# Patient Record
Sex: FEMALE | Race: WHITE | NOT HISPANIC OR LATINO | Employment: OTHER | URBAN - METROPOLITAN AREA
[De-identification: names, ages, dates, MRNs, and addresses within clinical notes are randomized per-mention and may not be internally consistent; named-entity substitution may affect disease eponyms.]

---

## 2017-02-06 ENCOUNTER — TRANSCRIBE ORDERS (OUTPATIENT)
Dept: ADMINISTRATIVE | Facility: HOSPITAL | Age: 72
End: 2017-02-06

## 2017-02-06 ENCOUNTER — LAB (OUTPATIENT)
Dept: LAB | Facility: HOSPITAL | Age: 72
End: 2017-02-06
Attending: INTERNAL MEDICINE

## 2017-02-06 DIAGNOSIS — K75.81 STEATOHEPATITIS, NON-ALCOHOLIC: ICD-10-CM

## 2017-02-06 DIAGNOSIS — K75.81 STEATOHEPATITIS, NON-ALCOHOLIC: Primary | ICD-10-CM

## 2017-02-06 LAB
ALBUMIN SERPL-MCNC: 4.3 G/DL (ref 3.5–5.2)
ALBUMIN/GLOB SERPL: 1.6 G/DL
ALP SERPL-CCNC: 105 U/L (ref 40–129)
ALT SERPL W P-5'-P-CCNC: 112 U/L (ref 5–33)
ANION GAP SERPL CALCULATED.3IONS-SCNC: 16.1 MMOL/L
AST SERPL-CCNC: 87 U/L (ref 5–32)
BILIRUB SERPL-MCNC: 1.1 MG/DL (ref 0.2–1.2)
BUN BLD-MCNC: 14 MG/DL (ref 8–23)
BUN/CREAT SERPL: 14.9 (ref 7–25)
CALCIUM SPEC-SCNC: 9.5 MG/DL (ref 8.8–10.5)
CHLORIDE SERPL-SCNC: 99 MMOL/L (ref 98–107)
CO2 SERPL-SCNC: 20.9 MMOL/L (ref 22–29)
CREAT BLD-MCNC: 0.94 MG/DL (ref 0.57–1)
GFR SERPL CREATININE-BSD FRML MDRD: 59 ML/MIN/1.73
GGT SERPL-CCNC: 254 U/L (ref 5–36)
GLOBULIN UR ELPH-MCNC: 2.7 GM/DL
GLUCOSE BLD-MCNC: 116 MG/DL (ref 65–99)
POTASSIUM BLD-SCNC: 4.2 MMOL/L (ref 3.5–5.2)
PROT SERPL-MCNC: 7 G/DL (ref 6–8.5)
SODIUM BLD-SCNC: 136 MMOL/L (ref 136–145)

## 2017-02-06 PROCEDURE — 36415 COLL VENOUS BLD VENIPUNCTURE: CPT

## 2017-02-06 PROCEDURE — 80053 COMPREHEN METABOLIC PANEL: CPT

## 2017-02-06 PROCEDURE — 82977 ASSAY OF GGT: CPT

## 2017-02-14 ENCOUNTER — OFFICE (OUTPATIENT)
Dept: URBAN - METROPOLITAN AREA OTHER 6 | Facility: OTHER | Age: 72
End: 2017-02-14
Payer: MEDICARE

## 2017-02-14 VITALS
HEIGHT: 66 IN | DIASTOLIC BLOOD PRESSURE: 80 MMHG | WEIGHT: 171 LBS | SYSTOLIC BLOOD PRESSURE: 139 MMHG | HEART RATE: 88 BPM

## 2017-02-14 DIAGNOSIS — Z01.89 ENCOUNTER FOR OTHER SPECIFIED SPECIAL EXAMINATIONS: ICD-10-CM

## 2017-02-14 DIAGNOSIS — K75.81 NONALCOHOLIC STEATOHEPATITIS (NASH): ICD-10-CM

## 2017-02-14 PROCEDURE — 99212 OFFICE O/P EST SF 10 MIN: CPT

## 2017-02-16 ENCOUNTER — TRANSCRIBE ORDERS (OUTPATIENT)
Dept: ADMINISTRATIVE | Facility: HOSPITAL | Age: 72
End: 2017-02-16

## 2017-02-16 DIAGNOSIS — Z12.39 SCREENING BREAST EXAMINATION: Primary | ICD-10-CM

## 2017-02-17 ENCOUNTER — HOSPITAL ENCOUNTER (OUTPATIENT)
Dept: MAMMOGRAPHY | Facility: HOSPITAL | Age: 72
Discharge: HOME | End: 2017-02-17
Attending: INTERNAL MEDICINE | Admitting: INTERNAL MEDICINE

## 2017-02-17 DIAGNOSIS — Z12.31 ENCOUNTER FOR MAMMOGRAM TO ESTABLISH BASELINE MAMMOGRAM: ICD-10-CM

## 2017-02-17 DIAGNOSIS — Z12.39 SCREENING BREAST EXAMINATION: ICD-10-CM

## 2017-02-17 PROCEDURE — 77063 BREAST TOMOSYNTHESIS BI: CPT

## 2017-02-17 PROCEDURE — G0202 SCR MAMMO BI INCL CAD: HCPCS

## 2017-04-14 ENCOUNTER — TRANSCRIBE ORDERS (OUTPATIENT)
Dept: ADMINISTRATIVE | Facility: HOSPITAL | Age: 72
End: 2017-04-14

## 2017-04-14 ENCOUNTER — LAB (OUTPATIENT)
Dept: LAB | Facility: HOSPITAL | Age: 72
End: 2017-04-14
Attending: INTERNAL MEDICINE

## 2017-04-14 DIAGNOSIS — K75.81 NONALCOHOLIC STEATOHEPATITIS: ICD-10-CM

## 2017-04-14 DIAGNOSIS — Z01.89 RADIOLOGICAL EXAMINATION, NOT ELSEWHERE CLASSIFIED: Primary | ICD-10-CM

## 2017-04-14 DIAGNOSIS — Z01.89 RADIOLOGICAL EXAMINATION, NOT ELSEWHERE CLASSIFIED: ICD-10-CM

## 2017-04-14 LAB
ALBUMIN SERPL-MCNC: 4.4 G/DL (ref 3.5–5.2)
ALBUMIN/GLOB SERPL: 1.6 G/DL
ALP SERPL-CCNC: 87 U/L (ref 40–129)
ALT SERPL W P-5'-P-CCNC: 74 U/L (ref 5–33)
ANION GAP SERPL CALCULATED.3IONS-SCNC: 13.9 MMOL/L
AST SERPL-CCNC: 67 U/L (ref 5–32)
BILIRUB SERPL-MCNC: 1 MG/DL (ref 0.2–1.2)
BUN BLD-MCNC: 12 MG/DL (ref 8–23)
BUN/CREAT SERPL: 14.1 (ref 7–25)
CALCIUM SPEC-SCNC: 9.8 MG/DL (ref 8.8–10.5)
CHLORIDE SERPL-SCNC: 103 MMOL/L (ref 98–107)
CO2 SERPL-SCNC: 23.1 MMOL/L (ref 22–29)
CREAT BLD-MCNC: 0.85 MG/DL (ref 0.57–1)
GFR SERPL CREATININE-BSD FRML MDRD: 66 ML/MIN/1.73
GLOBULIN UR ELPH-MCNC: 2.7 GM/DL
GLUCOSE BLD-MCNC: 107 MG/DL (ref 65–99)
POTASSIUM BLD-SCNC: 4.3 MMOL/L (ref 3.5–5.2)
PROT SERPL-MCNC: 7.1 G/DL (ref 6–8.5)
SODIUM BLD-SCNC: 140 MMOL/L (ref 136–145)

## 2017-04-14 PROCEDURE — 36415 COLL VENOUS BLD VENIPUNCTURE: CPT

## 2017-04-14 PROCEDURE — 80053 COMPREHEN METABOLIC PANEL: CPT

## 2017-04-19 ENCOUNTER — TRANSCRIBE ORDERS (OUTPATIENT)
Dept: ADMINISTRATIVE | Facility: HOSPITAL | Age: 72
End: 2017-04-19

## 2017-04-19 DIAGNOSIS — M89.9 DISORDER OF BONE, UNSPECIFIED: Primary | ICD-10-CM

## 2017-04-28 ENCOUNTER — APPOINTMENT (OUTPATIENT)
Dept: BONE DENSITY | Facility: HOSPITAL | Age: 72
End: 2017-04-28
Attending: INTERNAL MEDICINE

## 2017-04-28 DIAGNOSIS — M89.9 DISORDER OF BONE, UNSPECIFIED: ICD-10-CM

## 2017-04-28 PROCEDURE — 77080 DXA BONE DENSITY AXIAL: CPT

## 2017-06-06 ENCOUNTER — OFFICE (OUTPATIENT)
Dept: URBAN - METROPOLITAN AREA OTHER 6 | Facility: OTHER | Age: 72
End: 2017-06-06

## 2017-06-06 VITALS
HEART RATE: 75 BPM | WEIGHT: 155 LBS | DIASTOLIC BLOOD PRESSURE: 84 MMHG | SYSTOLIC BLOOD PRESSURE: 142 MMHG | HEIGHT: 66 IN

## 2017-06-06 DIAGNOSIS — K22.70 BARRETT'S ESOPHAGUS WITHOUT DYSPLASIA: ICD-10-CM

## 2017-06-06 DIAGNOSIS — K75.81 NONALCOHOLIC STEATOHEPATITIS (NASH): ICD-10-CM

## 2017-06-06 PROCEDURE — 99212 OFFICE O/P EST SF 10 MIN: CPT

## 2017-06-08 ENCOUNTER — TRANSCRIBE ORDERS (OUTPATIENT)
Dept: ADMINISTRATIVE | Facility: HOSPITAL | Age: 72
End: 2017-06-08

## 2017-06-08 ENCOUNTER — LAB (OUTPATIENT)
Dept: LAB | Facility: HOSPITAL | Age: 72
End: 2017-06-08
Attending: INTERNAL MEDICINE

## 2017-06-08 DIAGNOSIS — K75.81 NONALCOHOLIC STEATOHEPATITIS: ICD-10-CM

## 2017-06-08 DIAGNOSIS — K75.81 NONALCOHOLIC STEATOHEPATITIS: Primary | ICD-10-CM

## 2017-06-08 DIAGNOSIS — K22.70 SHORT-SEGMENT BARRETT'S ESOPHAGUS: ICD-10-CM

## 2017-06-08 LAB
ALBUMIN SERPL-MCNC: 4.2 G/DL (ref 3.5–5.2)
ALBUMIN/GLOB SERPL: 1.8 G/DL
ALP SERPL-CCNC: 79 U/L (ref 40–129)
ALT SERPL W P-5'-P-CCNC: 35 U/L (ref 5–33)
ANION GAP SERPL CALCULATED.3IONS-SCNC: 13.3 MMOL/L
AST SERPL-CCNC: 33 U/L (ref 5–32)
BILIRUB SERPL-MCNC: 0.9 MG/DL (ref 0.2–1.2)
BUN BLD-MCNC: 16 MG/DL (ref 8–23)
BUN/CREAT SERPL: 21.9 (ref 7–25)
CALCIUM SPEC-SCNC: 8.8 MG/DL (ref 8.8–10.5)
CHLORIDE SERPL-SCNC: 101 MMOL/L (ref 98–107)
CO2 SERPL-SCNC: 21.7 MMOL/L (ref 22–29)
CREAT BLD-MCNC: 0.73 MG/DL (ref 0.57–1)
GFR SERPL CREATININE-BSD FRML MDRD: 79 ML/MIN/1.73
GLOBULIN UR ELPH-MCNC: 2.3 GM/DL
GLUCOSE BLD-MCNC: 100 MG/DL (ref 65–99)
POTASSIUM BLD-SCNC: 4.5 MMOL/L (ref 3.5–5.2)
PROT SERPL-MCNC: 6.5 G/DL (ref 6–8.5)
SODIUM BLD-SCNC: 136 MMOL/L (ref 136–145)

## 2017-06-08 PROCEDURE — 80053 COMPREHEN METABOLIC PANEL: CPT

## 2017-06-08 PROCEDURE — 36415 COLL VENOUS BLD VENIPUNCTURE: CPT

## 2017-07-10 ENCOUNTER — TRANSCRIBE ORDERS (OUTPATIENT)
Dept: ADMINISTRATIVE | Facility: HOSPITAL | Age: 72
End: 2017-07-10

## 2017-07-14 ENCOUNTER — TRANSCRIBE ORDERS (OUTPATIENT)
Dept: ADMINISTRATIVE | Facility: HOSPITAL | Age: 72
End: 2017-07-14

## 2017-07-17 ENCOUNTER — TRANSCRIBE ORDERS (OUTPATIENT)
Dept: ADMINISTRATIVE | Facility: HOSPITAL | Age: 72
End: 2017-07-17

## 2017-07-17 DIAGNOSIS — I70.8 ATHEROSCLEROSIS OF OTHER SPECIFIED ARTERIES: Primary | ICD-10-CM

## 2017-07-20 ENCOUNTER — HOSPITAL ENCOUNTER (OUTPATIENT)
Dept: ULTRASOUND IMAGING | Facility: HOSPITAL | Age: 72
Discharge: HOME | End: 2017-07-20
Attending: INTERNAL MEDICINE | Admitting: INTERNAL MEDICINE

## 2017-07-20 DIAGNOSIS — I70.8 ATHEROSCLEROSIS OF OTHER SPECIFIED ARTERIES: ICD-10-CM

## 2017-07-20 PROCEDURE — 93880 EXTRACRANIAL BILAT STUDY: CPT

## 2017-12-05 ENCOUNTER — TRANSCRIBE ORDERS (OUTPATIENT)
Dept: ADMINISTRATIVE | Facility: HOSPITAL | Age: 72
End: 2017-12-05

## 2017-12-05 ENCOUNTER — LAB (OUTPATIENT)
Dept: LAB | Facility: HOSPITAL | Age: 72
End: 2017-12-05
Attending: INTERNAL MEDICINE

## 2017-12-05 DIAGNOSIS — K75.81 STEATOHEPATITIS, NON-ALCOHOLIC: ICD-10-CM

## 2017-12-05 DIAGNOSIS — K75.81 STEATOHEPATITIS, NON-ALCOHOLIC: Primary | ICD-10-CM

## 2017-12-05 LAB
ALBUMIN SERPL-MCNC: 4 G/DL (ref 3.5–5.2)
ALBUMIN/GLOB SERPL: 1.5 G/DL
ALP SERPL-CCNC: 89 U/L (ref 40–129)
ALT SERPL W P-5'-P-CCNC: 19 U/L (ref 5–33)
ANION GAP SERPL CALCULATED.3IONS-SCNC: 11.3 MMOL/L
AST SERPL-CCNC: 18 U/L (ref 5–32)
BILIRUB SERPL-MCNC: 0.5 MG/DL (ref 0.2–1.2)
BUN BLD-MCNC: 21 MG/DL (ref 8–23)
BUN/CREAT SERPL: 25.6 (ref 7–25)
CALCIUM SPEC-SCNC: 8.8 MG/DL (ref 8.8–10.5)
CHLORIDE SERPL-SCNC: 103 MMOL/L (ref 98–107)
CO2 SERPL-SCNC: 24.7 MMOL/L (ref 22–29)
CREAT BLD-MCNC: 0.82 MG/DL (ref 0.57–1)
GFR SERPL CREATININE-BSD FRML MDRD: 69 ML/MIN/1.73
GLOBULIN UR ELPH-MCNC: 2.6 GM/DL
GLUCOSE BLD-MCNC: 100 MG/DL (ref 65–99)
POTASSIUM BLD-SCNC: 4.4 MMOL/L (ref 3.5–5.2)
PROT SERPL-MCNC: 6.6 G/DL (ref 6–8.5)
SODIUM BLD-SCNC: 139 MMOL/L (ref 136–145)

## 2017-12-05 PROCEDURE — 36415 COLL VENOUS BLD VENIPUNCTURE: CPT

## 2017-12-05 PROCEDURE — 80053 COMPREHEN METABOLIC PANEL: CPT

## 2017-12-07 ENCOUNTER — HOSPITAL ENCOUNTER (OUTPATIENT)
Dept: NUCLEAR MEDICINE | Facility: HOSPITAL | Age: 72
Discharge: HOME | End: 2017-12-07
Attending: UROLOGY

## 2017-12-07 ENCOUNTER — OFFICE (OUTPATIENT)
Dept: URBAN - METROPOLITAN AREA OTHER 6 | Facility: OTHER | Age: 72
End: 2017-12-07

## 2017-12-07 VITALS
HEART RATE: 84 BPM | WEIGHT: 153 LBS | HEIGHT: 66 IN | DIASTOLIC BLOOD PRESSURE: 84 MMHG | SYSTOLIC BLOOD PRESSURE: 144 MMHG

## 2017-12-07 DIAGNOSIS — K75.81 NONALCOHOLIC STEATOHEPATITIS (NASH): ICD-10-CM

## 2017-12-07 DIAGNOSIS — K22.70 BARRETT'S ESOPHAGUS WITHOUT DYSPLASIA: ICD-10-CM

## 2017-12-07 DIAGNOSIS — N13.30 HYDRONEPHROSIS, UNSPECIFIED HYDRONEPHROSIS TYPE: ICD-10-CM

## 2017-12-07 PROCEDURE — A9562 TC99M MERTIATIDE: HCPCS | Performed by: UROLOGY

## 2017-12-07 PROCEDURE — 25010000002 FUROSEMIDE PER 20 MG: Performed by: UROLOGY

## 2017-12-07 PROCEDURE — 0 TECHNETIUM MERTIATIDE: Performed by: UROLOGY

## 2017-12-07 PROCEDURE — 78708 K FLOW/FUNCT IMAGE W/DRUG: CPT

## 2017-12-07 PROCEDURE — 99212 OFFICE O/P EST SF 10 MIN: CPT

## 2017-12-07 RX ORDER — FUROSEMIDE 10 MG/ML
34.5 INJECTION INTRAMUSCULAR; INTRAVENOUS
Status: COMPLETED | OUTPATIENT
Start: 2017-12-07 | End: 2017-12-07

## 2017-12-07 RX ADMIN — FUROSEMIDE 34.5 MG: 10 INJECTION, SOLUTION INTRAMUSCULAR; INTRAVENOUS at 11:30

## 2017-12-07 RX ADMIN — TECHNESCAN TC 99M MERTIATIDE 1 DOSE: 1 INJECTION, POWDER, LYOPHILIZED, FOR SOLUTION INTRAVENOUS at 11:30

## 2017-12-11 ENCOUNTER — TRANSCRIBE ORDERS (OUTPATIENT)
Dept: ADMINISTRATIVE | Facility: HOSPITAL | Age: 72
End: 2017-12-11

## 2017-12-11 ENCOUNTER — HOSPITAL ENCOUNTER (OUTPATIENT)
Dept: GENERAL RADIOLOGY | Facility: HOSPITAL | Age: 72
Discharge: HOME | End: 2017-12-11
Attending: UROLOGY | Admitting: UROLOGY

## 2017-12-11 DIAGNOSIS — N20.0 KIDNEY STONE: Primary | ICD-10-CM

## 2017-12-11 DIAGNOSIS — N20.0 KIDNEY STONE: ICD-10-CM

## 2017-12-11 PROCEDURE — 74000 HC ABDOMEN KUB: CPT

## 2018-01-17 ENCOUNTER — TRANSCRIBE ORDERS (OUTPATIENT)
Dept: ADMINISTRATIVE | Facility: HOSPITAL | Age: 73
End: 2018-01-17

## 2018-01-17 DIAGNOSIS — Z12.31 VISIT FOR SCREENING MAMMOGRAM: Primary | ICD-10-CM

## 2018-02-19 ENCOUNTER — HOSPITAL ENCOUNTER (OUTPATIENT)
Dept: MAMMOGRAPHY | Facility: HOSPITAL | Age: 73
Discharge: HOME | End: 2018-02-19
Attending: INTERNAL MEDICINE | Admitting: INTERNAL MEDICINE

## 2018-02-19 DIAGNOSIS — Z12.31 VISIT FOR SCREENING MAMMOGRAM: ICD-10-CM

## 2018-02-19 PROCEDURE — 77063 BREAST TOMOSYNTHESIS BI: CPT

## 2018-02-19 PROCEDURE — 77067 SCR MAMMO BI INCL CAD: CPT

## 2018-06-04 ENCOUNTER — OFFICE VISIT (OUTPATIENT)
Dept: GASTROENTEROLOGY | Facility: CLINIC | Age: 73
End: 2018-06-04

## 2018-06-04 VITALS
DIASTOLIC BLOOD PRESSURE: 86 MMHG | HEIGHT: 66 IN | WEIGHT: 147 LBS | BODY MASS INDEX: 23.63 KG/M2 | SYSTOLIC BLOOD PRESSURE: 138 MMHG

## 2018-06-04 DIAGNOSIS — K75.81 NASH (NONALCOHOLIC STEATOHEPATITIS): ICD-10-CM

## 2018-06-04 DIAGNOSIS — K22.70 BARRETT'S ESOPHAGUS WITHOUT DYSPLASIA: Primary | ICD-10-CM

## 2018-06-04 PROCEDURE — 99213 OFFICE O/P EST LOW 20 MIN: CPT | Performed by: INTERNAL MEDICINE

## 2018-06-04 RX ORDER — OMEPRAZOLE 40 MG/1
40 CAPSULE, DELAYED RELEASE ORAL DAILY
Qty: 90 CAPSULE | Refills: 3 | Status: SHIPPED | OUTPATIENT
Start: 2018-06-04 | End: 2019-08-12 | Stop reason: SDUPTHER

## 2018-06-04 NOTE — PROGRESS NOTES
PATIENT INFORMATION  Germaine Gonzalez       - 1945    CHIEF COMPLAINT  Chief Complaint   Patient presents with   • Elevated Hepatic Enzymes     6 mo follow up       HISTORY OF PRESENT ILLNESS  Follow up from [facility] but her last enzymes were in December and has seen [facility] and had labs but her LFTs weren't mentioned.  Has had nocturnal awakening with vomiting all brief and recurrent and c/w rank Gerd. 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 The current episode started more than 1 year ago. The onset quality is undetermined. The problem occurs intermittently. Duration: Minutes. The problem has been resolved. Pain location: Base of neck. The pain is moderate. The quality of the pain is burning. Associated symptoms include vomiting and weight loss. She has tried H2 blockers for the symptoms. Prior diagnostic workup includes upper endoscopy.           REVIEW OF SYSTEMS  Review of Systems   Constitutional: Positive for weight loss.   Gastrointestinal: Positive for abdominal pain and vomiting.        Reflux   All other systems reviewed and are negative.        ACTIVE PROBLEMS  Patient Active Problem List    Diagnosis   • Gastroesophageal reflux disease [K21.9]   • Hyperlipidemia [E78.5]         PAST MEDICAL HISTORY  Past Medical History:   Diagnosis Date   • Arthritis     knees   • Cancer     cervical   • Cervical cancer    • Eczema    • Elevated liver enzymes     PCP has stopped statin   • GERD (gastroesophageal reflux disease)    • H/O stress fracture    • Health care maintenance    • Hypercholesteremia    • Kidney stones     sched ureteroscopy, lithotripsy   • Ocular migraine    • Postmenopausal    • Seasonal allergies    • Sjogren's syndrome          SURGICAL HISTORY  Past Surgical History:   Procedure Laterality Date   •  SECTION      x2   • COLONOSCOPY     • CYSTOSCOPY URETEROSCOPY STONE MANIPULATION/EXTRACTION Left 10/14/2016     Procedure: LEFT URETEROSCOPY bilateral retrograde pyleogram LEFT STENT PLACEMENT.;  Surgeon: Julius Darling MD;  Location:  [facility] OR;  Service:    • HYSTERECTOMY     • UPPER GASTROINTESTINAL ENDOSCOPY     • URETEROSCOPY LASER LITHOTRIPSY WITH STENT INSERTION Left 10/26/2016    Procedure: LT URETEROSCOPY LASER LITHOTRIPSY ;  Surgeon: Julius Darling MD;  [facility] MAIN OR;  Service:          FAMILY HISTORY  Family History   Problem Relation Age of Onset   • Anxiety disorder Mother    • Bipolar disorder Mother    • Stroke Mother    • Thyroid disease Mother    • Thyroid disease Brother    • Breast cancer Neg Hx          SOCIAL HISTORY  Social History     Occupational History   • Not on file.     Social History Main Topics   • Smoking status: Former Smoker     Packs/day: 0.50     Years: 16.00     Types: Cigarettes     Quit date: 04/1990   • Smokeless tobacco: Never Used   • Alcohol use Yes      Comment: socially   • Drug use: No   • Sexual activity: Defer         CURRENT MEDICATIONS    Current Outpatient Prescriptions:   •  acetaminophen (TYLENOL) 325 MG tablet, Take 650 mg by mouth Every 6 (Six) Hours As Needed for mild pain (1-3)., Disp: , Rfl:   •  calcium carbonate (OS-ULI) 600 MG tablet, Take 600 mg by mouth 2 (Two) Times a Day With Meals., Disp: , Rfl:   •  cycloSPORINE (RESTASIS) 0.05 % ophthalmic emulsion, Administer 1 drop to both eyes 2 (Two) Times a Day., Disp: , Rfl:   •  desoximetasone (TOPICORT) 0.25 % cream, Apply 1 application topically As Needed., Disp: , Rfl:   •  escitalopram (LEXAPRO) 10 MG tablet, Take 5 mg by mouth Every Morning., Disp: , Rfl:   •  fluticasone (FLONASE) 50 MCG/ACT nasal spray, 2 sprays into each nostril Daily., Disp: , Rfl:   •  glucosamine-chondroitin 500-400 MG capsule capsule, Take 1 capsule by mouth Daily., Disp: , Rfl:   •  nitrofurantoin (MACRODANTIN) 100 MG capsule, Take 100 mg by mouth Every Night., Disp: , Rfl:   •  omeprazole (PriLOSEC) 40 MG capsule, Take 40  "mg by mouth Daily., Disp: , Rfl:   •  omeprazole (PRILOSEC) 40 MG capsule, Take 1 capsule by mouth Daily., Disp: 90 capsule, Rfl: 3  •  Probiotic Product (PROBIOTIC DAILY PO), Take 1 tablet by mouth Daily., Disp: , Rfl:   •  vitamin B-12 (CYANOCOBALAMIN) 1000 MCG tablet, Take 5,000 mcg by mouth Daily., Disp: , Rfl:     ALLERGIES  Levaquin [levofloxacin] and Tetracycline    VITALS  Vitals:    06/04/18 1315   BP: 138/86   Weight: 66.7 kg (147 lb)   Height: 167.6 cm (65.98\")       LAST RESULTS   Lab on 12/05/2017   Component Date Value Ref Range Status   • Glucose 12/05/2017 100* 65 - 99 mg/dL Final   • BUN 12/05/2017 21  8 - 23 mg/dL Final   • Creatinine 12/05/2017 0.82  0.57 - 1.00 mg/dL Final   • Sodium 12/05/2017 139  136 - 145 mmol/L Final   • Potassium 12/05/2017 4.4  3.5 - 5.2 mmol/L Final   • Chloride 12/05/2017 103  98 - 107 mmol/L Final   • CO2 12/05/2017 24.7  22.0 - 29.0 mmol/L Final   • Calcium 12/05/2017 8.8  8.8 - 10.5 mg/dL Final   • Total Protein 12/05/2017 6.6  6.0 - 8.5 g/dL Final   • Albumin 12/05/2017 4.00  3.50 - 5.20 g/dL Final   • ALT (SGPT) 12/05/2017 19  5 - 33 U/L Final   • AST (SGOT) 12/05/2017 18  5 - 32 U/L Final   • Alkaline Phosphatase 12/05/2017 89  40 - 129 U/L Final   • Total Bilirubin 12/05/2017 0.5  0.2 - 1.2 mg/dL Final   • eGFR Non African Amer 12/05/2017 69  >60 mL/min/1.73 Final   • Globulin 12/05/2017 2.6  gm/dL Final   • A/G Ratio 12/05/2017 1.5  g/dL Final   • BUN/Creatinine Ratio 12/05/2017 25.6* 7.0 - 25.0 Final   • Anion Gap 12/05/2017 11.3  mmol/L Final     No results found.    PHYSICAL EXAM  Physical Exam   Constitutional: She is oriented to person, place, and time. She appears well-developed and well-nourished.   HENT:   Head: Normocephalic.   Mouth/Throat: Oropharynx is clear and moist.   Eyes: Conjunctivae and EOM are normal. Pupils are equal, round, and reactive to light. No scleral icterus.   Neck: Normal range of motion. Neck supple. No thyromegaly present. "   Cardiovascular: Normal rate, regular rhythm, normal heart sounds and intact distal pulses.  Exam reveals no gallop.    No murmur heard.  Pulmonary/Chest: Effort normal and breath sounds normal. She has no wheezes. She has no rales.   Abdominal: Soft. Bowel sounds are normal. She exhibits no shifting dullness, no distension, no fluid wave, no abdominal bruit, no ascites and no mass. There is no hepatosplenomegaly. There is tenderness in the epigastric area. There is no guarding and negative García's sign. Hernia confirmed negative in the ventral area.   Musculoskeletal: Normal range of motion. She exhibits no edema.   Lymphadenopathy:     She has no cervical adenopathy.   Neurological: She is alert and oriented to person, place, and time.   Skin: Skin is warm and dry. No rash noted. She is not diaphoretic. No erythema.   Psychiatric: She has a normal mood and affect. Her behavior is normal.       ASSESSMENT  Diagnoses and all orders for this visit:    Mcgill's esophagus without dysplasia    -  Needs recall for EGD in 3/2019    [facility] (nonalcoholic steatohepatitis)    -  Will get labs from [facility]    Other orders  -     omeprazole (PRILOSEC) 40 MG capsule; Take 1 capsule by mouth Daily.          PLAN  Return in about 2 months (around 8/4/2018).

## 2018-07-31 ENCOUNTER — APPOINTMENT (OUTPATIENT)
Dept: CT IMAGING | Facility: HOSPITAL | Age: 73
End: 2018-07-31

## 2018-07-31 ENCOUNTER — HOSPITAL ENCOUNTER (EMERGENCY)
Facility: HOSPITAL | Age: 73
Discharge: HOME | End: 2018-08-01
Attending: EMERGENCY MEDICINE | Admitting: EMERGENCY MEDICINE

## 2018-07-31 DIAGNOSIS — N10 PYELONEPHRITIS, ACUTE: Primary | ICD-10-CM

## 2018-07-31 DIAGNOSIS — N13.4 HYDROURETER ON RIGHT: ICD-10-CM

## 2018-07-31 DIAGNOSIS — N13.30 HYDRONEPHROSIS OF RIGHT KIDNEY: ICD-10-CM

## 2018-07-31 DIAGNOSIS — N13.5 URETERAL STRICTURE, RIGHT: ICD-10-CM

## 2018-07-31 LAB
ANION GAP SERPL CALCULATED.3IONS-SCNC: 14.5 MMOL/L
BACTERIA UR QL AUTO: ABNORMAL /HPF
BASOPHILS # BLD AUTO: 0.06 10*3/MM3 (ref 0–0.2)
BASOPHILS NFR BLD AUTO: 0.5 % (ref 0–2)
BILIRUB UR QL STRIP: NEGATIVE
BUN BLD-MCNC: 18 MG/DL (ref 8–23)
BUN/CREAT SERPL: 16.2 (ref 7–25)
CALCIUM SPEC-SCNC: 9.7 MG/DL (ref 8.8–10.5)
CHLORIDE SERPL-SCNC: 102 MMOL/L (ref 98–107)
CLARITY UR: ABNORMAL
CO2 SERPL-SCNC: 24.5 MMOL/L (ref 22–29)
COLOR UR: ABNORMAL
CREAT BLD-MCNC: 1.11 MG/DL (ref 0.57–1)
DEPRECATED RDW RBC AUTO: 43.6 FL (ref 37–54)
EOSINOPHIL # BLD AUTO: 0.13 10*3/MM3 (ref 0.1–0.3)
EOSINOPHIL NFR BLD AUTO: 1.1 % (ref 0–4)
ERYTHROCYTE [DISTWIDTH] IN BLOOD BY AUTOMATED COUNT: 13 % (ref 11.5–14.5)
GFR SERPL CREATININE-BSD FRML MDRD: 48 ML/MIN/1.73
GLUCOSE BLD-MCNC: 130 MG/DL (ref 65–99)
GLUCOSE UR STRIP-MCNC: NEGATIVE MG/DL
HCT VFR BLD AUTO: 42 % (ref 37–47)
HGB BLD-MCNC: 14.4 G/DL (ref 12–16)
HGB UR QL STRIP.AUTO: ABNORMAL
HYALINE CASTS UR QL AUTO: ABNORMAL /LPF
IMM GRANULOCYTES # BLD: 0.04 10*3/MM3 (ref 0–0.03)
IMM GRANULOCYTES NFR BLD: 0.3 % (ref 0–0.5)
KETONES UR QL STRIP: ABNORMAL
LEUKOCYTE ESTERASE UR QL STRIP.AUTO: ABNORMAL
LYMPHOCYTES # BLD AUTO: 1.5 10*3/MM3 (ref 0.6–4.8)
LYMPHOCYTES NFR BLD AUTO: 12.6 % (ref 20–45)
MCH RBC QN AUTO: 31.6 PG (ref 27–31)
MCHC RBC AUTO-ENTMCNC: 34.3 G/DL (ref 31–37)
MCV RBC AUTO: 92.3 FL (ref 81–99)
MONOCYTES # BLD AUTO: 0.3 10*3/MM3 (ref 0–1)
MONOCYTES NFR BLD AUTO: 2.5 % (ref 3–8)
NEUTROPHILS # BLD AUTO: 9.91 10*3/MM3 (ref 1.5–8.3)
NEUTROPHILS NFR BLD AUTO: 83 % (ref 45–70)
NITRITE UR QL STRIP: POSITIVE
NRBC BLD MANUAL-RTO: 0 /100 WBC (ref 0–0)
PH UR STRIP.AUTO: 5.5 [PH] (ref 4.5–8)
PLATELET # BLD AUTO: 258 10*3/MM3 (ref 140–500)
PMV BLD AUTO: 9.3 FL (ref 7.4–10.4)
POTASSIUM BLD-SCNC: 3.7 MMOL/L (ref 3.5–5.2)
PROT UR QL STRIP: ABNORMAL
RBC # BLD AUTO: 4.55 10*6/MM3 (ref 4.2–5.4)
RBC # UR: ABNORMAL /HPF
REF LAB TEST METHOD: ABNORMAL
SODIUM BLD-SCNC: 141 MMOL/L (ref 136–145)
SP GR UR STRIP: 1.02 (ref 1–1.03)
SQUAMOUS #/AREA URNS HPF: ABNORMAL /HPF
UROBILINOGEN UR QL STRIP: ABNORMAL
WBC NRBC COR # BLD: 11.94 10*3/MM3 (ref 4.8–10.8)
WBC UR QL AUTO: ABNORMAL /HPF

## 2018-07-31 PROCEDURE — 87086 URINE CULTURE/COLONY COUNT: CPT | Performed by: EMERGENCY MEDICINE

## 2018-07-31 PROCEDURE — 87077 CULTURE AEROBIC IDENTIFY: CPT | Performed by: EMERGENCY MEDICINE

## 2018-07-31 PROCEDURE — 99284 EMERGENCY DEPT VISIT MOD MDM: CPT | Performed by: EMERGENCY MEDICINE

## 2018-07-31 PROCEDURE — 25010000002 MORPHINE PER 10 MG: Performed by: EMERGENCY MEDICINE

## 2018-07-31 PROCEDURE — 25010000002 ONDANSETRON PER 1 MG: Performed by: EMERGENCY MEDICINE

## 2018-07-31 PROCEDURE — 25010000002 FENTANYL CITRATE (PF) 100 MCG/2ML SOLUTION: Performed by: EMERGENCY MEDICINE

## 2018-07-31 PROCEDURE — 25010000002 PROMETHAZINE PER 50 MG: Performed by: EMERGENCY MEDICINE

## 2018-07-31 PROCEDURE — 96375 TX/PRO/DX INJ NEW DRUG ADDON: CPT

## 2018-07-31 PROCEDURE — 74176 CT ABD & PELVIS W/O CONTRAST: CPT

## 2018-07-31 PROCEDURE — 81001 URINALYSIS AUTO W/SCOPE: CPT | Performed by: EMERGENCY MEDICINE

## 2018-07-31 PROCEDURE — 87186 SC STD MICRODIL/AGAR DIL: CPT | Performed by: EMERGENCY MEDICINE

## 2018-07-31 PROCEDURE — 80048 BASIC METABOLIC PNL TOTAL CA: CPT | Performed by: EMERGENCY MEDICINE

## 2018-07-31 PROCEDURE — 99284 EMERGENCY DEPT VISIT MOD MDM: CPT

## 2018-07-31 PROCEDURE — 85025 COMPLETE CBC W/AUTO DIFF WBC: CPT | Performed by: EMERGENCY MEDICINE

## 2018-07-31 RX ORDER — EZETIMIBE 10 MG/1
10 TABLET ORAL DAILY
COMMUNITY
End: 2018-09-05

## 2018-07-31 RX ORDER — FENTANYL CITRATE 50 UG/ML
50 INJECTION, SOLUTION INTRAMUSCULAR; INTRAVENOUS ONCE
Status: COMPLETED | OUTPATIENT
Start: 2018-07-31 | End: 2018-07-31

## 2018-07-31 RX ORDER — SODIUM CHLORIDE 0.9 % (FLUSH) 0.9 %
10 SYRINGE (ML) INJECTION AS NEEDED
Status: DISCONTINUED | OUTPATIENT
Start: 2018-07-31 | End: 2018-08-01 | Stop reason: HOSPADM

## 2018-07-31 RX ORDER — MORPHINE SULFATE 10 MG/ML
4 INJECTION INTRAMUSCULAR; INTRAVENOUS; SUBCUTANEOUS ONCE
Status: COMPLETED | OUTPATIENT
Start: 2018-07-31 | End: 2018-07-31

## 2018-07-31 RX ORDER — ONDANSETRON 2 MG/ML
4 INJECTION INTRAMUSCULAR; INTRAVENOUS ONCE
Status: COMPLETED | OUTPATIENT
Start: 2018-07-31 | End: 2018-07-31

## 2018-07-31 RX ORDER — PROMETHAZINE HYDROCHLORIDE 25 MG/ML
12.5 INJECTION, SOLUTION INTRAMUSCULAR; INTRAVENOUS ONCE
Status: COMPLETED | OUTPATIENT
Start: 2018-07-31 | End: 2018-07-31

## 2018-07-31 RX ORDER — ONDANSETRON 8 MG/1
8 TABLET, ORALLY DISINTEGRATING ORAL AS NEEDED
COMMUNITY
End: 2018-08-05 | Stop reason: HOSPADM

## 2018-07-31 RX ADMIN — PROMETHAZINE HYDROCHLORIDE 12.5 MG: 25 INJECTION INTRAMUSCULAR; INTRAVENOUS at 23:56

## 2018-07-31 RX ADMIN — FENTANYL CITRATE 50 MCG: 50 INJECTION INTRAMUSCULAR; INTRAVENOUS at 23:55

## 2018-07-31 RX ADMIN — MORPHINE SULFATE 4 MG: 10 INJECTION INTRAVENOUS at 23:23

## 2018-07-31 RX ADMIN — ONDANSETRON 4 MG: 2 INJECTION INTRAMUSCULAR; INTRAVENOUS at 23:23

## 2018-08-01 VITALS
HEART RATE: 81 BPM | BODY MASS INDEX: 24.16 KG/M2 | DIASTOLIC BLOOD PRESSURE: 71 MMHG | OXYGEN SATURATION: 98 % | SYSTOLIC BLOOD PRESSURE: 140 MMHG | HEIGHT: 65 IN | TEMPERATURE: 98.8 F | RESPIRATION RATE: 14 BRPM | WEIGHT: 145 LBS

## 2018-08-01 PROCEDURE — 96365 THER/PROPH/DIAG IV INF INIT: CPT

## 2018-08-01 PROCEDURE — 25010000002 CEFTRIAXONE PER 250 MG: Performed by: EMERGENCY MEDICINE

## 2018-08-01 RX ORDER — CEFUROXIME AXETIL 500 MG/1
500 TABLET ORAL 2 TIMES DAILY
Qty: 14 TABLET | Refills: 0 | Status: SHIPPED | OUTPATIENT
Start: 2018-08-01 | End: 2018-08-05 | Stop reason: HOSPADM

## 2018-08-01 RX ORDER — PROMETHAZINE HYDROCHLORIDE 25 MG/1
12.5 TABLET ORAL EVERY 6 HOURS PRN
Qty: 20 TABLET | Refills: 0 | Status: SHIPPED | OUTPATIENT
Start: 2018-08-01 | End: 2018-08-05 | Stop reason: HOSPADM

## 2018-08-01 RX ORDER — OXYCODONE HYDROCHLORIDE AND ACETAMINOPHEN 5; 325 MG/1; MG/1
1 TABLET ORAL EVERY 6 HOURS PRN
Qty: 20 TABLET | Refills: 0 | Status: SHIPPED | OUTPATIENT
Start: 2018-08-01 | End: 2018-08-05 | Stop reason: HOSPADM

## 2018-08-01 RX ADMIN — CEFTRIAXONE 1 G: 1 INJECTION, SOLUTION INTRAVENOUS at 00:04

## 2018-08-02 ENCOUNTER — HOSPITAL ENCOUNTER (INPATIENT)
Facility: HOSPITAL | Age: 73
LOS: 2 days | Discharge: HOME HEALTH | End: 2018-08-05
Attending: EMERGENCY MEDICINE | Admitting: HOSPITALIST

## 2018-08-02 DIAGNOSIS — N12 PYELONEPHRITIS: Primary | ICD-10-CM

## 2018-08-02 LAB
ALBUMIN SERPL-MCNC: 3.6 G/DL (ref 3.5–5.2)
ALBUMIN/GLOB SERPL: 1.1 G/DL
ALP SERPL-CCNC: 79 U/L (ref 40–129)
ALT SERPL W P-5'-P-CCNC: 11 U/L (ref 5–33)
AMORPH URATE CRY URNS QL MICRO: ABNORMAL /HPF
ANION GAP SERPL CALCULATED.3IONS-SCNC: 15.7 MMOL/L
AST SERPL-CCNC: 11 U/L (ref 5–32)
BACTERIA UR QL AUTO: ABNORMAL /HPF
BASOPHILS # BLD AUTO: 0.02 10*3/MM3 (ref 0–0.2)
BASOPHILS NFR BLD AUTO: 0.1 % (ref 0–2)
BILIRUB SERPL-MCNC: 1.7 MG/DL (ref 0.2–1.2)
BILIRUB UR QL STRIP: NEGATIVE
BUN BLD-MCNC: 14 MG/DL (ref 8–23)
BUN/CREAT SERPL: 14.4 (ref 7–25)
CALCIUM SPEC-SCNC: 9.4 MG/DL (ref 8.8–10.5)
CHLORIDE SERPL-SCNC: 95 MMOL/L (ref 98–107)
CLARITY UR: CLEAR
CO2 SERPL-SCNC: 23.3 MMOL/L (ref 22–29)
COLOR UR: YELLOW
CREAT BLD-MCNC: 0.97 MG/DL (ref 0.57–1)
DEPRECATED RDW RBC AUTO: 43.4 FL (ref 37–54)
EOSINOPHIL # BLD AUTO: 0 10*3/MM3 (ref 0.1–0.3)
EOSINOPHIL NFR BLD AUTO: 0 % (ref 0–4)
ERYTHROCYTE [DISTWIDTH] IN BLOOD BY AUTOMATED COUNT: 12.7 % (ref 11.5–14.5)
GFR SERPL CREATININE-BSD FRML MDRD: 57 ML/MIN/1.73
GLOBULIN UR ELPH-MCNC: 3.4 GM/DL
GLUCOSE BLD-MCNC: 138 MG/DL (ref 65–99)
GLUCOSE UR STRIP-MCNC: NEGATIVE MG/DL
HCT VFR BLD AUTO: 39.7 % (ref 37–47)
HGB BLD-MCNC: 13.5 G/DL (ref 12–16)
HGB UR QL STRIP.AUTO: ABNORMAL
HYALINE CASTS UR QL AUTO: ABNORMAL /LPF
IMM GRANULOCYTES # BLD: 0.06 10*3/MM3 (ref 0–0.03)
IMM GRANULOCYTES NFR BLD: 0.4 % (ref 0–0.5)
KETONES UR QL STRIP: ABNORMAL
LEUKOCYTE ESTERASE UR QL STRIP.AUTO: ABNORMAL
LYMPHOCYTES # BLD AUTO: 1.37 10*3/MM3 (ref 0.6–4.8)
LYMPHOCYTES NFR BLD AUTO: 9.8 % (ref 20–45)
MCH RBC QN AUTO: 31.3 PG (ref 27–31)
MCHC RBC AUTO-ENTMCNC: 34 G/DL (ref 31–37)
MCV RBC AUTO: 92.1 FL (ref 81–99)
MONOCYTES # BLD AUTO: 0.67 10*3/MM3 (ref 0–1)
MONOCYTES NFR BLD AUTO: 4.8 % (ref 3–8)
MUCOUS THREADS URNS QL MICRO: ABNORMAL /HPF
NEUTROPHILS # BLD AUTO: 11.91 10*3/MM3 (ref 1.5–8.3)
NEUTROPHILS NFR BLD AUTO: 84.9 % (ref 45–70)
NITRITE UR QL STRIP: NEGATIVE
NRBC BLD MANUAL-RTO: 0 /100 WBC (ref 0–0)
PH UR STRIP.AUTO: 6 [PH] (ref 4.5–8)
PLATELET # BLD AUTO: 169 10*3/MM3 (ref 140–500)
PMV BLD AUTO: 9.8 FL (ref 7.4–10.4)
POTASSIUM BLD-SCNC: 3.7 MMOL/L (ref 3.5–5.2)
PROT SERPL-MCNC: 7 G/DL (ref 6–8.5)
PROT UR QL STRIP: ABNORMAL
RBC # BLD AUTO: 4.31 10*6/MM3 (ref 4.2–5.4)
RBC # UR: ABNORMAL /HPF
REF LAB TEST METHOD: ABNORMAL
SODIUM BLD-SCNC: 134 MMOL/L (ref 136–145)
SP GR UR STRIP: 1.02 (ref 1–1.03)
SQUAMOUS #/AREA URNS HPF: ABNORMAL /HPF
UROBILINOGEN UR QL STRIP: ABNORMAL
WBC NRBC COR # BLD: 14.03 10*3/MM3 (ref 4.8–10.8)
WBC UR QL AUTO: ABNORMAL /HPF

## 2018-08-02 PROCEDURE — 25010000002 ONDANSETRON PER 1 MG: Performed by: EMERGENCY MEDICINE

## 2018-08-02 PROCEDURE — 99284 EMERGENCY DEPT VISIT MOD MDM: CPT

## 2018-08-02 PROCEDURE — 85025 COMPLETE CBC W/AUTO DIFF WBC: CPT | Performed by: EMERGENCY MEDICINE

## 2018-08-02 PROCEDURE — 99284 EMERGENCY DEPT VISIT MOD MDM: CPT | Performed by: EMERGENCY MEDICINE

## 2018-08-02 PROCEDURE — 80053 COMPREHEN METABOLIC PANEL: CPT | Performed by: EMERGENCY MEDICINE

## 2018-08-02 PROCEDURE — P9612 CATHETERIZE FOR URINE SPEC: HCPCS

## 2018-08-02 PROCEDURE — 81001 URINALYSIS AUTO W/SCOPE: CPT | Performed by: EMERGENCY MEDICINE

## 2018-08-02 RX ORDER — ONDANSETRON 2 MG/ML
4 INJECTION INTRAMUSCULAR; INTRAVENOUS ONCE
Status: COMPLETED | OUTPATIENT
Start: 2018-08-02 | End: 2018-08-02

## 2018-08-02 RX ADMIN — SODIUM CHLORIDE 1000 ML: 9 INJECTION, SOLUTION INTRAVENOUS at 19:49

## 2018-08-02 RX ADMIN — ONDANSETRON 4 MG: 2 INJECTION, SOLUTION INTRAMUSCULAR; INTRAVENOUS at 19:49

## 2018-08-03 ENCOUNTER — ANESTHESIA EVENT (OUTPATIENT)
Dept: PERIOP | Facility: HOSPITAL | Age: 73
End: 2018-08-03

## 2018-08-03 ENCOUNTER — ANESTHESIA (OUTPATIENT)
Dept: PERIOP | Facility: HOSPITAL | Age: 73
End: 2018-08-03

## 2018-08-03 ENCOUNTER — APPOINTMENT (OUTPATIENT)
Dept: CT IMAGING | Facility: HOSPITAL | Age: 73
End: 2018-08-03

## 2018-08-03 ENCOUNTER — APPOINTMENT (OUTPATIENT)
Dept: GENERAL RADIOLOGY | Facility: HOSPITAL | Age: 73
End: 2018-08-03

## 2018-08-03 PROBLEM — N12 PYELONEPHRITIS: Status: ACTIVE | Noted: 2018-08-03

## 2018-08-03 LAB — BACTERIA SPEC AEROBE CULT: ABNORMAL

## 2018-08-03 PROCEDURE — 0T768DZ DILATION OF RIGHT URETER WITH INTRALUMINAL DEVICE, VIA NATURAL OR ARTIFICIAL OPENING ENDOSCOPIC: ICD-10-PCS | Performed by: UROLOGY

## 2018-08-03 PROCEDURE — 25010000002 CEFTRIAXONE: Performed by: INTERNAL MEDICINE

## 2018-08-03 PROCEDURE — 99222 1ST HOSP IP/OBS MODERATE 55: CPT | Performed by: HOSPITALIST

## 2018-08-03 PROCEDURE — 25010000002 ONDANSETRON PER 1 MG: Performed by: HOSPITALIST

## 2018-08-03 PROCEDURE — 25010000002 IOPAMIDOL 61 % SOLUTION: Performed by: UROLOGY

## 2018-08-03 PROCEDURE — 25010000002 FENTANYL CITRATE (PF) 100 MCG/2ML SOLUTION: Performed by: NURSE ANESTHETIST, CERTIFIED REGISTERED

## 2018-08-03 PROCEDURE — 25010000002 ONDANSETRON PER 1 MG

## 2018-08-03 PROCEDURE — 25010000002 ONDANSETRON PER 1 MG: Performed by: NURSE ANESTHETIST, CERTIFIED REGISTERED

## 2018-08-03 PROCEDURE — 25010000002 MIDAZOLAM PER 1 MG: Performed by: NURSE ANESTHETIST, CERTIFIED REGISTERED

## 2018-08-03 PROCEDURE — 87086 URINE CULTURE/COLONY COUNT: CPT | Performed by: UROLOGY

## 2018-08-03 PROCEDURE — BT141ZZ FLUOROSCOPY OF KIDNEYS, URETERS AND BLADDER USING LOW OSMOLAR CONTRAST: ICD-10-PCS | Performed by: UROLOGY

## 2018-08-03 PROCEDURE — 74420 UROGRAPHY RTRGR +-KUB: CPT

## 2018-08-03 PROCEDURE — C2617 STENT, NON-COR, TEM W/O DEL: HCPCS | Performed by: UROLOGY

## 2018-08-03 PROCEDURE — 25010000002 DEXAMETHASONE PER 1 MG: Performed by: NURSE ANESTHETIST, CERTIFIED REGISTERED

## 2018-08-03 PROCEDURE — C1758 CATHETER, URETERAL: HCPCS | Performed by: UROLOGY

## 2018-08-03 PROCEDURE — C1769 GUIDE WIRE: HCPCS | Performed by: UROLOGY

## 2018-08-03 PROCEDURE — 25010000002 PROPOFOL 10 MG/ML EMULSION: Performed by: NURSE ANESTHETIST, CERTIFIED REGISTERED

## 2018-08-03 PROCEDURE — 74176 CT ABD & PELVIS W/O CONTRAST: CPT

## 2018-08-03 PROCEDURE — 25010000002 ERTAPENEM PER 500 MG: Performed by: HOSPITALIST

## 2018-08-03 DEVICE — URETERAL STENT
Type: IMPLANTABLE DEVICE | Site: URETER | Status: NON-FUNCTIONAL
Brand: CONTOUR™

## 2018-08-03 RX ORDER — OXYCODONE HYDROCHLORIDE AND ACETAMINOPHEN 5; 325 MG/1; MG/1
1 TABLET ORAL EVERY 6 HOURS PRN
Status: DISCONTINUED | OUTPATIENT
Start: 2018-08-03 | End: 2018-08-05 | Stop reason: HOSPADM

## 2018-08-03 RX ORDER — MIDAZOLAM HYDROCHLORIDE 1 MG/ML
1 INJECTION INTRAMUSCULAR; INTRAVENOUS
Status: DISCONTINUED | OUTPATIENT
Start: 2018-08-03 | End: 2018-08-03 | Stop reason: HOSPADM

## 2018-08-03 RX ORDER — MAGNESIUM HYDROXIDE 1200 MG/15ML
LIQUID ORAL AS NEEDED
Status: DISCONTINUED | OUTPATIENT
Start: 2018-08-03 | End: 2018-08-03 | Stop reason: HOSPADM

## 2018-08-03 RX ORDER — GLYCOPYRROLATE 0.2 MG/ML
INJECTION INTRAMUSCULAR; INTRAVENOUS AS NEEDED
Status: DISCONTINUED | OUTPATIENT
Start: 2018-08-03 | End: 2018-08-03 | Stop reason: SURG

## 2018-08-03 RX ORDER — ONDANSETRON 2 MG/ML
4 INJECTION INTRAMUSCULAR; INTRAVENOUS ONCE AS NEEDED
Status: COMPLETED | OUTPATIENT
Start: 2018-08-03 | End: 2018-08-03

## 2018-08-03 RX ORDER — SODIUM CHLORIDE 9 MG/ML
40 INJECTION, SOLUTION INTRAVENOUS AS NEEDED
Status: DISCONTINUED | OUTPATIENT
Start: 2018-08-03 | End: 2018-08-05 | Stop reason: HOSPADM

## 2018-08-03 RX ORDER — DESOXIMETASONE 2.5 MG/G
1 CREAM TOPICAL AS NEEDED
COMMUNITY
End: 2018-08-27

## 2018-08-03 RX ORDER — OXYCODONE HYDROCHLORIDE AND ACETAMINOPHEN 5; 325 MG/1; MG/1
1 TABLET ORAL ONCE AS NEEDED
Status: DISCONTINUED | OUTPATIENT
Start: 2018-08-03 | End: 2018-08-03 | Stop reason: HOSPADM

## 2018-08-03 RX ORDER — PHENOL 1.4 %
600 AEROSOL, SPRAY (ML) MUCOUS MEMBRANE 2 TIMES DAILY WITH MEALS
Status: DISCONTINUED | OUTPATIENT
Start: 2018-08-03 | End: 2018-08-03

## 2018-08-03 RX ORDER — SODIUM CHLORIDE 0.9 % (FLUSH) 0.9 %
1-10 SYRINGE (ML) INJECTION AS NEEDED
Status: DISCONTINUED | OUTPATIENT
Start: 2018-08-03 | End: 2018-08-03 | Stop reason: HOSPADM

## 2018-08-03 RX ORDER — ONDANSETRON 2 MG/ML
4 INJECTION INTRAMUSCULAR; INTRAVENOUS ONCE AS NEEDED
Status: DISCONTINUED | OUTPATIENT
Start: 2018-08-03 | End: 2018-08-03 | Stop reason: HOSPADM

## 2018-08-03 RX ORDER — MAGNESIUM HYDROXIDE 1200 MG/15ML
LIQUID ORAL AS NEEDED
Status: DISCONTINUED | OUTPATIENT
Start: 2018-08-03 | End: 2018-08-05 | Stop reason: HOSPADM

## 2018-08-03 RX ORDER — SODIUM CHLORIDE 9 MG/ML
75 INJECTION, SOLUTION INTRAVENOUS CONTINUOUS
Status: DISCONTINUED | OUTPATIENT
Start: 2018-08-03 | End: 2018-08-05 | Stop reason: HOSPADM

## 2018-08-03 RX ORDER — LIDOCAINE HYDROCHLORIDE 20 MG/ML
INJECTION, SOLUTION INFILTRATION; PERINEURAL AS NEEDED
Status: DISCONTINUED | OUTPATIENT
Start: 2018-08-03 | End: 2018-08-03 | Stop reason: SURG

## 2018-08-03 RX ORDER — PROPOFOL 10 MG/ML
VIAL (ML) INTRAVENOUS AS NEEDED
Status: DISCONTINUED | OUTPATIENT
Start: 2018-08-03 | End: 2018-08-03 | Stop reason: SURG

## 2018-08-03 RX ORDER — SODIUM CHLORIDE 9 MG/ML
40 INJECTION, SOLUTION INTRAVENOUS AS NEEDED
Status: DISCONTINUED | OUTPATIENT
Start: 2018-08-03 | End: 2018-08-03 | Stop reason: HOSPADM

## 2018-08-03 RX ORDER — MIDAZOLAM HYDROCHLORIDE 1 MG/ML
2 INJECTION INTRAMUSCULAR; INTRAVENOUS
Status: DISCONTINUED | OUTPATIENT
Start: 2018-08-03 | End: 2018-08-03 | Stop reason: HOSPADM

## 2018-08-03 RX ORDER — SODIUM CHLORIDE, SODIUM LACTATE, POTASSIUM CHLORIDE, CALCIUM CHLORIDE 600; 310; 30; 20 MG/100ML; MG/100ML; MG/100ML; MG/100ML
9 INJECTION, SOLUTION INTRAVENOUS CONTINUOUS PRN
Status: DISCONTINUED | OUTPATIENT
Start: 2018-08-03 | End: 2018-08-03 | Stop reason: HOSPADM

## 2018-08-03 RX ORDER — ACETAMINOPHEN 325 MG/1
650 TABLET ORAL EVERY 4 HOURS PRN
Status: DISCONTINUED | OUTPATIENT
Start: 2018-08-03 | End: 2018-08-05 | Stop reason: HOSPADM

## 2018-08-03 RX ORDER — ONDANSETRON 2 MG/ML
4 INJECTION INTRAMUSCULAR; INTRAVENOUS ONCE
Status: COMPLETED | OUTPATIENT
Start: 2018-08-03 | End: 2018-08-03

## 2018-08-03 RX ORDER — FLUTICASONE PROPIONATE 50 MCG
2 SPRAY, SUSPENSION (ML) NASAL DAILY
Status: DISCONTINUED | OUTPATIENT
Start: 2018-08-03 | End: 2018-08-05 | Stop reason: HOSPADM

## 2018-08-03 RX ORDER — CEFTRIAXONE 1 G/1
INJECTION, POWDER, FOR SOLUTION INTRAMUSCULAR; INTRAVENOUS
Status: DISPENSED
Start: 2018-08-03 | End: 2018-08-03

## 2018-08-03 RX ORDER — HYDROMORPHONE HYDROCHLORIDE 1 MG/ML
0.5 INJECTION, SOLUTION INTRAMUSCULAR; INTRAVENOUS; SUBCUTANEOUS
Status: DISCONTINUED | OUTPATIENT
Start: 2018-08-03 | End: 2018-08-03 | Stop reason: HOSPADM

## 2018-08-03 RX ORDER — PANTOPRAZOLE SODIUM 40 MG/1
40 TABLET, DELAYED RELEASE ORAL EVERY MORNING
Status: DISCONTINUED | OUTPATIENT
Start: 2018-08-03 | End: 2018-08-03

## 2018-08-03 RX ORDER — ESCITALOPRAM OXALATE 10 MG/1
5 TABLET ORAL EVERY MORNING
Status: DISCONTINUED | OUTPATIENT
Start: 2018-08-03 | End: 2018-08-05 | Stop reason: HOSPADM

## 2018-08-03 RX ORDER — ONDANSETRON 2 MG/ML
INJECTION INTRAMUSCULAR; INTRAVENOUS
Status: COMPLETED
Start: 2018-08-03 | End: 2018-08-03

## 2018-08-03 RX ORDER — SODIUM CHLORIDE 9 MG/ML
INJECTION, SOLUTION INTRAVENOUS
Status: DISPENSED
Start: 2018-08-03 | End: 2018-08-03

## 2018-08-03 RX ORDER — DIPHENHYDRAMINE HYDROCHLORIDE 50 MG/ML
12.5 INJECTION INTRAMUSCULAR; INTRAVENOUS
Status: DISCONTINUED | OUTPATIENT
Start: 2018-08-03 | End: 2018-08-03 | Stop reason: HOSPADM

## 2018-08-03 RX ORDER — SODIUM CHLORIDE 0.9 % (FLUSH) 0.9 %
1-10 SYRINGE (ML) INJECTION AS NEEDED
Status: DISCONTINUED | OUTPATIENT
Start: 2018-08-03 | End: 2018-08-05 | Stop reason: HOSPADM

## 2018-08-03 RX ORDER — ACETAMINOPHEN 650 MG/1
650 SUPPOSITORY RECTAL ONCE AS NEEDED
Status: DISCONTINUED | OUTPATIENT
Start: 2018-08-03 | End: 2018-08-03 | Stop reason: HOSPADM

## 2018-08-03 RX ORDER — CYCLOSPORINE 0.5 MG/ML
1 EMULSION OPHTHALMIC 2 TIMES DAILY
Status: DISCONTINUED | OUTPATIENT
Start: 2018-08-03 | End: 2018-08-05 | Stop reason: HOSPADM

## 2018-08-03 RX ORDER — FLUTICASONE PROPIONATE 50 MCG
2 SPRAY, SUSPENSION (ML) NASAL DAILY
COMMUNITY
End: 2020-01-29 | Stop reason: SDUPTHER

## 2018-08-03 RX ORDER — ACETAMINOPHEN 325 MG/1
650 TABLET ORAL ONCE AS NEEDED
Status: DISCONTINUED | OUTPATIENT
Start: 2018-08-03 | End: 2018-08-03 | Stop reason: HOSPADM

## 2018-08-03 RX ORDER — DEXAMETHASONE SODIUM PHOSPHATE 4 MG/ML
4 INJECTION, SOLUTION INTRA-ARTICULAR; INTRALESIONAL; INTRAMUSCULAR; INTRAVENOUS; SOFT TISSUE ONCE AS NEEDED
Status: COMPLETED | OUTPATIENT
Start: 2018-08-03 | End: 2018-08-03

## 2018-08-03 RX ORDER — MEPERIDINE HYDROCHLORIDE 25 MG/ML
12.5 INJECTION INTRAMUSCULAR; INTRAVENOUS; SUBCUTANEOUS
Status: DISCONTINUED | OUTPATIENT
Start: 2018-08-03 | End: 2018-08-03 | Stop reason: HOSPADM

## 2018-08-03 RX ORDER — FENTANYL CITRATE 50 UG/ML
INJECTION, SOLUTION INTRAMUSCULAR; INTRAVENOUS AS NEEDED
Status: DISCONTINUED | OUTPATIENT
Start: 2018-08-03 | End: 2018-08-03 | Stop reason: SURG

## 2018-08-03 RX ORDER — PANTOPRAZOLE SODIUM 40 MG/10ML
40 INJECTION, POWDER, LYOPHILIZED, FOR SOLUTION INTRAVENOUS
Status: DISCONTINUED | OUTPATIENT
Start: 2018-08-03 | End: 2018-08-05 | Stop reason: HOSPADM

## 2018-08-03 RX ORDER — L.ACID,PARA/B.BIFIDUM/S.THERM 8B CELL
1 CAPSULE ORAL DAILY
Status: DISCONTINUED | OUTPATIENT
Start: 2018-08-03 | End: 2018-08-05 | Stop reason: HOSPADM

## 2018-08-03 RX ORDER — LANOLIN ALCOHOL/MO/W.PET/CERES
5000 CREAM (GRAM) TOPICAL DAILY
Status: DISCONTINUED | OUTPATIENT
Start: 2018-08-03 | End: 2018-08-05 | Stop reason: HOSPADM

## 2018-08-03 RX ORDER — ONDANSETRON 2 MG/ML
4 INJECTION INTRAMUSCULAR; INTRAVENOUS EVERY 6 HOURS PRN
Status: DISCONTINUED | OUTPATIENT
Start: 2018-08-03 | End: 2018-08-05 | Stop reason: HOSPADM

## 2018-08-03 RX ADMIN — ONDANSETRON 4 MG: 2 INJECTION, SOLUTION INTRAMUSCULAR; INTRAVENOUS at 15:17

## 2018-08-03 RX ADMIN — SODIUM CHLORIDE 1 G: 900 INJECTION INTRAVENOUS at 11:55

## 2018-08-03 RX ADMIN — MIDAZOLAM HYDROCHLORIDE 1 MG: 1 INJECTION, SOLUTION INTRAMUSCULAR; INTRAVENOUS at 16:01

## 2018-08-03 RX ADMIN — SODIUM CHLORIDE, POTASSIUM CHLORIDE, SODIUM LACTATE AND CALCIUM CHLORIDE: 600; 310; 30; 20 INJECTION, SOLUTION INTRAVENOUS at 16:10

## 2018-08-03 RX ADMIN — FAMOTIDINE 20 MG: 10 INJECTION INTRAVENOUS at 15:17

## 2018-08-03 RX ADMIN — LIDOCAINE HYDROCHLORIDE 60 MG: 20 INJECTION, SOLUTION INFILTRATION; PERINEURAL at 16:12

## 2018-08-03 RX ADMIN — ONDANSETRON 4 MG: 2 INJECTION INTRAMUSCULAR; INTRAVENOUS at 13:18

## 2018-08-03 RX ADMIN — PROPOFOL 150 MG: 10 INJECTION, EMULSION INTRAVENOUS at 16:13

## 2018-08-03 RX ADMIN — PANTOPRAZOLE SODIUM 40 MG: 40 INJECTION, POWDER, FOR SOLUTION INTRAVENOUS at 20:37

## 2018-08-03 RX ADMIN — DEXAMETHASONE SODIUM PHOSPHATE 4 MG: 4 INJECTION, SOLUTION INTRAMUSCULAR; INTRAVENOUS at 15:17

## 2018-08-03 RX ADMIN — ONDANSETRON 4 MG: 2 INJECTION, SOLUTION INTRAMUSCULAR; INTRAVENOUS at 02:07

## 2018-08-03 RX ADMIN — CEFTRIAXONE 2 G: 2 INJECTION, POWDER, FOR SOLUTION INTRAMUSCULAR; INTRAVENOUS at 06:30

## 2018-08-03 RX ADMIN — SODIUM CHLORIDE 50 ML/HR: 9 INJECTION, SOLUTION INTRAVENOUS at 06:29

## 2018-08-03 RX ADMIN — OXYCODONE HYDROCHLORIDE AND ACETAMINOPHEN 1 TABLET: 5; 325 TABLET ORAL at 21:41

## 2018-08-03 RX ADMIN — GLYCOPYRROLATE 0.1 MG: 0.2 INJECTION INTRAMUSCULAR; INTRAVENOUS at 16:12

## 2018-08-03 RX ADMIN — FENTANYL CITRATE 25 MCG: 50 INJECTION, SOLUTION INTRAMUSCULAR; INTRAVENOUS at 16:20

## 2018-08-03 RX ADMIN — ONDANSETRON 4 MG: 2 INJECTION INTRAMUSCULAR; INTRAVENOUS at 02:07

## 2018-08-03 RX ADMIN — FLUTICASONE PROPIONATE 2 SPRAY: 50 SPRAY, METERED NASAL at 11:29

## 2018-08-03 NOTE — ANESTHESIA PREPROCEDURE EVALUATION
Anesthesia Evaluation     Patient summary reviewed and Nursing notes reviewed   history of anesthetic complications: PONV  NPO Solid Status: > 8 hours  NPO Liquid Status: > 8 hours           Airway   Mallampati: II  TM distance: >3 FB  Neck ROM: full  No difficulty expected  Dental - normal exam     Pulmonary - normal exam    breath sounds clear to auscultation  (+) a smoker (quit 1995) Former,   Cardiovascular - normal exam    ECG reviewed  Rhythm: regular  Rate: normal      ROS comment: 2016 ECHO: · Left ventricular function is normal. Calculated EF = 58.6%.  · Normal stress echo with no significant echocardiographic evidence for myocardial ischemia.    Neuro/Psych  GI/Hepatic/Renal/Endo    (+)  GERD well controlled,  renal disease stones,     Musculoskeletal     Abdominal  - normal exam   Substance History   (+) alcohol use,      OB/GYN          Other   (+) arthritis   history of cancer remission                  Anesthesia Plan    ASA 2     general     intravenous induction   Anesthetic plan and risks discussed with patient.  Use of blood products discussed with patient  Consented to blood products.

## 2018-08-03 NOTE — ANESTHESIA POSTPROCEDURE EVALUATION
Patient: Germaine Gonzalez    Procedure Summary     Date:  08/03/18 Room / Location:  [facility] OR 3 /  [facility] OR    Anesthesia Start:  1610 Anesthesia Stop:  1644    Procedure:  CYSTOSCOPY URETERAL CATHETER/STENT INSERTION (Right Ureter) Diagnosis:       Obstructive pyelonephritis      Ureteral stricture, right      (Right UPJ Obstruction with Pyelonephritis)    Surgeon:  Farzad Cordova MD Provider:  Ammon Sims CRNA    Anesthesia Type:  general ASA Status:  2          Anesthesia Type: general  Last vitals  BP   138/64 (08/03/18 1705)   Temp   98.3 °F (36.8 °C) (08/03/18 1644)   Pulse   72 (08/03/18 1705)   Resp   14 (08/03/18 1705)     SpO2   95 % (08/03/18 1705)     Post Anesthesia Care and Evaluation    Patient location during evaluation: PACU  Patient participation: complete - patient participated  Level of consciousness: awake and alert  Pain score: 0  Pain management: adequate  Airway patency: patent  Anesthetic complications: No anesthetic complications  PONV Status: none  Cardiovascular status: acceptable  Respiratory status: acceptable  Hydration status: acceptable

## 2018-08-03 NOTE — ANESTHESIA PROCEDURE NOTES
Airway  Urgency: elective    Airway not difficult    General Information and Staff    Patient location during procedure: OR  CRNA: INGE ZHANG    Indications and Patient Condition  Indications for airway management: airway protection    Preoxygenated: yes  MILS maintained throughout  Mask difficulty assessment: 1 - vent by mask    Final Airway Details  Final airway type: endotracheal airway      Successful airway: ETT  Cuffed: yes   Successful intubation technique: direct laryngoscopy  Endotracheal tube insertion site: oral  Blade: Thierry  Blade size: #3 (3.5)  ETT size: 7.0 mm  Placement verified by: chest auscultation and capnometry   Measured from: lips  ETT to lips (cm): 21  Number of attempts at approach: 1    Additional Comments  To OR, monitors and O2 on. Smooth IV ind. - intubated x 1 attempt through clear cords + BBS. Tape OU. Pressure points checked and padded.

## 2018-08-04 LAB
ALBUMIN SERPL-MCNC: 3.2 G/DL (ref 3.5–5.2)
ALBUMIN/GLOB SERPL: 1.2 G/DL
ALP SERPL-CCNC: 65 U/L (ref 40–129)
ALT SERPL W P-5'-P-CCNC: 14 U/L (ref 5–33)
ANION GAP SERPL CALCULATED.3IONS-SCNC: 12.2 MMOL/L
AST SERPL-CCNC: 14 U/L (ref 5–32)
BASOPHILS # BLD AUTO: 0.01 10*3/MM3 (ref 0–0.2)
BASOPHILS NFR BLD AUTO: 0.2 % (ref 0–2)
BILIRUB SERPL-MCNC: 0.6 MG/DL (ref 0.2–1.2)
BUN BLD-MCNC: 12 MG/DL (ref 8–23)
BUN/CREAT SERPL: 16.2 (ref 7–25)
CALCIUM SPEC-SCNC: 8.7 MG/DL (ref 8.8–10.5)
CHLORIDE SERPL-SCNC: 103 MMOL/L (ref 98–107)
CO2 SERPL-SCNC: 21.8 MMOL/L (ref 22–29)
CREAT BLD-MCNC: 0.74 MG/DL (ref 0.57–1)
DEPRECATED RDW RBC AUTO: 42.6 FL (ref 37–54)
EOSINOPHIL # BLD AUTO: 0 10*3/MM3 (ref 0.1–0.3)
EOSINOPHIL NFR BLD AUTO: 0 % (ref 0–4)
ERYTHROCYTE [DISTWIDTH] IN BLOOD BY AUTOMATED COUNT: 12.6 % (ref 11.5–14.5)
GFR SERPL CREATININE-BSD FRML MDRD: 77 ML/MIN/1.73
GLOBULIN UR ELPH-MCNC: 2.7 GM/DL
GLUCOSE BLD-MCNC: 125 MG/DL (ref 65–99)
HCT VFR BLD AUTO: 37.5 % (ref 37–47)
HGB BLD-MCNC: 12.7 G/DL (ref 12–16)
IMM GRANULOCYTES # BLD: 0.04 10*3/MM3 (ref 0–0.03)
IMM GRANULOCYTES NFR BLD: 0.7 % (ref 0–0.5)
LYMPHOCYTES # BLD AUTO: 0.98 10*3/MM3 (ref 0.6–4.8)
LYMPHOCYTES NFR BLD AUTO: 17.6 % (ref 20–45)
MCH RBC QN AUTO: 31.3 PG (ref 27–31)
MCHC RBC AUTO-ENTMCNC: 33.9 G/DL (ref 31–37)
MCV RBC AUTO: 92.4 FL (ref 81–99)
MONOCYTES # BLD AUTO: 0.51 10*3/MM3 (ref 0–1)
MONOCYTES NFR BLD AUTO: 9.2 % (ref 3–8)
NEUTROPHILS # BLD AUTO: 4.02 10*3/MM3 (ref 1.5–8.3)
NEUTROPHILS NFR BLD AUTO: 72.3 % (ref 45–70)
NRBC BLD MANUAL-RTO: 0 /100 WBC (ref 0–0)
PLATELET # BLD AUTO: 162 10*3/MM3 (ref 140–500)
PMV BLD AUTO: 10.5 FL (ref 7.4–10.4)
POTASSIUM BLD-SCNC: 3.7 MMOL/L (ref 3.5–5.2)
PROT SERPL-MCNC: 5.9 G/DL (ref 6–8.5)
RBC # BLD AUTO: 4.06 10*6/MM3 (ref 4.2–5.4)
SODIUM BLD-SCNC: 137 MMOL/L (ref 136–145)
WBC NRBC COR # BLD: 5.56 10*3/MM3 (ref 4.8–10.8)

## 2018-08-04 PROCEDURE — 99231 SBSQ HOSP IP/OBS SF/LOW 25: CPT | Performed by: HOSPITALIST

## 2018-08-04 PROCEDURE — 25010000002 ERTAPENEM PER 500 MG: Performed by: HOSPITALIST

## 2018-08-04 PROCEDURE — 85025 COMPLETE CBC W/AUTO DIFF WBC: CPT | Performed by: INTERNAL MEDICINE

## 2018-08-04 PROCEDURE — 80053 COMPREHEN METABOLIC PANEL: CPT | Performed by: HOSPITALIST

## 2018-08-04 PROCEDURE — 25010000002 ONDANSETRON PER 1 MG: Performed by: HOSPITALIST

## 2018-08-04 PROCEDURE — 94799 UNLISTED PULMONARY SVC/PX: CPT

## 2018-08-04 RX ADMIN — Medication 1 CAPSULE: at 09:01

## 2018-08-04 RX ADMIN — CYANOCOBALAMIN TAB 1000 MCG 5000 MCG: 1000 TAB at 09:01

## 2018-08-04 RX ADMIN — SODIUM CHLORIDE 75 ML/HR: 9 INJECTION, SOLUTION INTRAVENOUS at 00:28

## 2018-08-04 RX ADMIN — FLUTICASONE PROPIONATE 2 SPRAY: 50 SPRAY, METERED NASAL at 09:01

## 2018-08-04 RX ADMIN — ONDANSETRON 4 MG: 2 INJECTION INTRAMUSCULAR; INTRAVENOUS at 07:15

## 2018-08-04 RX ADMIN — ONDANSETRON 4 MG: 2 INJECTION INTRAMUSCULAR; INTRAVENOUS at 00:28

## 2018-08-04 RX ADMIN — PANTOPRAZOLE SODIUM 40 MG: 40 INJECTION, POWDER, FOR SOLUTION INTRAVENOUS at 06:27

## 2018-08-04 RX ADMIN — SODIUM CHLORIDE 75 ML/HR: 9 INJECTION, SOLUTION INTRAVENOUS at 17:20

## 2018-08-04 RX ADMIN — SODIUM CHLORIDE 1 G: 900 INJECTION INTRAVENOUS at 11:16

## 2018-08-04 RX ADMIN — ESCITALOPRAM OXALATE 5 MG: 10 TABLET, FILM COATED ORAL at 06:27

## 2018-08-04 RX ADMIN — ONDANSETRON 4 MG: 2 INJECTION INTRAMUSCULAR; INTRAVENOUS at 21:56

## 2018-08-05 VITALS
HEIGHT: 65 IN | WEIGHT: 139 LBS | TEMPERATURE: 98.8 F | SYSTOLIC BLOOD PRESSURE: 104 MMHG | RESPIRATION RATE: 16 BRPM | OXYGEN SATURATION: 98 % | BODY MASS INDEX: 23.16 KG/M2 | DIASTOLIC BLOOD PRESSURE: 51 MMHG | HEART RATE: 66 BPM

## 2018-08-05 LAB — BACTERIA SPEC AEROBE CULT: ABNORMAL

## 2018-08-05 PROCEDURE — C1751 CATH, INF, PER/CENT/MIDLINE: HCPCS

## 2018-08-05 PROCEDURE — 02HV33Z INSERTION OF INFUSION DEVICE INTO SUPERIOR VENA CAVA, PERCUTANEOUS APPROACH: ICD-10-PCS | Performed by: HOSPITALIST

## 2018-08-05 PROCEDURE — 99239 HOSP IP/OBS DSCHRG MGMT >30: CPT | Performed by: HOSPITALIST

## 2018-08-05 PROCEDURE — 25010000002 ERTAPENEM PER 500 MG: Performed by: HOSPITALIST

## 2018-08-05 RX ORDER — ACETAMINOPHEN 325 MG/1
650 TABLET ORAL EVERY 4 HOURS PRN
Start: 2018-08-05 | End: 2018-08-27

## 2018-08-05 RX ADMIN — PANTOPRAZOLE SODIUM 40 MG: 40 INJECTION, POWDER, FOR SOLUTION INTRAVENOUS at 06:18

## 2018-08-05 RX ADMIN — SODIUM CHLORIDE 1 G: 900 INJECTION INTRAVENOUS at 11:37

## 2018-08-05 RX ADMIN — Medication 1 CAPSULE: at 09:09

## 2018-08-05 RX ADMIN — ESCITALOPRAM OXALATE 5 MG: 10 TABLET, FILM COATED ORAL at 06:18

## 2018-08-05 RX ADMIN — FLUTICASONE PROPIONATE 2 SPRAY: 50 SPRAY, METERED NASAL at 09:08

## 2018-08-05 RX ADMIN — CYANOCOBALAMIN TAB 1000 MCG 5000 MCG: 1000 TAB at 09:09

## 2018-08-06 ENCOUNTER — READMISSION MANAGEMENT (OUTPATIENT)
Dept: CALL CENTER | Facility: HOSPITAL | Age: 73
End: 2018-08-06

## 2018-08-06 ENCOUNTER — OFFICE VISIT (OUTPATIENT)
Dept: GASTROENTEROLOGY | Facility: CLINIC | Age: 73
End: 2018-08-06

## 2018-08-06 ENCOUNTER — TELEPHONE (OUTPATIENT)
Dept: INTERNAL MEDICINE | Facility: CLINIC | Age: 73
End: 2018-08-06

## 2018-08-06 VITALS
OXYGEN SATURATION: 99 % | HEIGHT: 65 IN | BODY MASS INDEX: 24.96 KG/M2 | SYSTOLIC BLOOD PRESSURE: 100 MMHG | WEIGHT: 149.8 LBS | DIASTOLIC BLOOD PRESSURE: 52 MMHG | HEART RATE: 58 BPM

## 2018-08-06 DIAGNOSIS — N12 PYELONEPHRITIS: ICD-10-CM

## 2018-08-06 DIAGNOSIS — K22.70 BARRETT'S ESOPHAGUS WITHOUT DYSPLASIA: Primary | ICD-10-CM

## 2018-08-06 DIAGNOSIS — K75.81 NASH (NONALCOHOLIC STEATOHEPATITIS): ICD-10-CM

## 2018-08-06 PROCEDURE — 99213 OFFICE O/P EST LOW 20 MIN: CPT | Performed by: INTERNAL MEDICINE

## 2018-08-06 NOTE — OUTREACH NOTE
Prep Survey      Responses   Facility patient discharged from?  [facility]   Is LACE score < 7 ?  No   Is patient eligible?  Yes   Discharge diagnosis  acute pyelonephritis, , ureteral stent placed,  PICC line   Does the patient have one of the following disease processes/diagnoses(primary or secondary)?  Other   Does the patient have Home health ordered?  Yes   What is the Home health agency?   [facility]   Is there a DME ordered?  No   Medication alerts for this patient  IV abx x 1 week   Prep survey completed?  Yes          Sujey Cordova RN

## 2018-08-06 NOTE — PROGRESS NOTES
PATIENT INFORMATION  Germaine Gonzalez       - 1945    CHIEF COMPLAINT  Chief Complaint   Patient presents with   • Follow-up     Mcgill's Esophagus       HISTORY OF PRESENT ILLNESS  Nearly a year of recurrent UTI and now has stent and IV NVANZ (Mid lne) for MRSA UTI. Was to see Urology and got so sick went to ER for Vomiting and pain.Was admitted and underwent Cysto stent   Was just [id number] yesterday. And is going home to get her IV abx.            REVIEW OF SYSTEMS  Review of Systems   All other systems reviewed and are negative.        ACTIVE PROBLEMS  Patient Active Problem List    Diagnosis   • Pyelonephritis [N12]   • Gastroesophageal reflux disease [K21.9]   • Hyperlipidemia [E78.5]         PAST MEDICAL HISTORY  Past Medical History:   Diagnosis Date   • Arthritis     knees   • Cancer (CMS/HCC)     cervical   • Cervical cancer (CMS/HCC)    • Eczema    • Elevated liver enzymes     PCP has stopped statin   • GERD (gastroesophageal reflux disease)    • H/O stress fracture    • Health care maintenance    • Hypercholesteremia    • Kidney stones     sched ureteroscopy, lithotripsy   • Ocular migraine    • Postmenopausal    • Seasonal allergies    • Sjogren's syndrome (CMS/HCC)          SURGICAL HISTORY  Past Surgical History:   Procedure Laterality Date   •  SECTION      x2   • COLONOSCOPY     • CYSTOSCOPY URETEROSCOPY STONE MANIPULATION/EXTRACTION Left 10/14/2016    Procedure: LEFT URETEROSCOPY bilateral retrograde pyleogram LEFT STENT PLACEMENT.;  Surgeon: Julius Darling MD;  Location: [facility] OR;  Service:    • CYSTOSCOPY W/ URETERAL STENT PLACEMENT Right 8/3/2018    Procedure: CYSTOSCOPY URETERAL CATHETER/STENT INSERTION;  Surgeon: Farzad Cordova MD;  Location: [facility] OR;  Service: Urology   • HYSTERECTOMY     • UPPER GASTROINTESTINAL ENDOSCOPY     • URETEROSCOPY LASER LITHOTRIPSY WITH STENT INSERTION Left 10/26/2016    Procedure: LT URETEROSCOPY LASER LITHOTRIPSY ;  Surgeon: Julius WERNER  MD Phong;  Location: [facility] OR;  Service:          FAMILY HISTORY  Family History   Problem Relation Age of Onset   • Anxiety disorder Mother    • Bipolar disorder Mother    • Stroke Mother    • Thyroid disease Mother    • Thyroid disease Brother    • Breast cancer Neg Hx          SOCIAL HISTORY  Social History     Occupational History   • Not on file.     Social History Main Topics   • Smoking status: Former Smoker     Packs/day: 0.50     Years: 16.00     Types: Cigarettes     Quit date: 04/1990   • Smokeless tobacco: Never Used   • Alcohol use Yes      Comment: socially   • Drug use: No   • Sexual activity: Defer         CURRENT MEDICATIONS    Current Outpatient Prescriptions:   •  acetaminophen (TYLENOL) 325 MG tablet, Take 2 tablets by mouth Every 4 (Four) Hours As Needed for Mild Pain ., Disp: , Rfl:   •  calcium carbonate (OS-ULI) 600 MG tablet, Take 600 mg by mouth 2 (Two) Times a Day With Meals., Disp: , Rfl:   •  cycloSPORINE (RESTASIS) 0.05 % ophthalmic emulsion, Administer 1 drop to both eyes 2 (Two) Times a Day., Disp: , Rfl:   •  desoximetasone (TOPICORT) 0.25 % cream, Apply 1 application topically to the appropriate area as directed As Needed for Irritation., Disp: , Rfl:   •  ertapenem 1 g in sodium chloride 0.9 % 100 mL IVPB, Infuse 1 g into a venous catheter Daily for 7 doses., Disp: 7 dose, Rfl: 0  •  escitalopram (LEXAPRO) 10 MG tablet, Take 5 mg by mouth Every Morning., Disp: , Rfl:   •  ezetimibe (ZETIA) 10 MG tablet, Take 10 mg by mouth Daily., Disp: , Rfl:   •  fluticasone (FLONASE) 50 MCG/ACT nasal spray, 2 sprays into the nostril(s) as directed by provider Daily., Disp: , Rfl:   •  glucosamine-chondroitin 500-400 MG capsule capsule, Take 1 capsule by mouth 2 (Two) Times a Day With Meals., Disp: , Rfl:   •  omeprazole (PRILOSEC) 40 MG capsule, Take 1 capsule by mouth Daily., Disp: 90 capsule, Rfl: 3  •  Probiotic Product (PROBIOTIC DAILY PO), Take 1 tablet by mouth Daily., Disp: ,  "Rfl:   •  vitamin B-12 (CYANOCOBALAMIN) 1000 MCG tablet, Take 5,000 mcg by mouth Daily., Disp: , Rfl:   No current facility-administered medications for this visit.     ALLERGIES  Levaquin [levofloxacin] and Tetracycline    VITALS  Vitals:    08/06/18 1416   BP: 100/52   Pulse: 58   SpO2: 99%   Weight: 67.9 kg (149 lb 12.8 oz)   Height: 165.1 cm (65\")       LAST RESULTS   Admission on 08/02/2018, Discharged on 08/05/2018   Component Date Value Ref Range Status   • Glucose 08/02/2018 138* 65 - 99 mg/dL Final   • BUN 08/02/2018 14  8 - 23 mg/dL Final   • Creatinine 08/02/2018 0.97  0.57 - 1.00 mg/dL Final   • Sodium 08/02/2018 134* 136 - 145 mmol/L Final   • Potassium 08/02/2018 3.7  3.5 - 5.2 mmol/L Final   • Chloride 08/02/2018 95* 98 - 107 mmol/L Final   • CO2 08/02/2018 23.3  22.0 - 29.0 mmol/L Final   • Calcium 08/02/2018 9.4  8.8 - 10.5 mg/dL Final   • Total Protein 08/02/2018 7.0  6.0 - 8.5 g/dL Final   • Albumin 08/02/2018 3.60  3.50 - 5.20 g/dL Final   • ALT (SGPT) 08/02/2018 11  5 - 33 U/L Final   • AST (SGOT) 08/02/2018 11  5 - 32 U/L Final   • Alkaline Phosphatase 08/02/2018 79  40 - 129 U/L Final   • Total Bilirubin 08/02/2018 1.7* 0.2 - 1.2 mg/dL Final   • eGFR Non African Amer 08/02/2018 57* >60 mL/min/1.73 Final   • Globulin 08/02/2018 3.4  gm/dL Final   • A/G Ratio 08/02/2018 1.1  g/dL Final   • BUN/Creatinine Ratio 08/02/2018 14.4  7.0 - 25.0 Final   • Anion Gap 08/02/2018 15.7  mmol/L Final   • Color, UA 08/02/2018 Yellow  Yellow, Straw Final   • Appearance, UA 08/02/2018 Clear  Clear Final   • pH, UA 08/02/2018 6.0  4.5 - 8.0 Final   • Specific Gravity, UA 08/02/2018 1.020  1.003 - 1.030 Final   • Glucose, UA 08/02/2018 Negative  Negative Final   • Ketones, UA 08/02/2018 40 mg/dL (2+)* Negative, 80 mg/dL (3+), >=160 mg/dL (4+) Final   • Bilirubin, UA 08/02/2018 Negative  Negative Final   • Blood, UA 08/02/2018 Moderate (2+)* Negative Final   • Protein, UA 08/02/2018 100 mg/dL (2+)* Negative Final "   • Leuk Esterase, UA 08/02/2018 Large (3+)* Negative Final   • Nitrite, UA 08/02/2018 Negative  Negative Final   • Urobilinogen, UA 08/02/2018 0.2 E.U./dL  0.2 - 1.0 E.U./dL Final   • WBC 08/02/2018 14.03* 4.80 - 10.80 10*3/mm3 Final   • RBC 08/02/2018 4.31  4.20 - 5.40 10*6/mm3 Final   • Hemoglobin 08/02/2018 13.5  12.0 - 16.0 g/dL Final   • Hematocrit 08/02/2018 39.7  37.0 - 47.0 % Final   • MCV 08/02/2018 92.1  81.0 - 99.0 fL Final   • MCH 08/02/2018 31.3* 27.0 - 31.0 pg Final   • MCHC 08/02/2018 34.0  31.0 - 37.0 g/dL Final   • RDW 08/02/2018 12.7  11.5 - 14.5 % Final   • RDW-SD 08/02/2018 43.4  37.0 - 54.0 fl Final   • MPV 08/02/2018 9.8  7.4 - 10.4 fL Final   • Platelets 08/02/2018 169  140 - 500 10*3/mm3 Final   • Neutrophil % 08/02/2018 84.9* 45.0 - 70.0 % Final   • Lymphocyte % 08/02/2018 9.8* 20.0 - 45.0 % Final   • Monocyte % 08/02/2018 4.8  3.0 - 8.0 % Final   • Eosinophil % 08/02/2018 0.0  0.0 - 4.0 % Final   • Basophil % 08/02/2018 0.1  0.0 - 2.0 % Final   • Immature Grans % 08/02/2018 0.4  0.0 - 0.5 % Final   • Neutrophils, Absolute 08/02/2018 11.91* 1.50 - 8.30 10*3/mm3 Final   • Lymphocytes, Absolute 08/02/2018 1.37  0.60 - 4.80 10*3/mm3 Final   • Monocytes, Absolute 08/02/2018 0.67  0.00 - 1.00 10*3/mm3 Final   • Eosinophils, Absolute 08/02/2018 0.00* 0.10 - 0.30 10*3/mm3 Final   • Basophils, Absolute 08/02/2018 0.02  0.00 - 0.20 10*3/mm3 Final   • Immature Grans, Absolute 08/02/2018 0.06* 0.00 - 0.03 10*3/mm3 Final   • nRBC 08/02/2018 0.0  0.0 - 0.0 /100 WBC Final   • RBC, UA 08/02/2018 6-12* None Seen /HPF Final   • WBC, UA 08/02/2018 13-20* None Seen /HPF Final   • Bacteria,  08/02/2018 Trace* None Seen /HPF Final   • Squamous Epithelial Cells,  08/02/2018 7-12* None Seen, 0-2 /HPF Final   • Hyaline Casts,  08/02/2018 None Seen  None Seen /LPF Final   • Amorphous Crystals,  08/02/2018 Large/3+  None Seen /HPF Final   • Mucus,  08/02/2018 Small/1+* None Seen, Trace /HPF Final   •  Methodology 08/02/2018 Manual Light Microscopy   Final   • Urine Culture 08/03/2018 <25,000 CFU/mL Gram Negative Bacilli*  Final    Call if further workup needed.     • Glucose 08/04/2018 125* 65 - 99 mg/dL Final   • BUN 08/04/2018 12  8 - 23 mg/dL Final   • Creatinine 08/04/2018 0.74  0.57 - 1.00 mg/dL Final   • Sodium 08/04/2018 137  136 - 145 mmol/L Final   • Potassium 08/04/2018 3.7  3.5 - 5.2 mmol/L Final   • Chloride 08/04/2018 103  98 - 107 mmol/L Final   • CO2 08/04/2018 21.8* 22.0 - 29.0 mmol/L Final   • Calcium 08/04/2018 8.7* 8.8 - 10.5 mg/dL Final   • Total Protein 08/04/2018 5.9* 6.0 - 8.5 g/dL Final   • Albumin 08/04/2018 3.20* 3.50 - 5.20 g/dL Final   • ALT (SGPT) 08/04/2018 14  5 - 33 U/L Final   • AST (SGOT) 08/04/2018 14  5 - 32 U/L Final   • Alkaline Phosphatase 08/04/2018 65  40 - 129 U/L Final   • Total Bilirubin 08/04/2018 0.6  0.2 - 1.2 mg/dL Final   • eGFR Non African Amer 08/04/2018 77  >60 mL/min/1.73 Final   • Globulin 08/04/2018 2.7  gm/dL Final   • A/G Ratio 08/04/2018 1.2  g/dL Final   • BUN/Creatinine Ratio 08/04/2018 16.2  7.0 - 25.0 Final   • Anion Gap 08/04/2018 12.2  mmol/L Final   • WBC 08/04/2018 5.56  4.80 - 10.80 10*3/mm3 Final   • RBC 08/04/2018 4.06* 4.20 - 5.40 10*6/mm3 Final   • Hemoglobin 08/04/2018 12.7  12.0 - 16.0 g/dL Final   • Hematocrit 08/04/2018 37.5  37.0 - 47.0 % Final   • MCV 08/04/2018 92.4  81.0 - 99.0 fL Final   • MCH 08/04/2018 31.3* 27.0 - 31.0 pg Final   • MCHC 08/04/2018 33.9  31.0 - 37.0 g/dL Final   • RDW 08/04/2018 12.6  11.5 - 14.5 % Final   • RDW-SD 08/04/2018 42.6  37.0 - 54.0 fl Final   • MPV 08/04/2018 10.5* 7.4 - 10.4 fL Final   • Platelets 08/04/2018 162  140 - 500 10*3/mm3 Final   • Neutrophil % 08/04/2018 72.3* 45.0 - 70.0 % Final   • Lymphocyte % 08/04/2018 17.6* 20.0 - 45.0 % Final   • Monocyte % 08/04/2018 9.2* 3.0 - 8.0 % Final   • Eosinophil % 08/04/2018 0.0  0.0 - 4.0 % Final   • Basophil % 08/04/2018 0.2  0.0 - 2.0 % Final   • Immature  Grans % 08/04/2018 0.7* 0.0 - 0.5 % Final   • Neutrophils, Absolute 08/04/2018 4.02  1.50 - 8.30 10*3/mm3 Final   • Lymphocytes, Absolute 08/04/2018 0.98  0.60 - 4.80 10*3/mm3 Final   • Monocytes, Absolute 08/04/2018 0.51  0.00 - 1.00 10*3/mm3 Final   • Eosinophils, Absolute 08/04/2018 0.00* 0.10 - 0.30 10*3/mm3 Final   • Basophils, Absolute 08/04/2018 0.01  0.00 - 0.20 10*3/mm3 Final   • Immature Grans, Absolute 08/04/2018 0.04* 0.00 - 0.03 10*3/mm3 Final   • nRBC 08/04/2018 0.0  0.0 - 0.0 /100 WBC Final     Ct Abdomen Pelvis Without Contrast    Result Date: 8/3/2018  Narrative: CT ABDOMEN AND PELVIS WITHOUT CONTRAST, 8/3/2018  HISTORY: 73-year-old female with recent exam here 4 days ago showing severe right-sided hydronephrosis. Nephrolithiasis but no visible obstructing ureter stone. Previous left side lithotripsies in 2016. She returns to the ED complaining of persistent right side abdomen pain, nausea and vomiting.  TECHNIQUE: CT examination of the abdomen and pelvis without oral or IV contrast using kidney stone protocol. Radiation dose reduction techniques included automated exposure control or exposure modulation based on body size. Radiation audit for CT and nuclear cardiology exams in the last 12 months: 1.  COMPARISON: *  CT stone study, 7/31/2018.  ABDOMEN FINDINGS: Persistent severe right-sided hydronephrosis with perinephric urinary extravasation. Persistent upper urinary tract urothelial thickening implying inflammation or infection. Right ureteral dilatation to the mid pelvis level. No visible obstructing ureter stone. Ureteral stricture or other lesion should be excluded. Bilateral nonobstructing renal calculi unchanged.  Increasing atelectasis in the right posterior lung base. Liver, pancreas and spleen are within normal limits without contrast. No gallbladder distention or bile duct dilatation.  Small bowel and colon remain normal in caliber and appearance, as imaged.  PELVIS FINDINGS:  Hysterectomy. Urinary bladder and rectum are normal. No mass or adenopathy is seen within the pelvis or along the course of either ureter (reported past history of cervical cancer).      Impression: 1. Severe right-sided hydronephrosis with perinephric urinary extravasation and upper urinary tract urothelial thickening, similar when compared with the recent study of 7/31/2018. 2. No visible right ureter stone. Consider ureteral stricture or other lesion. Recommend retrograde ureteral evaluation. 3. Bilateral nonobstructing renal calculi. 4. Hysterectomy. 5. Initial stat report to the ED from Dr. Jhonatan Moreno at 0202 hours on 8/3/2018.  This report was finalized on 8/3/2018 6:29 AM by Dr. Alejandro Longo MD.      Ct Abdomen Pelvis Without Contrast    Result Date: 8/1/2018  Narrative: CT ABDOMEN AND PELVIS, NONCONTRAST, 7/31/2018  HISTORY: 73-year-old female in the ED with new onset right side abdomen pain, nausea and vomiting this evening. Previous studies showing bilateral renal calculi and a dilated right renal pelvis and collecting system at the [facility]. Cystoscopic procedures including retrograde ureterograms and left side laser lithotripsy October 2016. She has a past history of cervical cancer.  TECHNIQUE: CT examination of the abdomen and pelvis was performed without oral or IV contrast using kidney stone protocol. Radiation dose reduction techniques included automated exposure control or exposure modulation based on body size. Radiation audit for CT and nuclear cardiology exams in the last 12 months: 0.  COMPARISON: *  CT abdomen/pelvis, multiphase, 9/14/2016.  RENAL/URINARY FINDINGS: Severe right-sided hydronephrosis with markedly dilated right renal collecting system, right renal pelvis and right mid and upper ureter to the level of the pelvic brim. There is associated obstructive urinary extravasation surrounding the right kidney and right upper ureter. Stone material is present within the right lower pole  renal collecting system, but no radiopaque stone is seen within the right ureter or urinary bladder at this time. The findings may represent obstructive changes due to recently passed stone, nonvisualized stone, or ureteral stricture or other lesion.  Marked urothelial thickening in the right upper urinary tract may be infectious or inflammatory. Enlarged perinephric and periaortic lymph nodes in the upper retroperitoneum are new since the prior study. This is nonspecific but is most likely reactive. Neoplastic adenopathy is possible but less likely.  There are several tiny nonobstructing left upper pole renal calculi measuring 1 to 2 mm, but this is significantly decreased since the previous CT study following interval lithotripsy. There is no evidence of obstruction on the left at this time.  ABDOMEN FINDINGS: Liver, pancreas and spleen are normal in size and appearance. Small bowel and colon are normal in caliber and appearance, as imaged. Normal caliber abdominal aorta.  PELVIS FINDINGS: Hysterectomy. No pelvic mass or adenopathy is seen in this patient with reported past history of cervical cancer. Urinary bladder and rectum are unremarkable. No inguinal hernia.      Impression: 1. Severe right-sided hydronephrosis with ureteral dilatation to about the level of the pelvic brim. Right perinephric obstructive urinary extravasation and extensive right upper urinary tract urothelial thickening. No visible obstructing right ureter stone. 2. Large nonobstructing calculi within the right lower pole renal collecting system. Tiny nonobstructing left upper renal calculi. 3. Hysterectomy. 4. Initial stat report to the ED from Dr. Jhonatan Moreno at 2359 hours on 7/31/2018.  This report was finalized on 8/1/2018 6:45 AM by Dr. Alejandro Longo MD.      Fl Retrograde Pyelogram In Or    Result Date: 8/6/2018  Narrative: FL RETROGRADE PYELOGRAM IN OR-: 8/3/2018 4:17 PM  INDICATION: Right hydronephrosis. Ureteral stent  placement..  COMPARISON: CT abdomen 8/3/2018.  FINDINGS: 4 fluoroscopic images of the right ureter were submitted for review. The right renal collecting system and right ureter are severely dilated. There is a focal transition site at the pelvic inlet. The stricture is smooth bordered with no irregularity, however, this is concerning for possible malignant stricture. Fluoroscopic time 0.50 minutes  This report was finalized on 8/6/2018 7:15 AM by Dr. Chad Thompson MD.        PHYSICAL EXAM  Physical Exam   Constitutional: She is oriented to person, place, and time. She appears well-developed and well-nourished.   HENT:   Head: Normocephalic and atraumatic.   Eyes: Pupils are equal, round, and reactive to light. Conjunctivae and EOM are normal. No scleral icterus.   Neck: Normal range of motion. Neck supple. No thyromegaly present.   Cardiovascular: Normal rate, regular rhythm, normal heart sounds and intact distal pulses.  Exam reveals no gallop.    No murmur heard.  Pulmonary/Chest: Effort normal and breath sounds normal. She has no wheezes. She has no rales.   Abdominal: Soft. Bowel sounds are normal. She exhibits no shifting dullness, no distension, no fluid wave, no abdominal bruit, no ascites and no mass. There is no hepatosplenomegaly. There is no tenderness. There is no guarding and negative García's sign. Hernia confirmed negative in the ventral area.   Musculoskeletal: Normal range of motion. She exhibits no edema.   Lymphadenopathy:     She has no cervical adenopathy.   Neurological: She is alert and oriented to person, place, and time.   Skin: Skin is warm and dry. No rash noted. She is not diaphoretic. No erythema.   Psychiatric: She has a normal mood and affect. Her behavior is normal.       ASSESSMENT  Diagnoses and all orders for this visit:    Mcgill's esophagus without dysplasia          PLAN  No Follow-up on file.

## 2018-08-06 NOTE — TELEPHONE ENCOUNTER
----- Message from Muriel Darling MA sent at 8/6/2018 11:51 AM EDT -----  Regarding: RE: POSSIBLE NEW [facility] PT ?  Contact: [phone]  ka  ----- Message -----  From: Roberta Boswell MD  Sent: 8/6/2018  10:30 AM  To: Muriel Darling MA  Subject: RE: POSSIBLE NEW [facility] PT ?                       Ok to take pt    ----- Message -----  From: Muriel Darling MA  Sent: 8/6/2018  10:02 AM  To: Roberta Bosewll MD  Subject: FW: POSSIBLE NEW [facility] PT ?                       Please advise.  ----- Message -----  From: Tatiana David  Sent: 8/6/2018   9:57 AM  To: [facility]  Subject: POSSIBLE NEW [facility] PT ?                             I received a call from spouse, Kaden Gonzalez, (he is a [facility] pt), and he is asking that we take his wife, Germaine, as a new patient. He said that she just got out of the hospital with a UTI and had to have a stent put in.  The hospital wants her to see her primary care physician, and he is hoping this will be dr boswell.    Please call them back    Thanks!  tatiana

## 2018-08-07 ENCOUNTER — READMISSION MANAGEMENT (OUTPATIENT)
Dept: CALL CENTER | Facility: HOSPITAL | Age: 73
End: 2018-08-07

## 2018-08-07 NOTE — OUTREACH NOTE
Medical Week 1 Survey      Responses   Facility patient discharged from?  [facility]   Does the patient have one of the following disease processes/diagnoses(primary or secondary)?  Other   Is there a successful TCM telephone encounter documented?  No   Week 1 attempt successful?  No   Unsuccessful attempts  Attempt 1          Ben Hector RN

## 2018-08-08 ENCOUNTER — READMISSION MANAGEMENT (OUTPATIENT)
Dept: CALL CENTER | Facility: HOSPITAL | Age: 73
End: 2018-08-08

## 2018-08-08 NOTE — OUTREACH NOTE
Medical Week 2 Survey      Responses   Facility patient discharged from?  [facility]   Does the patient have one of the following disease processes/diagnoses(primary or secondary)?  Other   Call start time  0912   Discharge diagnosis  acute pyelonephritis, , ureteral stent placed,  PICC line   Call end time  0923   Medication alerts for this patient  IV abx x 1 week   Meds reviewed with patient/caregiver?  Yes   Comments regarding appointments  appt with Dr Cordova ([facility]) on 8/15/18   Does the patient have a primary care provider?   Yes   Does the patient have an appointment with their PCP within 7 days of discharge?  Greater than 7 days   Nursing Interventions  Verified appointment date/time/provider   Has the patient kept scheduled appointments due by today?  N/A   What is the Home health agency?   [facility]   Has home health visited the patient within 72 hours of discharge?  Yes   Comments  pt and  managing IV Invanz infusions without difficulty   Did the patient receive a copy of their discharge instructions?  Yes   Nursing interventions  Reviewed instructions with patient   What is the patient's perception of their health status since discharge?  Improving   Is the patient/caregiver able to teach back signs and symptoms related to disease process for when to call PCP?  Yes   Is the patient/caregiver able to teach back signs and symptoms related to disease process for when to call 911?  Yes   Is the patient/caregiver able to teach back the hierarchy of who to call/visit for symptoms/problems? PCP, Specialist, Home health nurse, Urgent Care, ED, 911  Yes          Shelby Go RN

## 2018-08-16 ENCOUNTER — READMISSION MANAGEMENT (OUTPATIENT)
Dept: CALL CENTER | Facility: HOSPITAL | Age: 73
End: 2018-08-16

## 2018-08-16 NOTE — OUTREACH NOTE
Medical Week 2 Survey      Responses   Facility patient discharged from?  [facility]   Does the patient have one of the following disease processes/diagnoses(primary or secondary)?  General Surgery   Week 2 attempt successful?  No   Unsuccessful attempts  Attempt 1          Ifeoma Cortez RN

## 2018-08-17 ENCOUNTER — READMISSION MANAGEMENT (OUTPATIENT)
Dept: CALL CENTER | Facility: HOSPITAL | Age: 73
End: 2018-08-17

## 2018-08-17 NOTE — OUTREACH NOTE
Medical Week 1 Survey      Responses   Facility patient discharged from?  [facility]   Does the patient have one of the following disease processes/diagnoses(primary or secondary)?  Other   Is there a successful TCM telephone encounter documented?  No   Week 1 attempt successful?  Yes   Call start time  0912   Call end time  0923   Discharge diagnosis  acute pyelonephritis, , ureteral stent placed,  PICC line   Medication alerts for this patient  IV abx x 1 week   Meds reviewed with patient/caregiver?  Yes   Comments regarding appointments  appt with Dr Cordova ([facility]) on 8/15/18   Does the patient have a primary care provider?   Yes   Does the patient have an appointment with their PCP within 7 days of discharge?  Greater than 7 days   Nursing Interventions  Verified appointment date/time/provider   Has the patient kept scheduled appointments due by today?  N/A   What is the Home health agency?   [facility]   Has home health visited the patient within 72 hours of discharge?  Yes   Comments  pt and  managing IV Invanz infusions without difficulty   Did the patient receive a copy of their discharge instructions?  Yes   Nursing interventions  Reviewed instructions with patient   What is the patient's perception of their health status since discharge?  Improving   Is the patient/caregiver able to teach back signs and symptoms related to disease process for when to call PCP?  Yes   Is the patient/caregiver able to teach back signs and symptoms related to disease process for when to call 911?  Yes   Is the patient/caregiver able to teach back the hierarchy of who to call/visit for symptoms/problems? PCP, Specialist, Home health nurse, Urgent Care, ED, 911  Yes   Week 1 call completed?  Yes          Shelby Go RN

## 2018-08-17 NOTE — OUTREACH NOTE
Medical Week 2 Survey      Responses   Facility patient discharged from?  [facility]   Does the patient have one of the following disease processes/diagnoses(primary or secondary)?  General Surgery   Week 2 attempt successful?  Yes   Call start time  1028   Discharge diagnosis  acute pyelonephritis, , ureteral stent placed,  PICC line   Call end time  1032   List who call center can speak with  Spouse   Medication alerts for this patient  IV abx x 1 week-Picc Line out on 08/15/18   Meds reviewed with patient/caregiver?  Yes   Is the patient having any side effects they believe may be caused by any medication additions or changes?  No   Does the patient have all medications ordered at discharge?  Yes   Is the patient taking all medications as directed (includes completed medication regime)?  Yes   Comments regarding appointments  Has been back to Urologist and will go again in September to have the stent removed.   Does the patient have a primary care provider?   Yes   Does the patient have an appointment with their PCP within 7 days of discharge?  Greater than 7 days   Nursing Interventions  Verified appointment date/time/provider   Has the patient kept scheduled appointments due by today?  Yes   What is the Home health agency?   [facility] infusion   Has [facility] visited the patient within 72 hours of discharge?  Yes   Psychosocial issues?  No   Comments  Infusions are complete and No Oral ABX needed at this time.   Did the patient receive a copy of their discharge instructions?  Yes   Nursing interventions  Reviewed instructions with patient   What is the patient's perception of their health status since discharge?  Improving   Is the patient/caregiver able to teach back signs and symptoms related to disease process for when to call PCP?  Yes   Is the patient/caregiver able to teach back signs and symptoms related to disease process for when to call 911?  Yes   Is the patient/caregiver able to  teach back the hierarchy of who to call/visit for symptoms/problems? PCP, Specialist, Home health nurse, Urgent Care, ED, 911  Yes   Week 2 Call Completed?  Yes          Ciara Adams RN

## 2018-08-27 ENCOUNTER — OFFICE VISIT (OUTPATIENT)
Dept: INTERNAL MEDICINE | Facility: CLINIC | Age: 73
End: 2018-08-27

## 2018-08-27 ENCOUNTER — READMISSION MANAGEMENT (OUTPATIENT)
Dept: CALL CENTER | Facility: HOSPITAL | Age: 73
End: 2018-08-27

## 2018-08-27 VITALS
HEART RATE: 87 BPM | HEIGHT: 65 IN | SYSTOLIC BLOOD PRESSURE: 150 MMHG | BODY MASS INDEX: 24.49 KG/M2 | OXYGEN SATURATION: 99 % | DIASTOLIC BLOOD PRESSURE: 92 MMHG | WEIGHT: 147 LBS

## 2018-08-27 DIAGNOSIS — E78.2 MIXED HYPERLIPIDEMIA: ICD-10-CM

## 2018-08-27 DIAGNOSIS — Z23 NEEDS FLU SHOT: Primary | ICD-10-CM

## 2018-08-27 DIAGNOSIS — M35.01 SJOGREN'S SYNDROME WITH KERATOCONJUNCTIVITIS SICCA (HCC): ICD-10-CM

## 2018-08-27 DIAGNOSIS — R31.9 HEMATURIA, UNSPECIFIED TYPE: ICD-10-CM

## 2018-08-27 DIAGNOSIS — R73.9 HYPERGLYCEMIA: ICD-10-CM

## 2018-08-27 DIAGNOSIS — N12 PYELONEPHRITIS: ICD-10-CM

## 2018-08-27 DIAGNOSIS — K21.9 GASTROESOPHAGEAL REFLUX DISEASE, ESOPHAGITIS PRESENCE NOT SPECIFIED: ICD-10-CM

## 2018-08-27 DIAGNOSIS — K22.70 BARRETT'S ESOPHAGUS WITHOUT DYSPLASIA: ICD-10-CM

## 2018-08-27 LAB
BILIRUB BLD-MCNC: NEGATIVE MG/DL
CLARITY, POC: CLEAR
COLOR UR: YELLOW
GLUCOSE UR STRIP-MCNC: NEGATIVE MG/DL
KETONES UR QL: NEGATIVE
LEUKOCYTE EST, POC: ABNORMAL
NITRITE UR-MCNC: NEGATIVE MG/ML
PH UR: 5.5 [PH] (ref 5–8)
PROT UR STRIP-MCNC: NEGATIVE MG/DL
RBC # UR STRIP: ABNORMAL /UL
SP GR UR: 1.02 (ref 1–1.03)
UROBILINOGEN UR QL: NORMAL

## 2018-08-27 PROCEDURE — 90471 IMMUNIZATION ADMIN: CPT | Performed by: INTERNAL MEDICINE

## 2018-08-27 PROCEDURE — 90662 IIV NO PRSV INCREASED AG IM: CPT | Performed by: INTERNAL MEDICINE

## 2018-08-27 PROCEDURE — 99203 OFFICE O/P NEW LOW 30 MIN: CPT | Performed by: INTERNAL MEDICINE

## 2018-08-27 PROCEDURE — 81003 URINALYSIS AUTO W/O SCOPE: CPT | Performed by: INTERNAL MEDICINE

## 2018-08-27 RX ORDER — IBUPROFEN 200 MG
200 TABLET ORAL EVERY 6 HOURS PRN
COMMUNITY
End: 2019-02-11

## 2018-08-27 NOTE — PROGRESS NOTES
Germaine Gonzalez is a 73 y.o. female, who presents with a chief complaint of   Chief Complaint   Patient presents with   • Establish Care   • Follow-up     Went to ED on 7/31/2018& 8/2/2018       HPI   The pt is here to [facility].    She was recently in the hosptial x 2 in the past month.  She had a Acute Pyelonephritis/ ESBL E. Coli went home with PICC line and 7 days ertapenem for 7 more days.  Obstructive pyelonephritis with placement of ureteral stent.  she follows with dr. Darling and plan for stent out on 9/5/18.    HLD - pt on zetia.  Statins elevated LFT's.  Labs in April viewed in care everywhere showed very high cholesterol (279) with low hdl (35) and high LDL.     Gerd with Mcgill's esophagus - pt on omeprazole and ranitidine.     Anxiety - on low dose lexapro and doing well.     Pt tries to eat a healthy diet but isnt exercising regularly.  She used to be a runner and run marathon.  She has lots of knee issues and has had to have intermittent injections in her knees.  I encouraged her to swim or bike.     The following portions of the patient's history were reviewed and updated as appropriate: allergies, current medications, past family history, past medical history, past social history, past surgical history and problem list.    Allergies: Levaquin [levofloxacin] and Tetracycline    Review of Systems   Constitutional: Negative.    HENT: Negative.    Eyes: Negative.    Respiratory: Negative.    Cardiovascular: Negative.    Gastrointestinal: Negative.    Endocrine: Negative.    Genitourinary: Negative.    Musculoskeletal: Negative.    Skin: Negative.    Allergic/Immunologic: Negative.    Neurological: Negative.    Hematological: Negative.    Psychiatric/Behavioral: Negative.    All other systems reviewed and are negative.            Wt Readings from Last 3 Encounters:   08/27/18 66.7 kg (147 lb)   08/06/18 67.9 kg (149 lb 12.8 oz)   08/03/18 63 kg (139 lb)     Temp Readings from Last 3 Encounters:    08/05/18 98.8 °F (37.1 °C) (Oral)   07/31/18 98.8 °F (37.1 °C) (Oral)   10/26/16 97.6 °F (36.4 °C) (Oral)     BP Readings from Last 3 Encounters:   08/27/18 150/92   08/06/18 100/52   08/05/18 104/51     Pulse Readings from Last 3 Encounters:   08/27/18 87   08/06/18 58   08/05/18 66     Body mass index is 24.46 kg/m².  @LASTSAO2(3)@    Physical Exam   Constitutional: She is oriented to person, place, and time. She appears well-developed and well-nourished. No distress.   HENT:   Head: Normocephalic and atraumatic.   Right Ear: External ear normal.   Left Ear: External ear normal.   Nose: Nose normal.   Mouth/Throat: Oropharynx is clear and moist.   Eyes: Pupils are equal, round, and reactive to light. Conjunctivae and EOM are normal.   Neck: Normal range of motion. Neck supple.   Cardiovascular: Normal rate, regular rhythm, normal heart sounds and intact distal pulses.    Pulmonary/Chest: Effort normal and breath sounds normal. No respiratory distress. She has no wheezes.   Musculoskeletal: Normal range of motion.   Normal gait   Neurological: She is alert and oriented to person, place, and time.   Skin: Skin is warm and dry.   Psychiatric: She has a normal mood and affect. Her behavior is normal. Judgment and thought content normal.   Nursing note and vitals reviewed.      Results for orders placed or performed during the hospital encounter of 08/02/18   Urine Culture - Urine, Urine, Catheter   Result Value Ref Range    Urine Culture <25,000 CFU/mL Gram Negative Bacilli (A)    Comprehensive Metabolic Panel   Result Value Ref Range    Glucose 138 (H) 65 - 99 mg/dL    BUN 14 8 - 23 mg/dL    Creatinine 0.97 0.57 - 1.00 mg/dL    Sodium 134 (L) 136 - 145 mmol/L    Potassium 3.7 3.5 - 5.2 mmol/L    Chloride 95 (L) 98 - 107 mmol/L    CO2 23.3 22.0 - 29.0 mmol/L    Calcium 9.4 8.8 - 10.5 mg/dL    Total Protein 7.0 6.0 - 8.5 g/dL    Albumin 3.60 3.50 - 5.20 g/dL    ALT (SGPT) 11 5 - 33 U/L    AST (SGOT) 11 5 - 32 U/L     Alkaline Phosphatase 79 40 - 129 U/L    Total Bilirubin 1.7 (H) 0.2 - 1.2 mg/dL    eGFR Non African Amer 57 (L) >60 mL/min/1.73    Globulin 3.4 gm/dL    A/G Ratio 1.1 g/dL    BUN/Creatinine Ratio 14.4 7.0 - 25.0    Anion Gap 15.7 mmol/L   Urinalysis With Microscopic If Indicated (No Culture) - Urine, Catheter   Result Value Ref Range    Color, UA Yellow Yellow, Straw    Appearance, UA Clear Clear    pH, UA 6.0 4.5 - 8.0    Specific Gravity, UA 1.020 1.003 - 1.030    Glucose, UA Negative Negative    Ketones, UA 40 mg/dL (2+) (A) Negative, 80 mg/dL (3+), >=160 mg/dL (4+)    Bilirubin, UA Negative Negative    Blood, UA Moderate (2+) (A) Negative    Protein,  mg/dL (2+) (A) Negative    Leuk Esterase, UA Large (3+) (A) Negative    Nitrite, UA Negative Negative    Urobilinogen, UA 0.2 E.U./dL 0.2 - 1.0 E.U./dL   CBC Auto Differential   Result Value Ref Range    WBC 14.03 (H) 4.80 - 10.80 10*3/mm3    RBC 4.31 4.20 - 5.40 10*6/mm3    Hemoglobin 13.5 12.0 - 16.0 g/dL    Hematocrit 39.7 37.0 - 47.0 %    MCV 92.1 81.0 - 99.0 fL    MCH 31.3 (H) 27.0 - 31.0 pg    MCHC 34.0 31.0 - 37.0 g/dL    RDW 12.7 11.5 - 14.5 %    RDW-SD 43.4 37.0 - 54.0 fl    MPV 9.8 7.4 - 10.4 fL    Platelets 169 140 - 500 10*3/mm3    Neutrophil % 84.9 (H) 45.0 - 70.0 %    Lymphocyte % 9.8 (L) 20.0 - 45.0 %    Monocyte % 4.8 3.0 - 8.0 %    Eosinophil % 0.0 0.0 - 4.0 %    Basophil % 0.1 0.0 - 2.0 %    Immature Grans % 0.4 0.0 - 0.5 %    Neutrophils, Absolute 11.91 (H) 1.50 - 8.30 10*3/mm3    Lymphocytes, Absolute 1.37 0.60 - 4.80 10*3/mm3    Monocytes, Absolute 0.67 0.00 - 1.00 10*3/mm3    Eosinophils, Absolute 0.00 (L) 0.10 - 0.30 10*3/mm3    Basophils, Absolute 0.02 0.00 - 0.20 10*3/mm3    Immature Grans, Absolute 0.06 (H) 0.00 - 0.03 10*3/mm3    nRBC 0.0 0.0 - 0.0 /100 WBC   Urinalysis, Microscopic Only - Urine, Clean Catch   Result Value Ref Range    RBC, UA 6-12 (A) None Seen /HPF    WBC, UA 13-20 (A) None Seen /HPF    Bacteria, UA Trace (A) None  Seen /HPF    Squamous Epithelial Cells, UA 7-12 (A) None Seen, 0-2 /HPF    Hyaline Casts, UA None Seen None Seen /LPF    Amorphous Crystals, UA Large/3+ None Seen /HPF    Mucus, UA Small/1+ (A) None Seen, Trace /HPF    Methodology Manual Light Microscopy    Comprehensive Metabolic Panel   Result Value Ref Range    Glucose 125 (H) 65 - 99 mg/dL    BUN 12 8 - 23 mg/dL    Creatinine 0.74 0.57 - 1.00 mg/dL    Sodium 137 136 - 145 mmol/L    Potassium 3.7 3.5 - 5.2 mmol/L    Chloride 103 98 - 107 mmol/L    CO2 21.8 (L) 22.0 - 29.0 mmol/L    Calcium 8.7 (L) 8.8 - 10.5 mg/dL    Total Protein 5.9 (L) 6.0 - 8.5 g/dL    Albumin 3.20 (L) 3.50 - 5.20 g/dL    ALT (SGPT) 14 5 - 33 U/L    AST (SGOT) 14 5 - 32 U/L    Alkaline Phosphatase 65 40 - 129 U/L    Total Bilirubin 0.6 0.2 - 1.2 mg/dL    eGFR Non African Amer 77 >60 mL/min/1.73    Globulin 2.7 gm/dL    A/G Ratio 1.2 g/dL    BUN/Creatinine Ratio 16.2 7.0 - 25.0    Anion Gap 12.2 mmol/L   CBC Auto Differential   Result Value Ref Range    WBC 5.56 4.80 - 10.80 10*3/mm3    RBC 4.06 (L) 4.20 - 5.40 10*6/mm3    Hemoglobin 12.7 12.0 - 16.0 g/dL    Hematocrit 37.5 37.0 - 47.0 %    MCV 92.4 81.0 - 99.0 fL    MCH 31.3 (H) 27.0 - 31.0 pg    MCHC 33.9 31.0 - 37.0 g/dL    RDW 12.6 11.5 - 14.5 %    RDW-SD 42.6 37.0 - 54.0 fl    MPV 10.5 (H) 7.4 - 10.4 fL    Platelets 162 140 - 500 10*3/mm3    Neutrophil % 72.3 (H) 45.0 - 70.0 %    Lymphocyte % 17.6 (L) 20.0 - 45.0 %    Monocyte % 9.2 (H) 3.0 - 8.0 %    Eosinophil % 0.0 0.0 - 4.0 %    Basophil % 0.2 0.0 - 2.0 %    Immature Grans % 0.7 (H) 0.0 - 0.5 %    Neutrophils, Absolute 4.02 1.50 - 8.30 10*3/mm3    Lymphocytes, Absolute 0.98 0.60 - 4.80 10*3/mm3    Monocytes, Absolute 0.51 0.00 - 1.00 10*3/mm3    Eosinophils, Absolute 0.00 (L) 0.10 - 0.30 10*3/mm3    Basophils, Absolute 0.01 0.00 - 0.20 10*3/mm3    Immature Grans, Absolute 0.04 (H) 0.00 - 0.03 10*3/mm3    nRBC 0.0 0.0 - 0.0 /100 WBC           Germaine was seen today for establish care and  follow-up.    Diagnoses and all orders for this visit:    Hematuria, unspecified type  -     POCT urinalysis dipstick, automated  -     Urine Culture - Urine, Urine, Clean Catch    Mixed hyperlipidemia  -     Comprehensive Metabolic Panel; Future  -     Lipid Panel With LDL / HDL Ratio; Future    Gastroesophageal reflux disease, esophagitis presence not specified  -     Comprehensive Metabolic Panel; Future  -     CBC & Differential; Future  -     T4, Free; Future  -     TSH; Future  -     Lipid Panel With LDL / HDL Ratio; Future  -     Hemoglobin A1c; Future  -     Microalbumin / Creatinine Urine Ratio - Urine, Clean Catch    Mcgill's esophagus without dysplasia  -     Comprehensive Metabolic Panel; Future  -     CBC & Differential; Future  -     T4, Free; Future  -     TSH; Future  -     Lipid Panel With LDL / HDL Ratio; Future  -     Hemoglobin A1c; Future  -     Microalbumin / Creatinine Urine Ratio - Urine, Clean Catch    Sjogren's syndrome with keratoconjunctivitis sicca (CMS/HCC)  -     Comprehensive Metabolic Panel; Future  -     CBC & Differential; Future  -     T4, Free; Future  -     TSH; Future  -     Lipid Panel With LDL / HDL Ratio; Future  -     Hemoglobin A1c; Future  -     Microalbumin / Creatinine Urine Ratio - Urine, Clean Catch    Pyelonephritis  -     Comprehensive Metabolic Panel; Future  -     CBC & Differential; Future  -     T4, Free; Future  -     TSH; Future  -     Lipid Panel With LDL / HDL Ratio; Future  -     Hemoglobin A1c; Future  -     Microalbumin / Creatinine Urine Ratio - Urine, Clean Catch    Hyperglycemia  -     Comprehensive Metabolic Panel; Future  -     CBC & Differential; Future  -     T4, Free; Future  -     TSH; Future  -     Lipid Panel With LDL / HDL Ratio; Future  -     Hemoglobin A1c; Future  -     Microalbumin / Creatinine Urine Ratio - Urine, Clean Catch      Cont current meds.  Ov/labs in 6 mo.    Outpatient Medications Prior to Visit   Medication Sig Dispense  Refill   • calcium carbonate (OS-ULI) 600 MG tablet Take 600 mg by mouth 2 (Two) Times a Day With Meals.     • cycloSPORINE (RESTASIS) 0.05 % ophthalmic emulsion Administer 1 drop to both eyes 2 (Two) Times a Day.     • escitalopram (LEXAPRO) 10 MG tablet Take 5 mg by mouth Every Morning.     • ezetimibe (ZETIA) 10 MG tablet Take 10 mg by mouth Daily.     • fluticasone (FLONASE) 50 MCG/ACT nasal spray 2 sprays into the nostril(s) as directed by provider Daily.     • glucosamine-chondroitin 500-400 MG capsule capsule Take 1 capsule by mouth 2 (Two) Times a Day With Meals.     • omeprazole (PRILOSEC) 40 MG capsule Take 1 capsule by mouth Daily. 90 capsule 3   • Probiotic Product (PROBIOTIC DAILY PO) Take 1 tablet by mouth Daily.     • vitamin B-12 (CYANOCOBALAMIN) 1000 MCG tablet Take 5,000 mcg by mouth Daily.     • acetaminophen (TYLENOL) 325 MG tablet Take 2 tablets by mouth Every 4 (Four) Hours As Needed for Mild Pain .     • desoximetasone (TOPICORT) 0.25 % cream Apply 1 application topically to the appropriate area as directed As Needed for Irritation.       No facility-administered medications prior to visit.      No orders of the defined types were placed in this encounter.    [unfilled]  Medications Discontinued During This Encounter   Medication Reason   • acetaminophen (TYLENOL) 325 MG tablet *Therapy completed   • desoximetasone (TOPICORT) 0.25 % cream *Therapy completed         Return in about 6 months (around 2/27/2019) for Recheck, labs.

## 2018-08-27 NOTE — OUTREACH NOTE
Medical Week 3 Survey      Responses   Facility patient discharged from?  [facility]   Does the patient have one of the following disease processes/diagnoses(primary or secondary)?  General Surgery   Week 3 attempt successful?  No   Unsuccessful attempts  Attempt 1          Palak Mai RN

## 2018-08-29 ENCOUNTER — READMISSION MANAGEMENT (OUTPATIENT)
Dept: CALL CENTER | Facility: HOSPITAL | Age: 73
End: 2018-08-29

## 2018-08-29 LAB
BACTERIA UR CULT: NORMAL
BACTERIA UR CULT: NORMAL

## 2018-08-29 NOTE — OUTREACH NOTE
Medical Week 3 Survey      Responses   Facility patient discharged from?  [facility]   Does the patient have one of the following disease processes/diagnoses(primary or secondary)?  General Surgery   Week 3 attempt successful?  No   Unsuccessful attempts  Attempt 2          Eliana Pandya RN

## 2018-08-30 LAB
ALBUMIN SERPL-MCNC: 4.5 G/DL (ref 3.5–5.2)
ALBUMIN/GLOB SERPL: 2 G/DL
ALP SERPL-CCNC: 99 U/L (ref 40–129)
ALT SERPL-CCNC: 12 U/L (ref 5–33)
AST SERPL-CCNC: 15 U/L (ref 5–32)
BASOPHILS # BLD AUTO: 0.04 10*3/MM3 (ref 0–0.2)
BASOPHILS NFR BLD AUTO: 1 % (ref 0–2)
BILIRUB SERPL-MCNC: 0.9 MG/DL (ref 0.2–1.2)
BUN SERPL-MCNC: 15 MG/DL (ref 8–23)
BUN/CREAT SERPL: 17.9 (ref 7–25)
CALCIUM SERPL-MCNC: 9.5 MG/DL (ref 8.8–10.5)
CHLORIDE SERPL-SCNC: 110 MMOL/L (ref 98–107)
CHOLEST SERPL-MCNC: 285 MG/DL (ref 0–200)
CO2 SERPL-SCNC: 24.8 MMOL/L (ref 22–29)
CREAT SERPL-MCNC: 0.84 MG/DL (ref 0.57–1)
EOSINOPHIL # BLD AUTO: 0.13 10*3/MM3 (ref 0.1–0.3)
EOSINOPHIL NFR BLD AUTO: 3.2 % (ref 0–4)
ERYTHROCYTE [DISTWIDTH] IN BLOOD BY AUTOMATED COUNT: 13.3 % (ref 11.5–14.5)
GLOBULIN SER CALC-MCNC: 2.3 GM/DL
GLUCOSE SERPL-MCNC: 104 MG/DL (ref 65–99)
HBA1C MFR BLD: 5.4 % (ref 4.8–5.6)
HCT VFR BLD AUTO: 40.4 % (ref 37–47)
HDLC SERPL-MCNC: 65 MG/DL (ref 40–60)
HGB BLD-MCNC: 13.8 G/DL (ref 12–16)
IMM GRANULOCYTES # BLD: 0.02 10*3/MM3 (ref 0–0.03)
IMM GRANULOCYTES NFR BLD: 0.5 % (ref 0–0.5)
LDLC SERPL CALC-MCNC: 199 MG/DL (ref 0–100)
LDLC/HDLC SERPL: 3.07 {RATIO}
LYMPHOCYTES # BLD AUTO: 1.05 10*3/MM3 (ref 0.6–4.8)
LYMPHOCYTES NFR BLD AUTO: 25.5 % (ref 20–45)
MCH RBC QN AUTO: 31.7 PG (ref 27–31)
MCHC RBC AUTO-ENTMCNC: 34.2 G/DL (ref 31–37)
MCV RBC AUTO: 92.7 FL (ref 81–99)
MONOCYTES # BLD AUTO: 0.58 10*3/MM3 (ref 0–1)
MONOCYTES NFR BLD AUTO: 14.1 % (ref 3–8)
NEUTROPHILS # BLD AUTO: 2.3 10*3/MM3 (ref 1.5–8.3)
NEUTROPHILS NFR BLD AUTO: 55.7 % (ref 45–70)
NRBC BLD AUTO-RTO: 0 /100 WBC (ref 0–0)
PLATELET # BLD AUTO: 198 10*3/MM3 (ref 140–500)
POTASSIUM SERPL-SCNC: 4.3 MMOL/L (ref 3.5–5.2)
PROT SERPL-MCNC: 6.8 G/DL (ref 6–8.5)
RBC # BLD AUTO: 4.36 10*6/MM3 (ref 4.2–5.4)
SODIUM SERPL-SCNC: 146 MMOL/L (ref 136–145)
T4 FREE SERPL-MCNC: 0.92 NG/DL (ref 0.93–1.7)
TRIGL SERPL-MCNC: 103 MG/DL (ref 0–150)
TSH SERPL DL<=0.005 MIU/L-ACNC: 1.89 MIU/ML (ref 0.27–4.2)
VLDLC SERPL CALC-MCNC: 20.6 MG/DL (ref 7–27)
WBC # BLD AUTO: 4.12 10*3/MM3 (ref 4.8–10.8)

## 2018-09-01 ENCOUNTER — RESULTS ENCOUNTER (OUTPATIENT)
Dept: INTERNAL MEDICINE | Facility: CLINIC | Age: 73
End: 2018-09-01

## 2018-09-01 DIAGNOSIS — K22.70 BARRETT'S ESOPHAGUS WITHOUT DYSPLASIA: ICD-10-CM

## 2018-09-01 DIAGNOSIS — M35.01 SJOGREN'S SYNDROME WITH KERATOCONJUNCTIVITIS SICCA (HCC): ICD-10-CM

## 2018-09-01 DIAGNOSIS — N12 PYELONEPHRITIS: ICD-10-CM

## 2018-09-01 DIAGNOSIS — E78.2 MIXED HYPERLIPIDEMIA: ICD-10-CM

## 2018-09-01 DIAGNOSIS — R73.9 HYPERGLYCEMIA: ICD-10-CM

## 2018-09-01 DIAGNOSIS — K21.9 GASTROESOPHAGEAL REFLUX DISEASE, ESOPHAGITIS PRESENCE NOT SPECIFIED: ICD-10-CM

## 2018-09-05 ENCOUNTER — TELEPHONE (OUTPATIENT)
Dept: INTERNAL MEDICINE | Facility: CLINIC | Age: 73
End: 2018-09-05

## 2018-09-05 RX ORDER — ROSUVASTATIN CALCIUM 5 MG/1
5 TABLET, COATED ORAL DAILY
Qty: 30 TABLET | Refills: 3 | Status: SHIPPED | OUTPATIENT
Start: 2018-09-05 | End: 2018-11-15 | Stop reason: SDUPTHER

## 2018-09-05 NOTE — TELEPHONE ENCOUNTER
Patient notified, she would like the medication sent to the [facility] pharmacy in [place]. She will stop taking Zetia. She already has an appt in 3 months for her labs.   ----- Message from Roberta Boswell MD sent at 9/4/2018  4:56 PM EDT -----  Call pt about labs. Try crestor 5mg and stop zetia

## 2018-09-20 ENCOUNTER — HOSPITAL ENCOUNTER (EMERGENCY)
Facility: HOSPITAL | Age: 73
Discharge: LEFT BEFORE DISCHARGE | End: 2018-09-20

## 2018-09-20 VITALS
HEART RATE: 86 BPM | WEIGHT: 149 LBS | RESPIRATION RATE: 18 BRPM | TEMPERATURE: 98.3 F | HEIGHT: 65 IN | BODY MASS INDEX: 24.83 KG/M2 | SYSTOLIC BLOOD PRESSURE: 132 MMHG | OXYGEN SATURATION: 94 % | DIASTOLIC BLOOD PRESSURE: 79 MMHG

## 2018-10-11 ENCOUNTER — TRANSCRIBE ORDERS (OUTPATIENT)
Dept: ADMINISTRATIVE | Facility: HOSPITAL | Age: 73
End: 2018-10-11

## 2018-10-11 DIAGNOSIS — N13.30 HYDRONEPHROSIS, UNSPECIFIED HYDRONEPHROSIS TYPE: Primary | ICD-10-CM

## 2018-11-15 RX ORDER — ROSUVASTATIN CALCIUM 5 MG/1
5 TABLET, COATED ORAL DAILY
Qty: 90 TABLET | Refills: 1 | Status: SHIPPED | OUTPATIENT
Start: 2018-11-15 | End: 2019-07-09 | Stop reason: SDUPTHER

## 2018-12-04 DIAGNOSIS — M35.01 SJOGREN'S SYNDROME WITH KERATOCONJUNCTIVITIS SICCA (HCC): ICD-10-CM

## 2018-12-04 DIAGNOSIS — R79.89 ABNORMAL THYROID BLOOD TEST: Primary | ICD-10-CM

## 2018-12-04 DIAGNOSIS — R73.9 HYPERGLYCEMIA: ICD-10-CM

## 2018-12-04 LAB
T4 FREE SERPL-MCNC: 0.94 NG/DL (ref 0.93–1.7)
TSH SERPL DL<=0.005 MIU/L-ACNC: 3 MIU/ML (ref 0.27–4.2)

## 2018-12-07 ENCOUNTER — TELEPHONE (OUTPATIENT)
Dept: INTERNAL MEDICINE | Facility: CLINIC | Age: 73
End: 2018-12-07

## 2018-12-07 NOTE — TELEPHONE ENCOUNTER
----- Message from Roberta Boswell MD sent at 12/4/2018  5:07 PM EST -----  Repeat thyroid labs back in normal range    Pt given results.dg

## 2019-01-14 ENCOUNTER — HOSPITAL ENCOUNTER (OUTPATIENT)
Dept: NUCLEAR MEDICINE | Facility: HOSPITAL | Age: 74
Discharge: HOME | End: 2019-01-14
Attending: UROLOGY

## 2019-01-14 DIAGNOSIS — N13.30 HYDRONEPHROSIS, UNSPECIFIED HYDRONEPHROSIS TYPE: ICD-10-CM

## 2019-01-14 PROCEDURE — 0 TECHNETIUM MERTIATIDE: Performed by: UROLOGY

## 2019-01-14 PROCEDURE — 25010000002 FUROSEMIDE PER 20 MG: Performed by: UROLOGY

## 2019-01-14 PROCEDURE — 78708 K FLOW/FUNCT IMAGE W/DRUG: CPT

## 2019-01-14 PROCEDURE — A9562 TC99M MERTIATIDE: HCPCS | Performed by: UROLOGY

## 2019-01-14 RX ORDER — FUROSEMIDE 10 MG/ML
36.1 INJECTION INTRAMUSCULAR; INTRAVENOUS
Status: COMPLETED | OUTPATIENT
Start: 2019-01-14 | End: 2019-01-14

## 2019-01-14 RX ADMIN — FUROSEMIDE 36.1 MG: 10 INJECTION, SOLUTION INTRAMUSCULAR; INTRAVENOUS at 12:31

## 2019-01-14 RX ADMIN — TECHNESCAN TC 99M MERTIATIDE 1 DOSE: 1 INJECTION, POWDER, LYOPHILIZED, FOR SOLUTION INTRAVENOUS at 12:13

## 2019-01-30 ENCOUNTER — TRANSCRIBE ORDERS (OUTPATIENT)
Dept: INTERNAL MEDICINE | Facility: CLINIC | Age: 74
End: 2019-01-30

## 2019-01-30 DIAGNOSIS — Z12.39 SCREENING BREAST EXAMINATION: Primary | ICD-10-CM

## 2019-02-11 ENCOUNTER — OFFICE VISIT (OUTPATIENT)
Dept: GASTROENTEROLOGY | Facility: CLINIC | Age: 74
End: 2019-02-11

## 2019-02-11 VITALS — BODY MASS INDEX: 28.39 KG/M2 | HEIGHT: 65 IN | WEIGHT: 170.4 LBS

## 2019-02-11 DIAGNOSIS — K75.81 NASH (NONALCOHOLIC STEATOHEPATITIS): ICD-10-CM

## 2019-02-11 DIAGNOSIS — K22.70 BARRETT'S ESOPHAGUS WITHOUT DYSPLASIA: Primary | ICD-10-CM

## 2019-02-11 PROCEDURE — 99213 OFFICE O/P EST LOW 20 MIN: CPT | Performed by: INTERNAL MEDICINE

## 2019-02-11 NOTE — PROGRESS NOTES
PATIENT INFORMATION  Germaine Gonzalez       - 1945    CHIEF COMPLAINT  Chief Complaint   Patient presents with   • Follow-up     6 mo follow up on Mcgill's       HISTORY OF PRESENT ILLNESS  Has labs to be drawn in March and overall is doing well and is here for her Mcgill's and is up a few pounds but her control of her HB but is back on her PPi daily.            REVIEW OF SYSTEMS  Review of Systems   All other systems reviewed and are negative.        ACTIVE PROBLEMS  Patient Active Problem List    Diagnosis   • WALSH (nonalcoholic steatohepatitis) [K75.81]   • Sjogren's syndrome with keratoconjunctivitis sicca (CMS/HCC) [M35.01]   • Mcgill's esophagus without dysplasia [K22.70]   • Hyperglycemia [R73.9]   • Pyelonephritis [N12]   • Gastroesophageal reflux disease [K21.9]   • Hyperlipidemia [E78.5]         PAST MEDICAL HISTORY  Past Medical History:   Diagnosis Date   • Arthritis     knees   • Mcgill esophagus    • Cancer (CMS/HCC)     cervical   • Cervical cancer (CMS/HCC)    • Colon polyp    • Eczema    • Elevated liver enzymes     PCP has stopped statin   • GERD (gastroesophageal reflux disease)    • H/O stress fracture    • Health care maintenance    • Hypercholesteremia    • Kidney stones     sched ureteroscopy, lithotripsy   • Ocular migraine    • Postmenopausal    • Seasonal allergies    • Sjogren's syndrome (CMS/HCC)          SURGICAL HISTORY  Past Surgical History:   Procedure Laterality Date   •  SECTION      x2   • COLONOSCOPY     • CYSTOSCOPY URETEROSCOPY STONE MANIPULATION/EXTRACTION Left 10/14/2016    Procedure: LEFT URETEROSCOPY bilateral retrograde pyleogram LEFT STENT PLACEMENT.;  Surgeon: Julius Darling MD;  Location: [facility] OR;  Service:    • CYSTOSCOPY W/ URETERAL STENT PLACEMENT Right 8/3/2018    Procedure: CYSTOSCOPY URETERAL CATHETER/STENT INSERTION;  Surgeon: Farzad Cordova MD;  Location: [facility] OR;  Service: Urology   • HYSTERECTOMY     • UPPER GASTROINTESTINAL  ENDOSCOPY     • URETEROSCOPY LASER LITHOTRIPSY WITH STENT INSERTION Left 10/26/2016    Procedure: LT URETEROSCOPY LASER LITHOTRIPSY ;  Surgeon: Julius Darling MD;  Location: [facility] OR;  Service:          FAMILY HISTORY  Family History   Problem Relation Age of Onset   • Anxiety disorder Mother    • Bipolar disorder Mother    • Stroke Mother 83   • Thyroid disease Mother    • Depression Mother    • Heart attack Father 47   • Thyroid disease Brother    • Hypertension Brother    • Diabetes Brother 68   • Glaucoma Brother    • Depression Brother    • No Known Problems Daughter    • No Known Problems Son    • Leukemia Maternal Grandmother    • Diabetes Maternal Grandfather    • Heart attack Paternal Grandmother    • Heart attack Paternal Grandfather    • Breast cancer Neg Hx    • Colon cancer Neg Hx    • Colon polyps Neg Hx          SOCIAL HISTORY  Social History     Occupational History   • Not on file   Tobacco Use   • Smoking status: Former Smoker     Packs/day: 0.50     Years: 16.00     Pack years: 8.00     Types: Cigarettes     Last attempt to quit: 1990     Years since quittin.8   • Smokeless tobacco: Never Used   Substance and Sexual Activity   • Alcohol use: Yes     Comment: socially   • Drug use: No   • Sexual activity: Defer       Debilities/Disabilities Identified: None    Emotional Behavior: Appropriate    CURRENT MEDICATIONS    Current Outpatient Medications:   •  calcium carbonate (OS-ULI) 600 MG tablet, Take 600 mg by mouth 2 (Two) Times a Day With Meals., Disp: , Rfl:   •  cycloSPORINE (RESTASIS) 0.05 % ophthalmic emulsion, Administer 1 drop to both eyes 2 (Two) Times a Day., Disp: , Rfl:   •  escitalopram (LEXAPRO) 10 MG tablet, Take 5 mg by mouth Every Morning., Disp: , Rfl:   •  fluticasone (FLONASE) 50 MCG/ACT nasal spray, 2 sprays into the nostril(s) as directed by provider Daily., Disp: , Rfl:   •  glucosamine-chondroitin 500-400 MG capsule capsule, Take 1 capsule by mouth 2 (Two)  "Times a Day With Meals., Disp: , Rfl:   •  omeprazole (PRILOSEC) 40 MG capsule, Take 1 capsule by mouth Daily., Disp: 90 capsule, Rfl: 3  •  Probiotic Product (PROBIOTIC DAILY PO), Take 1 tablet by mouth Daily., Disp: , Rfl:   •  rosuvastatin (CRESTOR) 5 MG tablet, Take 1 tablet by mouth Daily., Disp: 90 tablet, Rfl: 1  •  vitamin B-12 (CYANOCOBALAMIN) 1000 MCG tablet, Take 5,000 mcg by mouth Daily., Disp: , Rfl:     ALLERGIES  Levaquin [levofloxacin] and Tetracycline    VITALS  Vitals:    02/11/19 1310   Weight: 77.3 kg (170 lb 6.4 oz)   Height: 165.1 cm (65\")       LAST RESULTS   Orders Only on 12/04/2018   Component Date Value Ref Range Status   • TSH 12/04/2018 3.000  0.270 - 4.200 mIU/mL Final   • Free T4 12/04/2018 0.94  0.93 - 1.70 ng/dL Final     Nm Renal With Flow & Function With Pharmacological Intervention    Result Date: 1/14/2019  Narrative: RADIONUCLIDE RENAL SCAN WITH LASIX, 1/14/2019  HISTORY: 73-year-old female with history of right UPJ obstruction. Episode of obstructive pyelonephritis August 2018 with subsequent stent placement and improvement. Retrograde ureterogram at that time showed a high-grade stricture of the right distal ureter at the level of the pelvic brim as well as UPJ stricture.  DOSE: *  4.1 mCi MAG3 intravenously. *  36.1 mg Lasix IV at 25 minutes.  COMPARISON: *  Right retrograde ureterogram from cystoscopic procedure, 8/3/2018. *  CT abdomen/pelvis, 8/3/2018. *  Lasix renal scan, 12/7/2017.  FINDINGS: Early dynamic images and perfusion curves show prompt, bilaterally similar perfusion of both kidneys.  Activity at peak measures 62% on the left and 38% on the right. Prompt visualization of urinary activity within renal collecting systems, both ureters and the urinary bladder.  Excretion images show moderately severe dilatation of the right renal pelvis and collecting system. Excretion curve is normal on the left but shows delayed excretion on the right due to accumulation of " urinary activity within the dilated renal pelvis and collecting system.  Following Lasix administration, there is a brief period of washout and reaccumulation followed by continuous and complete washout of urinary activity from the dilated system. The findings are compatible with a dilated, but unobstructed right collecting system. Additionally, there is no abnormally retained activity within the mid and proximal right ureter to suggest obstruction by the previously demonstrated distal ureteral stricture.      Impression: 1. Normal bilateral renal perfusion. 2. Decreased peak renal parenchymal activity on the right and compared with the left, a new finding since 2017. 3. Lasix excretion curve showing evidence of a dilated right renal pelvis and collecting system, but no evidence of high-grade UPJ obstruction.  This report was finalized on 1/14/2019 1:30 PM by Dr. Alejandro Longo MD.        PHYSICAL EXAM  Physical Exam   Constitutional: She is oriented to person, place, and time. She appears well-developed and well-nourished.   HENT:   Head: Normocephalic and atraumatic.   Eyes: Conjunctivae and EOM are normal. Pupils are equal, round, and reactive to light. No scleral icterus.   Neck: Normal range of motion. Neck supple. No thyromegaly present.   Cardiovascular: Normal rate, regular rhythm, normal heart sounds and intact distal pulses. Exam reveals no gallop.   No murmur heard.  Pulmonary/Chest: Effort normal and breath sounds normal. She has no wheezes. She has no rales.   Abdominal: Soft. Bowel sounds are normal. She exhibits no shifting dullness, no distension, no fluid wave, no abdominal bruit, no ascites and no mass. There is no hepatosplenomegaly. There is tenderness in the epigastric area. There is no guarding and negative García's sign. Hernia confirmed negative in the ventral area.   Musculoskeletal: Normal range of motion. She exhibits no edema.   Lymphadenopathy:     She has no cervical adenopathy.    Neurological: She is alert and oriented to person, place, and time.   Skin: Skin is warm and dry. No rash noted. She is not diaphoretic. No erythema.   Psychiatric: She has a normal mood and affect. Her behavior is normal.       ASSESSMENT  Diagnoses and all orders for this visit:    Mcgill's esophagus without dysplasia  -     External Facility Surgical / Procedural Request; Future    WALSH (nonalcoholic steatohepatitis)    Other orders  -     Obtain informed consent; Future          PLAN  Return in about 6 months (around 8/11/2019).

## 2019-02-12 ENCOUNTER — PREP FOR SURGERY (OUTPATIENT)
Dept: OTHER | Facility: HOSPITAL | Age: 74
End: 2019-02-12

## 2019-02-12 DIAGNOSIS — K22.70 BARRETT'S ESOPHAGUS WITHOUT DYSPLASIA: Primary | ICD-10-CM

## 2019-02-18 DIAGNOSIS — R73.9 HYPERGLYCEMIA: ICD-10-CM

## 2019-02-18 DIAGNOSIS — E78.2 MIXED HYPERLIPIDEMIA: Primary | ICD-10-CM

## 2019-02-20 ENCOUNTER — HOSPITAL ENCOUNTER (OUTPATIENT)
Dept: MAMMOGRAPHY | Facility: HOSPITAL | Age: 74
Discharge: HOME | End: 2019-02-20
Attending: INTERNAL MEDICINE | Admitting: INTERNAL MEDICINE

## 2019-02-20 DIAGNOSIS — Z12.39 SCREENING BREAST EXAMINATION: ICD-10-CM

## 2019-02-20 LAB
ALBUMIN SERPL-MCNC: 4.6 G/DL (ref 3.5–5.2)
ALBUMIN/GLOB SERPL: 1.8 G/DL
ALP SERPL-CCNC: 100 U/L (ref 40–129)
ALT SERPL-CCNC: 32 U/L (ref 5–33)
AST SERPL-CCNC: 22 U/L (ref 5–32)
BASOPHILS # BLD AUTO: 0.02 10*3/MM3 (ref 0–0.2)
BASOPHILS NFR BLD AUTO: 0.3 % (ref 0–1.5)
BILIRUB SERPL-MCNC: 0.8 MG/DL (ref 0.2–1.2)
BUN SERPL-MCNC: 20 MG/DL (ref 8–23)
BUN/CREAT SERPL: 21.1 (ref 7–25)
CALCIUM SERPL-MCNC: 9.5 MG/DL (ref 8.8–10.5)
CHLORIDE SERPL-SCNC: 104 MMOL/L (ref 98–107)
CHOLEST SERPL-MCNC: 237 MG/DL (ref 0–200)
CO2 SERPL-SCNC: 23.9 MMOL/L (ref 22–29)
CREAT SERPL-MCNC: 0.95 MG/DL (ref 0.57–1)
EOSINOPHIL # BLD AUTO: 0.16 10*3/MM3 (ref 0–0.4)
EOSINOPHIL NFR BLD AUTO: 2.7 % (ref 0.3–6.2)
ERYTHROCYTE [DISTWIDTH] IN BLOOD BY AUTOMATED COUNT: 12.3 % (ref 12.3–15.4)
GLOBULIN SER CALC-MCNC: 2.5 GM/DL
GLUCOSE SERPL-MCNC: 100 MG/DL (ref 65–99)
HCT VFR BLD AUTO: 41.5 % (ref 34–46.6)
HDLC SERPL-MCNC: 75 MG/DL (ref 40–60)
HGB BLD-MCNC: 14 G/DL (ref 12–15.9)
IMM GRANULOCYTES # BLD AUTO: 0.02 10*3/MM3 (ref 0–0.05)
IMM GRANULOCYTES NFR BLD AUTO: 0.3 % (ref 0–0.5)
LDLC SERPL CALC-MCNC: 134 MG/DL (ref 0–100)
LDLC/HDLC SERPL: 1.79 {RATIO}
LYMPHOCYTES # BLD AUTO: 1.14 10*3/MM3 (ref 0.7–3.1)
LYMPHOCYTES NFR BLD AUTO: 19 % (ref 19.6–45.3)
MCH RBC QN AUTO: 32.3 PG (ref 26.6–33)
MCHC RBC AUTO-ENTMCNC: 33.7 G/DL (ref 31.5–35.7)
MCV RBC AUTO: 95.8 FL (ref 79–97)
MONOCYTES # BLD AUTO: 0.61 10*3/MM3 (ref 0.1–0.9)
MONOCYTES NFR BLD AUTO: 10.1 % (ref 5–12)
NEUTROPHILS # BLD AUTO: 4.06 10*3/MM3 (ref 1.4–7)
NEUTROPHILS NFR BLD AUTO: 67.6 % (ref 42.7–76)
PLATELET # BLD AUTO: 208 10*3/MM3 (ref 140–450)
POTASSIUM SERPL-SCNC: 4.7 MMOL/L (ref 3.5–5.2)
PROT SERPL-MCNC: 7.1 G/DL (ref 6–8.5)
RBC # BLD AUTO: 4.33 10*6/MM3 (ref 3.77–5.28)
SODIUM SERPL-SCNC: 141 MMOL/L (ref 136–145)
TRIGL SERPL-MCNC: 139 MG/DL (ref 0–150)
TSH SERPL DL<=0.005 MIU/L-ACNC: 2.85 MIU/ML (ref 0.27–4.2)
VLDLC SERPL CALC-MCNC: 27.8 MG/DL (ref 7–27)
WBC # BLD AUTO: 6.01 10*3/MM3 (ref 3.4–10.8)

## 2019-02-20 PROCEDURE — 77067 SCR MAMMO BI INCL CAD: CPT

## 2019-02-20 PROCEDURE — 77063 BREAST TOMOSYNTHESIS BI: CPT

## 2019-02-21 ENCOUNTER — TELEPHONE (OUTPATIENT)
Dept: INTERNAL MEDICINE | Facility: CLINIC | Age: 74
End: 2019-02-21

## 2019-02-21 NOTE — TELEPHONE ENCOUNTER
Mammogram result letter mailed yesterday per chart.     ----- Message from Roberta Boswell MD sent at 2/20/2019  5:12 PM EST -----  Mammogram normal.  Repeat 1 year

## 2019-02-23 ENCOUNTER — RESULTS ENCOUNTER (OUTPATIENT)
Dept: INTERNAL MEDICINE | Facility: CLINIC | Age: 74
End: 2019-02-23

## 2019-02-23 DIAGNOSIS — E78.2 MIXED HYPERLIPIDEMIA: ICD-10-CM

## 2019-02-23 DIAGNOSIS — R73.9 HYPERGLYCEMIA: ICD-10-CM

## 2019-02-25 ENCOUNTER — TELEPHONE (OUTPATIENT)
Dept: INTERNAL MEDICINE | Facility: CLINIC | Age: 74
End: 2019-02-25

## 2019-02-25 NOTE — TELEPHONE ENCOUNTER
----- Message from Roberta Boswell MD sent at 2/25/2019 10:28 AM EST -----  Labs ok. Will discuss at upcoming appt.

## 2019-02-26 ENCOUNTER — TELEPHONE (OUTPATIENT)
Dept: GASTROENTEROLOGY | Facility: CLINIC | Age: 74
End: 2019-02-26

## 2019-02-26 NOTE — TELEPHONE ENCOUNTER
WAS SCHEDULED 02/26/2019 AT [facility] AT 2:15PM.  DID NOT SHOW FOR PROCEDURE.      CALLED AND LEFT MESSAGE ON VOICE MAIL.

## 2019-02-27 ENCOUNTER — OFFICE VISIT (OUTPATIENT)
Dept: INTERNAL MEDICINE | Facility: CLINIC | Age: 74
End: 2019-02-27

## 2019-02-27 VITALS
RESPIRATION RATE: 16 BRPM | WEIGHT: 170 LBS | HEART RATE: 75 BPM | SYSTOLIC BLOOD PRESSURE: 148 MMHG | DIASTOLIC BLOOD PRESSURE: 90 MMHG | BODY MASS INDEX: 28.32 KG/M2 | OXYGEN SATURATION: 100 % | TEMPERATURE: 98.2 F | HEIGHT: 65 IN

## 2019-02-27 DIAGNOSIS — I10 ESSENTIAL HYPERTENSION: Primary | ICD-10-CM

## 2019-02-27 DIAGNOSIS — M35.01 SJOGREN'S SYNDROME WITH KERATOCONJUNCTIVITIS SICCA (HCC): ICD-10-CM

## 2019-02-27 DIAGNOSIS — K22.70 BARRETT'S ESOPHAGUS WITHOUT DYSPLASIA: ICD-10-CM

## 2019-02-27 DIAGNOSIS — K21.9 GASTROESOPHAGEAL REFLUX DISEASE, ESOPHAGITIS PRESENCE NOT SPECIFIED: ICD-10-CM

## 2019-02-27 DIAGNOSIS — R73.9 HYPERGLYCEMIA: ICD-10-CM

## 2019-02-27 DIAGNOSIS — E78.2 MIXED HYPERLIPIDEMIA: ICD-10-CM

## 2019-02-27 PROCEDURE — 99214 OFFICE O/P EST MOD 30 MIN: CPT | Performed by: INTERNAL MEDICINE

## 2019-02-27 RX ORDER — CONJUGATED ESTROGENS 0.62 MG/G
CREAM VAGINAL
COMMUNITY
Start: 2019-02-25 | End: 2019-11-25 | Stop reason: CLARIF

## 2019-02-27 RX ORDER — ANTIARTHRITIC COMBINATION NO.2 900 MG
TABLET ORAL DAILY
COMMUNITY

## 2019-02-27 RX ORDER — LISINOPRIL 10 MG/1
10 TABLET ORAL DAILY
Qty: 30 TABLET | Refills: 0 | Status: SHIPPED | OUTPATIENT
Start: 2019-02-27 | End: 2019-03-28 | Stop reason: SDUPTHER

## 2019-02-27 NOTE — PROGRESS NOTES
Germaine Gonzalez is a 73 y.o. female, who presents with a chief complaint of   Chief Complaint   Patient presents with   • Follow-up     6 month f/u, lab results   • Hyperlipidemia       HPI     Pt has a blocked tear duct.  Her optometrist disnt have the tool to fix it but will refer her to ophthalmology.     She says her left thumb is hurting and catching.  She has had a trigger finger in the past.  She doesn't want to see hadn surgery yet bc of cost.     She is UTD with dr. Darling and dr. agarwal    HTN - bp up some today. She is not on meds for htn.  No ha/dizziness    HLD - on crestor.  Grandparents and parents all with CAD/strokes.  Normal stress echo 10/2016    The following portions of the patient's history were reviewed and updated as appropriate: allergies, current medications, past family history, past medical history, past social history, past surgical history and problem list.    Allergies: Levaquin [levofloxacin] and Tetracycline    Review of Systems   Constitutional: Negative.    HENT: Negative.         Eye drainage       Eyes: Negative.    Respiratory: Negative.    Cardiovascular: Negative.    Gastrointestinal: Negative.    Endocrine: Negative.    Genitourinary: Negative.    Musculoskeletal: Negative.    Skin: Negative.    Allergic/Immunologic: Negative.    Neurological: Negative.    Hematological: Negative.    Psychiatric/Behavioral: Negative.    All other systems reviewed and are negative.            Wt Readings from Last 3 Encounters:   02/27/19 77.1 kg (170 lb)   02/11/19 77.3 kg (170 lb 6.4 oz)   09/20/18 67.6 kg (149 lb)     Temp Readings from Last 3 Encounters:   02/27/19 98.2 °F (36.8 °C) (Oral)   09/20/18 98.3 °F (36.8 °C) (Oral)   08/05/18 98.8 °F (37.1 °C) (Oral)     BP Readings from Last 3 Encounters:   02/27/19 148/90   09/20/18 132/79   08/27/18 150/92     Pulse Readings from Last 3 Encounters:   02/27/19 75   09/20/18 86   08/27/18 87     Body mass index is 28.29  kg/m².  @LASTSAO2(3)@    Physical Exam   Constitutional: She is oriented to person, place, and time. She appears well-developed and well-nourished. No distress.   HENT:   Head: Normocephalic and atraumatic.   Right Ear: External ear normal.   Left Ear: External ear normal.   Nose: Nose normal.   Mouth/Throat: Oropharynx is clear and moist.   Eyes: Conjunctivae and EOM are normal. Pupils are equal, round, and reactive to light.   Neck: Normal range of motion. Neck supple.   Cardiovascular: Normal rate, regular rhythm, normal heart sounds and intact distal pulses.   Pulmonary/Chest: Effort normal and breath sounds normal. No respiratory distress. She has no wheezes.   Musculoskeletal:   Normal gait  Left thumb with rigid movements.     Neurological: She is alert and oriented to person, place, and time.   Skin: Skin is warm and dry.   Psychiatric: She has a normal mood and affect. Her behavior is normal. Judgment and thought content normal.   Nursing note and vitals reviewed.      Results for orders placed or performed in visit on 02/23/19   Lipid Panel With LDL / HDL Ratio   Result Value Ref Range    Total Cholesterol 237 (H) 0 - 200 mg/dL    Triglycerides 139 0 - 150 mg/dL    HDL Cholesterol 75 (H) 40 - 60 mg/dL    VLDL Cholesterol 27.8 (H) 7 - 27 mg/dL    LDL Cholesterol  134 (H) 0 - 100 mg/dL    LDL/HDL Ratio 1.79    Comprehensive Metabolic Panel   Result Value Ref Range    Glucose 100 (H) 65 - 99 mg/dL    BUN 20 8 - 23 mg/dL    Creatinine 0.95 0.57 - 1.00 mg/dL    eGFR Non African Am 58 (L) >60 mL/min/1.73    eGFR African Am 70 >60 mL/min/1.73    BUN/Creatinine Ratio 21.1 7.0 - 25.0    Sodium 141 136 - 145 mmol/L    Potassium 4.7 3.5 - 5.2 mmol/L    Chloride 104 98 - 107 mmol/L    Total CO2 23.9 22.0 - 29.0 mmol/L    Calcium 9.5 8.8 - 10.5 mg/dL    Total Protein 7.1 6.0 - 8.5 g/dL    Albumin 4.60 3.50 - 5.20 g/dL    Globulin 2.5 gm/dL    A/G Ratio 1.8 g/dL    Total Bilirubin 0.8 0.2 - 1.2 mg/dL    Alkaline  Phosphatase 100 40 - 129 U/L    AST (SGOT) 22 5 - 32 U/L    ALT (SGPT) 32 5 - 33 U/L   TSH   Result Value Ref Range    TSH 2.850 0.270 - 4.200 mIU/mL   CBC & Differential   Result Value Ref Range    WBC 6.01 3.40 - 10.80 10*3/mm3    RBC 4.33 3.77 - 5.28 10*6/mm3    Hemoglobin 14.0 12.0 - 15.9 g/dL    Hematocrit 41.5 34.0 - 46.6 %    MCV 95.8 79.0 - 97.0 fL    MCH 32.3 26.6 - 33.0 pg    MCHC 33.7 31.5 - 35.7 g/dL    RDW 12.3 12.3 - 15.4 %    Platelets 208 140 - 450 10*3/mm3    Neutrophil Rel % 67.6 42.7 - 76.0 %    Lymphocyte Rel % 19.0 (L) 19.6 - 45.3 %    Monocyte Rel % 10.1 5.0 - 12.0 %    Eosinophil Rel % 2.7 0.3 - 6.2 %    Basophil Rel % 0.3 0.0 - 1.5 %    Neutrophils Absolute 4.06 1.40 - 7.00 10*3/mm3    Lymphocytes Absolute 1.14 0.70 - 3.10 10*3/mm3    Monocytes Absolute 0.61 0.10 - 0.90 10*3/mm3    Eosinophils Absolute 0.16 0.00 - 0.40 10*3/mm3    Basophils Absolute 0.02 0.00 - 0.20 10*3/mm3    Immature Granulocyte Rel % 0.3 0.0 - 0.5 %    Immature Grans Absolute 0.02 0.00 - 0.05 10*3/mm3           Germaine was seen today for follow-up and hyperlipidemia.    Diagnoses and all orders for this visit:    Essential hypertension  -     lisinopril (PRINIVIL,ZESTRIL) 10 MG tablet; Take 1 tablet by mouth Daily.    Mixed hyperlipidemia    Hyperglycemia    Sjogren's syndrome with keratoconjunctivitis sicca (CMS/HCC)    Mcgill's esophagus without dysplasia    Gastroesophageal reflux disease, esophagitis presence not specified          Outpatient Medications Prior to Visit   Medication Sig Dispense Refill   • Biotin 5000 MCG tablet Take  by mouth Daily.     • calcium carbonate (OS-ULI) 600 MG tablet Take 600 mg by mouth 2 (Two) Times a Day With Meals.     • cycloSPORINE (RESTASIS) 0.05 % ophthalmic emulsion Administer 1 drop to both eyes 2 (Two) Times a Day.     • escitalopram (LEXAPRO) 10 MG tablet Take 5 mg by mouth Every Morning.     • fluticasone (FLONASE) 50 MCG/ACT nasal spray 2 sprays into the nostril(s) as directed  by provider Daily.     • glucosamine-chondroitin 500-400 MG capsule capsule Take 1 capsule by mouth 2 (Two) Times a Day With Meals.     • omeprazole (PRILOSEC) 40 MG capsule Take 1 capsule by mouth Daily. 90 capsule 3   • PREMARIN 0.625 MG/GM vaginal cream      • Probiotic Product (PROBIOTIC DAILY PO) Take 1 tablet by mouth Daily.     • rosuvastatin (CRESTOR) 5 MG tablet Take 1 tablet by mouth Daily. 90 tablet 1   • vitamin B-12 (CYANOCOBALAMIN) 1000 MCG tablet Take 5,000 mcg by mouth Daily.       No facility-administered medications prior to visit.      New Medications Ordered This Visit   Medications   • lisinopril (PRINIVIL,ZESTRIL) 10 MG tablet     Sig: Take 1 tablet by mouth Daily.     Dispense:  30 tablet     Refill:  0     [unfilled]  There are no discontinued medications.      Return in about 4 weeks (around 3/27/2019) for Recheck.

## 2019-03-04 ENCOUNTER — OFFICE VISIT (OUTPATIENT)
Dept: INTERNAL MEDICINE | Facility: CLINIC | Age: 74
End: 2019-03-04

## 2019-03-04 VITALS
OXYGEN SATURATION: 99 % | HEIGHT: 65 IN | TEMPERATURE: 98 F | SYSTOLIC BLOOD PRESSURE: 122 MMHG | BODY MASS INDEX: 28.16 KG/M2 | WEIGHT: 169 LBS | DIASTOLIC BLOOD PRESSURE: 76 MMHG | HEART RATE: 91 BPM | RESPIRATION RATE: 16 BRPM

## 2019-03-04 DIAGNOSIS — R06.2 WHEEZE: ICD-10-CM

## 2019-03-04 DIAGNOSIS — R05.9 COUGH: Primary | ICD-10-CM

## 2019-03-04 DIAGNOSIS — J02.9 SORE THROAT: ICD-10-CM

## 2019-03-04 LAB
EXPIRATION DATE: NORMAL
INTERNAL CONTROL: NORMAL
Lab: NORMAL
S PYO AG THROAT QL: NEGATIVE

## 2019-03-04 PROCEDURE — 87880 STREP A ASSAY W/OPTIC: CPT | Performed by: INTERNAL MEDICINE

## 2019-03-04 PROCEDURE — 99213 OFFICE O/P EST LOW 20 MIN: CPT | Performed by: INTERNAL MEDICINE

## 2019-03-04 NOTE — PROGRESS NOTES
Germaine Gonzalez is a 73 y.o. female, who presents with a chief complaint of   Chief Complaint   Patient presents with   • Cough       HPI   Pt here bc of cough and wheeze for over a week. She is taking flonase.  Her  has had similar sx.  She has felt hot/cold and had chills.  + some nausea.  Sore throat when coughing.      The following portions of the patient's history were reviewed and updated as appropriate: allergies, current medications, past family history, past medical history, past social history, past surgical history and problem list.    Allergies: Levaquin [levofloxacin] and Tetracycline    Review of Systems   Constitutional: Positive for chills.   HENT: Negative.    Eyes: Negative.    Respiratory: Positive for cough and wheezing.    Cardiovascular: Negative.    Gastrointestinal: Negative.    Endocrine: Negative.    Genitourinary: Negative.    Musculoskeletal: Negative.    Skin: Negative.    Allergic/Immunologic: Negative.    Neurological: Negative.    Hematological: Negative.    Psychiatric/Behavioral: Negative.    All other systems reviewed and are negative.            Wt Readings from Last 3 Encounters:   03/04/19 76.7 kg (169 lb)   02/27/19 77.1 kg (170 lb)   02/11/19 77.3 kg (170 lb 6.4 oz)     Temp Readings from Last 3 Encounters:   03/04/19 98 °F (36.7 °C)   02/27/19 98.2 °F (36.8 °C) (Oral)   09/20/18 98.3 °F (36.8 °C) (Oral)     BP Readings from Last 3 Encounters:   03/04/19 122/76   02/27/19 148/90   09/20/18 132/79     Pulse Readings from Last 3 Encounters:   03/04/19 91   02/27/19 75   09/20/18 86     Body mass index is 28.12 kg/m².  @LASTSAO2(3)@    Physical Exam   Constitutional: She is oriented to person, place, and time. She appears well-developed and well-nourished. No distress.   HENT:   Head: Normocephalic and atraumatic.   Right Ear: External ear normal.   Left Ear: External ear normal.   Nose: Nose normal.   Mouth/Throat: Oropharynx is clear and moist.   Bilateral serous  effusion without erythema   Eyes: Conjunctivae, EOM and lids are normal. Pupils are equal, round, and reactive to light.   Neck: Normal range of motion. Neck supple.   Cardiovascular: Normal rate, regular rhythm, normal heart sounds and intact distal pulses.   Pulmonary/Chest: Effort normal and breath sounds normal. No respiratory distress. She has no wheezes.   Musculoskeletal: Normal range of motion. She exhibits no edema.   Normal gait   Lymphadenopathy:     She has cervical adenopathy.   Neurological: She is alert and oriented to person, place, and time. No cranial nerve deficit.   Skin: Skin is warm and dry. No rash noted.   Psychiatric: She has a normal mood and affect. Her behavior is normal. Judgment and thought content normal.   Nursing note and vitals reviewed.      Results for orders placed or performed in visit on 03/04/19   POC Rapid Strep A   Result Value Ref Range    Rapid Strep A Screen Negative Negative, VALID, INVALID, Not Performed    Internal Control Passed Passed    Lot Number [id number]     Expiration Date ,706,021            Germaine was seen today for cough.    Diagnoses and all orders for this visit:    Cough    Wheeze    Sore throat  -     POC Rapid Strep A      Trial of symbicort to help with cough/wheeze likely exacerbated by viral illness.    Outpatient Medications Prior to Visit   Medication Sig Dispense Refill   • Biotin 5000 MCG tablet Take  by mouth Daily.     • calcium carbonate (OS-ULI) 600 MG tablet Take 600 mg by mouth 2 (Two) Times a Day With Meals.     • cycloSPORINE (RESTASIS) 0.05 % ophthalmic emulsion Administer 1 drop to both eyes 2 (Two) Times a Day.     • escitalopram (LEXAPRO) 10 MG tablet Take 5 mg by mouth Every Morning.     • fluticasone (FLONASE) 50 MCG/ACT nasal spray 2 sprays into the nostril(s) as directed by provider Daily.     • glucosamine-chondroitin 500-400 MG capsule capsule Take 1 capsule by mouth 2 (Two) Times a Day With Meals.     • lisinopril  (PRINIVIL,ZESTRIL) 10 MG tablet Take 1 tablet by mouth Daily. 30 tablet 0   • omeprazole (PRILOSEC) 40 MG capsule Take 1 capsule by mouth Daily. 90 capsule 3   • PREMARIN 0.625 MG/GM vaginal cream      • Probiotic Product (PROBIOTIC DAILY PO) Take 1 tablet by mouth Daily.     • rosuvastatin (CRESTOR) 5 MG tablet Take 1 tablet by mouth Daily. 90 tablet 1   • vitamin B-12 (CYANOCOBALAMIN) 1000 MCG tablet Take 5,000 mcg by mouth Daily.       No facility-administered medications prior to visit.      No orders of the defined types were placed in this encounter.    [unfilled]  There are no discontinued medications.      Return if symptoms worsen or fail to improve.

## 2019-03-06 ENCOUNTER — OFFICE VISIT (OUTPATIENT)
Dept: INTERNAL MEDICINE | Facility: CLINIC | Age: 74
End: 2019-03-06

## 2019-03-06 VITALS
WEIGHT: 169 LBS | HEIGHT: 65 IN | OXYGEN SATURATION: 97 % | BODY MASS INDEX: 28.16 KG/M2 | DIASTOLIC BLOOD PRESSURE: 80 MMHG | RESPIRATION RATE: 16 BRPM | HEART RATE: 88 BPM | TEMPERATURE: 99.5 F | SYSTOLIC BLOOD PRESSURE: 128 MMHG

## 2019-03-06 DIAGNOSIS — J40 BRONCHITIS: ICD-10-CM

## 2019-03-06 DIAGNOSIS — R50.9 FEVER, UNSPECIFIED FEVER CAUSE: Primary | ICD-10-CM

## 2019-03-06 DIAGNOSIS — R05.9 COUGH: ICD-10-CM

## 2019-03-06 LAB
EXPIRATION DATE: NORMAL
FLUAV AG NPH QL: NEGATIVE
FLUBV AG NPH QL: NEGATIVE
INTERNAL CONTROL: NORMAL
Lab: NORMAL

## 2019-03-06 PROCEDURE — 87804 INFLUENZA ASSAY W/OPTIC: CPT | Performed by: INTERNAL MEDICINE

## 2019-03-06 PROCEDURE — 99213 OFFICE O/P EST LOW 20 MIN: CPT | Performed by: INTERNAL MEDICINE

## 2019-03-06 RX ORDER — BENZONATATE 200 MG/1
200 CAPSULE ORAL 3 TIMES DAILY PRN
Qty: 20 CAPSULE | Refills: 0 | Status: SHIPPED | OUTPATIENT
Start: 2019-03-06 | End: 2019-04-09

## 2019-03-06 RX ORDER — CEFDINIR 300 MG/1
300 CAPSULE ORAL 2 TIMES DAILY
Qty: 20 CAPSULE | Refills: 0 | Status: SHIPPED | OUTPATIENT
Start: 2019-03-06 | End: 2019-03-16

## 2019-03-06 NOTE — PROGRESS NOTES
Germaine Gonzalez is a 73 y.o. female, who presents with a chief complaint of   Chief Complaint   Patient presents with   • Fever   • Chills   • Cough     non prod   • Shortness of Breath       HPI   Pt here for follow up.  Sx worse.  She had chills and subjective fever at home.  She has been on symbicort to help with breathing.  She had trouble with oral steroids so we tried the combo inhaler instead.  She still feels short of breath and is wheezing.       The following portions of the patient's history were reviewed and updated as appropriate: allergies, current medications, past family history, past medical history, past social history, past surgical history and problem list.    Allergies: Levaquin [levofloxacin] and Tetracycline    Review of Systems   Constitutional: Positive for fatigue and fever.   HENT: Negative.    Eyes: Negative.    Respiratory: Positive for cough, shortness of breath and wheezing.    Cardiovascular: Negative.    Gastrointestinal: Negative.    Endocrine: Negative.    Genitourinary: Negative.    Musculoskeletal: Negative.    Skin: Negative.    Allergic/Immunologic: Negative.    Neurological: Negative.    Hematological: Negative.    Psychiatric/Behavioral: Negative.    All other systems reviewed and are negative.            Wt Readings from Last 3 Encounters:   03/06/19 76.7 kg (169 lb)   03/04/19 76.7 kg (169 lb)   02/27/19 77.1 kg (170 lb)     Temp Readings from Last 3 Encounters:   03/06/19 99.5 °F (37.5 °C) (Oral)   03/04/19 98 °F (36.7 °C)   02/27/19 98.2 °F (36.8 °C) (Oral)     BP Readings from Last 3 Encounters:   03/06/19 128/80   03/04/19 122/76   02/27/19 148/90     Pulse Readings from Last 3 Encounters:   03/06/19 88   03/04/19 91   02/27/19 75     Body mass index is 28.12 kg/m².  @LASTSAO2(3)@    Physical Exam   Constitutional: She is oriented to person, place, and time. She appears well-developed and well-nourished. No distress.   HENT:   Head: Normocephalic and atraumatic.    Right Ear: External ear normal.   Left Ear: External ear normal.   Nose: Nose normal.   Mouth/Throat: Oropharynx is clear and moist.   Eyes: Conjunctivae and EOM are normal. Pupils are equal, round, and reactive to light.   Neck: Normal range of motion. Neck supple.   Cardiovascular: Normal rate, regular rhythm, normal heart sounds and intact distal pulses.   Pulmonary/Chest: Effort normal and breath sounds normal. No respiratory distress. She has no wheezes.   Musculoskeletal: Normal range of motion.   Normal gait   Neurological: She is alert and oriented to person, place, and time.   Skin: Skin is warm and dry.   Psychiatric: She has a normal mood and affect. Her behavior is normal. Judgment and thought content normal.   Nursing note and vitals reviewed.      Results for orders placed or performed in visit on 03/04/19   POC Rapid Strep A   Result Value Ref Range    Rapid Strep A Screen Negative Negative, VALID, INVALID, Not Performed    Internal Control Passed Passed    Lot Number [id number]     Expiration Date 8,749,021            Germaine was seen today for fever, chills, cough and shortness of breath.    Diagnoses and all orders for this visit:    Fever, unspecified fever cause  -     POCT Influenza A/B    Cough  -     POCT Influenza A/B    Bronchitis  -     cefdinir (OMNICEF) 300 MG capsule; Take 1 capsule by mouth 2 (Two) Times a Day for 10 days.  -     benzonatate (TESSALON) 200 MG capsule; Take 1 capsule by mouth 3 (Three) Times a Day As Needed for Cough.          Outpatient Medications Prior to Visit   Medication Sig Dispense Refill   • Biotin 5000 MCG tablet Take  by mouth Daily.     • calcium carbonate (OS-ULI) 600 MG tablet Take 600 mg by mouth 2 (Two) Times a Day With Meals.     • cycloSPORINE (RESTASIS) 0.05 % ophthalmic emulsion Administer 1 drop to both eyes 2 (Two) Times a Day.     • escitalopram (LEXAPRO) 10 MG tablet Take 5 mg by mouth Every Morning.     • fluticasone (FLONASE) 50 MCG/ACT nasal  spray 2 sprays into the nostril(s) as directed by provider Daily.     • glucosamine-chondroitin 500-400 MG capsule capsule Take 1 capsule by mouth 2 (Two) Times a Day With Meals.     • lisinopril (PRINIVIL,ZESTRIL) 10 MG tablet Take 1 tablet by mouth Daily. 30 tablet 0   • omeprazole (PRILOSEC) 40 MG capsule Take 1 capsule by mouth Daily. 90 capsule 3   • PREMARIN 0.625 MG/GM vaginal cream      • Probiotic Product (PROBIOTIC DAILY PO) Take 1 tablet by mouth Daily.     • rosuvastatin (CRESTOR) 5 MG tablet Take 1 tablet by mouth Daily. 90 tablet 1   • vitamin B-12 (CYANOCOBALAMIN) 1000 MCG tablet Take 5,000 mcg by mouth Daily.       No facility-administered medications prior to visit.      New Medications Ordered This Visit   Medications   • cefdinir (OMNICEF) 300 MG capsule     Sig: Take 1 capsule by mouth 2 (Two) Times a Day for 10 days.     Dispense:  20 capsule     Refill:  0   • benzonatate (TESSALON) 200 MG capsule     Sig: Take 1 capsule by mouth 3 (Three) Times a Day As Needed for Cough.     Dispense:  20 capsule     Refill:  0     [unfilled]  There are no discontinued medications.      Return if symptoms worsen or fail to improve.

## 2019-03-28 DIAGNOSIS — I10 ESSENTIAL HYPERTENSION: ICD-10-CM

## 2019-03-28 RX ORDER — LISINOPRIL 10 MG/1
TABLET ORAL
Qty: 90 TABLET | Refills: 1 | Status: SHIPPED | OUTPATIENT
Start: 2019-03-28 | End: 2019-08-12 | Stop reason: SDUPTHER

## 2019-04-01 ENCOUNTER — TELEPHONE (OUTPATIENT)
Dept: GASTROENTEROLOGY | Facility: CLINIC | Age: 74
End: 2019-04-01

## 2019-04-01 NOTE — TELEPHONE ENCOUNTER
Looks like patient is re-scheduled for an EGD on 4/10/19 @ 12:30pm. But I don't see a new order for Adilene Azul.

## 2019-04-05 ENCOUNTER — PREP FOR SURGERY (OUTPATIENT)
Dept: OTHER | Facility: HOSPITAL | Age: 74
End: 2019-04-05

## 2019-04-05 DIAGNOSIS — K22.70 BARRETT'S ESOPHAGUS WITHOUT DYSPLASIA: Primary | ICD-10-CM

## 2019-04-09 ENCOUNTER — ANESTHESIA EVENT (OUTPATIENT)
Dept: PERIOP | Facility: HOSPITAL | Age: 74
End: 2019-04-09

## 2019-04-10 ENCOUNTER — HOSPITAL ENCOUNTER (OUTPATIENT)
Facility: HOSPITAL | Age: 74
Setting detail: HOSPITAL OUTPATIENT SURGERY
Discharge: HOME | End: 2019-04-10
Attending: INTERNAL MEDICINE | Admitting: INTERNAL MEDICINE

## 2019-04-10 ENCOUNTER — ANESTHESIA (OUTPATIENT)
Dept: PERIOP | Facility: HOSPITAL | Age: 74
End: 2019-04-10

## 2019-04-10 VITALS
BODY MASS INDEX: 27 KG/M2 | SYSTOLIC BLOOD PRESSURE: 129 MMHG | OXYGEN SATURATION: 98 % | TEMPERATURE: 97.9 F | HEART RATE: 62 BPM | HEIGHT: 66 IN | RESPIRATION RATE: 18 BRPM | WEIGHT: 168 LBS | DIASTOLIC BLOOD PRESSURE: 72 MMHG

## 2019-04-10 DIAGNOSIS — K22.70 BARRETT'S ESOPHAGUS WITHOUT DYSPLASIA: ICD-10-CM

## 2019-04-10 PROCEDURE — 25010000002 PROPOFOL 10 MG/ML EMULSION: Performed by: NURSE ANESTHETIST, CERTIFIED REGISTERED

## 2019-04-10 PROCEDURE — 88305 TISSUE EXAM BY PATHOLOGIST: CPT | Performed by: INTERNAL MEDICINE

## 2019-04-10 PROCEDURE — 43239 EGD BIOPSY SINGLE/MULTIPLE: CPT | Performed by: INTERNAL MEDICINE

## 2019-04-10 RX ORDER — LIDOCAINE HYDROCHLORIDE 20 MG/ML
INJECTION, SOLUTION INFILTRATION; PERINEURAL AS NEEDED
Status: DISCONTINUED | OUTPATIENT
Start: 2019-04-10 | End: 2019-04-10 | Stop reason: SURG

## 2019-04-10 RX ORDER — PROPOFOL 10 MG/ML
VIAL (ML) INTRAVENOUS CONTINUOUS PRN
Status: DISCONTINUED | OUTPATIENT
Start: 2019-04-10 | End: 2019-04-10 | Stop reason: SURG

## 2019-04-10 RX ORDER — PROPOFOL 10 MG/ML
VIAL (ML) INTRAVENOUS AS NEEDED
Status: DISCONTINUED | OUTPATIENT
Start: 2019-04-10 | End: 2019-04-10 | Stop reason: SURG

## 2019-04-10 RX ORDER — SODIUM CHLORIDE 9 MG/ML
40 INJECTION, SOLUTION INTRAVENOUS AS NEEDED
Status: DISCONTINUED | OUTPATIENT
Start: 2019-04-10 | End: 2019-04-10 | Stop reason: HOSPADM

## 2019-04-10 RX ORDER — SODIUM CHLORIDE 0.9 % (FLUSH) 0.9 %
1-10 SYRINGE (ML) INJECTION AS NEEDED
Status: DISCONTINUED | OUTPATIENT
Start: 2019-04-10 | End: 2019-04-10 | Stop reason: HOSPADM

## 2019-04-10 RX ORDER — GLYCOPYRROLATE 0.2 MG/ML
INJECTION INTRAMUSCULAR; INTRAVENOUS AS NEEDED
Status: DISCONTINUED | OUTPATIENT
Start: 2019-04-10 | End: 2019-04-10 | Stop reason: SURG

## 2019-04-10 RX ORDER — LIDOCAINE HYDROCHLORIDE 10 MG/ML
0.5 INJECTION, SOLUTION EPIDURAL; INFILTRATION; INTRACAUDAL; PERINEURAL ONCE AS NEEDED
Status: COMPLETED | OUTPATIENT
Start: 2019-04-10 | End: 2019-04-10

## 2019-04-10 RX ORDER — SODIUM CHLORIDE, SODIUM LACTATE, POTASSIUM CHLORIDE, CALCIUM CHLORIDE 600; 310; 30; 20 MG/100ML; MG/100ML; MG/100ML; MG/100ML
9 INJECTION, SOLUTION INTRAVENOUS CONTINUOUS
Status: DISCONTINUED | OUTPATIENT
Start: 2019-04-10 | End: 2019-04-10 | Stop reason: HOSPADM

## 2019-04-10 RX ORDER — SODIUM CHLORIDE 0.9 % (FLUSH) 0.9 %
3 SYRINGE (ML) INJECTION EVERY 12 HOURS SCHEDULED
Status: DISCONTINUED | OUTPATIENT
Start: 2019-04-10 | End: 2019-04-10 | Stop reason: HOSPADM

## 2019-04-10 RX ADMIN — PROPOFOL 50 MG: 10 INJECTION, EMULSION INTRAVENOUS at 13:25

## 2019-04-10 RX ADMIN — LIDOCAINE HYDROCHLORIDE 50 MG: 20 INJECTION, SOLUTION INFILTRATION; PERINEURAL at 13:15

## 2019-04-10 RX ADMIN — LIDOCAINE HYDROCHLORIDE 0.2 ML: 10 INJECTION, SOLUTION EPIDURAL; INFILTRATION; INTRACAUDAL; PERINEURAL at 12:10

## 2019-04-10 RX ADMIN — PROPOFOL 50 MG: 10 INJECTION, EMULSION INTRAVENOUS at 13:19

## 2019-04-10 RX ADMIN — SODIUM CHLORIDE, POTASSIUM CHLORIDE, SODIUM LACTATE AND CALCIUM CHLORIDE 9 ML/HR: 600; 310; 30; 20 INJECTION, SOLUTION INTRAVENOUS at 12:10

## 2019-04-10 RX ADMIN — SODIUM CHLORIDE, POTASSIUM CHLORIDE, SODIUM LACTATE AND CALCIUM CHLORIDE: 600; 310; 30; 20 INJECTION, SOLUTION INTRAVENOUS at 13:13

## 2019-04-10 RX ADMIN — PROPOFOL 100 MCG/KG/MIN: 10 INJECTION, EMULSION INTRAVENOUS at 13:19

## 2019-04-10 RX ADMIN — GLYCOPYRROLATE 0.1 MG: 0.2 INJECTION INTRAMUSCULAR; INTRAVENOUS at 13:15

## 2019-04-10 RX ADMIN — PROPOFOL 50 MG: 10 INJECTION, EMULSION INTRAVENOUS at 13:21

## 2019-04-10 NOTE — OP NOTE
ESOPHAGOGASTRODUODENOSCOPY  Procedure Report    Patient Name:  Gremaine Gonzalez  YOB: 1945    Date of Surgery:  4/10/2019     Indications:  Mcgill's    Pre-op Diagnosis:   Mcgill's esophagus without dysplasia [K22.70]    Post-Op Diagnosis Codes:     * Mcgill's esophagus without dysplasia [K22.70]     * Gastritis [K29.70]         Procedure/CPT® Codes:      Procedure(s):  ESOPHAGOGASTRODUODENOSCOPY    Staff:  Surgeon(s):  Bc Sanchez MD         Anesthesia: Monitor Anesthesia Care    Estimated Blood Loss: none    Specimens:   ID Type Source Tests Collected by Time   A : GASTRIC BIOPSY Tissue Gastric, Body TISSUE PATHOLOGY EXAM Bc Sanchez MD 4/10/2019 1323   B : DISTAL ESOPHAGUS Tissue Esophagus, Distal TISSUE PATHOLOGY EXAM Bc Sanchez MD 4/10/2019 1325       Implants:    Nothing was implanted during the procedure      Description of Procedure: After having signed informed consent, she was brought to the endoscopy suite and placed in left lateral decubitus position and given her IV sedation.  Bite block was placed between her incisors.  Scope was introduced into the oropharynx and advanced under direct visualization with ease in the esophagus, through distal esophagus.  Z line was mildly irregular with one significant gastric tone.  Scope was advanced through the lower esophageal sphincter into the stomach, retroflexed revealing normal cardia and fundus.  Scope was deretroflexed and advanced to antrum.  The antrum had evidence of scarring and petechial erythema.  No alphonso erosion or ulcer suggestive of previous ulcer disease.  Scope was advanced through the pylorus to the duodenal bulb which was examined and normal around the angle of the second and third portion of the duodenum which was normal as well.  Scope was withdrawn back into the antrum.  Biopsies were taken to rule out H. pylori.  Scope was withdrawn in the distal esophagus and biopsies were taken  in a targeted fashion to rule out dysplasia within the Mcgill's esophagus.  As the scope was withdrawn, the remainder of the esophagus was normal appearing.  The scope was taken from the patient.  The patient tolerated the procedure very well.          Findings: Normal Duodenum  Moderate Antral Gastritis-Biopsy  Short Segment Mcgill's Esophagus-Biopsy        Complications: None    Recommendations: Continue Same Meds , results to be called      Bc Sanchez MD     Date: 4/10/2019  Time: 1:32 PM

## 2019-04-10 NOTE — H&P
Patient Care Team:  Roberta Boswell MD as PCP - General (Internal Medicine & Pediatrics)  Bc Sanchez MD as Consulting Physician (Gastroenterology)    CHIEF COMPLAINT: Mcgill's Esophagus    HISTORY OF PRESENT ILLNESS:    Last exam 3 years back and her symptoms are well controlled on her daily PPI      Past Medical History:   Diagnosis Date   • Arthritis     knees   • Mcgill esophagus    • Cancer (CMS/HCC)     cervical   • Cervical cancer (CMS/HCC)    • Colon polyp    • Eczema    • Elevated liver enzymes    • GERD (gastroesophageal reflux disease)    • H/O stress fracture    • Health care maintenance    • Hypercholesteremia    • Kidney stones     sched ureteroscopy, lithotripsy   • Ocular migraine    • Postmenopausal    • Seasonal allergies    • Sjogren's syndrome (CMS/HCC)      Past Surgical History:   Procedure Laterality Date   •  SECTION      x2   • COLONOSCOPY     • CYSTOSCOPY URETEROSCOPY STONE MANIPULATION/EXTRACTION Left 10/14/2016    Procedure: LEFT URETEROSCOPY bilateral retrograde pyleogram LEFT STENT PLACEMENT.;  Surgeon: Julius Darling MD;  Location: [facility] OR;  Service:    • CYSTOSCOPY W/ URETERAL STENT PLACEMENT Right 8/3/2018    Procedure: CYSTOSCOPY URETERAL CATHETER/STENT INSERTION;  Surgeon: Farzad Cordova MD;  Location: [facility] OR;  Service: Urology   • HYSTERECTOMY     • UPPER GASTROINTESTINAL ENDOSCOPY     • URETEROSCOPY LASER LITHOTRIPSY WITH STENT INSERTION Left 10/26/2016    Procedure: LT URETEROSCOPY LASER LITHOTRIPSY ;  Surgeon: Julius Darling MD;  Location: [facility] OR;  Service:      Family History   Problem Relation Age of Onset   • Anxiety disorder Mother    • Bipolar disorder Mother    • Stroke Mother 83   • Thyroid disease Mother    • Depression Mother    • Heart attack Father 47   • Thyroid disease Brother    • Hypertension Brother    • Diabetes Brother 68   • Glaucoma Brother    • Depression Brother    • No Known Problems Daughter     • No Known Problems Son    • Leukemia Maternal Grandmother    • Diabetes Maternal Grandfather    • Heart attack Paternal Grandmother    • Heart attack Paternal Grandfather    • Breast cancer Neg Hx    • Colon cancer Neg Hx    • Colon polyps Neg Hx      Social History     Tobacco Use   • Smoking status: Former Smoker     Packs/day: 0.50     Years: 16.00     Pack years: 8.00     Types: Cigarettes     Last attempt to quit: 1990     Years since quittin.0   • Smokeless tobacco: Never Used   Substance Use Topics   • Alcohol use: Yes     Alcohol/week: 1.2 oz     Types: 2 Glasses of wine per week   • Drug use: No     Medications Prior to Admission   Medication Sig Dispense Refill Last Dose   • Biotin 5000 MCG tablet Take  by mouth Daily.   2019 at 0800   • calcium carbonate (OS-ULI) 600 MG tablet Take 600 mg by mouth 2 (Two) Times a Day With Meals.   2019 at 2100   • cycloSPORINE (RESTASIS) 0.05 % ophthalmic emulsion Administer 1 drop to both eyes 2 (Two) Times a Day.   2019 at 0800   • escitalopram (LEXAPRO) 10 MG tablet Take 5 mg by mouth Every Morning.   2019 at 0800   • fluticasone (FLONASE) 50 MCG/ACT nasal spray 2 sprays into the nostril(s) as directed by provider Daily.   4/10/2019 at 0730   • glucosamine-chondroitin 500-400 MG capsule capsule Take 1 capsule by mouth 2 (Two) Times a Day With Meals.   2019 at 2100   • lisinopril (PRINIVIL,ZESTRIL) 10 MG tablet TAKE 1 TABLET BY MOUTH ONCE DAILY 90 tablet 1 4/10/2019 at 0730   • omeprazole (PRILOSEC) 40 MG capsule Take 1 capsule by mouth Daily. 90 capsule 3 2019 at 0800   • PREMARIN 0.625 MG/GM vaginal cream    2019 at 2100   • Probiotic Product (PROBIOTIC DAILY PO) Take 1 tablet by mouth Daily.   2019 at 0800   • rosuvastatin (CRESTOR) 5 MG tablet Take 1 tablet by mouth Daily. 90 tablet 1 2019 at 0800   • vitamin B-12 (CYANOCOBALAMIN) 1000 MCG tablet Take 5,000 mcg by mouth Daily.   2019 at 0800  "    Allergies:  Levaquin [levofloxacin] and Tetracycline    REVIEW OF SYSTEMS:  Please see the above history of present illness for pertinent positives and negatives.  The remainder of the patient's systems have been reviewed and are negative.     Vital Signs  Temp:  [98.1 °F (36.7 °C)] 98.1 °F (36.7 °C)  Heart Rate:  [76] 76  Resp:  [18] 18  BP: (128)/(96) 128/96    Flowsheet Rows      First Filed Value   Admission Height  167.6 cm (66\") Documented at 04/10/2019 1158   Admission Weight  76.2 kg (168 lb) Documented at 04/10/2019 1158           Physical Exam:  Physical Exam   Constitutional: Patient appears well-developed and well-nourished and in no acute distress   HEENT:   Head: Normocephalic and atraumatic.   Eyes:  Pupils are equal, round, and reactive to light. EOM are intact. Sclerae are anicteric and non-injected.  Mouth and Throat: Patient has moist mucous membranes. Oropharynx is clear of any erythema or exudate.     Neck: Neck supple. No JVD present. No thyromegaly present. No lymphadenopathy present.  Cardiovascular: Regular rate, regular rhythm, S1 normal and S2 normal.  Exam reveals no gallop and no friction rub.  No murmur heard.  Pulmonary/Chest: Lungs are clear to auscultation bilaterally. No respiratory distress. No wheezes. No rhonchi. No rales.   Abdominal: Soft. Bowel sounds are normal. No distension and no mass. There is no hepatosplenomegaly. There is no tenderness.   Musculoskeletal: Normal Muscle tone  Extremities: No edema. Pulses are palpable in all 4 extremities.  Neurological: Patient is alert and oriented to person, place, and time. Cranial nerves II-XII are grossly intact with no focal deficits.  Skin: Skin is warm. No rash noted. Nails show no clubbing.  No cyanosis or erythema.    Debilities/Disabilities Identified: None  Emotional Behavior: Appropriate     Results Review:    I reviewed the patient's new clinical results.  Lab Results (most recent)     None          Imaging Results " (most recent)     None        reviewed    ECG/EMG Results (most recent)     None        reviewed    Assessment/Plan     Mcgill's Esophagus/ EGD w Biopsy    I discussed the patients findings and my recommendations with patient.     Bc Sanchez MD  04/10/19  1:16 PM    Time: 10 min prior to procedure.

## 2019-04-10 NOTE — ANESTHESIA POSTPROCEDURE EVALUATION
Patient: Germaine Gonzalez    Procedure Summary     Date:  04/10/19 Room / Location:  [facility] ENDOSCOPY 1 /  [facility]    Anesthesia Start:  1313 Anesthesia Stop:  1329    Procedure:  ESOPHAGOGASTRODUODENOSCOPY (N/A Esophagus) Diagnosis:       Mcgill's esophagus without dysplasia      Gastritis      (Mcgill's esophagus without dysplasia [K22.70])    Surgeon:  Bc Sanchez MD Provider:  Ammon Sims CRNA    Anesthesia Type:  MAC ASA Status:  2          Anesthesia Type: MAC  Last vitals  BP   125/72 (04/10/19 1350)   Temp   97.9 °F (36.6 °C) (04/10/19 1332)   Pulse   62 (04/10/19 1350)   Resp   18 (04/10/19 1350)     SpO2   98 % (04/10/19 1350)     Post Anesthesia Care and Evaluation    Patient location during evaluation: bedside  Patient participation: complete - patient participated  Level of consciousness: awake  Pain score: 0  Pain management: adequate  Airway patency: patent  Anesthetic complications: No anesthetic complications  PONV Status: none  Cardiovascular status: acceptable  Respiratory status: acceptable  Hydration status: acceptable

## 2019-04-10 NOTE — ANESTHESIA PREPROCEDURE EVALUATION
Anesthesia Evaluation     Patient summary reviewed and Nursing notes reviewed   NPO Solid Status: > 8 hours  NPO Liquid Status: > 8 hours           Airway   Mallampati: II  TM distance: >3 FB  Neck ROM: full  No difficulty expected  Dental - normal exam     Pulmonary - normal exam    breath sounds clear to auscultation  (+) a smoker (quit 1990) Former,   Cardiovascular - normal exam  Exercise tolerance: good (4-7 METS)    ECG reviewed  Rhythm: regular  Rate: normal    (+) hypertension ( on Lisinopril x 6 weeks.), hyperlipidemia,     ROS comment: 2016 ECHO: · Left ventricular function is normal. Calculated EF = 58.6%.  · Normal stress echo with no significant echocardiographic evidence for myocardial ischemia.    Neuro/Psych- negative ROS  GI/Hepatic/Renal/Endo    (+)  GERD well controlled,  liver disease (LFT improved with weight loss) history of elevated LFT, renal disease stones,     Musculoskeletal     Abdominal  - normal exam   Substance History   (+) alcohol use,      OB/GYN          Other   (+) arthritis   history of cancer remission                      Anesthesia Plan    ASA 2     MAC     intravenous induction   Anesthetic plan, all risks, benefits, and alternatives have been provided, discussed and informed consent has been obtained with: patient.  Use of blood products discussed with patient  Consented to blood products.   Plan discussed with CRNA.

## 2019-04-10 NOTE — BRIEF OP NOTE
ESOPHAGOGASTRODUODENOSCOPY  Progress Note    Germaine Gonzalez  4/10/2019    Pre-op Diagnosis:   Mcgill's esophagus without dysplasia [K22.70]       Post-Op Diagnosis Codes:     * Mcgill's esophagus without dysplasia [K22.70]     * Gastritis [K29.70]    Procedure/CPT® Codes:      Procedure(s):  ESOPHAGOGASTRODUODENOSCOPY    Surgeon(s):  Bc Sanchez MD    Anesthesia: Monitor Anesthesia Care    Staff:   Circulator: Thania Puri RN  Scrub Person: Lorene Bauer    Estimated Blood Loss: none    Urine Voided: * No values recorded between 4/10/2019  1:11 PM and 4/10/2019  1:31 PM *    Specimens:                Specimens     ID Source Type Tests Collected By Collected At Frozen?      A Gastric, Body Tissue · TISSUE PATHOLOGY EXAM   Bc Sanchez MD 4/10/19 1324      Description: GASTRIC BIOPSY    B Esophagus, Distal Tissue · TISSUE PATHOLOGY EXAM   Bc Sanchez MD 4/10/19 1546      Description: DISTAL ESOPHAGUS                Drains:      Findings: Normal Duodenum  Moderate Antral Gastritis-Biopsy  Short Segment Mcgill's Esophagus-Biopsy    Complications: None      Bc Sanchez MD     Date: 4/10/2019  Time: 1:31 PM

## 2019-04-12 LAB
CYTO UR: NORMAL
LAB AP CASE REPORT: NORMAL
PATH REPORT.FINAL DX SPEC: NORMAL
PATH REPORT.GROSS SPEC: NORMAL

## 2019-04-17 RX ORDER — ESCITALOPRAM OXALATE 10 MG/1
5 TABLET ORAL EVERY MORNING
Qty: 7 TABLET | Refills: 0 | Status: CANCELLED | OUTPATIENT
Start: 2019-04-17

## 2019-04-19 ENCOUNTER — TRANSCRIBE ORDERS (OUTPATIENT)
Dept: ADMINISTRATIVE | Facility: HOSPITAL | Age: 74
End: 2019-04-19

## 2019-04-19 DIAGNOSIS — N13.30 HYDRONEPHROSIS, UNSPECIFIED HYDRONEPHROSIS TYPE: Primary | ICD-10-CM

## 2019-04-19 DIAGNOSIS — N20.0 KIDNEY STONES: ICD-10-CM

## 2019-04-23 ENCOUNTER — TELEPHONE (OUTPATIENT)
Dept: INTERNAL MEDICINE | Facility: CLINIC | Age: 74
End: 2019-04-23

## 2019-04-23 RX ORDER — ESCITALOPRAM OXALATE 10 MG/1
5 TABLET ORAL EVERY MORNING
Qty: 90 TABLET | Refills: 3 | Status: SHIPPED | OUTPATIENT
Start: 2019-04-23 | End: 2020-05-19

## 2019-04-23 NOTE — TELEPHONE ENCOUNTER
I HAVE   CALLED AND LEFT   3 MESSAGES FOR PT.  SHE WILL NOT CALL ME  BACK.          ----- Message from Clementine Tripp MA sent at 4/18/2019 10:23 AM EDT -----  LEFT MESSAGE AGAIN TODAY TO GET  CORRECT INFO TO SEND IN MEDICATION  ----- Message -----  From: Clementine Tripp MA  Sent: 4/17/2019   2:50 PM  To: Clementine Tripp MA    PT  CALLED IS ON VACATION IN [facility]   IS IN [facility], AND SHE  NEEDS 3 TO  7 DAYS WORTH OF LEXAPRO.  I CALLED HRE BACK TO GET CORRECT DOSE, AND WILL ALSO NEED MORE  INFO ON [facility].   [phone]

## 2019-05-28 ENCOUNTER — OFFICE VISIT (OUTPATIENT)
Dept: INTERNAL MEDICINE | Facility: CLINIC | Age: 74
End: 2019-05-28

## 2019-05-28 VITALS
TEMPERATURE: 98.5 F | SYSTOLIC BLOOD PRESSURE: 126 MMHG | HEART RATE: 79 BPM | RESPIRATION RATE: 16 BRPM | OXYGEN SATURATION: 97 % | DIASTOLIC BLOOD PRESSURE: 70 MMHG | WEIGHT: 170.6 LBS | BODY MASS INDEX: 28.42 KG/M2 | HEIGHT: 65 IN

## 2019-05-28 DIAGNOSIS — J40 BRONCHITIS: Primary | ICD-10-CM

## 2019-05-28 DIAGNOSIS — R11.2 NAUSEA AND VOMITING, INTRACTABILITY OF VOMITING NOT SPECIFIED, UNSPECIFIED VOMITING TYPE: ICD-10-CM

## 2019-05-28 LAB
EXPIRATION DATE: NORMAL
INTERNAL CONTROL: NORMAL
Lab: NORMAL
S PYO AG THROAT QL: NEGATIVE

## 2019-05-28 PROCEDURE — 87880 STREP A ASSAY W/OPTIC: CPT | Performed by: INTERNAL MEDICINE

## 2019-05-28 PROCEDURE — 99213 OFFICE O/P EST LOW 20 MIN: CPT | Performed by: INTERNAL MEDICINE

## 2019-05-28 RX ORDER — ALBUTEROL SULFATE 90 UG/1
2 AEROSOL, METERED RESPIRATORY (INHALATION) EVERY 4 HOURS PRN
Qty: 1 INHALER | Refills: 2 | Status: SHIPPED | OUTPATIENT
Start: 2019-05-28 | End: 2020-03-16

## 2019-05-28 RX ORDER — AZITHROMYCIN 250 MG/1
TABLET, FILM COATED ORAL
Qty: 6 TABLET | Refills: 0 | Status: SHIPPED | OUTPATIENT
Start: 2019-05-28 | End: 2019-07-17

## 2019-05-28 NOTE — PROGRESS NOTES
Germaine Gonzalez is a 74 y.o. female, who presents with a chief complaint of   Chief Complaint   Patient presents with   • Vomiting   • Cough       HPI     Pt has been sick for 3-4 days.  She has had a bad cough that is making her chest and sides ache.  + sore throat.  No fever.  She is achy all over.  She has been taking ibuprofen at home which has helped some.  Dayquil helped a little but sx come right back.  Cough is dry.  + ear fullness and sinus pressure.  Pt's  had sore throat recently but was not this sick.  She is a former smoker.  She doesn't have an inhaler at home now.      r  The following portions of the patient's history were reviewed and updated as appropriate: allergies, current medications, past family history, past medical history, past social history, past surgical history and problem list.    Allergies: Levaquin [levofloxacin] and Tetracycline    Review of Systems   Constitutional: Negative.  Negative for fever.   HENT: Positive for congestion, ear pain, sinus pressure, sore throat and voice change.    Eyes: Negative.    Respiratory: Positive for cough.    Cardiovascular: Negative.    Gastrointestinal: Positive for nausea and vomiting.   Endocrine: Negative.    Genitourinary: Negative.    Musculoskeletal: Negative.    Skin: Negative.    Allergic/Immunologic: Negative.    Neurological: Negative.    Hematological: Negative.    Psychiatric/Behavioral: Negative.    All other systems reviewed and are negative.            Wt Readings from Last 3 Encounters:   05/28/19 77.4 kg (170 lb 9.6 oz)   04/10/19 76.2 kg (168 lb)   03/06/19 76.7 kg (169 lb)     Temp Readings from Last 3 Encounters:   05/28/19 98.5 °F (36.9 °C)   04/10/19 97.9 °F (36.6 °C) (Oral)   03/06/19 99.5 °F (37.5 °C) (Oral)     BP Readings from Last 3 Encounters:   05/28/19 126/70   04/10/19 129/72   03/06/19 128/80     Pulse Readings from Last 3 Encounters:   05/28/19 79   04/10/19 62   03/06/19 88     Body mass index is 28.39  kg/m².  @LASTSAO2(3)@    Physical Exam   Constitutional: She is oriented to person, place, and time. She appears well-developed and well-nourished.   HENT:   Head: Normocephalic and atraumatic.   Right Ear: External ear normal.   Left Ear: External ear normal.   Bilateral serous effusion without erythema   Eyes: Conjunctivae and lids are normal.   Neck: Normal range of motion. Neck supple.   Cardiovascular: Normal rate and regular rhythm.   Pulmonary/Chest: Effort normal and breath sounds normal. No respiratory distress. She has no wheezes.   Musculoskeletal: Normal range of motion. She exhibits no edema.   Lymphadenopathy:     She has cervical adenopathy.   Neurological: She is alert and oriented to person, place, and time. No cranial nerve deficit.   Skin: Skin is warm and dry. No rash noted.   Psychiatric: She has a normal mood and affect. Her behavior is normal. Thought content normal.   Nursing note and vitals reviewed.      Results for orders placed or performed in visit on 05/28/19   POCT rapid strep A   Result Value Ref Range    Rapid Strep A Screen Negative Negative, VALID, INVALID, Not Performed    Internal Control Passed Passed    Lot Number [id number]     Expiration Date 10,312,020            Germaine was seen today for vomiting and cough.    Diagnoses and all orders for this visit:    Bronchitis  -     azithromycin (ZITHROMAX) 250 MG tablet; Take 2 tablets the first day, then 1 tablet daily for 4 days.  -     albuterol sulfate  (90 Base) MCG/ACT inhaler; Inhale 2 puffs Every 4 (Four) Hours As Needed for Wheezing.    Nausea and vomiting, intractability of vomiting not specified, unspecified vomiting type  -     POCT rapid strep A  -     azithromycin (ZITHROMAX) 250 MG tablet; Take 2 tablets the first day, then 1 tablet daily for 4 days.  -     albuterol sulfate  (90 Base) MCG/ACT inhaler; Inhale 2 puffs Every 4 (Four) Hours As Needed for Wheezing.          Outpatient Medications Prior to Visit    Medication Sig Dispense Refill   • Biotin 5000 MCG tablet Take  by mouth Daily.     • calcium carbonate (OS-ULI) 600 MG tablet Take 600 mg by mouth 2 (Two) Times a Day With Meals.     • cycloSPORINE (RESTASIS) 0.05 % ophthalmic emulsion Administer 1 drop to both eyes 2 (Two) Times a Day.     • escitalopram (LEXAPRO) 10 MG tablet Take 0.5 tablets by mouth Every Morning. 90 tablet 3   • fluticasone (FLONASE) 50 MCG/ACT nasal spray 2 sprays into the nostril(s) as directed by provider Daily.     • glucosamine-chondroitin 500-400 MG capsule capsule Take 1 capsule by mouth 2 (Two) Times a Day With Meals.     • lisinopril (PRINIVIL,ZESTRIL) 10 MG tablet TAKE 1 TABLET BY MOUTH ONCE DAILY 90 tablet 1   • omeprazole (PRILOSEC) 40 MG capsule Take 1 capsule by mouth Daily. 90 capsule 3   • PREMARIN 0.625 MG/GM vaginal cream      • Probiotic Product (PROBIOTIC DAILY PO) Take 1 tablet by mouth Daily.     • rosuvastatin (CRESTOR) 5 MG tablet Take 1 tablet by mouth Daily. 90 tablet 1   • vitamin B-12 (CYANOCOBALAMIN) 1000 MCG tablet Take 5,000 mcg by mouth Daily.       No facility-administered medications prior to visit.      New Medications Ordered This Visit   Medications   • azithromycin (ZITHROMAX) 250 MG tablet     Sig: Take 2 tablets the first day, then 1 tablet daily for 4 days.     Dispense:  6 tablet     Refill:  0   • albuterol sulfate  (90 Base) MCG/ACT inhaler     Sig: Inhale 2 puffs Every 4 (Four) Hours As Needed for Wheezing.     Dispense:  1 inhaler     Refill:  2     [unfilled]  There are no discontinued medications.      Return if symptoms worsen or fail to improve.

## 2019-07-09 RX ORDER — ROSUVASTATIN CALCIUM 5 MG/1
TABLET, COATED ORAL
Qty: 90 TABLET | Refills: 1 | Status: SHIPPED | OUTPATIENT
Start: 2019-07-09 | End: 2019-09-23 | Stop reason: SDUPTHER

## 2019-07-11 ENCOUNTER — HOSPITAL ENCOUNTER (OUTPATIENT)
Dept: NUCLEAR MEDICINE | Facility: HOSPITAL | Age: 74
Discharge: HOME | End: 2019-07-11

## 2019-07-11 ENCOUNTER — HOSPITAL ENCOUNTER (OUTPATIENT)
Dept: CT IMAGING | Facility: HOSPITAL | Age: 74
Discharge: HOME | End: 2019-07-11
Admitting: UROLOGY

## 2019-07-11 DIAGNOSIS — N20.0 KIDNEY STONES: ICD-10-CM

## 2019-07-11 DIAGNOSIS — N13.30 HYDRONEPHROSIS, UNSPECIFIED HYDRONEPHROSIS TYPE: ICD-10-CM

## 2019-07-11 PROCEDURE — 78708 K FLOW/FUNCT IMAGE W/DRUG: CPT

## 2019-07-11 PROCEDURE — 25010000002 FUROSEMIDE PER 20 MG: Performed by: UROLOGY

## 2019-07-11 PROCEDURE — 0 TECHNETIUM MERTIATIDE: Performed by: UROLOGY

## 2019-07-11 PROCEDURE — A9562 TC99M MERTIATIDE: HCPCS | Performed by: UROLOGY

## 2019-07-11 PROCEDURE — 74176 CT ABD & PELVIS W/O CONTRAST: CPT

## 2019-07-11 RX ORDER — FUROSEMIDE 10 MG/ML
39 INJECTION INTRAMUSCULAR; INTRAVENOUS
Status: COMPLETED | OUTPATIENT
Start: 2019-07-11 | End: 2019-07-11

## 2019-07-11 RX ADMIN — TECHNESCAN TC 99M MERTIATIDE 1 DOSE: 1 INJECTION, POWDER, LYOPHILIZED, FOR SOLUTION INTRAVENOUS at 11:22

## 2019-07-11 RX ADMIN — FUROSEMIDE 39 MG: 10 INJECTION, SOLUTION INTRAMUSCULAR; INTRAVENOUS at 11:53

## 2019-07-17 ENCOUNTER — OFFICE VISIT (OUTPATIENT)
Dept: INTERNAL MEDICINE | Facility: CLINIC | Age: 74
End: 2019-07-17

## 2019-07-17 VITALS
HEART RATE: 74 BPM | WEIGHT: 172.6 LBS | SYSTOLIC BLOOD PRESSURE: 134 MMHG | OXYGEN SATURATION: 98 % | RESPIRATION RATE: 16 BRPM | HEIGHT: 65 IN | DIASTOLIC BLOOD PRESSURE: 68 MMHG | BODY MASS INDEX: 28.76 KG/M2

## 2019-07-17 DIAGNOSIS — G89.29 CHRONIC PAIN OF BOTH KNEES: Primary | ICD-10-CM

## 2019-07-17 DIAGNOSIS — M17.0 PRIMARY OSTEOARTHRITIS OF BOTH KNEES: ICD-10-CM

## 2019-07-17 DIAGNOSIS — M25.562 CHRONIC PAIN OF BOTH KNEES: Primary | ICD-10-CM

## 2019-07-17 DIAGNOSIS — M25.561 CHRONIC PAIN OF BOTH KNEES: Primary | ICD-10-CM

## 2019-07-17 PROCEDURE — 20610 DRAIN/INJ JOINT/BURSA W/O US: CPT | Performed by: INTERNAL MEDICINE

## 2019-07-17 PROCEDURE — 99213 OFFICE O/P EST LOW 20 MIN: CPT | Performed by: INTERNAL MEDICINE

## 2019-07-17 RX ORDER — LIDOCAINE HYDROCHLORIDE 10 MG/ML
1 INJECTION, SOLUTION EPIDURAL; INFILTRATION; INTRACAUDAL; PERINEURAL
Status: DISCONTINUED | OUTPATIENT
Start: 2019-07-17 | End: 2019-07-17 | Stop reason: HOSPADM

## 2019-07-17 RX ORDER — TRIAMCINOLONE ACETONIDE 40 MG/ML
80 INJECTION, SUSPENSION INTRA-ARTICULAR; INTRAMUSCULAR
Status: DISCONTINUED | OUTPATIENT
Start: 2019-07-17 | End: 2019-07-17 | Stop reason: HOSPADM

## 2019-07-17 RX ADMIN — LIDOCAINE HYDROCHLORIDE 1 ML: 10 INJECTION, SOLUTION EPIDURAL; INFILTRATION; INTRACAUDAL; PERINEURAL at 09:58

## 2019-07-17 RX ADMIN — TRIAMCINOLONE ACETONIDE 80 MG: 40 INJECTION, SUSPENSION INTRA-ARTICULAR; INTRAMUSCULAR at 09:58

## 2019-07-17 NOTE — PROGRESS NOTES
Germaine Gonzalez is a 74 y.o. female, who presents with a chief complaint of   Chief Complaint   Patient presents with   • Knee Pain       HPI   Pt here bc of bilat knee pain.  This has been chronic.  She had steroid injections about 5 years ago which helped. She is very active and has been to [facility] this year.  She has been doing lots of gardening.  Right knee worse than left.  She used to be a marathon runner.     The following portions of the patient's history were reviewed and updated as appropriate: allergies, current medications, past family history, past medical history, past social history, past surgical history and problem list.    Allergies: Levaquin [levofloxacin] and Tetracycline    Review of Systems   Constitutional: Negative.    HENT: Negative.    Eyes: Negative.    Respiratory: Negative.    Cardiovascular: Negative.    Gastrointestinal: Negative.    Endocrine: Negative.    Genitourinary: Negative.    Musculoskeletal: Positive for arthralgias.   Skin: Negative.    Allergic/Immunologic: Negative.    Neurological: Negative.    Hematological: Negative.    Psychiatric/Behavioral: Negative.    All other systems reviewed and are negative.            Wt Readings from Last 3 Encounters:   07/17/19 78.3 kg (172 lb 9.6 oz)   05/28/19 77.4 kg (170 lb 9.6 oz)   04/10/19 76.2 kg (168 lb)     Temp Readings from Last 3 Encounters:   05/28/19 98.5 °F (36.9 °C)   04/10/19 97.9 °F (36.6 °C) (Oral)   03/06/19 99.5 °F (37.5 °C) (Oral)     BP Readings from Last 3 Encounters:   07/17/19 134/68   05/28/19 126/70   04/10/19 129/72     Pulse Readings from Last 3 Encounters:   07/17/19 74   05/28/19 79   04/10/19 62     Body mass index is 28.72 kg/m².    @LASTSAO2(3)@    Physical Exam   Constitutional: She is oriented to person, place, and time. She appears well-developed and well-nourished. No distress.   HENT:   Head: Normocephalic and atraumatic.   Right Ear: External ear normal.   Left Ear: External ear normal.  "  Nose: Nose normal.   Mouth/Throat: Oropharynx is clear and moist.   Eyes: Conjunctivae and EOM are normal. Pupils are equal, round, and reactive to light.   Neck: Normal range of motion. Neck supple.   Cardiovascular: Normal rate, regular rhythm, normal heart sounds and intact distal pulses.   Pulmonary/Chest: Effort normal and breath sounds normal. No respiratory distress. She has no wheezes.   Musculoskeletal: Normal range of motion.   Normal gait  bilat knee crepitus  Normal knee stability.  derrick neg.  No joint line ttp   Neurological: She is alert and oriented to person, place, and time.   Skin: Skin is warm and dry.   Psychiatric: She has a normal mood and affect. Her behavior is normal. Judgment and thought content normal.   Nursing note and vitals reviewed.      Results for orders placed or performed in visit on 05/28/19   POCT rapid strep A   Result Value Ref Range    Rapid Strep A Screen Negative Negative, VALID, INVALID, Not Performed    Internal Control Passed Passed    Lot Number [id number]     Expiration Date 10,312,020      Joint injection of bilateral knees  Date/Time: 7/17/2019 9:58 AM  Performed by: Roberta Boswell MD  Authorized by: Roberta Boswell MD   Consent: Verbal consent obtained.  Consent given by: patient  Patient understanding: patient states understanding of the procedure being performed  Patient consent: the patient's understanding of the procedure matches consent given  Patient identity confirmed: verbally with patient  Time out: Immediately prior to procedure a \"time out\" was called to verify the correct patient, procedure, equipment, support staff and site/side marked as required.  Indications: pain   Body area: knee  Needle gauge: 25G.  Approach: lateral  Patient tolerance: Patient tolerated the procedure well with no immediate complications  Comments: 1cc 1% lidocaine and 2cc kenalog injected into each knee            Germaine was seen today for knee " pain.    Diagnoses and all orders for this visit:    Chronic pain of both knees  -     joint injection of bilateral knees    Primary osteoarthritis of both knees  -     joint injection of bilateral knees    nsaids, low impact exercise, ice as needed for knees.  If pain not improved with injection will refer to ortho.      Outpatient Medications Prior to Visit   Medication Sig Dispense Refill   • albuterol sulfate  (90 Base) MCG/ACT inhaler Inhale 2 puffs Every 4 (Four) Hours As Needed for Wheezing. 1 inhaler 2   • Biotin 5000 MCG tablet Take  by mouth Daily.     • calcium carbonate (OS-ULI) 600 MG tablet Take 600 mg by mouth 2 (Two) Times a Day With Meals.     • cycloSPORINE (RESTASIS) 0.05 % ophthalmic emulsion Administer 1 drop to both eyes 2 (Two) Times a Day.     • escitalopram (LEXAPRO) 10 MG tablet Take 0.5 tablets by mouth Every Morning. 90 tablet 3   • fluticasone (FLONASE) 50 MCG/ACT nasal spray 2 sprays into the nostril(s) as directed by provider Daily.     • glucosamine-chondroitin 500-400 MG capsule capsule Take 1 capsule by mouth 2 (Two) Times a Day With Meals.     • lisinopril (PRINIVIL,ZESTRIL) 10 MG tablet TAKE 1 TABLET BY MOUTH ONCE DAILY 90 tablet 1   • omeprazole (PRILOSEC) 40 MG capsule Take 1 capsule by mouth Daily. 90 capsule 3   • PREMARIN 0.625 MG/GM vaginal cream      • Probiotic Product (PROBIOTIC DAILY PO) Take 1 tablet by mouth Daily.     • rosuvastatin (CRESTOR) 5 MG tablet TAKE 1 TABLET BY MOUTH ONCE DAILY 90 tablet 1   • vitamin B-12 (CYANOCOBALAMIN) 1000 MCG tablet Take 5,000 mcg by mouth Daily.     • azithromycin (ZITHROMAX) 250 MG tablet Take 2 tablets the first day, then 1 tablet daily for 4 days. 6 tablet 0     No facility-administered medications prior to visit.      No orders of the defined types were placed in this encounter.    [unfilled]  Medications Discontinued During This Encounter   Medication Reason   • azithromycin (ZITHROMAX) 250 MG tablet *Therapy  completed         Return if symptoms worsen or fail to improve.

## 2019-08-09 NOTE — TELEPHONE ENCOUNTER
SPOKE WITH PATIENT.  NEEDS REFILL ON OMEPRAZOLE 40 MG TAKE 1 DAY.  PLEASE SEND TO [facility].  PHARMACY TOLD HER THEY HAVE FAXED 2 REQUEST, BUT UNABLE TO LOCATE.  THANKS!!

## 2019-08-12 ENCOUNTER — OFFICE VISIT (OUTPATIENT)
Dept: GASTROENTEROLOGY | Facility: CLINIC | Age: 74
End: 2019-08-12

## 2019-08-12 VITALS — WEIGHT: 168.8 LBS | BODY MASS INDEX: 28.12 KG/M2 | HEIGHT: 65 IN

## 2019-08-12 DIAGNOSIS — K75.81 NASH (NONALCOHOLIC STEATOHEPATITIS): ICD-10-CM

## 2019-08-12 DIAGNOSIS — I10 ESSENTIAL HYPERTENSION: ICD-10-CM

## 2019-08-12 DIAGNOSIS — Z86.010 PERSONAL HISTORY OF COLONIC POLYPS: ICD-10-CM

## 2019-08-12 DIAGNOSIS — K21.00 GASTROESOPHAGEAL REFLUX DISEASE WITH ESOPHAGITIS: ICD-10-CM

## 2019-08-12 DIAGNOSIS — K22.70 BARRETT'S ESOPHAGUS WITHOUT DYSPLASIA: Primary | ICD-10-CM

## 2019-08-12 PROCEDURE — 99212 OFFICE O/P EST SF 10 MIN: CPT | Performed by: INTERNAL MEDICINE

## 2019-08-12 RX ORDER — LISINOPRIL 10 MG/1
10 TABLET ORAL DAILY
Qty: 90 TABLET | Refills: 0 | Status: SHIPPED | OUTPATIENT
Start: 2019-08-12 | End: 2019-11-25 | Stop reason: SDUPTHER

## 2019-08-12 RX ORDER — OMEPRAZOLE 40 MG/1
40 CAPSULE, DELAYED RELEASE ORAL DAILY
Qty: 90 CAPSULE | Refills: 3 | Status: SHIPPED | OUTPATIENT
Start: 2019-08-12 | End: 2020-08-11

## 2019-08-12 NOTE — PROGRESS NOTES
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 She is not taking millie antac and is doing well on her QDay PPI    Colon is due next in 3/2021 and is 5 years out for polyps.            REVIEW OF SYSTEMS  Review of Systems   All other systems reviewed and are negative.        ACTIVE PROBLEMS  Patient Active Problem List    Diagnosis   • WALSH (nonalcoholic steatohepatitis) [K75.81]   • Sjogren's syndrome with keratoconjunctivitis sicca (CMS/HCC) [M35.01]   • Mcgill's esophagus without dysplasia [K22.70]   • Hyperglycemia [R73.9]   • Pyelonephritis [N12]   • Gastroesophageal reflux disease [K21.9]   • Hyperlipidemia [E78.5]   • Pure hypercholesterolemia [E78.00]   • Knee pain [M25.569]         PAST MEDICAL HISTORY  Past Medical History:   Diagnosis Date   • Arthritis     knees   • Mcgill esophagus    • Cancer (CMS/HCC)     cervical   • Cervical cancer (CMS/HCC)    • Colon polyp    • Eczema    • Elevated liver enzymes    • GERD (gastroesophageal reflux disease)    • H/O stress fracture    • Health care maintenance    • Hypercholesteremia    • Kidney stones     sched ureteroscopy, lithotripsy   • Ocular migraine    • Postmenopausal    • Seasonal allergies    • Sjogren's syndrome (CMS/HCC)          SURGICAL HISTORY  Past Surgical History:   Procedure Laterality Date   •  SECTION      x2   • COLONOSCOPY     • CYSTOSCOPY URETEROSCOPY STONE MANIPULATION/EXTRACTION Left 10/14/2016    Procedure: LEFT URETEROSCOPY bilateral retrograde pyleogram LEFT STENT PLACEMENT.;  Surgeon: Julius Darling MD;  Location: [facility];  Service:    • CYSTOSCOPY W/ URETERAL STENT PLACEMENT Right 8/3/2018    Procedure: CYSTOSCOPY URETERAL CATHETER/STENT INSERTION;  Surgeon: Art  Farzad JERRY MD;  Location: [facility];  Service: Urology   • ENDOSCOPY N/A 4/10/2019    Procedure: ESOPHAGOGASTRODUODENOSCOPY;  Surgeon: Bc Sanchez MD;  Location: [facility] OR;  Service: Gastroenterology   • HYSTERECTOMY     • UPPER GASTROINTESTINAL ENDOSCOPY     • URETEROSCOPY LASER LITHOTRIPSY WITH STENT INSERTION Left 10/26/2016    Procedure: LT URETEROSCOPY LASER LITHOTRIPSY ;  Surgeon: Julius Darling MD;  Location: [facility] OR;  Service:          FAMILY HISTORY  Family History   Problem Relation Age of Onset   • Anxiety disorder Mother    • Bipolar disorder Mother    • Stroke Mother 83   • Thyroid disease Mother    • Depression Mother    • Heart attack Father 47   • Thyroid disease Brother    • Hypertension Brother    • Diabetes Brother 68   • Glaucoma Brother    • Depression Brother    • No Known Problems Daughter    • No Known Problems Son    • Leukemia Maternal Grandmother    • Diabetes Maternal Grandfather    • Heart attack Paternal Grandmother    • Heart attack Paternal Grandfather    • Breast cancer Neg Hx    • Colon cancer Neg Hx    • Colon polyps Neg Hx          SOCIAL HISTORY  Social History     Occupational History   • Not on file   Tobacco Use   • Smoking status: Former Smoker     Packs/day: 0.50     Years: 16.00     Pack years: 8.00     Types: Cigarettes     Last attempt to quit: 1990     Years since quittin.3   • Smokeless tobacco: Never Used   Substance and Sexual Activity   • Alcohol use: Yes     Alcohol/week: 1.2 oz     Types: 2 Glasses of wine per week   • Drug use: No   • Sexual activity: Defer       Debilities/Disabilities Identified: None    Emotional Behavior: Appropriate    CURRENT MEDICATIONS    Current Outpatient Medications:   •  albuterol sulfate  (90 Base) MCG/ACT inhaler, Inhale 2 puffs Every 4 (Four) Hours As Needed for Wheezing., Disp: 1 inhaler, Rfl: 2  •  Biotin 5000 MCG tablet, Take  by mouth Daily., Disp: , Rfl:   •  calcium carbonate  "(OS-ULI) 600 MG tablet, Take 600 mg by mouth 2 (Two) Times a Day With Meals., Disp: , Rfl:   •  cycloSPORINE (RESTASIS) 0.05 % ophthalmic emulsion, Administer 1 drop to both eyes 2 (Two) Times a Day., Disp: , Rfl:   •  escitalopram (LEXAPRO) 10 MG tablet, Take 0.5 tablets by mouth Every Morning., Disp: 90 tablet, Rfl: 3  •  fluticasone (FLONASE) 50 MCG/ACT nasal spray, 2 sprays into the nostril(s) as directed by provider Daily., Disp: , Rfl:   •  glucosamine-chondroitin 500-400 MG capsule capsule, Take 1 capsule by mouth 2 (Two) Times a Day With Meals., Disp: , Rfl:   •  lisinopril (PRINIVIL,ZESTRIL) 10 MG tablet, TAKE 1 TABLET BY MOUTH ONCE DAILY, Disp: 90 tablet, Rfl: 1  •  omeprazole (PRILOSEC) 40 MG capsule, Take 1 capsule by mouth Daily., Disp: 90 capsule, Rfl: 3  •  PREMARIN 0.625 MG/GM vaginal cream, , Disp: , Rfl:   •  Probiotic Product (PROBIOTIC DAILY PO), Take 1 tablet by mouth Daily., Disp: , Rfl:   •  rosuvastatin (CRESTOR) 5 MG tablet, TAKE 1 TABLET BY MOUTH ONCE DAILY, Disp: 90 tablet, Rfl: 1  •  vitamin B-12 (CYANOCOBALAMIN) 1000 MCG tablet, Take 5,000 mcg by mouth Daily., Disp: , Rfl:     ALLERGIES  Levaquin [levofloxacin] and Tetracycline    VITALS  Vitals:    08/12/19 1318   Weight: 76.6 kg (168 lb 12.8 oz)   Height: 165.1 cm (65\")       LAST RESULTS   Office Visit on 05/28/2019   Component Date Value Ref Range Status   • Rapid Strep A Screen 05/28/2019 Negative  Negative, VALID, INVALID, Not Performed Final   • Internal Control 05/28/2019 Passed  Passed Final   • Lot Number 05/28/2019 [id number]   Final   • Expiration Date 05/28/2019 [id number]   Final     No results found.    PHYSICAL EXAM  Physical Exam   Constitutional: She is oriented to person, place, and time. She appears well-developed and well-nourished.   HENT:   Head: Normocephalic and atraumatic.   Eyes: Conjunctivae and EOM are normal. Pupils are equal, round, and reactive to light. No scleral icterus.   Neck: Normal range of motion. " Neck supple. No thyromegaly present.   Cardiovascular: Normal rate, regular rhythm, normal heart sounds and intact distal pulses. Exam reveals no gallop.   No murmur heard.  Pulmonary/Chest: Effort normal and breath sounds normal. She has no wheezes. She has no rales.   Abdominal: Soft. Bowel sounds are normal. She exhibits no shifting dullness, no distension, no fluid wave, no abdominal bruit, no ascites and no mass. There is no hepatosplenomegaly. There is no tenderness. There is no guarding and negative García's sign. Hernia confirmed negative in the ventral area.   Musculoskeletal: Normal range of motion. She exhibits no edema.   Lymphadenopathy:     She has no cervical adenopathy.   Neurological: She is alert and oriented to person, place, and time.   Skin: Skin is warm and dry. No rash noted. She is not diaphoretic. No erythema.   Psychiatric: She has a normal mood and affect. Her behavior is normal.       ASSESSMENT  Diagnoses and all orders for this visit:    Mcgill's esophagus without dysplasia    WALSH (nonalcoholic steatohepatitis)    Gastroesophageal reflux disease with esophagitis    Personal history of colonic polyps          PLAN  Return in about 6 months (around 2/12/2020).

## 2019-09-12 DIAGNOSIS — R79.89 ABNORMAL THYROID BLOOD TEST: ICD-10-CM

## 2019-09-12 DIAGNOSIS — E78.2 MIXED HYPERLIPIDEMIA: Primary | ICD-10-CM

## 2019-09-12 DIAGNOSIS — R73.9 HYPERGLYCEMIA: ICD-10-CM

## 2019-09-16 LAB
ALBUMIN SERPL-MCNC: 5 G/DL (ref 3.5–5.2)
ALBUMIN/GLOB SERPL: 2 G/DL
ALP SERPL-CCNC: 86 U/L (ref 39–117)
ALT SERPL-CCNC: 37 U/L (ref 1–33)
AST SERPL-CCNC: 31 U/L (ref 1–32)
BASOPHILS # BLD AUTO: 0.02 10*3/MM3 (ref 0–0.2)
BASOPHILS NFR BLD AUTO: 0.4 % (ref 0–1.5)
BILIRUB SERPL-MCNC: 0.9 MG/DL (ref 0.2–1.2)
BUN SERPL-MCNC: 29 MG/DL (ref 8–23)
BUN/CREAT SERPL: 31.9 (ref 7–25)
CALCIUM SERPL-MCNC: 10.2 MG/DL (ref 8.6–10.5)
CHLORIDE SERPL-SCNC: 100 MMOL/L (ref 98–107)
CHOLEST SERPL-MCNC: 270 MG/DL (ref 0–200)
CO2 SERPL-SCNC: 22.4 MMOL/L (ref 22–29)
CREAT SERPL-MCNC: 0.91 MG/DL (ref 0.57–1)
EOSINOPHIL # BLD AUTO: 0.09 10*3/MM3 (ref 0–0.4)
EOSINOPHIL NFR BLD AUTO: 1.9 % (ref 0.3–6.2)
ERYTHROCYTE [DISTWIDTH] IN BLOOD BY AUTOMATED COUNT: 12.5 % (ref 12.3–15.4)
GLOBULIN SER CALC-MCNC: 2.5 GM/DL
GLUCOSE SERPL-MCNC: 92 MG/DL (ref 65–99)
HBA1C MFR BLD: 5.3 % (ref 4.8–5.6)
HCT VFR BLD AUTO: 42.8 % (ref 34–46.6)
HDLC SERPL-MCNC: 89 MG/DL (ref 40–60)
HGB BLD-MCNC: 14 G/DL (ref 12–15.9)
IMM GRANULOCYTES # BLD AUTO: 0.05 10*3/MM3 (ref 0–0.05)
IMM GRANULOCYTES NFR BLD AUTO: 1 % (ref 0–0.5)
LDLC SERPL CALC-MCNC: 157 MG/DL (ref 0–100)
LDLC/HDLC SERPL: 1.77 {RATIO}
LYMPHOCYTES # BLD AUTO: 1.27 10*3/MM3 (ref 0.7–3.1)
LYMPHOCYTES NFR BLD AUTO: 26.3 % (ref 19.6–45.3)
MCH RBC QN AUTO: 33.3 PG (ref 26.6–33)
MCHC RBC AUTO-ENTMCNC: 32.7 G/DL (ref 31.5–35.7)
MCV RBC AUTO: 101.7 FL (ref 79–97)
MONOCYTES # BLD AUTO: 0.6 10*3/MM3 (ref 0.1–0.9)
MONOCYTES NFR BLD AUTO: 12.4 % (ref 5–12)
NEUTROPHILS # BLD AUTO: 2.79 10*3/MM3 (ref 1.7–7)
NEUTROPHILS NFR BLD AUTO: 58 % (ref 42.7–76)
NRBC BLD AUTO-RTO: 0 /100 WBC (ref 0–0.2)
PLATELET # BLD AUTO: 220 10*3/MM3 (ref 140–450)
POTASSIUM SERPL-SCNC: 4.9 MMOL/L (ref 3.5–5.2)
PROT SERPL-MCNC: 7.5 G/DL (ref 6–8.5)
RBC # BLD AUTO: 4.21 10*6/MM3 (ref 3.77–5.28)
SODIUM SERPL-SCNC: 139 MMOL/L (ref 136–145)
TRIGL SERPL-MCNC: 118 MG/DL (ref 0–150)
TSH SERPL DL<=0.005 MIU/L-ACNC: 1.11 UIU/ML (ref 0.27–4.2)
VLDLC SERPL CALC-MCNC: 23.6 MG/DL
WBC # BLD AUTO: 4.82 10*3/MM3 (ref 3.4–10.8)

## 2019-09-17 ENCOUNTER — RESULTS ENCOUNTER (OUTPATIENT)
Dept: INTERNAL MEDICINE | Facility: CLINIC | Age: 74
End: 2019-09-17

## 2019-09-17 DIAGNOSIS — E78.2 MIXED HYPERLIPIDEMIA: ICD-10-CM

## 2019-09-17 DIAGNOSIS — R79.89 ABNORMAL THYROID BLOOD TEST: ICD-10-CM

## 2019-09-17 DIAGNOSIS — R73.9 HYPERGLYCEMIA: ICD-10-CM

## 2019-09-23 ENCOUNTER — OFFICE VISIT (OUTPATIENT)
Dept: INTERNAL MEDICINE | Facility: CLINIC | Age: 74
End: 2019-09-23

## 2019-09-23 VITALS
RESPIRATION RATE: 16 BRPM | BODY MASS INDEX: 28.32 KG/M2 | OXYGEN SATURATION: 97 % | HEART RATE: 71 BPM | DIASTOLIC BLOOD PRESSURE: 70 MMHG | HEIGHT: 65 IN | WEIGHT: 170 LBS | SYSTOLIC BLOOD PRESSURE: 146 MMHG

## 2019-09-23 DIAGNOSIS — M25.561 CHRONIC PAIN OF BOTH KNEES: ICD-10-CM

## 2019-09-23 DIAGNOSIS — I10 ESSENTIAL HYPERTENSION: ICD-10-CM

## 2019-09-23 DIAGNOSIS — M25.562 CHRONIC PAIN OF BOTH KNEES: ICD-10-CM

## 2019-09-23 DIAGNOSIS — E78.2 MIXED HYPERLIPIDEMIA: ICD-10-CM

## 2019-09-23 DIAGNOSIS — Z00.00 MEDICARE ANNUAL WELLNESS VISIT, SUBSEQUENT: Primary | ICD-10-CM

## 2019-09-23 DIAGNOSIS — G89.29 CHRONIC PAIN OF BOTH KNEES: ICD-10-CM

## 2019-09-23 PROCEDURE — G0439 PPPS, SUBSEQ VISIT: HCPCS | Performed by: INTERNAL MEDICINE

## 2019-09-23 PROCEDURE — 99213 OFFICE O/P EST LOW 20 MIN: CPT | Performed by: INTERNAL MEDICINE

## 2019-09-23 PROCEDURE — 96160 PT-FOCUSED HLTH RISK ASSMT: CPT | Performed by: INTERNAL MEDICINE

## 2019-09-23 PROCEDURE — 99397 PER PM REEVAL EST PAT 65+ YR: CPT | Performed by: INTERNAL MEDICINE

## 2019-09-23 RX ORDER — ROSUVASTATIN CALCIUM 10 MG/1
10 TABLET, COATED ORAL DAILY
Qty: 90 TABLET | Refills: 3 | Status: SHIPPED | OUTPATIENT
Start: 2019-09-23 | End: 2020-09-28

## 2019-09-23 NOTE — PROGRESS NOTES
Germaine Gonzalez is a 74 y.o. female, who presents with a chief complaint of   Chief Complaint   Patient presents with   • Medicare Wellness-subsequent   • Hyperlipidemia   • Hypertension       HPI   Pt here for follow up    She is UTD with dr. Darling and dr. agarwal     HTN - bp up some today. She is on lisinopril but misses doses often and she did not take her meds today.  No ha/dizziness     HLD - on crestor.  Grandparents and parents all with CAD/strokes.  Normal stress echo 10/2016    Knees much better after steroid injections     She has started jogging some and feels better .  She is trying to eat a healthy diet.      The following portions of the patient's history were reviewed and updated as appropriate: allergies, current medications, past family history, past medical history, past social history, past surgical history and problem list.    Allergies: Levaquin [levofloxacin] and Tetracycline    Review of Systems   Constitutional: Negative.    HENT: Negative.    Eyes: Negative.    Respiratory: Negative.    Cardiovascular: Negative.    Gastrointestinal: Negative.    Endocrine: Negative.    Genitourinary: Negative.    Musculoskeletal: Negative.    Skin: Negative.    Allergic/Immunologic: Negative.    Neurological: Negative.    Hematological: Negative.    Psychiatric/Behavioral: Negative.    All other systems reviewed and are negative.            Wt Readings from Last 3 Encounters:   09/23/19 77.1 kg (170 lb)   08/12/19 76.6 kg (168 lb 12.8 oz)   07/17/19 78.3 kg (172 lb 9.6 oz)     Temp Readings from Last 3 Encounters:   05/28/19 98.5 °F (36.9 °C)   04/10/19 97.9 °F (36.6 °C) (Oral)   03/06/19 99.5 °F (37.5 °C) (Oral)     BP Readings from Last 3 Encounters:   09/23/19 146/70   07/17/19 134/68   05/28/19 126/70     Pulse Readings from Last 3 Encounters:   09/23/19 71   07/17/19 74   05/28/19 79     Body mass index is 28.29 kg/m².  @LASTSAO2(3)@    Physical Exam   Constitutional: She is oriented to person,  place, and time. She appears well-developed and well-nourished. No distress.   HENT:   Head: Normocephalic and atraumatic.   Right Ear: External ear normal.   Left Ear: External ear normal.   Nose: Nose normal.   Mouth/Throat: Oropharynx is clear and moist.   Eyes: Conjunctivae and EOM are normal. Pupils are equal, round, and reactive to light.   Neck: Normal range of motion. Neck supple.   Cardiovascular: Normal rate, regular rhythm, normal heart sounds and intact distal pulses.   Pulmonary/Chest: Effort normal and breath sounds normal. No respiratory distress. She has no wheezes.   Musculoskeletal: Normal range of motion.   Normal gait   Neurological: She is alert and oriented to person, place, and time.   Skin: Skin is warm and dry.   Psychiatric: She has a normal mood and affect. Her behavior is normal. Judgment and thought content normal.   Nursing note and vitals reviewed.      Results for orders placed or performed in visit on 09/17/19   Lipid Panel With LDL / HDL Ratio   Result Value Ref Range    Total Cholesterol 270 (H) 0 - 200 mg/dL    Triglycerides 118 0 - 150 mg/dL    HDL Cholesterol 89 (H) 40 - 60 mg/dL    VLDL Cholesterol 23.6 mg/dL    LDL Cholesterol  157 (H) 0 - 100 mg/dL    LDL/HDL Ratio 1.77    Hemoglobin A1c   Result Value Ref Range    Hemoglobin A1C 5.30 4.80 - 5.60 %   Comprehensive Metabolic Panel   Result Value Ref Range    Glucose 92 65 - 99 mg/dL    BUN 29 (H) 8 - 23 mg/dL    Creatinine 0.91 0.57 - 1.00 mg/dL    eGFR Non African Am 61 >60 mL/min/1.73    eGFR African Am 74 >60 mL/min/1.73    BUN/Creatinine Ratio 31.9 (H) 7.0 - 25.0    Sodium 139 136 - 145 mmol/L    Potassium 4.9 3.5 - 5.2 mmol/L    Chloride 100 98 - 107 mmol/L    Total CO2 22.4 22.0 - 29.0 mmol/L    Calcium 10.2 8.6 - 10.5 mg/dL    Total Protein 7.5 6.0 - 8.5 g/dL    Albumin 5.00 3.50 - 5.20 g/dL    Globulin 2.5 gm/dL    A/G Ratio 2.0 g/dL    Total Bilirubin 0.9 0.2 - 1.2 mg/dL    Alkaline Phosphatase 86 39 - 117 U/L    AST  (SGOT) 31 1 - 32 U/L    ALT (SGPT) 37 (H) 1 - 33 U/L   TSH Rfx On Abnormal To Free T4   Result Value Ref Range    TSH 1.110 0.270 - 4.200 uIU/mL   CBC & Differential   Result Value Ref Range    WBC 4.82 3.40 - 10.80 10*3/mm3    RBC 4.21 3.77 - 5.28 10*6/mm3    Hemoglobin 14.0 12.0 - 15.9 g/dL    Hematocrit 42.8 34.0 - 46.6 %    .7 (H) 79.0 - 97.0 fL    MCH 33.3 (H) 26.6 - 33.0 pg    MCHC 32.7 31.5 - 35.7 g/dL    RDW 12.5 12.3 - 15.4 %    Platelets 220 140 - 450 10*3/mm3    Neutrophil Rel % 58.0 42.7 - 76.0 %    Lymphocyte Rel % 26.3 19.6 - 45.3 %    Monocyte Rel % 12.4 (H) 5.0 - 12.0 %    Eosinophil Rel % 1.9 0.3 - 6.2 %    Basophil Rel % 0.4 0.0 - 1.5 %    Neutrophils Absolute 2.79 1.70 - 7.00 10*3/mm3    Lymphocytes Absolute 1.27 0.70 - 3.10 10*3/mm3    Monocytes Absolute 0.60 0.10 - 0.90 10*3/mm3    Eosinophils Absolute 0.09 0.00 - 0.40 10*3/mm3    Basophils Absolute 0.02 0.00 - 0.20 10*3/mm3    Immature Granulocyte Rel % 1.0 (H) 0.0 - 0.5 %    Immature Grans Absolute 0.05 0.00 - 0.05 10*3/mm3    nRBC 0.0 0.0 - 0.2 /100 WBC           Germaine was seen today for medicare wellness-subsequent, hyperlipidemia and hypertension.    Diagnoses and all orders for this visit:    Medicare annual wellness visit, subsequent    Mixed hyperlipidemia  -     rosuvastatin (CRESTOR) 10 MG tablet; Take 1 tablet by mouth Daily.    Essential hypertension    Chronic pain of both knees      Encouraged health diet and exercise. Ov/labs in 6 mo.    Outpatient Medications Prior to Visit   Medication Sig Dispense Refill   • albuterol sulfate  (90 Base) MCG/ACT inhaler Inhale 2 puffs Every 4 (Four) Hours As Needed for Wheezing. 1 inhaler 2   • Biotin 5000 MCG tablet Take  by mouth Daily.     • calcium carbonate (OS-ULI) 600 MG tablet Take 600 mg by mouth 2 (Two) Times a Day With Meals.     • cycloSPORINE (RESTASIS) 0.05 % ophthalmic emulsion Administer 1 drop to both eyes 2 (Two) Times a Day.     • escitalopram (LEXAPRO) 10 MG  tablet Take 0.5 tablets by mouth Every Morning. 90 tablet 3   • fluticasone (FLONASE) 50 MCG/ACT nasal spray 2 sprays into the nostril(s) as directed by provider Daily.     • glucosamine-chondroitin 500-400 MG capsule capsule Take 1 capsule by mouth 2 (Two) Times a Day With Meals.     • lisinopril (PRINIVIL,ZESTRIL) 10 MG tablet Take 1 tablet by mouth Daily. 90 tablet 0   • omeprazole (PRILOSEC) 40 MG capsule Take 1 capsule by mouth Daily. 90 capsule 3   • PREMARIN 0.625 MG/GM vaginal cream      • Probiotic Product (PROBIOTIC DAILY PO) Take 1 tablet by mouth Daily.     • vitamin B-12 (CYANOCOBALAMIN) 1000 MCG tablet Take 5,000 mcg by mouth Daily.     • rosuvastatin (CRESTOR) 5 MG tablet TAKE 1 TABLET BY MOUTH ONCE DAILY 90 tablet 1     No facility-administered medications prior to visit.      New Medications Ordered This Visit   Medications   • rosuvastatin (CRESTOR) 10 MG tablet     Sig: Take 1 tablet by mouth Daily.     Dispense:  90 tablet     Refill:  3     [unfilled]  Medications Discontinued During This Encounter   Medication Reason   • rosuvastatin (CRESTOR) 5 MG tablet Reorder         Return in about 6 months (around 3/23/2020) for Recheck, labs.

## 2019-09-23 NOTE — PATIENT INSTRUCTIONS
Medicare Wellness  Personal Prevention Plan of Service     Date of Office Visit:  2019  Encounter Provider:  Roberta Boswell MD  Place of Service:  [facility]  Patient Name: Germaine Gonzalez  :  1945    As part of the Medicare Wellness portion of your visit today, we are providing you with this personalized preventive plan of services (PPPS). This plan is based upon recommendations of the [place] Preventive Services Task Force (USPSTF) and the Advisory Committee on Immunization Practices (ACIP).    This lists the preventive care services that should be considered, and provides dates of when you are due. Items listed as completed are up-to-date and do not require any further intervention.    Health Maintenance   Topic Date Due   • INFLUENZA VACCINE  10/18/2019 (Originally 2019)   • PNEUMOCOCCAL VACCINES (65+ LOW/MEDIUM RISK) (2 of 2 - PCV13) 2020 (Originally 2018)   • ZOSTER VACCINE (3 of 3) 2019   • LIPID PANEL  2020   • MEDICARE ANNUAL WELLNESS  2020   • MAMMOGRAM  2021   • COLONOSCOPY  2021   • TDAP/TD VACCINES (2 - Td) 2025   • HEPATITIS C SCREENING  Completed       No orders of the defined types were placed in this encounter.      Return in about 6 months (around 3/23/2020) for Recheck, labs.

## 2019-09-23 NOTE — PROGRESS NOTES
The ABCs of the Annual Wellness Visit  Subsequent Medicare Wellness Visit    Chief Complaint   Patient presents with   • Medicare Wellness-subsequent   • Hyperlipidemia   • Hypertension       Subjective   History of Present Illness:  Germaine Gonzalez is a 74 y.o. female who presents for a Subsequent Medicare Wellness Visit.    HEALTH RISK ASSESSMENT    Recent Hospitalizations:  No hospitalization(s) within the last year.    Current Medical Providers:  Patient Care Team:  Roberta Boswell MD as PCP - General (Internal Medicine & Pediatrics)  LauraBc MD as Consulting Physician (Gastroenterology)    Smoking Status:  Social History     Tobacco Use   Smoking Status Former Smoker   • Packs/day: 0.50   • Years: 16.00   • Pack years: 8.00   • Types: Cigarettes   • Last attempt to quit: 1990   • Years since quittin.4   Smokeless Tobacco Never Used       Alcohol Consumption:  Social History     Substance and Sexual Activity   Alcohol Use Yes   • Alcohol/week: 1.2 oz   • Types: 2 Glasses of wine per week       Depression Screen:   PHQ-2/PHQ-9 Depression Screening 2019   Little interest or pleasure in doing things 0   Feeling down, depressed, or hopeless 0   Trouble falling or staying asleep, or sleeping too much 0   Feeling tired or having little energy 0   Poor appetite or overeating 0   Feeling bad about yourself - or that you are a failure or have let yourself or your family down 0   Trouble concentrating on things, such as reading the newspaper or watching television 0   Moving or speaking so slowly that other people could have noticed. Or the opposite - being so fidgety or restless that you have been moving around a lot more than usual 0   Thoughts that you would be better off dead, or of hurting yourself in some way 0   Total Score 0   If you checked off any problems, how difficult have these problems made it for you to do your work, take care of things at home, or get along with  other people? Not difficult at all       Fall Risk Screen:  JEREMY Fall Risk Assessment was completed, and patient is at LOW risk for falls.Assessment completed on:9/23/2019    Health Habits and Functional and Cognitive Screening:  Functional & Cognitive Status 9/23/2019   Do you have difficulty preparing food and eating? No   Do you have difficulty bathing yourself, getting dressed or grooming yourself? No   Do you have difficulty using the toilet? No   Do you have difficulty moving around from place to place? No   Do you have trouble with steps or getting out of a bed or a chair? No   Current Diet Well Balanced Diet   Dental Exam Up to date   Eye Exam Up to date   Exercise (times per week) 4 times per week   Current Exercise Activities Include Running/Jogging   Do you need help using the phone?  No   Are you deaf or do you have serious difficulty hearing?  No   Do you need help with transportation? No   Do you need help shopping? No   Do you need help preparing meals?  No   Do you need help with housework?  No   Do you need help with laundry? No   Do you need help taking your medications? No   Do you need help managing money? No   Do you ever drive or ride in a car without wearing a seat belt? No   Have you felt unusual stress, anger or loneliness in the last month? No   Who do you live with? Spouse   If you need help, do you have trouble finding someone available to you? No   Have you been bothered in the last four weeks by sexual problems? No   Do you have difficulty concentrating, remembering or making decisions? No         Does the patient have evidence of cognitive impairment? No    Asprin use counseling:Does not need ASA (and currently is not on it)    Age-appropriate Screening Schedule:  Refer to the list below for future screening recommendations based on patient's age, sex and/or medical conditions. Orders for these recommended tests are listed in the plan section. The patient has been provided with a  written plan.    Health Maintenance   Topic Date Due   • INFLUENZA VACCINE  10/18/2019 (Originally 8/1/2019)   • PNEUMOCOCCAL VACCINES (65+ LOW/MEDIUM RISK) (2 of 2 - PCV13) 09/23/2020 (Originally 7/11/2018)   • ZOSTER VACCINE (3 of 3) 09/26/2019   • LIPID PANEL  09/16/2020   • MAMMOGRAM  02/20/2021   • COLONOSCOPY  03/02/2021   • TDAP/TD VACCINES (2 - Td) 01/01/2025          The following portions of the patient's history were reviewed and updated as appropriate: allergies, current medications, past family history, past medical history, past social history, past surgical history and problem list.    Outpatient Medications Prior to Visit   Medication Sig Dispense Refill   • albuterol sulfate  (90 Base) MCG/ACT inhaler Inhale 2 puffs Every 4 (Four) Hours As Needed for Wheezing. 1 inhaler 2   • Biotin 5000 MCG tablet Take  by mouth Daily.     • calcium carbonate (OS-ULI) 600 MG tablet Take 600 mg by mouth 2 (Two) Times a Day With Meals.     • cycloSPORINE (RESTASIS) 0.05 % ophthalmic emulsion Administer 1 drop to both eyes 2 (Two) Times a Day.     • escitalopram (LEXAPRO) 10 MG tablet Take 0.5 tablets by mouth Every Morning. 90 tablet 3   • fluticasone (FLONASE) 50 MCG/ACT nasal spray 2 sprays into the nostril(s) as directed by provider Daily.     • glucosamine-chondroitin 500-400 MG capsule capsule Take 1 capsule by mouth 2 (Two) Times a Day With Meals.     • lisinopril (PRINIVIL,ZESTRIL) 10 MG tablet Take 1 tablet by mouth Daily. 90 tablet 0   • omeprazole (PRILOSEC) 40 MG capsule Take 1 capsule by mouth Daily. 90 capsule 3   • PREMARIN 0.625 MG/GM vaginal cream      • Probiotic Product (PROBIOTIC DAILY PO) Take 1 tablet by mouth Daily.     • vitamin B-12 (CYANOCOBALAMIN) 1000 MCG tablet Take 5,000 mcg by mouth Daily.     • rosuvastatin (CRESTOR) 5 MG tablet TAKE 1 TABLET BY MOUTH ONCE DAILY 90 tablet 1     No facility-administered medications prior to visit.        Patient Active Problem List   Diagnosis   •  "Gastroesophageal reflux disease   • Hyperlipidemia   • Pyelonephritis   • Sjogren's syndrome with keratoconjunctivitis sicca (CMS/HCC)   • Mcgill's esophagus without dysplasia   • Hyperglycemia   • WALHS (nonalcoholic steatohepatitis)   • Pure hypercholesterolemia   • Knee pain       Advanced Care Planning:  Patient has an advance directive - a copy has not been provided. Have asked the patient to send this to us to add to record    Review of Systems   Constitutional: Negative.    HENT: Negative.    Eyes: Negative.    Respiratory: Negative.    Cardiovascular: Negative.    Gastrointestinal: Negative.    Endocrine: Negative.    Genitourinary: Negative.    Musculoskeletal: Negative.    Skin: Negative.    Allergic/Immunologic: Negative.    Neurological: Negative.    Hematological: Negative.    Psychiatric/Behavioral: Negative.    All other systems reviewed and are negative.      Compared to one year ago, the patient feels her physical health is better.  Compared to one year ago, the patient feels her mental health is better.    Reviewed chart for potential of high risk medication in the elderly: yes  Reviewed chart for potential of harmful drug interactions in the elderly:yes    Objective         Vitals:    09/23/19 0959   BP: 146/70   Pulse: 71   Resp: 16   SpO2: 97%   Weight: 77.1 kg (170 lb)   Height: 165.1 cm (65\")       Body mass index is 28.29 kg/m².  Discussed the patient's BMI with her. The BMI is above average; no BMI management plan is appropriate..    Physical Exam   Constitutional: She is oriented to person, place, and time. She appears well-developed and well-nourished. No distress.   HENT:   Head: Normocephalic and atraumatic.   Right Ear: External ear normal.   Left Ear: External ear normal.   Nose: Nose normal.   Mouth/Throat: Oropharynx is clear and moist.   Eyes: Conjunctivae and EOM are normal. Pupils are equal, round, and reactive to light.   Neck: Normal range of motion. Neck supple. "   Cardiovascular: Normal rate, regular rhythm, normal heart sounds and intact distal pulses.   Pulmonary/Chest: Effort normal and breath sounds normal. No respiratory distress. She has no wheezes.   Musculoskeletal: Normal range of motion.   Normal gait   Neurological: She is alert and oriented to person, place, and time.   Skin: Skin is warm and dry.   Psychiatric: She has a normal mood and affect. Her behavior is normal. Judgment and thought content normal.   Nursing note and vitals reviewed.      Lab Results   Component Value Date    GLU 92 09/16/2019    CHLPL 270 (H) 09/16/2019    TRIG 118 09/16/2019    HDL 89 (H) 09/16/2019     (H) 09/16/2019    VLDL 23.6 09/16/2019    HGBA1C 5.30 09/16/2019        Assessment/Plan   Medicare Risks and Personalized Health Plan  CMS Preventative Services Quick Reference  Advance Directive Discussion  Immunizations Discussed/Encouraged (specific immunizations; Influenza, Pneumococcal 23, Prevnar, Shingrix and pt will get records from [facility].  )  Polypharmacy    The above risks/problems have been discussed with the patient.  Pertinent information has been shared with the patient in the After Visit Summary.  Follow up plans and orders are seen below in the Assessment/Plan Section.    Diagnoses and all orders for this visit:    1. Medicare annual wellness visit, subsequent (Primary)    2. Mixed hyperlipidemia  -     rosuvastatin (CRESTOR) 10 MG tablet; Take 1 tablet by mouth Daily.  Dispense: 90 tablet; Refill: 3    3. Essential hypertension    4. Chronic pain of both knees      Follow Up:  Return in about 6 months (around 3/23/2020) for Recheck, labs.     An After Visit Summary and PPPS were given to the patient.

## 2019-11-25 ENCOUNTER — HOSPITAL ENCOUNTER (OUTPATIENT)
Dept: GENERAL RADIOLOGY | Facility: HOSPITAL | Age: 74
Discharge: HOME | End: 2019-11-25
Admitting: INTERNAL MEDICINE

## 2019-11-25 ENCOUNTER — OFFICE VISIT (OUTPATIENT)
Dept: INTERNAL MEDICINE | Facility: CLINIC | Age: 74
End: 2019-11-25

## 2019-11-25 VITALS
WEIGHT: 175.4 LBS | RESPIRATION RATE: 18 BRPM | DIASTOLIC BLOOD PRESSURE: 92 MMHG | HEART RATE: 85 BPM | OXYGEN SATURATION: 98 % | HEIGHT: 65 IN | SYSTOLIC BLOOD PRESSURE: 132 MMHG | BODY MASS INDEX: 29.22 KG/M2

## 2019-11-25 DIAGNOSIS — M17.0 PRIMARY OSTEOARTHRITIS OF BOTH KNEES: Primary | ICD-10-CM

## 2019-11-25 DIAGNOSIS — I10 ESSENTIAL HYPERTENSION: ICD-10-CM

## 2019-11-25 DIAGNOSIS — M79.641 RIGHT HAND PAIN: ICD-10-CM

## 2019-11-25 PROCEDURE — 99214 OFFICE O/P EST MOD 30 MIN: CPT | Performed by: INTERNAL MEDICINE

## 2019-11-25 PROCEDURE — 73130 X-RAY EXAM OF HAND: CPT

## 2019-11-25 PROCEDURE — 20610 DRAIN/INJ JOINT/BURSA W/O US: CPT | Performed by: INTERNAL MEDICINE

## 2019-11-25 RX ORDER — LIDOCAINE HYDROCHLORIDE 10 MG/ML
1 INJECTION, SOLUTION EPIDURAL; INFILTRATION; INTRACAUDAL; PERINEURAL
Status: COMPLETED | OUTPATIENT
Start: 2019-11-25 | End: 2019-11-25

## 2019-11-25 RX ORDER — TRIAMCINOLONE ACETONIDE 40 MG/ML
80 INJECTION, SUSPENSION INTRA-ARTICULAR; INTRAMUSCULAR
Status: COMPLETED | OUTPATIENT
Start: 2019-11-25 | End: 2019-11-25

## 2019-11-25 RX ORDER — LISINOPRIL 10 MG/1
10 TABLET ORAL DAILY
Qty: 90 TABLET | Refills: 3 | Status: SHIPPED | OUTPATIENT
Start: 2019-11-25 | End: 2020-11-25

## 2019-11-25 RX ORDER — ESTRADIOL 0.1 MG/G
CREAM VAGINAL
COMMUNITY
Start: 2019-11-11

## 2019-11-25 RX ADMIN — LIDOCAINE HYDROCHLORIDE 1 ML: 10 INJECTION, SOLUTION EPIDURAL; INFILTRATION; INTRACAUDAL; PERINEURAL at 11:58

## 2019-11-25 RX ADMIN — TRIAMCINOLONE ACETONIDE 80 MG: 40 INJECTION, SUSPENSION INTRA-ARTICULAR; INTRAMUSCULAR at 11:58

## 2019-11-25 NOTE — PROGRESS NOTES
Germaine Gonzalez is a 74 y.o. female, who presents with a chief complaint of   Chief Complaint   Patient presents with   • Knee Pain       HPI   Pt fell Friday. She was walking to get the mail and her foot slipped off the side of the drive and she landed on her right hand.  She is especially sore along the 5th metatarsal. She cont to have lots of pain.      HTN - needs bp meds refilled.  Well controlled. No ha/dizziness.    Knee pain - bilat knee pain.  She had steroid injections done in April and it helped a lot.  No help with the gel injections.  Sx worse with increased activity.  No real help with otc pain meds.    The following portions of the patient's history were reviewed and updated as appropriate: allergies, current medications, past family history, past medical history, past social history, past surgical history and problem list.    Allergies: Levaquin [levofloxacin] and Tetracycline    Review of Systems   Constitutional: Negative.    HENT: Negative.    Eyes: Negative.    Respiratory: Negative.    Cardiovascular: Negative.    Gastrointestinal: Negative.    Endocrine: Negative.    Genitourinary: Negative.    Musculoskeletal:        Right hand pain  Knee pain   Skin: Negative.    Allergic/Immunologic: Negative.    Neurological: Negative.    Hematological: Negative.    Psychiatric/Behavioral: Negative.    All other systems reviewed and are negative.            Wt Readings from Last 3 Encounters:   11/25/19 79.6 kg (175 lb 6.4 oz)   09/23/19 77.1 kg (170 lb)   08/12/19 76.6 kg (168 lb 12.8 oz)     Temp Readings from Last 3 Encounters:   05/28/19 98.5 °F (36.9 °C)   04/10/19 97.9 °F (36.6 °C) (Oral)   03/06/19 99.5 °F (37.5 °C) (Oral)     BP Readings from Last 3 Encounters:   11/25/19 132/92   09/23/19 146/70   07/17/19 134/68     Pulse Readings from Last 3 Encounters:   11/25/19 85   09/23/19 71   07/17/19 74     Body mass index is 29.19 kg/m².  @LASTSAO2(3)@    Physical Exam   Constitutional: She is oriented  to person, place, and time. She appears well-developed and well-nourished. No distress.   HENT:   Head: Normocephalic and atraumatic.   Right Ear: External ear normal.   Left Ear: External ear normal.   Nose: Nose normal.   Mouth/Throat: Oropharynx is clear and moist.   Eyes: Conjunctivae and EOM are normal. Pupils are equal, round, and reactive to light.   Neck: Normal range of motion. Neck supple.   Cardiovascular: Normal rate, regular rhythm, normal heart sounds and intact distal pulses.   Pulmonary/Chest: Effort normal and breath sounds normal. No respiratory distress. She has no wheezes.   Musculoskeletal: Normal range of motion.   Normal gait   Neurological: She is alert and oriented to person, place, and time.   Skin: Skin is warm and dry.   Psychiatric: She has a normal mood and affect. Her behavior is normal. Judgment and thought content normal.   Nursing note and vitals reviewed.      Results for orders placed or performed in visit on 09/17/19   Lipid Panel With LDL / HDL Ratio   Result Value Ref Range    Total Cholesterol 270 (H) 0 - 200 mg/dL    Triglycerides 118 0 - 150 mg/dL    HDL Cholesterol 89 (H) 40 - 60 mg/dL    VLDL Cholesterol 23.6 mg/dL    LDL Cholesterol  157 (H) 0 - 100 mg/dL    LDL/HDL Ratio 1.77    Hemoglobin A1c   Result Value Ref Range    Hemoglobin A1C 5.30 4.80 - 5.60 %   Comprehensive Metabolic Panel   Result Value Ref Range    Glucose 92 65 - 99 mg/dL    BUN 29 (H) 8 - 23 mg/dL    Creatinine 0.91 0.57 - 1.00 mg/dL    eGFR Non African Am 61 >60 mL/min/1.73    eGFR African Am 74 >60 mL/min/1.73    BUN/Creatinine Ratio 31.9 (H) 7.0 - 25.0    Sodium 139 136 - 145 mmol/L    Potassium 4.9 3.5 - 5.2 mmol/L    Chloride 100 98 - 107 mmol/L    Total CO2 22.4 22.0 - 29.0 mmol/L    Calcium 10.2 8.6 - 10.5 mg/dL    Total Protein 7.5 6.0 - 8.5 g/dL    Albumin 5.00 3.50 - 5.20 g/dL    Globulin 2.5 gm/dL    A/G Ratio 2.0 g/dL    Total Bilirubin 0.9 0.2 - 1.2 mg/dL    Alkaline Phosphatase 86 39 - 117  U/L    AST (SGOT) 31 1 - 32 U/L    ALT (SGPT) 37 (H) 1 - 33 U/L   TSH Rfx On Abnormal To Free T4   Result Value Ref Range    TSH 1.110 0.270 - 4.200 uIU/mL   CBC & Differential   Result Value Ref Range    WBC 4.82 3.40 - 10.80 10*3/mm3    RBC 4.21 3.77 - 5.28 10*6/mm3    Hemoglobin 14.0 12.0 - 15.9 g/dL    Hematocrit 42.8 34.0 - 46.6 %    .7 (H) 79.0 - 97.0 fL    MCH 33.3 (H) 26.6 - 33.0 pg    MCHC 32.7 31.5 - 35.7 g/dL    RDW 12.5 12.3 - 15.4 %    Platelets 220 140 - 450 10*3/mm3    Neutrophil Rel % 58.0 42.7 - 76.0 %    Lymphocyte Rel % 26.3 19.6 - 45.3 %    Monocyte Rel % 12.4 (H) 5.0 - 12.0 %    Eosinophil Rel % 1.9 0.3 - 6.2 %    Basophil Rel % 0.4 0.0 - 1.5 %    Neutrophils Absolute 2.79 1.70 - 7.00 10*3/mm3    Lymphocytes Absolute 1.27 0.70 - 3.10 10*3/mm3    Monocytes Absolute 0.60 0.10 - 0.90 10*3/mm3    Eosinophils Absolute 0.09 0.00 - 0.40 10*3/mm3    Basophils Absolute 0.02 0.00 - 0.20 10*3/mm3    Immature Granulocyte Rel % 1.0 (H) 0.0 - 0.5 %    Immature Grans Absolute 0.05 0.00 - 0.05 10*3/mm3    nRBC 0.0 0.0 - 0.2 /100 WBC     Bilateral knee injection  Date/Time: 11/25/2019 11:58 AM  Performed by: Roberta Boswell MD  Authorized by: Roberta Boswell MD   Risks and benefits: risks, benefits and alternatives were discussed  Consent given by: patient  Patient understanding: patient states understanding of the procedure being performed  Patient identity confirmed: verbally with patient  Indications: pain   Body area: knee (bilateral)  Preparation: Patient was prepped and draped in the usual sterile fashion.  Patient tolerance: Patient tolerated the procedure well with no immediate complications                Germaine was seen today for knee pain.    Diagnoses and all orders for this visit:    Primary osteoarthritis of both knees  -     bilateral knee injection    Right hand pain - x-ray neg for fracture  -     XR Hand 3+ View Right; Future    Essential hypertension  -     lisinopril  (PRINIVIL,ZESTRIL) 10 MG tablet; Take 1 tablet by mouth Daily.          Outpatient Medications Prior to Visit   Medication Sig Dispense Refill   • albuterol sulfate  (90 Base) MCG/ACT inhaler Inhale 2 puffs Every 4 (Four) Hours As Needed for Wheezing. 1 inhaler 2   • Biotin 5000 MCG tablet Take  by mouth Daily.     • calcium carbonate (OS-ULI) 600 MG tablet Take 600 mg by mouth 2 (Two) Times a Day With Meals.     • cycloSPORINE (RESTASIS) 0.05 % ophthalmic emulsion Administer 1 drop to both eyes 2 (Two) Times a Day.     • escitalopram (LEXAPRO) 10 MG tablet Take 0.5 tablets by mouth Every Morning. 90 tablet 3   • estradiol (ESTRACE) 0.1 MG/GM vaginal cream      • fluticasone (FLONASE) 50 MCG/ACT nasal spray 2 sprays into the nostril(s) as directed by provider Daily.     • glucosamine-chondroitin 500-400 MG capsule capsule Take 1 capsule by mouth 2 (Two) Times a Day With Meals.     • omeprazole (PRILOSEC) 40 MG capsule Take 1 capsule by mouth Daily. 90 capsule 3   • Probiotic Product (PROBIOTIC DAILY PO) Take 1 tablet by mouth Daily.     • rosuvastatin (CRESTOR) 10 MG tablet Take 1 tablet by mouth Daily. 90 tablet 3   • vitamin B-12 (CYANOCOBALAMIN) 1000 MCG tablet Take 5,000 mcg by mouth Daily.     • lisinopril (PRINIVIL,ZESTRIL) 10 MG tablet Take 1 tablet by mouth Daily. 90 tablet 0   • PREMARIN 0.625 MG/GM vaginal cream        No facility-administered medications prior to visit.      New Medications Ordered This Visit   Medications   • lisinopril (PRINIVIL,ZESTRIL) 10 MG tablet     Sig: Take 1 tablet by mouth Daily.     Dispense:  90 tablet     Refill:  3     [unfilled]  Medications Discontinued During This Encounter   Medication Reason   • PREMARIN 0.625 MG/GM vaginal cream Formulary change   • lisinopril (PRINIVIL,ZESTRIL) 10 MG tablet Reorder         Return in about 6 months (around 5/25/2020) for Recheck, labs.

## 2020-01-23 ENCOUNTER — HOSPITAL ENCOUNTER (OUTPATIENT)
Dept: GENERAL RADIOLOGY | Facility: HOSPITAL | Age: 75
Discharge: HOME | End: 2020-01-23
Admitting: UROLOGY

## 2020-01-23 ENCOUNTER — LAB (OUTPATIENT)
Dept: LAB | Facility: HOSPITAL | Age: 75
End: 2020-01-23

## 2020-01-23 ENCOUNTER — TRANSCRIBE ORDERS (OUTPATIENT)
Dept: ADMINISTRATIVE | Facility: HOSPITAL | Age: 75
End: 2020-01-23

## 2020-01-23 DIAGNOSIS — N30.20 BLADDER INFECTION, CHRONIC: ICD-10-CM

## 2020-01-23 DIAGNOSIS — N13.30 HYDRONEPHROSIS, UNSPECIFIED HYDRONEPHROSIS TYPE: ICD-10-CM

## 2020-01-23 DIAGNOSIS — N20.0 CALCULUS, RENAL: Primary | ICD-10-CM

## 2020-01-23 DIAGNOSIS — N20.0 CALCULUS, RENAL: ICD-10-CM

## 2020-01-23 DIAGNOSIS — Q62.39 CONGENITAL OBSTRUCTION OF URETEROPELVIC JUNCTION: ICD-10-CM

## 2020-01-23 DIAGNOSIS — N10 ACUTE PYELITIS: ICD-10-CM

## 2020-01-23 LAB
ALBUMIN SERPL-MCNC: 4.6 G/DL (ref 3.5–5.2)
ALBUMIN/GLOB SERPL: 1.8 G/DL
ALP SERPL-CCNC: 79 U/L (ref 39–117)
ALT SERPL W P-5'-P-CCNC: 50 U/L (ref 1–33)
ANION GAP SERPL CALCULATED.3IONS-SCNC: 16.8 MMOL/L (ref 5–15)
AST SERPL-CCNC: 39 U/L (ref 1–32)
BILIRUB SERPL-MCNC: 0.9 MG/DL (ref 0.2–1.2)
BUN BLD-MCNC: 20 MG/DL (ref 8–23)
BUN/CREAT SERPL: 21.5 (ref 7–25)
CALCIUM SPEC-SCNC: 9.7 MG/DL (ref 8.6–10.5)
CHLORIDE SERPL-SCNC: 100 MMOL/L (ref 98–107)
CO2 SERPL-SCNC: 21.2 MMOL/L (ref 22–29)
CREAT BLD-MCNC: 0.93 MG/DL (ref 0.57–1)
GFR SERPL CREATININE-BSD FRML MDRD: 59 ML/MIN/1.73
GLOBULIN UR ELPH-MCNC: 2.5 GM/DL
GLUCOSE BLD-MCNC: 105 MG/DL (ref 65–99)
POTASSIUM BLD-SCNC: 4.4 MMOL/L (ref 3.5–5.2)
PROT SERPL-MCNC: 7.1 G/DL (ref 6–8.5)
SODIUM BLD-SCNC: 138 MMOL/L (ref 136–145)

## 2020-01-23 PROCEDURE — 74018 RADEX ABDOMEN 1 VIEW: CPT

## 2020-01-23 PROCEDURE — 80053 COMPREHEN METABOLIC PANEL: CPT

## 2020-01-23 PROCEDURE — 36415 COLL VENOUS BLD VENIPUNCTURE: CPT

## 2020-01-27 ENCOUNTER — TRANSCRIBE ORDERS (OUTPATIENT)
Dept: ADMINISTRATIVE | Facility: HOSPITAL | Age: 75
End: 2020-01-27

## 2020-01-27 DIAGNOSIS — Z12.31 VISIT FOR SCREENING MAMMOGRAM: Primary | ICD-10-CM

## 2020-01-29 ENCOUNTER — TELEPHONE (OUTPATIENT)
Dept: INTERNAL MEDICINE | Facility: CLINIC | Age: 75
End: 2020-01-29

## 2020-01-29 DIAGNOSIS — J30.9 ALLERGIC RHINITIS, UNSPECIFIED SEASONALITY, UNSPECIFIED TRIGGER: Primary | ICD-10-CM

## 2020-01-29 DIAGNOSIS — J30.9 ALLERGIC RHINITIS, UNSPECIFIED SEASONALITY, UNSPECIFIED TRIGGER: ICD-10-CM

## 2020-01-29 RX ORDER — FLUTICASONE PROPIONATE 50 MCG
2 SPRAY, SUSPENSION (ML) NASAL DAILY
Qty: 3 BOTTLE | Refills: 1 | Status: SHIPPED | OUTPATIENT
Start: 2020-01-29 | End: 2022-02-01

## 2020-01-29 RX ORDER — FLUTICASONE PROPIONATE 50 MCG
2 SPRAY, SUSPENSION (ML) NASAL DAILY
Qty: 3 BOTTLE | Refills: 1 | Status: SHIPPED | OUTPATIENT
Start: 2020-01-29 | End: 2020-01-29 | Stop reason: SDUPTHER

## 2020-01-29 NOTE — TELEPHONE ENCOUNTER
Sent in as Rx, but advised patient to check on OTC price vs insurance copay before picking up. Pt voiced understanding.

## 2020-01-29 NOTE — TELEPHONE ENCOUNTER
Pt having allergy symptoms and asks if pcp can send in script to [facility] for fluticasone (FLONASE) 50 MCG/ACT nasal spray

## 2020-02-24 ENCOUNTER — HOSPITAL ENCOUNTER (OUTPATIENT)
Dept: MAMMOGRAPHY | Facility: HOSPITAL | Age: 75
Discharge: HOME | End: 2020-02-24
Admitting: INTERNAL MEDICINE

## 2020-02-24 DIAGNOSIS — Z12.31 VISIT FOR SCREENING MAMMOGRAM: ICD-10-CM

## 2020-02-24 PROCEDURE — 77067 SCR MAMMO BI INCL CAD: CPT

## 2020-02-24 PROCEDURE — 77063 BREAST TOMOSYNTHESIS BI: CPT

## 2020-02-25 ENCOUNTER — TELEPHONE (OUTPATIENT)
Dept: INTERNAL MEDICINE | Facility: CLINIC | Age: 75
End: 2020-02-25

## 2020-02-25 NOTE — TELEPHONE ENCOUNTER
----- Message from Roberta Boswell MD sent at 2/24/2020  3:26 PM EST -----  Mammogram normal.  Repeat 1 year

## 2020-03-16 ENCOUNTER — OFFICE VISIT (OUTPATIENT)
Dept: INTERNAL MEDICINE | Facility: CLINIC | Age: 75
End: 2020-03-16

## 2020-03-16 VITALS
HEART RATE: 78 BPM | WEIGHT: 180 LBS | OXYGEN SATURATION: 100 % | SYSTOLIC BLOOD PRESSURE: 128 MMHG | TEMPERATURE: 97.9 F | RESPIRATION RATE: 18 BRPM | DIASTOLIC BLOOD PRESSURE: 80 MMHG | BODY MASS INDEX: 29.99 KG/M2 | HEIGHT: 65 IN

## 2020-03-16 DIAGNOSIS — K22.70 BARRETT'S ESOPHAGUS WITHOUT DYSPLASIA: ICD-10-CM

## 2020-03-16 DIAGNOSIS — E78.2 MIXED HYPERLIPIDEMIA: Primary | ICD-10-CM

## 2020-03-16 DIAGNOSIS — M35.01 SJOGREN'S SYNDROME WITH KERATOCONJUNCTIVITIS SICCA (HCC): ICD-10-CM

## 2020-03-16 DIAGNOSIS — J30.9 ALLERGIC RHINITIS, UNSPECIFIED SEASONALITY, UNSPECIFIED TRIGGER: Primary | ICD-10-CM

## 2020-03-16 DIAGNOSIS — R73.9 HYPERGLYCEMIA: ICD-10-CM

## 2020-03-16 DIAGNOSIS — E78.00 PURE HYPERCHOLESTEROLEMIA: ICD-10-CM

## 2020-03-16 PROCEDURE — 99213 OFFICE O/P EST LOW 20 MIN: CPT | Performed by: NURSE PRACTITIONER

## 2020-03-16 RX ORDER — EZETIMIBE 10 MG/1
TABLET ORAL
COMMUNITY
End: 2020-03-16

## 2020-03-16 RX ORDER — EPINEPHRINE 0.3 MG/.3ML
INJECTION SUBCUTANEOUS
COMMUNITY
End: 2022-03-15

## 2020-03-16 RX ORDER — LEVOCETIRIZINE DIHYDROCHLORIDE 5 MG/1
5 TABLET, FILM COATED ORAL EVERY EVENING
Status: ON HOLD
Start: 2020-03-16 | End: 2021-09-08

## 2020-03-16 RX ORDER — METHYLPREDNISOLONE 4 MG/1
TABLET ORAL
Qty: 21 EACH | Refills: 0 | Status: SHIPPED | OUTPATIENT
Start: 2020-03-16 | End: 2020-04-06

## 2020-03-16 NOTE — PROGRESS NOTES
"Subjective   Germaine Gonzalez is a 74 y.o. female presenting today for   Chief Complaint   Patient presents with   • Earache     all sx off/on 2-3 x weeks   • Nasal Congestion   • Sore Throat   • Cough   • Generalized Body Aches       URI    This is a new problem. Episode onset: 3 weeks ago. The problem has been unchanged. Associated symptoms include congestion, coughing (NPC), rhinorrhea, sneezing and a sore throat. Pertinent negatives include no diarrhea, ear pain, nausea, vomiting or wheezing. Associated symptoms comments: Nasal congestion x3 week, ear fullness x2 days. Treatments tried: flonase.        The following portions of the patient's history were reviewed and updated as appropriate: allergies, current medications, past family history, past medical history, past social history, past surgical history and problem list.    Review of Systems   Constitutional: Negative for fever.   HENT: Positive for congestion, rhinorrhea, sneezing and sore throat. Negative for ear discharge and ear pain.    Respiratory: Positive for cough (NPC). Negative for choking, shortness of breath and wheezing.    Gastrointestinal: Negative for diarrhea, nausea and vomiting.   Musculoskeletal: Positive for myalgias.   Neurological: Positive for headache.       Objective   Vitals:    03/16/20 1104   BP: 128/80   BP Location: Left arm   Patient Position: Sitting   Cuff Size: Adult   Pulse: 78   Resp: 18   Temp: 97.9 °F (36.6 °C)   TempSrc: Oral   SpO2: 100%   Weight: 81.6 kg (180 lb)   Height: 165.1 cm (65\")     Body mass index is 29.95 kg/m².  Nursing notes and vitals reviewed.    Physical Exam   Constitutional: She is oriented to person, place, and time. She appears well-developed and well-nourished. No distress.   HENT:   Right Ear: External ear and ear canal normal. Tympanic membrane is not erythematous and not bulging. A middle ear effusion is present.   Left Ear: External ear and ear canal normal. Tympanic membrane is not erythematous " and not bulging. A middle ear effusion is present.   Nose: Mucosal edema and rhinorrhea present. Right sinus exhibits no maxillary sinus tenderness and no frontal sinus tenderness. Left sinus exhibits no maxillary sinus tenderness and no frontal sinus tenderness.   Mouth/Throat: Oropharynx is clear and moist and mucous membranes are normal.   Eyes: Conjunctivae are normal. Right eye exhibits no discharge. Left eye exhibits no discharge.   Neck: Neck supple.   Cardiovascular: Regular rhythm, S1 normal and S2 normal.   Pulmonary/Chest: Effort normal and breath sounds normal.   Lymphadenopathy:     She has no cervical adenopathy.   Neurological: She is alert and oriented to person, place, and time.   Psychiatric: She has a normal mood and affect. Her behavior is normal. Thought content normal. She is attentive.         Assessment/Plan   Germaine was seen today for earache, nasal congestion, sore throat, cough and generalized body aches.    Diagnoses and all orders for this visit:    Allergic rhinitis, unspecified seasonality, unspecified trigger  -     methylPREDNISolone (MEDROL, HARSHAL,) 4 MG tablet; Take as directed on package instructions.  -     levocetirizine (XYZAL) 5 MG tablet; Take 1 tablet by mouth Every Evening.      Continue Flonase.    Return if symptoms worsen or fail to improve.

## 2020-03-17 LAB
ALBUMIN SERPL-MCNC: 4.7 G/DL (ref 3.5–5.2)
ALBUMIN/GLOB SERPL: 2 G/DL
ALP SERPL-CCNC: 81 U/L (ref 39–117)
ALT SERPL-CCNC: 46 U/L (ref 1–33)
AST SERPL-CCNC: 37 U/L (ref 1–32)
BASOPHILS # BLD AUTO: 0.03 10*3/MM3 (ref 0–0.2)
BASOPHILS NFR BLD AUTO: 0.7 % (ref 0–1.5)
BILIRUB SERPL-MCNC: 0.7 MG/DL (ref 0.2–1.2)
BUN SERPL-MCNC: 17 MG/DL (ref 8–23)
BUN/CREAT SERPL: 19.5 (ref 7–25)
CALCIUM SERPL-MCNC: 9.5 MG/DL (ref 8.6–10.5)
CHLORIDE SERPL-SCNC: 104 MMOL/L (ref 98–107)
CHOLEST SERPL-MCNC: 235 MG/DL (ref 0–200)
CO2 SERPL-SCNC: 25.8 MMOL/L (ref 22–29)
CREAT SERPL-MCNC: 0.87 MG/DL (ref 0.57–1)
EOSINOPHIL # BLD AUTO: 0.1 10*3/MM3 (ref 0–0.4)
EOSINOPHIL NFR BLD AUTO: 2.4 % (ref 0.3–6.2)
ERYTHROCYTE [DISTWIDTH] IN BLOOD BY AUTOMATED COUNT: 12.3 % (ref 12.3–15.4)
GLOBULIN SER CALC-MCNC: 2.4 GM/DL
GLUCOSE SERPL-MCNC: 111 MG/DL (ref 65–99)
HBA1C MFR BLD: 5.5 % (ref 4.8–5.6)
HCT VFR BLD AUTO: 40.2 % (ref 34–46.6)
HDLC SERPL-MCNC: 73 MG/DL (ref 40–60)
HGB BLD-MCNC: 13.9 G/DL (ref 12–15.9)
IMM GRANULOCYTES # BLD AUTO: 0.03 10*3/MM3 (ref 0–0.05)
IMM GRANULOCYTES NFR BLD AUTO: 0.7 % (ref 0–0.5)
LDLC SERPL CALC-MCNC: 139 MG/DL (ref 0–100)
LDLC/HDLC SERPL: 1.9 {RATIO}
LYMPHOCYTES # BLD AUTO: 1.02 10*3/MM3 (ref 0.7–3.1)
LYMPHOCYTES NFR BLD AUTO: 24 % (ref 19.6–45.3)
MCH RBC QN AUTO: 34.1 PG (ref 26.6–33)
MCHC RBC AUTO-ENTMCNC: 34.6 G/DL (ref 31.5–35.7)
MCV RBC AUTO: 98.5 FL (ref 79–97)
MONOCYTES # BLD AUTO: 0.43 10*3/MM3 (ref 0.1–0.9)
MONOCYTES NFR BLD AUTO: 10.1 % (ref 5–12)
NEUTROPHILS # BLD AUTO: 2.64 10*3/MM3 (ref 1.7–7)
NEUTROPHILS NFR BLD AUTO: 62.1 % (ref 42.7–76)
NRBC BLD AUTO-RTO: 0 /100 WBC (ref 0–0.2)
PLATELET # BLD AUTO: 190 10*3/MM3 (ref 140–450)
POTASSIUM SERPL-SCNC: 4.6 MMOL/L (ref 3.5–5.2)
PROT SERPL-MCNC: 7.1 G/DL (ref 6–8.5)
RBC # BLD AUTO: 4.08 10*6/MM3 (ref 3.77–5.28)
SODIUM SERPL-SCNC: 144 MMOL/L (ref 136–145)
T4 FREE SERPL-MCNC: 1.05 NG/DL (ref 0.93–1.7)
TRIGL SERPL-MCNC: 116 MG/DL (ref 0–150)
TSH SERPL DL<=0.005 MIU/L-ACNC: 2.85 UIU/ML (ref 0.27–4.2)
VLDLC SERPL CALC-MCNC: 23.2 MG/DL
WBC # BLD AUTO: 4.25 10*3/MM3 (ref 3.4–10.8)

## 2020-03-23 ENCOUNTER — OFFICE VISIT (OUTPATIENT)
Dept: INTERNAL MEDICINE | Facility: CLINIC | Age: 75
End: 2020-03-23

## 2020-03-23 VITALS
OXYGEN SATURATION: 99 % | TEMPERATURE: 98.3 F | HEIGHT: 65 IN | WEIGHT: 179.6 LBS | RESPIRATION RATE: 16 BRPM | SYSTOLIC BLOOD PRESSURE: 126 MMHG | BODY MASS INDEX: 29.92 KG/M2 | HEART RATE: 82 BPM | DIASTOLIC BLOOD PRESSURE: 78 MMHG

## 2020-03-23 DIAGNOSIS — E78.2 MIXED HYPERLIPIDEMIA: Primary | ICD-10-CM

## 2020-03-23 DIAGNOSIS — E88.81 INSULIN RESISTANCE: ICD-10-CM

## 2020-03-23 DIAGNOSIS — I10 ESSENTIAL HYPERTENSION: ICD-10-CM

## 2020-03-23 DIAGNOSIS — J01.90 ACUTE SINUSITIS, RECURRENCE NOT SPECIFIED, UNSPECIFIED LOCATION: ICD-10-CM

## 2020-03-23 PROCEDURE — 99214 OFFICE O/P EST MOD 30 MIN: CPT | Performed by: INTERNAL MEDICINE

## 2020-03-23 RX ORDER — AMOXICILLIN 500 MG/1
1000 CAPSULE ORAL 2 TIMES DAILY
Qty: 40 CAPSULE | Refills: 0 | Status: SHIPPED | OUTPATIENT
Start: 2020-03-23 | End: 2020-04-06

## 2020-03-23 NOTE — PROGRESS NOTES
Germaine Gonzalez is a 74 y.o. female, who presents with a chief complaint of   Chief Complaint   Patient presents with   • Follow-up     lab results   • head cold     Not improving       HPI   Pt has had head congestion for a while.  She was here last week and put on xyzal and steroids.  She got a little better but then worse again.  She has a mild cough but she feels like the mucus is going down in her chest.  She still has sinus pressure, PND, and fluid in her ears.  She is wheezing some    HTN - well controlled.  No ha/dizziness.    HLD - on statin.  No cramps or myalgias.    Insulin resistance - fasting glucoses high but a1c has been normal.  She is trying to eat a healthy diet.     The following portions of the patient's history were reviewed and updated as appropriate: allergies, current medications, past family history, past medical history, past social history, past surgical history and problem list.    Allergies: Levaquin [levofloxacin] and Tetracycline    Review of Systems   Constitutional: Negative.  Negative for fever.   HENT: Positive for congestion, postnasal drip and sinus pressure.    Eyes: Negative.    Respiratory: Positive for cough.    Cardiovascular: Negative.    Gastrointestinal: Negative.    Endocrine: Negative.    Genitourinary: Negative.    Musculoskeletal: Negative.    Skin: Negative.    Allergic/Immunologic: Negative.    Neurological: Negative.    Hematological: Negative.    Psychiatric/Behavioral: Negative.    All other systems reviewed and are negative.            Wt Readings from Last 3 Encounters:   03/23/20 81.5 kg (179 lb 9.6 oz)   03/16/20 81.6 kg (180 lb)   11/25/19 79.6 kg (175 lb 6.4 oz)     Temp Readings from Last 3 Encounters:   03/23/20 98.3 °F (36.8 °C) (Oral)   03/16/20 97.9 °F (36.6 °C) (Oral)   05/28/19 98.5 °F (36.9 °C)     BP Readings from Last 3 Encounters:   03/23/20 126/78   03/16/20 128/80   11/25/19 132/92     Pulse Readings from Last 3 Encounters:   03/23/20 82    03/16/20 78   11/25/19 85     Body mass index is 29.89 kg/m².  @LASTSAO2(3)@    Physical Exam   Constitutional: She is oriented to person, place, and time. She appears well-developed and well-nourished. No distress.   HENT:   Head: Normocephalic and atraumatic.   Right Ear: External ear normal.   Left Ear: External ear normal.   Nose: Nose normal.   Mouth/Throat: Oropharynx is clear and moist.   Eyes: Pupils are equal, round, and reactive to light. Conjunctivae and EOM are normal.   Neck: Normal range of motion. Neck supple.   Cardiovascular: Normal rate, regular rhythm, normal heart sounds and intact distal pulses.   Pulmonary/Chest: Effort normal and breath sounds normal. No respiratory distress. She has no wheezes.   Musculoskeletal: Normal range of motion.   Normal gait   Neurological: She is alert and oriented to person, place, and time.   Skin: Skin is warm and dry.   Psychiatric: She has a normal mood and affect. Her behavior is normal. Judgment and thought content normal.   Nursing note and vitals reviewed.      Results for orders placed or performed in visit on 03/16/20   T4, Free   Result Value Ref Range    Free T4 1.05 0.93 - 1.70 ng/dL   Lipid Panel With LDL / HDL Ratio   Result Value Ref Range    Total Cholesterol 235 (H) 0 - 200 mg/dL    Triglycerides 116 0 - 150 mg/dL    HDL Cholesterol 73 (H) 40 - 60 mg/dL    VLDL Cholesterol 23.2 mg/dL    LDL Cholesterol  139 (H) 0 - 100 mg/dL    LDL/HDL Ratio 1.90    TSH   Result Value Ref Range    TSH 2.850 0.270 - 4.200 uIU/mL   Hemoglobin A1c   Result Value Ref Range    Hemoglobin A1C 5.50 4.80 - 5.60 %   Comprehensive Metabolic Panel   Result Value Ref Range    Glucose 111 (H) 65 - 99 mg/dL    BUN 17 8 - 23 mg/dL    Creatinine 0.87 0.57 - 1.00 mg/dL    eGFR Non African Am 64 >60 mL/min/1.73    eGFR African Am 78 >60 mL/min/1.73    BUN/Creatinine Ratio 19.5 7.0 - 25.0    Sodium 144 136 - 145 mmol/L    Potassium 4.6 3.5 - 5.2 mmol/L    Chloride 104 98 - 107  mmol/L    Total CO2 25.8 22.0 - 29.0 mmol/L    Calcium 9.5 8.6 - 10.5 mg/dL    Total Protein 7.1 6.0 - 8.5 g/dL    Albumin 4.70 3.50 - 5.20 g/dL    Globulin 2.4 gm/dL    A/G Ratio 2.0 g/dL    Total Bilirubin 0.7 0.2 - 1.2 mg/dL    Alkaline Phosphatase 81 39 - 117 U/L    AST (SGOT) 37 (H) 1 - 32 U/L    ALT (SGPT) 46 (H) 1 - 33 U/L   CBC & Differential   Result Value Ref Range    WBC 4.25 3.40 - 10.80 10*3/mm3    RBC 4.08 3.77 - 5.28 10*6/mm3    Hemoglobin 13.9 12.0 - 15.9 g/dL    Hematocrit 40.2 34.0 - 46.6 %    MCV 98.5 (H) 79.0 - 97.0 fL    MCH 34.1 (H) 26.6 - 33.0 pg    MCHC 34.6 31.5 - 35.7 g/dL    RDW 12.3 12.3 - 15.4 %    Platelets 190 140 - 450 10*3/mm3    Neutrophil Rel % 62.1 42.7 - 76.0 %    Lymphocyte Rel % 24.0 19.6 - 45.3 %    Monocyte Rel % 10.1 5.0 - 12.0 %    Eosinophil Rel % 2.4 0.3 - 6.2 %    Basophil Rel % 0.7 0.0 - 1.5 %    Neutrophils Absolute 2.64 1.70 - 7.00 10*3/mm3    Lymphocytes Absolute 1.02 0.70 - 3.10 10*3/mm3    Monocytes Absolute 0.43 0.10 - 0.90 10*3/mm3    Eosinophils Absolute 0.10 0.00 - 0.40 10*3/mm3    Basophils Absolute 0.03 0.00 - 0.20 10*3/mm3    Immature Granulocyte Rel % 0.7 (H) 0.0 - 0.5 %    Immature Grans Absolute 0.03 0.00 - 0.05 10*3/mm3    nRBC 0.0 0.0 - 0.2 /100 WBC           Germaine was seen today for follow-up and head cold.    Diagnoses and all orders for this visit:    Mixed hyperlipidemia    Insulin resistance    Essential hypertension    Acute sinusitis, recurrence not specified, unspecified location  -     amoxicillin (AMOXIL) 500 MG capsule; Take 2 capsules by mouth 2 (Two) Times a Day.      Cont current meds.  Ov/labs in 6 mo.    Outpatient Medications Prior to Visit   Medication Sig Dispense Refill   • Biotin 5000 MCG tablet Take  by mouth Daily.     • calcium carbonate (OS-ULI) 600 MG tablet Take 600 mg by mouth 2 (Two) Times a Day With Meals.     • conjugated estrogens (PREMARIN) 0.625 MG/GM vaginal cream Premarin 0.625 mg/gram vaginal cream     • cycloSPORINE  (RESTASIS) 0.05 % ophthalmic emulsion Administer 1 drop to both eyes 2 (Two) Times a Day.     • EPINEPHrine (EPIPEN) 0.3 MG/0.3ML solution auto-injector injection epinephrine 0.3 mg/0.3 mL injection, auto-injector     • escitalopram (LEXAPRO) 10 MG tablet Take 0.5 tablets by mouth Every Morning. 90 tablet 3   • estradiol (ESTRACE) 0.1 MG/GM vaginal cream      • fluticasone (FLONASE) 50 MCG/ACT nasal spray 2 sprays into the nostril(s) as directed by provider Daily. 3 bottle 1   • glucosamine-chondroitin 500-400 MG capsule capsule Take 1 capsule by mouth 2 (Two) Times a Day With Meals.     • levocetirizine (XYZAL) 5 MG tablet Take 1 tablet by mouth Every Evening.     • lisinopril (PRINIVIL,ZESTRIL) 10 MG tablet Take 1 tablet by mouth Daily. 90 tablet 3   • methylPREDNISolone (MEDROL, HARSHAL,) 4 MG tablet Take as directed on package instructions. 21 each 0   • omeprazole (PRILOSEC) 40 MG capsule Take 1 capsule by mouth Daily. 90 capsule 3   • rosuvastatin (CRESTOR) 10 MG tablet Take 1 tablet by mouth Daily. 90 tablet 3   • vitamin B-12 (CYANOCOBALAMIN) 1000 MCG tablet Take 5,000 mcg by mouth Daily.       No facility-administered medications prior to visit.      New Medications Ordered This Visit   Medications   • amoxicillin (AMOXIL) 500 MG capsule     Sig: Take 2 capsules by mouth 2 (Two) Times a Day.     Dispense:  40 capsule     Refill:  0     [unfilled]  There are no discontinued medications.      Return in about 6 months (around 9/23/2020) for Recheck, labs.

## 2020-03-30 ENCOUNTER — TELEPHONE (OUTPATIENT)
Dept: INTERNAL MEDICINE | Facility: CLINIC | Age: 75
End: 2020-03-30

## 2020-03-30 RX ORDER — AZITHROMYCIN 250 MG/1
TABLET, FILM COATED ORAL
Qty: 6 TABLET | Refills: 0 | Status: SHIPPED | OUTPATIENT
Start: 2020-03-30 | End: 2020-04-06

## 2020-04-06 ENCOUNTER — TELEPHONE (OUTPATIENT)
Dept: INTERNAL MEDICINE | Facility: CLINIC | Age: 75
End: 2020-04-06

## 2020-04-06 ENCOUNTER — OFFICE VISIT (OUTPATIENT)
Dept: INTERNAL MEDICINE | Facility: CLINIC | Age: 75
End: 2020-04-06

## 2020-04-06 ENCOUNTER — HOSPITAL ENCOUNTER (OUTPATIENT)
Dept: GENERAL RADIOLOGY | Facility: HOSPITAL | Age: 75
Discharge: HOME | End: 2020-04-06
Admitting: INTERNAL MEDICINE

## 2020-04-06 DIAGNOSIS — R05.3 CHRONIC COUGH: Primary | ICD-10-CM

## 2020-04-06 DIAGNOSIS — R09.82 POST-NASAL DRIP: ICD-10-CM

## 2020-04-06 DIAGNOSIS — I10 ESSENTIAL HYPERTENSION: ICD-10-CM

## 2020-04-06 DIAGNOSIS — R05.3 CHRONIC COUGH: ICD-10-CM

## 2020-04-06 PROCEDURE — 99214 OFFICE O/P EST MOD 30 MIN: CPT | Performed by: INTERNAL MEDICINE

## 2020-04-06 PROCEDURE — 71046 X-RAY EXAM CHEST 2 VIEWS: CPT

## 2020-04-06 RX ORDER — BENZONATATE 200 MG/1
200 CAPSULE ORAL 3 TIMES DAILY PRN
Qty: 20 CAPSULE | Refills: 0 | Status: SHIPPED | OUTPATIENT
Start: 2020-04-06 | End: 2020-04-13

## 2020-04-06 RX ORDER — PSEUDOEPHEDRINE HCL 30 MG
TABLET ORAL
Qty: 48 TABLET | Refills: 0 | Status: SHIPPED | OUTPATIENT
Start: 2020-04-06 | End: 2020-04-13 | Stop reason: SDUPTHER

## 2020-04-06 NOTE — TELEPHONE ENCOUNTER
"Pt  called stating pt is \"still\" not feeling well, z pack, amoxil last month.  states pt vomited for the first time this AM. Denies fever. Cough is still present and productive.  advised pt denies any nasal/chest congestion.  asking what Dr. Boswell recommends. Pt willing to do video acute visit if needed. Please advise and route to the pool. Thank you   "

## 2020-04-06 NOTE — TELEPHONE ENCOUNTER
Needs CXR and OV.  rec getting cxr first for review then we can see in office.  Orders for cxr put in.

## 2020-04-06 NOTE — PROGRESS NOTES
Germaine Gonzalez is a 74 y.o. female, who presents with a chief complaint of   Chief Complaint   Patient presents with   • Cough       HPI   Visit completed via telephone because pt did not have video available at the time.     Pt has been sick for about a month.  She has been on Zithromax and amoxil and the past month.  She has also been on steroids recently.  No fever.  Her ear is stopped up.  She has been taking cough drops PRN.  It feels like she is still wheezing.  She did vomit this am.  She feels like there is stuff stuck in her throat.  She is taking xyzal and flonase daily.  bp has been well controlled at home.  She has been using an old ventolin inhaler she had at home but it hasn't helped much.      The following portions of the patient's history were reviewed and updated as appropriate: allergies, current medications, past family history, past medical history, past social history, past surgical history and problem list.    Allergies: Levaquin [levofloxacin] and Tetracycline    Review of Systems   Constitutional: Negative.  Negative for fever.   HENT: Positive for congestion.    Eyes: Negative.    Respiratory: Positive for cough and wheezing. Negative for shortness of breath.    Cardiovascular: Negative.    Gastrointestinal: Negative.    Endocrine: Negative.    Genitourinary: Negative.    Musculoskeletal: Negative.    Skin: Negative.    Allergic/Immunologic: Negative.    Neurological: Negative.    Hematological: Negative.    Psychiatric/Behavioral: Negative.    All other systems reviewed and are negative.            Wt Readings from Last 3 Encounters:   03/23/20 81.5 kg (179 lb 9.6 oz)   03/16/20 81.6 kg (180 lb)   11/25/19 79.6 kg (175 lb 6.4 oz)     Temp Readings from Last 3 Encounters:   03/23/20 98.3 °F (36.8 °C) (Oral)   03/16/20 97.9 °F (36.6 °C) (Oral)   05/28/19 98.5 °F (36.9 °C)     BP Readings from Last 3 Encounters:   03/23/20 126/78   03/16/20 128/80   11/25/19 132/92     Pulse Readings from  Last 3 Encounters:   03/23/20 82   03/16/20 78   11/25/19 85     There is no height or weight on file to calculate BMI.  @LASTSAO2(3)@    Physical Exam   Constitutional: She is oriented to person, place, and time. No distress.   Pulmonary/Chest: No respiratory distress.   Neurological: She is alert and oriented to person, place, and time.   Psychiatric: She has a normal mood and affect. Her behavior is normal. Judgment and thought content normal.         Results for orders placed or performed in visit on 03/16/20   T4, Free   Result Value Ref Range    Free T4 1.05 0.93 - 1.70 ng/dL   Lipid Panel With LDL / HDL Ratio   Result Value Ref Range    Total Cholesterol 235 (H) 0 - 200 mg/dL    Triglycerides 116 0 - 150 mg/dL    HDL Cholesterol 73 (H) 40 - 60 mg/dL    VLDL Cholesterol 23.2 mg/dL    LDL Cholesterol  139 (H) 0 - 100 mg/dL    LDL/HDL Ratio 1.90    TSH   Result Value Ref Range    TSH 2.850 0.270 - 4.200 uIU/mL   Hemoglobin A1c   Result Value Ref Range    Hemoglobin A1C 5.50 4.80 - 5.60 %   Comprehensive Metabolic Panel   Result Value Ref Range    Glucose 111 (H) 65 - 99 mg/dL    BUN 17 8 - 23 mg/dL    Creatinine 0.87 0.57 - 1.00 mg/dL    eGFR Non African Am 64 >60 mL/min/1.73    eGFR African Am 78 >60 mL/min/1.73    BUN/Creatinine Ratio 19.5 7.0 - 25.0    Sodium 144 136 - 145 mmol/L    Potassium 4.6 3.5 - 5.2 mmol/L    Chloride 104 98 - 107 mmol/L    Total CO2 25.8 22.0 - 29.0 mmol/L    Calcium 9.5 8.6 - 10.5 mg/dL    Total Protein 7.1 6.0 - 8.5 g/dL    Albumin 4.70 3.50 - 5.20 g/dL    Globulin 2.4 gm/dL    A/G Ratio 2.0 g/dL    Total Bilirubin 0.7 0.2 - 1.2 mg/dL    Alkaline Phosphatase 81 39 - 117 U/L    AST (SGOT) 37 (H) 1 - 32 U/L    ALT (SGPT) 46 (H) 1 - 33 U/L   CBC & Differential   Result Value Ref Range    WBC 4.25 3.40 - 10.80 10*3/mm3    RBC 4.08 3.77 - 5.28 10*6/mm3    Hemoglobin 13.9 12.0 - 15.9 g/dL    Hematocrit 40.2 34.0 - 46.6 %    MCV 98.5 (H) 79.0 - 97.0 fL    MCH 34.1 (H) 26.6 - 33.0 pg     MCHC 34.6 31.5 - 35.7 g/dL    RDW 12.3 12.3 - 15.4 %    Platelets 190 140 - 450 10*3/mm3    Neutrophil Rel % 62.1 42.7 - 76.0 %    Lymphocyte Rel % 24.0 19.6 - 45.3 %    Monocyte Rel % 10.1 5.0 - 12.0 %    Eosinophil Rel % 2.4 0.3 - 6.2 %    Basophil Rel % 0.7 0.0 - 1.5 %    Neutrophils Absolute 2.64 1.70 - 7.00 10*3/mm3    Lymphocytes Absolute 1.02 0.70 - 3.10 10*3/mm3    Monocytes Absolute 0.43 0.10 - 0.90 10*3/mm3    Eosinophils Absolute 0.10 0.00 - 0.40 10*3/mm3    Basophils Absolute 0.03 0.00 - 0.20 10*3/mm3    Immature Granulocyte Rel % 0.7 (H) 0.0 - 0.5 %    Immature Grans Absolute 0.03 0.00 - 0.05 10*3/mm3    nRBC 0.0 0.0 - 0.2 /100 WBC           Germaine was seen today for cough.    Diagnoses and all orders for this visit:    Chronic cough    Essential hypertension  -     benzonatate (TESSALON) 200 MG capsule; Take 1 capsule by mouth 3 (Three) Times a Day As Needed for Cough.    Post-nasal drip  -     pseudoephedrine (Sudafed) 30 MG tablet; 1-2 tabs po q 6 hours    x-ray results reviewed with patient.  cxr normal.  No further abx at this time.  Try tessalon and sudafed.  Monitor bp at home.    25 min spent in patient care with >50% spent in counseling about above issues.       Outpatient Medications Prior to Visit   Medication Sig Dispense Refill   • Biotin 5000 MCG tablet Take  by mouth Daily.     • calcium carbonate (OS-ULI) 600 MG tablet Take 600 mg by mouth 2 (Two) Times a Day With Meals.     • conjugated estrogens (PREMARIN) 0.625 MG/GM vaginal cream Premarin 0.625 mg/gram vaginal cream     • cycloSPORINE (RESTASIS) 0.05 % ophthalmic emulsion Administer 1 drop to both eyes 2 (Two) Times a Day.     • EPINEPHrine (EPIPEN) 0.3 MG/0.3ML solution auto-injector injection epinephrine 0.3 mg/0.3 mL injection, auto-injector     • escitalopram (LEXAPRO) 10 MG tablet Take 0.5 tablets by mouth Every Morning. 90 tablet 3   • estradiol (ESTRACE) 0.1 MG/GM vaginal cream      • fluticasone (FLONASE) 50 MCG/ACT nasal  spray 2 sprays into the nostril(s) as directed by provider Daily. 3 bottle 1   • glucosamine-chondroitin 500-400 MG capsule capsule Take 1 capsule by mouth 2 (Two) Times a Day With Meals.     • levocetirizine (XYZAL) 5 MG tablet Take 1 tablet by mouth Every Evening.     • lisinopril (PRINIVIL,ZESTRIL) 10 MG tablet Take 1 tablet by mouth Daily. 90 tablet 3   • omeprazole (PRILOSEC) 40 MG capsule Take 1 capsule by mouth Daily. 90 capsule 3   • rosuvastatin (CRESTOR) 10 MG tablet Take 1 tablet by mouth Daily. 90 tablet 3   • vitamin B-12 (CYANOCOBALAMIN) 1000 MCG tablet Take 5,000 mcg by mouth Daily.     • amoxicillin (AMOXIL) 500 MG capsule Take 2 capsules by mouth 2 (Two) Times a Day. 40 capsule 0   • azithromycin (Zithromax Z-Harshal) 250 MG tablet Take 2 tablets the first day, then 1 tablet daily for 4 days. 6 tablet 0   • methylPREDNISolone (MEDROL, HARSHAL,) 4 MG tablet Take as directed on package instructions. 21 each 0     No facility-administered medications prior to visit.      New Medications Ordered This Visit   Medications   • benzonatate (TESSALON) 200 MG capsule     Sig: Take 1 capsule by mouth 3 (Three) Times a Day As Needed for Cough.     Dispense:  20 capsule     Refill:  0   • pseudoephedrine (Sudafed) 30 MG tablet     Si-2 tabs po q 6 hours     Dispense:  48 tablet     Refill:  0     [unfilled]  Medications Discontinued During This Encounter   Medication Reason   • methylPREDNISolone (MEDROL, HARSHAL,) 4 MG tablet    • amoxicillin (AMOXIL) 500 MG capsule    • azithromycin (Zithromax Z-Harshal) 250 MG tablet          Return for Recheck  3-4 days if not improving or sooner if worsening.

## 2020-04-06 NOTE — TELEPHONE ENCOUNTER
Per dr peñaloza, patient needs to have chest xray, they will head over to hospital to have this done and per dr peñalzoa they are ok with going to the car and calling for results with Dr. Peñaloza, patient would like to do that and will call the office once xray is completed.

## 2020-04-10 ENCOUNTER — TELEPHONE (OUTPATIENT)
Dept: INTERNAL MEDICINE | Facility: CLINIC | Age: 75
End: 2020-04-10

## 2020-04-10 ENCOUNTER — OFFICE VISIT (OUTPATIENT)
Dept: INTERNAL MEDICINE | Facility: CLINIC | Age: 75
End: 2020-04-10

## 2020-04-10 VITALS
HEART RATE: 96 BPM | WEIGHT: 178 LBS | OXYGEN SATURATION: 98 % | DIASTOLIC BLOOD PRESSURE: 80 MMHG | HEIGHT: 65 IN | SYSTOLIC BLOOD PRESSURE: 116 MMHG | TEMPERATURE: 96.8 F | BODY MASS INDEX: 29.66 KG/M2 | RESPIRATION RATE: 16 BRPM

## 2020-04-10 DIAGNOSIS — R06.2 WHEEZING: Primary | ICD-10-CM

## 2020-04-10 DIAGNOSIS — H65.21 RIGHT CHRONIC SEROUS OTITIS MEDIA: ICD-10-CM

## 2020-04-10 PROCEDURE — 99213 OFFICE O/P EST LOW 20 MIN: CPT | Performed by: INTERNAL MEDICINE

## 2020-04-10 RX ORDER — CLARITHROMYCIN 500 MG/1
500 TABLET, COATED ORAL 2 TIMES DAILY
Qty: 14 TABLET | Refills: 0 | Status: SHIPPED | OUTPATIENT
Start: 2020-04-10 | End: 2020-04-17

## 2020-04-10 RX ORDER — PREDNISONE 20 MG/1
60 TABLET ORAL DAILY
Qty: 15 TABLET | Refills: 0 | Status: SHIPPED | OUTPATIENT
Start: 2020-04-10 | End: 2020-04-15

## 2020-04-10 NOTE — PROGRESS NOTES
Germaine Gonzalez is a 75 y.o. female, who presents with a chief complaint of   Chief Complaint   Patient presents with   • Ear Fullness     right   • Cough     congested-finished 2 antibiotic courses w/steroid last month       HPI   Pt has had chronic upper respiratory issues lately.  She was on 2 courses of abx and steroids over the past month.  Her cough and congestion is better.   Pt still wheezing. The albuterol has helped this some.       The following portions of the patient's history were reviewed and updated as appropriate: allergies, current medications, past family history, past medical history, past social history, past surgical history and problem list.    Allergies: Levaquin [levofloxacin] and Tetracycline    Review of Systems   Constitutional: Negative.  Negative for fever.   HENT: Positive for congestion and ear pain.    Eyes: Negative.    Respiratory: Positive for wheezing.    Cardiovascular: Negative.    Gastrointestinal: Negative.    Endocrine: Negative.    Genitourinary: Negative.    Musculoskeletal: Negative.    Skin: Negative.    Allergic/Immunologic: Negative.    Neurological: Negative.    Hematological: Negative.    Psychiatric/Behavioral: Negative.    All other systems reviewed and are negative.            Wt Readings from Last 3 Encounters:   04/10/20 80.7 kg (178 lb)   03/23/20 81.5 kg (179 lb 9.6 oz)   03/16/20 81.6 kg (180 lb)     Temp Readings from Last 3 Encounters:   04/10/20 96.8 °F (36 °C) (Temporal)   03/23/20 98.3 °F (36.8 °C) (Oral)   03/16/20 97.9 °F (36.6 °C) (Oral)     BP Readings from Last 3 Encounters:   04/10/20 116/80   03/23/20 126/78   03/16/20 128/80     Pulse Readings from Last 3 Encounters:   04/10/20 96   03/23/20 82   03/16/20 78     Body mass index is 29.62 kg/m².  @LASTSAO2(3)@    Physical Exam   Constitutional: She is oriented to person, place, and time. She appears well-developed and well-nourished.   HENT:   Head: Normocephalic and atraumatic.   Right Ear:  External ear normal.   Left Ear: External ear normal.   Bilateral serous effusion without erythema   Eyes: Conjunctivae and lids are normal.   Neck: Normal range of motion. Neck supple.   Cardiovascular: Normal rate and regular rhythm.   Pulmonary/Chest: Effort normal. No respiratory distress. She has wheezes.   Musculoskeletal: Normal range of motion. She exhibits no edema.   Lymphadenopathy:     She has no cervical adenopathy.   Neurological: She is alert and oriented to person, place, and time. No cranial nerve deficit.   Skin: Skin is warm and dry. No rash noted.   Psychiatric: She has a normal mood and affect. Her behavior is normal. Thought content normal.   Nursing note and vitals reviewed.      Results for orders placed or performed in visit on 03/16/20   T4, Free   Result Value Ref Range    Free T4 1.05 0.93 - 1.70 ng/dL   Lipid Panel With LDL / HDL Ratio   Result Value Ref Range    Total Cholesterol 235 (H) 0 - 200 mg/dL    Triglycerides 116 0 - 150 mg/dL    HDL Cholesterol 73 (H) 40 - 60 mg/dL    VLDL Cholesterol 23.2 mg/dL    LDL Cholesterol  139 (H) 0 - 100 mg/dL    LDL/HDL Ratio 1.90    TSH   Result Value Ref Range    TSH 2.850 0.270 - 4.200 uIU/mL   Hemoglobin A1c   Result Value Ref Range    Hemoglobin A1C 5.50 4.80 - 5.60 %   Comprehensive Metabolic Panel   Result Value Ref Range    Glucose 111 (H) 65 - 99 mg/dL    BUN 17 8 - 23 mg/dL    Creatinine 0.87 0.57 - 1.00 mg/dL    eGFR Non African Am 64 >60 mL/min/1.73    eGFR African Am 78 >60 mL/min/1.73    BUN/Creatinine Ratio 19.5 7.0 - 25.0    Sodium 144 136 - 145 mmol/L    Potassium 4.6 3.5 - 5.2 mmol/L    Chloride 104 98 - 107 mmol/L    Total CO2 25.8 22.0 - 29.0 mmol/L    Calcium 9.5 8.6 - 10.5 mg/dL    Total Protein 7.1 6.0 - 8.5 g/dL    Albumin 4.70 3.50 - 5.20 g/dL    Globulin 2.4 gm/dL    A/G Ratio 2.0 g/dL    Total Bilirubin 0.7 0.2 - 1.2 mg/dL    Alkaline Phosphatase 81 39 - 117 U/L    AST (SGOT) 37 (H) 1 - 32 U/L    ALT (SGPT) 46 (H) 1 - 33  U/L   CBC & Differential   Result Value Ref Range    WBC 4.25 3.40 - 10.80 10*3/mm3    RBC 4.08 3.77 - 5.28 10*6/mm3    Hemoglobin 13.9 12.0 - 15.9 g/dL    Hematocrit 40.2 34.0 - 46.6 %    MCV 98.5 (H) 79.0 - 97.0 fL    MCH 34.1 (H) 26.6 - 33.0 pg    MCHC 34.6 31.5 - 35.7 g/dL    RDW 12.3 12.3 - 15.4 %    Platelets 190 140 - 450 10*3/mm3    Neutrophil Rel % 62.1 42.7 - 76.0 %    Lymphocyte Rel % 24.0 19.6 - 45.3 %    Monocyte Rel % 10.1 5.0 - 12.0 %    Eosinophil Rel % 2.4 0.3 - 6.2 %    Basophil Rel % 0.7 0.0 - 1.5 %    Neutrophils Absolute 2.64 1.70 - 7.00 10*3/mm3    Lymphocytes Absolute 1.02 0.70 - 3.10 10*3/mm3    Monocytes Absolute 0.43 0.10 - 0.90 10*3/mm3    Eosinophils Absolute 0.10 0.00 - 0.40 10*3/mm3    Basophils Absolute 0.03 0.00 - 0.20 10*3/mm3    Immature Granulocyte Rel % 0.7 (H) 0.0 - 0.5 %    Immature Grans Absolute 0.03 0.00 - 0.05 10*3/mm3    nRBC 0.0 0.0 - 0.2 /100 WBC           Germaine was seen today for ear fullness and cough.    Diagnoses and all orders for this visit:    Wheezing  -     predniSONE (DELTASONE) 20 MG tablet; Take 3 tablets by mouth Daily for 5 days.  -     clarithromycin (Biaxin) 500 MG tablet; Take 1 tablet by mouth 2 (Two) Times a Day for 7 days.    Right chronic serous otitis media  -     predniSONE (DELTASONE) 20 MG tablet; Take 3 tablets by mouth Daily for 5 days.  -     clarithromycin (Biaxin) 500 MG tablet; Take 1 tablet by mouth 2 (Two) Times a Day for 7 days.          Outpatient Medications Prior to Visit   Medication Sig Dispense Refill   • benzonatate (TESSALON) 200 MG capsule Take 1 capsule by mouth 3 (Three) Times a Day As Needed for Cough. 20 capsule 0   • Biotin 5000 MCG tablet Take  by mouth Daily.     • calcium carbonate (OS-ULI) 600 MG tablet Take 600 mg by mouth 2 (Two) Times a Day With Meals.     • conjugated estrogens (PREMARIN) 0.625 MG/GM vaginal cream Premarin 0.625 mg/gram vaginal cream     • cycloSPORINE (RESTASIS) 0.05 % ophthalmic emulsion Administer  1 drop to both eyes 2 (Two) Times a Day.     • EPINEPHrine (EPIPEN) 0.3 MG/0.3ML solution auto-injector injection epinephrine 0.3 mg/0.3 mL injection, auto-injector     • escitalopram (LEXAPRO) 10 MG tablet Take 0.5 tablets by mouth Every Morning. 90 tablet 3   • estradiol (ESTRACE) 0.1 MG/GM vaginal cream      • fluticasone (FLONASE) 50 MCG/ACT nasal spray 2 sprays into the nostril(s) as directed by provider Daily. 3 bottle 1   • glucosamine-chondroitin 500-400 MG capsule capsule Take 1 capsule by mouth 2 (Two) Times a Day With Meals.     • levocetirizine (XYZAL) 5 MG tablet Take 1 tablet by mouth Every Evening.     • lisinopril (PRINIVIL,ZESTRIL) 10 MG tablet Take 1 tablet by mouth Daily. 90 tablet 3   • omeprazole (PRILOSEC) 40 MG capsule Take 1 capsule by mouth Daily. 90 capsule 3   • pseudoephedrine (Sudafed) 30 MG tablet 1-2 tabs po q 6 hours 48 tablet 0   • rosuvastatin (CRESTOR) 10 MG tablet Take 1 tablet by mouth Daily. 90 tablet 3   • vitamin B-12 (CYANOCOBALAMIN) 1000 MCG tablet Take 5,000 mcg by mouth Daily.       No facility-administered medications prior to visit.      New Medications Ordered This Visit   Medications   • predniSONE (DELTASONE) 20 MG tablet     Sig: Take 3 tablets by mouth Daily for 5 days.     Dispense:  15 tablet     Refill:  0   • clarithromycin (Biaxin) 500 MG tablet     Sig: Take 1 tablet by mouth 2 (Two) Times a Day for 7 days.     Dispense:  14 tablet     Refill:  0     [unfilled]  There are no discontinued medications.      Return if symptoms worsen or fail to improve.

## 2020-04-10 NOTE — TELEPHONE ENCOUNTER
PT CALLS TO UPDATE PCP THAT HER EAR IS STILL REALLY STOPPED UP.  SHE IS UNSURE IF THERE WAS ANYTHING ELSE TO BE DONE.

## 2020-04-13 DIAGNOSIS — I10 ESSENTIAL HYPERTENSION: ICD-10-CM

## 2020-04-13 DIAGNOSIS — R09.82 POST-NASAL DRIP: ICD-10-CM

## 2020-04-13 RX ORDER — BENZONATATE 200 MG/1
CAPSULE ORAL
Qty: 20 CAPSULE | Refills: 0 | Status: SHIPPED | OUTPATIENT
Start: 2020-04-13 | End: 2020-06-30

## 2020-04-13 RX ORDER — PSEUDOEPHEDRINE HCL 30 MG
TABLET ORAL
Qty: 48 TABLET | Refills: 1 | Status: SHIPPED | OUTPATIENT
Start: 2020-04-13 | End: 2020-06-30

## 2020-04-17 ENCOUNTER — TELEPHONE (OUTPATIENT)
Dept: INTERNAL MEDICINE | Facility: CLINIC | Age: 75
End: 2020-04-17

## 2020-04-17 NOTE — TELEPHONE ENCOUNTER
Patient states her ears are still stopped up, also still has a cough.  Patient is asking if there is anything else she can try.    [facility] [address] confirmed    Patient call back [phone]

## 2020-04-17 NOTE — TELEPHONE ENCOUNTER
Patient was seen on 4/10/20 by you,   Please advise what you would like patient to do about still not feeling well.

## 2020-04-20 DIAGNOSIS — J32.9 CHRONIC SINUSITIS, UNSPECIFIED LOCATION: Primary | ICD-10-CM

## 2020-04-20 DIAGNOSIS — R05.3 CHRONIC COUGH: ICD-10-CM

## 2020-04-20 DIAGNOSIS — R06.2 WHEEZING: ICD-10-CM

## 2020-04-23 ENCOUNTER — TELEMEDICINE (OUTPATIENT)
Dept: GASTROENTEROLOGY | Facility: CLINIC | Age: 75
End: 2020-04-23

## 2020-04-23 DIAGNOSIS — K22.70 BARRETT'S ESOPHAGUS WITHOUT DYSPLASIA: ICD-10-CM

## 2020-04-23 DIAGNOSIS — J40 BRONCHITIS: Primary | ICD-10-CM

## 2020-04-23 DIAGNOSIS — K75.81 NASH (NONALCOHOLIC STEATOHEPATITIS): ICD-10-CM

## 2020-04-23 DIAGNOSIS — Z86.010 PERSONAL HISTORY OF COLONIC POLYPS: ICD-10-CM

## 2020-04-23 PROCEDURE — 99213 OFFICE O/P EST LOW 20 MIN: CPT | Performed by: INTERNAL MEDICINE

## 2020-04-23 NOTE — PROGRESS NOTES
Germaine Gonzalez is a 75 y.o. female who presents with No chief complaint on file.  Pt     Subjective     Has had respiratory illnes for the last 5 weeks but took steroids and abx for 2 rounds. Is on her Probiotic.    Followed for Mcgill's and Polyps as well as WALSH with normal LFTs recently    Her labs were reviewed and due to her illness has lost weight since her march labs and probably was 5-10 pounds from normal LFTs    No increase in her GErd symtoms despite the cough and steroids.    Colon due next year.        Past Medical History:   Diagnosis Date   • Arthritis     knees   • Mcgill esophagus    • Cancer (CMS/HCC)     cervical   • Cervical cancer (CMS/HCC)    • Colon polyp    • Eczema    • Elevated liver enzymes    • GERD (gastroesophageal reflux disease)    • H/O stress fracture    • Health care maintenance    • Hypercholesteremia    • Kidney stones     sched ureteroscopy, lithotripsy   • Ocular migraine    • Postmenopausal    • Seasonal allergies    • Sjogren's syndrome (CMS/HCC)        Social History     Socioeconomic History   • Marital status:      Spouse name: Not on file   • Number of children: Not on file   • Years of education: Not on file   • Highest education level: Not on file   Tobacco Use   • Smoking status: Former Smoker     Packs/day: 0.50     Years: 16.00     Pack years: 8.00     Types: Cigarettes     Last attempt to quit: 1990     Years since quittin.0   • Smokeless tobacco: Never Used   Substance and Sexual Activity   • Alcohol use: Yes     Alcohol/week: 2.0 standard drinks     Types: 2 Glasses of wine per week   • Drug use: No   • Sexual activity: Defer         Current Outpatient Medications:   •  benzonatate (TESSALON) 200 MG capsule, Take 1 capsule by mouth three times daily as needed for cough, Disp: 20 capsule, Rfl: 0  •  Biotin 5000 MCG tablet, Take  by mouth Daily., Disp: , Rfl:   •  calcium carbonate (OS-ULI) 600 MG tablet, Take 600 mg by mouth 2 (Two) Times a Day With  Meals., Disp: , Rfl:   •  conjugated estrogens (PREMARIN) 0.625 MG/GM vaginal cream, Premarin 0.625 mg/gram vaginal cream, Disp: , Rfl:   •  cycloSPORINE (RESTASIS) 0.05 % ophthalmic emulsion, Administer 1 drop to both eyes 2 (Two) Times a Day., Disp: , Rfl:   •  EPINEPHrine (EPIPEN) 0.3 MG/0.3ML solution auto-injector injection, epinephrine 0.3 mg/0.3 mL injection, auto-injector, Disp: , Rfl:   •  escitalopram (LEXAPRO) 10 MG tablet, Take 0.5 tablets by mouth Every Morning., Disp: 90 tablet, Rfl: 3  •  estradiol (ESTRACE) 0.1 MG/GM vaginal cream, , Disp: , Rfl:   •  fluticasone (FLONASE) 50 MCG/ACT nasal spray, 2 sprays into the nostril(s) as directed by provider Daily., Disp: 3 bottle, Rfl: 1  •  glucosamine-chondroitin 500-400 MG capsule capsule, Take 1 capsule by mouth 2 (Two) Times a Day With Meals., Disp: , Rfl:   •  levocetirizine (XYZAL) 5 MG tablet, Take 1 tablet by mouth Every Evening., Disp: , Rfl:   •  lisinopril (PRINIVIL,ZESTRIL) 10 MG tablet, Take 1 tablet by mouth Daily., Disp: 90 tablet, Rfl: 3  •  omeprazole (PRILOSEC) 40 MG capsule, Take 1 capsule by mouth Daily., Disp: 90 capsule, Rfl: 3  •  pseudoephedrine (Sudafed) 30 MG tablet, 1-2 tabs po q 6 hours, Disp: 48 tablet, Rfl: 1  •  rosuvastatin (CRESTOR) 10 MG tablet, Take 1 tablet by mouth Daily., Disp: 90 tablet, Rfl: 3  •  vitamin B-12 (CYANOCOBALAMIN) 1000 MCG tablet, Take 5,000 mcg by mouth Daily., Disp: , Rfl:     Review of Systems    Objective   There were no vitals filed for this visit.  There were no vitals filed for this visit.  There is no height or weight on file to calculate BMI.      Physical Exam    WBC   Date Value Ref Range Status   03/16/2020 4.25 3.40 - 10.80 10*3/mm3 Final     RBC   Date Value Ref Range Status   03/16/2020 4.08 3.77 - 5.28 10*6/mm3 Final     Hemoglobin   Date Value Ref Range Status   03/16/2020 13.9 12.0 - 15.9 g/dL Final   08/04/2018 12.7 12.0 - 16.0 g/dL Final     Hematocrit   Date Value Ref Range Status    03/16/2020 40.2 34.0 - 46.6 % Final   08/04/2018 37.5 37.0 - 47.0 % Final     MCV   Date Value Ref Range Status   03/16/2020 98.5 (H) 79.0 - 97.0 fL Final   08/04/2018 92.4 81.0 - 99.0 fL Final     MCH   Date Value Ref Range Status   03/16/2020 34.1 (H) 26.6 - 33.0 pg Final   08/04/2018 31.3 (H) 27.0 - 31.0 pg Final     MCHC   Date Value Ref Range Status   03/16/2020 34.6 31.5 - 35.7 g/dL Final   08/04/2018 33.9 31.0 - 37.0 g/dL Final     RDW   Date Value Ref Range Status   03/16/2020 12.3 12.3 - 15.4 % Final   08/04/2018 12.6 11.5 - 14.5 % Final     RDW-SD   Date Value Ref Range Status   08/04/2018 42.6 37.0 - 54.0 fl Final     MPV   Date Value Ref Range Status   08/04/2018 10.5 (H) 7.4 - 10.4 fL Final     Platelets   Date Value Ref Range Status   03/16/2020 190 140 - 450 10*3/mm3 Final   08/04/2018 162 140 - 500 10*3/mm3 Final     Neutrophil Rel %   Date Value Ref Range Status   03/16/2020 62.1 42.7 - 76.0 % Final     Neutrophil %   Date Value Ref Range Status   08/04/2018 72.3 (H) 45.0 - 70.0 % Final     Lymphocyte Rel %   Date Value Ref Range Status   03/16/2020 24.0 19.6 - 45.3 % Final     Lymphocyte %   Date Value Ref Range Status   08/04/2018 17.6 (L) 20.0 - 45.0 % Final     Monocyte Rel %   Date Value Ref Range Status   03/16/2020 10.1 5.0 - 12.0 % Final     Monocyte %   Date Value Ref Range Status   08/04/2018 9.2 (H) 3.0 - 8.0 % Final     Eosinophil Rel %   Date Value Ref Range Status   03/16/2020 2.4 0.3 - 6.2 % Final     Eosinophil %   Date Value Ref Range Status   08/04/2018 0.0 0.0 - 4.0 % Final     Basophil Rel %   Date Value Ref Range Status   03/16/2020 0.7 0.0 - 1.5 % Final     Basophil %   Date Value Ref Range Status   08/04/2018 0.2 0.0 - 2.0 % Final     Immature Grans %   Date Value Ref Range Status   08/04/2018 0.7 (H) 0.0 - 0.5 % Final     Neutrophils Absolute   Date Value Ref Range Status   03/16/2020 2.64 1.70 - 7.00 10*3/mm3 Final     Neutrophils, Absolute   Date Value Ref Range Status    08/04/2018 4.02 1.50 - 8.30 10*3/mm3 Final     Lymphocytes Absolute   Date Value Ref Range Status   03/16/2020 1.02 0.70 - 3.10 10*3/mm3 Final     Lymphocytes, Absolute   Date Value Ref Range Status   08/04/2018 0.98 0.60 - 4.80 10*3/mm3 Final     Monocytes Absolute   Date Value Ref Range Status   03/16/2020 0.43 0.10 - 0.90 10*3/mm3 Final     Monocytes, Absolute   Date Value Ref Range Status   08/04/2018 0.51 0.00 - 1.00 10*3/mm3 Final     Eosinophils Absolute   Date Value Ref Range Status   03/16/2020 0.10 0.00 - 0.40 10*3/mm3 Final     Eosinophils, Absolute   Date Value Ref Range Status   08/04/2018 0.00 (L) 0.10 - 0.30 10*3/mm3 Final     Basophils Absolute   Date Value Ref Range Status   03/16/2020 0.03 0.00 - 0.20 10*3/mm3 Final     Basophils, Absolute   Date Value Ref Range Status   08/04/2018 0.01 0.00 - 0.20 10*3/mm3 Final     Immature Grans, Absolute   Date Value Ref Range Status   08/04/2018 0.04 (H) 0.00 - 0.03 10*3/mm3 Final     nRBC   Date Value Ref Range Status   03/16/2020 0.0 0.0 - 0.2 /100 WBC Final   08/04/2018 0.0 0.0 - 0.0 /100 WBC Final       Lab Results   Component Value Date    GLUCOSE 105 (H) 01/23/2020    BUN 17 03/16/2020    CREATININE 0.87 03/16/2020    EGFRIFNONA 64 03/16/2020    EGFRIFAFRI 78 03/16/2020    BCR 19.5 03/16/2020    CO2 25.8 03/16/2020    CALCIUM 9.5 03/16/2020    PROTENTOTREF 7.1 03/16/2020    ALBUMIN 4.70 03/16/2020    LABIL2 2.0 03/16/2020    AST 37 (H) 03/16/2020    ALT 46 (H) 03/16/2020         Imaging Results (Last 7 Days)     ** No results found for the last 168 hours. **            Assessment/Plan   Diagnoses and all orders for this visit:    Bronchitis    Mcgill's esophagus without dysplasia    Personal history of colonic polyps    WALSH (nonalcoholic steatohepatitis)  -     Comprehensive Metabolic Panel; Future    Overall doing well but would recheck her LFTs in June to July to look for improvement and her Colon is not due till 2021 and her EGD can be either  2021/22  4 month F/U    Dictated utilizing Dragon dictation

## 2020-05-06 ENCOUNTER — HOSPITAL ENCOUNTER (OUTPATIENT)
Dept: CT IMAGING | Facility: HOSPITAL | Age: 75
Discharge: HOME | End: 2020-05-06
Admitting: INTERNAL MEDICINE

## 2020-05-06 DIAGNOSIS — R06.2 WHEEZING: ICD-10-CM

## 2020-05-06 DIAGNOSIS — J32.9 CHRONIC SINUSITIS, UNSPECIFIED LOCATION: ICD-10-CM

## 2020-05-06 DIAGNOSIS — R05.3 CHRONIC COUGH: ICD-10-CM

## 2020-05-06 PROCEDURE — 71260 CT THORAX DX C+: CPT

## 2020-05-06 PROCEDURE — 70486 CT MAXILLOFACIAL W/O DYE: CPT

## 2020-05-06 PROCEDURE — 0 IOPAMIDOL PER 1 ML: Performed by: INTERNAL MEDICINE

## 2020-05-06 RX ADMIN — IOPAMIDOL 100 ML: 755 INJECTION, SOLUTION INTRAVENOUS at 12:05

## 2020-05-07 ENCOUNTER — TELEPHONE (OUTPATIENT)
Dept: INTERNAL MEDICINE | Facility: CLINIC | Age: 75
End: 2020-05-07

## 2020-05-19 RX ORDER — ESCITALOPRAM OXALATE 10 MG/1
TABLET ORAL
Qty: 45 TABLET | Refills: 2 | Status: SHIPPED | OUTPATIENT
Start: 2020-05-19 | End: 2021-02-17

## 2020-06-30 ENCOUNTER — OFFICE VISIT (OUTPATIENT)
Dept: INTERNAL MEDICINE | Facility: CLINIC | Age: 75
End: 2020-06-30

## 2020-06-30 VITALS
BODY MASS INDEX: 30.09 KG/M2 | TEMPERATURE: 96.6 F | DIASTOLIC BLOOD PRESSURE: 72 MMHG | HEART RATE: 83 BPM | SYSTOLIC BLOOD PRESSURE: 138 MMHG | WEIGHT: 180.6 LBS | HEIGHT: 65 IN | OXYGEN SATURATION: 99 %

## 2020-06-30 DIAGNOSIS — M25.561 CHRONIC PAIN OF BOTH KNEES: Primary | ICD-10-CM

## 2020-06-30 DIAGNOSIS — M25.562 CHRONIC PAIN OF BOTH KNEES: Primary | ICD-10-CM

## 2020-06-30 DIAGNOSIS — G89.29 CHRONIC PAIN OF BOTH KNEES: Primary | ICD-10-CM

## 2020-06-30 PROCEDURE — 99213 OFFICE O/P EST LOW 20 MIN: CPT | Performed by: INTERNAL MEDICINE

## 2020-06-30 PROCEDURE — 20610 DRAIN/INJ JOINT/BURSA W/O US: CPT | Performed by: INTERNAL MEDICINE

## 2020-06-30 RX ORDER — PSEUDOEPHEDRINE HCL 30 MG
30 TABLET ORAL EVERY 4 HOURS PRN
Qty: 98 TABLET | Refills: 1 | Status: SHIPPED | OUTPATIENT
Start: 2020-06-30 | End: 2020-11-10 | Stop reason: SDUPTHER

## 2020-06-30 RX ADMIN — TRIAMCINOLONE ACETONIDE 80 MG: 40 INJECTION, SUSPENSION INTRA-ARTICULAR; INTRAMUSCULAR at 16:57

## 2020-06-30 RX ADMIN — LIDOCAINE HYDROCHLORIDE 1 ML: 10 INJECTION, SOLUTION EPIDURAL; INFILTRATION; INTRACAUDAL; PERINEURAL at 16:57

## 2020-07-01 RX ORDER — TRIAMCINOLONE ACETONIDE 40 MG/ML
80 INJECTION, SUSPENSION INTRA-ARTICULAR; INTRAMUSCULAR
Status: COMPLETED | OUTPATIENT
Start: 2020-06-30 | End: 2020-06-30

## 2020-07-01 RX ORDER — LIDOCAINE HYDROCHLORIDE 10 MG/ML
1 INJECTION, SOLUTION EPIDURAL; INFILTRATION; INTRACAUDAL; PERINEURAL
Status: COMPLETED | OUTPATIENT
Start: 2020-06-30 | End: 2020-06-30

## 2020-07-09 ENCOUNTER — APPOINTMENT (OUTPATIENT)
Dept: GENERAL RADIOLOGY | Facility: HOSPITAL | Age: 75
End: 2020-07-09

## 2020-07-09 ENCOUNTER — HOSPITAL ENCOUNTER (EMERGENCY)
Facility: HOSPITAL | Age: 75
Discharge: HOME | End: 2020-07-09
Attending: EMERGENCY MEDICINE | Admitting: EMERGENCY MEDICINE

## 2020-07-09 VITALS
RESPIRATION RATE: 16 BRPM | WEIGHT: 178 LBS | OXYGEN SATURATION: 99 % | DIASTOLIC BLOOD PRESSURE: 91 MMHG | TEMPERATURE: 99 F | HEIGHT: 65 IN | SYSTOLIC BLOOD PRESSURE: 155 MMHG | BODY MASS INDEX: 29.66 KG/M2 | HEART RATE: 76 BPM

## 2020-07-09 DIAGNOSIS — S83.92XA SPRAIN OF LEFT KNEE, UNSPECIFIED LIGAMENT, INITIAL ENCOUNTER: Primary | ICD-10-CM

## 2020-07-09 PROCEDURE — 99283 EMERGENCY DEPT VISIT LOW MDM: CPT

## 2020-07-09 PROCEDURE — 73562 X-RAY EXAM OF KNEE 3: CPT

## 2020-07-09 PROCEDURE — 99282 EMERGENCY DEPT VISIT SF MDM: CPT | Performed by: EMERGENCY MEDICINE

## 2020-07-09 NOTE — ED PROVIDER NOTES
Subjective   History of Present Illness  History of Present Illness    Chief complaint: Knee pain    Location: Left knee    Quality/Severity: Moderate, sharp    Timing/Onset/Duration: Acute onset sudden    Modifying Factors: It hurts to bear weight, it feels better to keep it propped up    Associated Symptoms: No numbness, tingling, or weakness.  She has some mild bruising on the right hip but otherwise no complaints.    Narrative: This 75-year-old white female tripped on a brick on Sunday injuring her left knee.  She presents complaining of left knee pain is not getting any better.    PCP: [facility]      Review of Systems   Respiratory: Negative for shortness of breath.    Cardiovascular: Negative for chest pain.   Gastrointestinal: Negative for abdominal pain.   Musculoskeletal: Negative for back pain and neck pain.        Left knee pain   Neurological: Negative for weakness and numbness.        Medication List      ASK your doctor about these medications    Biotin 5000 MCG tablet     calcium carbonate 600 MG tablet  Commonly known as:  OS-ULI     cycloSPORINE 0.05 % ophthalmic emulsion  Commonly known as:  RESTASIS     EPINEPHrine 0.3 MG/0.3ML solution auto-injector injection  Commonly known as:  EPIPEN     escitalopram 10 MG tablet  Commonly known as:  LEXAPRO  TAKE 1/2 (ONE-HALF) TABLET BY MOUTH IN THE MORNING     estradiol 0.1 MG/GM vaginal cream  Commonly known as:  ESTRACE     fluticasone 50 MCG/ACT nasal spray  Commonly known as:  FLONASE  2 sprays into the nostril(s) as directed by provider Daily.     glucosamine-chondroitin 500-400 MG capsule capsule     levocetirizine 5 MG tablet  Commonly known as:  Xyzal  Take 1 tablet by mouth Every Evening.     lisinopril 10 MG tablet  Commonly known as:  PRINIVIL,ZESTRIL  Take 1 tablet by mouth Daily.     omeprazole 40 MG capsule  Commonly known as:  PrilOSEC  Take 1 capsule by mouth Daily.     pseudoephedrine 30 MG tablet  Commonly known as:  Sudafed  Take 1 tablet by  mouth Every 4 (Four) Hours As Needed for Congestion.     rosuvastatin 10 MG tablet  Commonly known as:  CRESTOR  Take 1 tablet by mouth Daily.     vitamin B-12 1000 MCG tablet  Commonly known as:  CYANOCOBALAMIN          Past Medical History:   Diagnosis Date   • Arthritis     knees   • Mcgill esophagus    • Cancer (CMS/HCC)     cervical   • Cervical cancer (CMS/HCC)    • Colon polyp    • Eczema    • Elevated liver enzymes    • GERD (gastroesophageal reflux disease)    • H/O stress fracture    • Health care maintenance    • Hypercholesteremia    • Kidney stones     sched ureteroscopy, lithotripsy   • Ocular migraine    • Postmenopausal    • Seasonal allergies    • Sjogren's syndrome (CMS/HCC)        Allergies   Allergen Reactions   • Levaquin [Levofloxacin] Nausea And Vomiting   • Tetracycline Nausea And Vomiting       Past Surgical History:   Procedure Laterality Date   •  SECTION      x2   • COLONOSCOPY     • CYSTOSCOPY URETEROSCOPY STONE MANIPULATION/EXTRACTION Left 10/14/2016    Procedure: LEFT URETEROSCOPY bilateral retrograde pyleogram LEFT STENT PLACEMENT.;  Surgeon: Julius Darling MD;  Location: [facility];  Service:    • CYSTOSCOPY W/ URETERAL STENT PLACEMENT Right 8/3/2018    Procedure: CYSTOSCOPY URETERAL CATHETER/STENT INSERTION;  Surgeon: Farzad Cordova MD;  Location: [facility];  Service: Urology   • ENDOSCOPY N/A 4/10/2019    Procedure: ESOPHAGOGASTRODUODENOSCOPY;  Surgeon: Bc Sanchez MD;  Location: [facility];  Service: Gastroenterology   • HYSTERECTOMY     • UPPER GASTROINTESTINAL ENDOSCOPY     • URETEROSCOPY LASER LITHOTRIPSY WITH STENT INSERTION Left 10/26/2016    Procedure: LT URETEROSCOPY LASER LITHOTRIPSY ;  Surgeon: Julius Darling MD;  Location: [facility] OR;  Service:        Family History   Problem Relation Age of Onset   • Anxiety disorder Mother    • Bipolar disorder Mother    • Stroke Mother 83   • Thyroid disease Mother    • Depression Mother    •  Heart attack Father 47   • Thyroid disease Brother    • Hypertension Brother    • Diabetes Brother 68   • Glaucoma Brother    • Depression Brother    • No Known Problems Daughter    • No Known Problems Son    • Leukemia Maternal Grandmother    • Diabetes Maternal Grandfather    • Heart attack Paternal Grandmother    • Heart attack Paternal Grandfather    • Breast cancer Neg Hx    • Colon cancer Neg Hx    • Colon polyps Neg Hx        Social History     Socioeconomic History   • Marital status:      Spouse name: Not on file   • Number of children: Not on file   • Years of education: Not on file   • Highest education level: Not on file   Tobacco Use   • Smoking status: Former Smoker     Packs/day: 0.50     Years: 16.00     Pack years: 8.00     Types: Cigarettes     Last attempt to quit: 1990     Years since quittin.2   • Smokeless tobacco: Never Used   Substance and Sexual Activity   • Alcohol use: Yes     Alcohol/week: 2.0 standard drinks     Types: 2 Glasses of wine per week   • Drug use: No   • Sexual activity: Defer           Objective   Physical Exam   Constitutional: She is oriented to person, place, and time. She appears well-developed and well-nourished. No distress.   ED Triage Vitals (20 1004)  Temp: 99 °F (37.2 °C)  Heart Rate: 76  Resp: 16  BP: 155/91  SpO2: 99 %  Temp src: Oral  Heart Rate Source: Monitor  Patient Position: n/a  BP Location: n/a  FiO2 (%): n/a    The patient's vitals were reviewed by me.  Unless otherwise noted they are within normal limits.  The blood pressure is elevated at 155/91.  The patient has a history of hypertension.     Musculoskeletal:   There is no swelling.  There is tenderness upon palpation of the medial tibial plateau and upon posterior drawer test.  The capillary refill is less than 2 seconds.  The sensation is intact.  There is a normal range of motion noted.  There is no joint laxity noted.  There is a 2+ dorsalis pedis and posterior tibial pulse.   The left lower extremity is otherwise without tenderness or deformity and neurovascularly intact.   Neurological: She is alert and oriented to person, place, and time.   Skin: Skin is warm and dry. Capillary refill takes less than 2 seconds.   Nursing note and vitals reviewed.      Procedures           ED Course        11:31, 07/09/20:  A left knee immobilizer was placed by the technician.  After application of the knee immobilizer was assessed by me and noted be in good position with the left lower extremity being neurovascularly intact.  Patient was fitted for crutches by the technician and given crutch instructions.      11:30, 07/09/20: The patient's diagnosis of left knee sprain was discussed with her.  The patient should rest.  She should apply ice for 20 minutes every 2 hours while she is awake for 2 to 3 days.  She should take Motrin or Tylenol as needed as directed for the pain.  The patient should not bear weight on the left knee for 2 days and advance weightbearing as tolerated.  The patient should follow-up with Dr. Villanueva within 1 week.  She should return to the emergency department if there is increased pain, numbness, tingling, weakness, change in color or temperature, worse in any way at all.  All the patient's questions were answered she will be discharged in good condition.                                               MDM  No orders to display     Labs Reviewed - No data to display  No results found.    Final diagnoses:   Sprain of left knee, unspecified ligament, initial encounter         ED Medications:  Medications - No data to display    New Medications:     Medication List      ASK your doctor about these medications    Biotin 5000 MCG tablet     calcium carbonate 600 MG tablet  Commonly known as:  OS-ULI     cycloSPORINE 0.05 % ophthalmic emulsion  Commonly known as:  RESTASIS     EPINEPHrine 0.3 MG/0.3ML solution auto-injector injection  Commonly known as:  EPIPEN     escitalopram 10 MG  tablet  Commonly known as:  LEXAPRO  TAKE 1/2 (ONE-HALF) TABLET BY MOUTH IN THE MORNING     estradiol 0.1 MG/GM vaginal cream  Commonly known as:  ESTRACE     fluticasone 50 MCG/ACT nasal spray  Commonly known as:  FLONASE  2 sprays into the nostril(s) as directed by provider Daily.     glucosamine-chondroitin 500-400 MG capsule capsule     levocetirizine 5 MG tablet  Commonly known as:  Xyzal  Take 1 tablet by mouth Every Evening.     lisinopril 10 MG tablet  Commonly known as:  PRINIVIL,ZESTRIL  Take 1 tablet by mouth Daily.     omeprazole 40 MG capsule  Commonly known as:  PrilOSEC  Take 1 capsule by mouth Daily.     pseudoephedrine 30 MG tablet  Commonly known as:  Sudafed  Take 1 tablet by mouth Every 4 (Four) Hours As Needed for Congestion.     rosuvastatin 10 MG tablet  Commonly known as:  CRESTOR  Take 1 tablet by mouth Daily.     vitamin B-12 1000 MCG tablet  Commonly known as:  CYANOCOBALAMIN          Stopped Medications:     Medication List      ASK your doctor about these medications    Biotin 5000 MCG tablet     calcium carbonate 600 MG tablet  Commonly known as:  OS-ULI     cycloSPORINE 0.05 % ophthalmic emulsion  Commonly known as:  RESTASIS     EPINEPHrine 0.3 MG/0.3ML solution auto-injector injection  Commonly known as:  EPIPEN     escitalopram 10 MG tablet  Commonly known as:  LEXAPRO  TAKE 1/2 (ONE-HALF) TABLET BY MOUTH IN THE MORNING     estradiol 0.1 MG/GM vaginal cream  Commonly known as:  ESTRACE     fluticasone 50 MCG/ACT nasal spray  Commonly known as:  FLONASE  2 sprays into the nostril(s) as directed by provider Daily.     glucosamine-chondroitin 500-400 MG capsule capsule     levocetirizine 5 MG tablet  Commonly known as:  Xyzal  Take 1 tablet by mouth Every Evening.     lisinopril 10 MG tablet  Commonly known as:  PRINIVIL,ZESTRIL  Take 1 tablet by mouth Daily.     omeprazole 40 MG capsule  Commonly known as:  PrilOSEC  Take 1 capsule by mouth Daily.     pseudoephedrine 30 MG  tablet  Commonly known as:  Sudafed  Take 1 tablet by mouth Every 4 (Four) Hours As Needed for Congestion.     rosuvastatin 10 MG tablet  Commonly known as:  CRESTOR  Take 1 tablet by mouth Daily.     vitamin B-12 1000 MCG tablet  Commonly known as:  CYANOCOBALAMIN            Final diagnoses:   Sprain of left knee, unspecified ligament, initial encounter            Clem Machado MD  07/09/20 [id number]

## 2020-07-16 NOTE — PROGRESS NOTES
"Subjective:     Patient ID: Germaine Gonzalez is a 75 y.o. female.    Chief Complaint: left knee pain, new patient to provider    History of Present Illness  Germaine Gonzalez presents to clinic today for evaluation of her left knee. On 20, she tripped over a brick in her garden and fell, and felt her left knee \"twist\". She has had knee pain for several years, and this incident caused her pain to worsen. Denies any history of patellar dislocations. She rates the pain as 6/10, describes it as sharp in nature. Localizes pain to anterior medial joint line. Has noted improvement with rest and previous cortisone injections.  Symptoms are exacerbated with running. Denies radiation of pain, denies associated numbness or tingling. She is currently using crutches for ambulatory assist.      Social History     Occupational History   • Not on file   Tobacco Use   • Smoking status: Former Smoker     Packs/day: 0.50     Years: 16.00     Pack years: 8.00     Types: Cigarettes     Last attempt to quit: 1990     Years since quittin.3   • Smokeless tobacco: Never Used   Substance and Sexual Activity   • Alcohol use: Yes     Alcohol/week: 2.0 standard drinks     Types: 2 Glasses of wine per week   • Drug use: No   • Sexual activity: Defer      Past Medical History:   Diagnosis Date   • Arthritis     knees   • Mcgill esophagus    • Cancer (CMS/HCC)     cervical   • Cervical cancer (CMS/HCC)    • Colon polyp    • Eczema    • Elevated liver enzymes    • GERD (gastroesophageal reflux disease)    • H/O stress fracture    • Health care maintenance    • Hypercholesteremia    • Kidney stones     sched ureteroscopy, lithotripsy   • Ocular migraine    • Postmenopausal    • Seasonal allergies    • Sjogren's syndrome (CMS/HCC)      Past Surgical History:   Procedure Laterality Date   •  SECTION      x2   • COLONOSCOPY     • CYSTOSCOPY URETEROSCOPY STONE MANIPULATION/EXTRACTION Left 10/14/2016    Procedure: LEFT URETEROSCOPY bilateral " retrograde pyleogram LEFT STENT PLACEMENT.;  Surgeon: Julius Darling MD;  Location: [facility] OR;  Service:    • CYSTOSCOPY W/ URETERAL STENT PLACEMENT Right 8/3/2018    Procedure: CYSTOSCOPY URETERAL CATHETER/STENT INSERTION;  Surgeon: Farzad Cordova MD;  Location: [facility] OR;  Service: Urology   • ENDOSCOPY N/A 4/10/2019    Procedure: ESOPHAGOGASTRODUODENOSCOPY;  Surgeon: Bc Sanchez MD;  Location: [facility] OR;  Service: Gastroenterology   • HYSTERECTOMY     • UPPER GASTROINTESTINAL ENDOSCOPY     • URETEROSCOPY LASER LITHOTRIPSY WITH STENT INSERTION Left 10/26/2016    Procedure: LT URETEROSCOPY LASER LITHOTRIPSY ;  Surgeon: Julius Darling MD;  Location: [facility] MAIN OR;  Service:        Family History   Problem Relation Age of Onset   • Anxiety disorder Mother    • Bipolar disorder Mother    • Stroke Mother 83   • Thyroid disease Mother    • Depression Mother    • Heart attack Father 47   • Thyroid disease Brother    • Hypertension Brother    • Diabetes Brother 68   • Glaucoma Brother    • Depression Brother    • No Known Problems Daughter    • No Known Problems Son    • Leukemia Maternal Grandmother    • Diabetes Maternal Grandfather    • Heart attack Paternal Grandmother    • Heart attack Paternal Grandfather    • Breast cancer Neg Hx    • Colon cancer Neg Hx    • Colon polyps Neg Hx          Review of Systems   Constitutional: Negative for chills, diaphoresis, fever and unexpected weight change.   HENT: Negative for hearing loss, nosebleeds, sore throat and tinnitus.    Eyes: Negative for pain and visual disturbance.   Respiratory: Negative for cough, shortness of breath and wheezing.    Cardiovascular: Negative for chest pain and palpitations.   Gastrointestinal: Negative for abdominal pain, diarrhea, nausea and vomiting.   Endocrine: Negative for cold intolerance, heat intolerance and polydipsia.   Genitourinary: Negative for difficulty urinating, dysuria and hematuria.  "  Musculoskeletal: Positive for arthralgias. Negative for joint swelling and myalgias.   Skin: Negative for rash and wound.   Allergic/Immunologic: Negative for environmental allergies.   Neurological: Negative for dizziness, syncope and numbness.   Hematological: Does not bruise/bleed easily.   Psychiatric/Behavioral: Negative for dysphoric mood and sleep disturbance. The patient is not nervous/anxious.            Objective:  Vitals:    07/17/20 1022   BP: 146/75   Pulse: 79   Weight: 79.4 kg (175 lb)   Height: 165.1 cm (65\")         07/17/20  1022   Weight: 79.4 kg (175 lb)     Body mass index is 29.12 kg/m².    Physical Exam    Vital signs reviewed.   General: No acute distress, alert and oriented  Eyes: conjunctiva clear; pupils equally round and reactive  ENT: external ears and nose atraumatic; oropharynx clear  CV: no peripheral edema  Resp: normal respiratory effort  Skin: no rashes or wounds; normal turgor  Psych: mood and affect appropriate; recent and remote memory intact      Ortho Exam       Left Knee-    ROM 0-130 degrees  4+/5 on flexion  4+/5 on extension  Maximal tenderness medial joint line and medial and lateral patellar facet    Geoff - positive with pain at medial joint line  Grade 1A Lachman  Anterior drawer- negative  Posterior drawer- negative   No opening on varus and valgus stress at 0 and 30  Active patellar compression test- positive     Log roll-  negative  Stinchfield-  negative    J-sign- positive with lateral patellar tracking    Positive sensation light tough all distributions symmetric to contralateral side  Brisk cap refill all digits  1+ dorsalis pedis pulse      Imaging:  Xr Knee 3 View Left    Result Date: 7/9/2020  Impression: 1. No acute osseous abnormality. 2. Advanced degenerative arthropathy.  This report was finalized on 7/9/2020 11:20 AM by Dr. Alejandro Longo MD.      Review of outside x-rays of left knee from emergency department with review of images as well as " radiology report indicates advanced tricompartmental osteoarthritis with slight valgus alignment noted primarily on lateral patellofemoral compartments with bone-on-bone articulation of patellofemoral joint on patella axial view noted, no evidence of fracture, dislocation, or subluxation.  Moderate effusion appreciated.  No comparison images.    Assessment:        1. Primary osteoarthritis of left knee           Plan:  Large Joint Arthrocentesis: L knee  Date/Time: 7/17/2020 11:21 AM  Consent given by: patient  Site marked: site marked  Timeout: Immediately prior to procedure a time out was called to verify the correct patient, procedure, equipment, support staff and site/side marked as required   Supporting Documentation  Indications: pain   Procedure Details  Location: knee - L knee  Preparation: Patient was prepped and draped in the usual sterile fashion  Needle size: 22 G  Approach: anterolateral  Medications administered: 8 mL lidocaine PF 1% 1 %; 80 mg triamcinolone acetonide 40 MG/ML  Patient tolerance: patient tolerated the procedure well with no immediate complications                1. Discussed treatment options at length with patient at today's visit.  Reviewed with patient findings from x-ray and exam indicating that her pain seems to be most likely coming from an exacerbation of her underlying arthritis.  2. Patient would like to proceed with cortisone injection today to the left knee. Recommended limited use of affected extremity for the next 24 hours to only essential activites other than work on general active and passive motion. Recommended supplementing with ice and soft tissue massage. Discussed with patient that they should see results in 5-7 days, if no improvement in 5-6 weeks I have asked them to call the office to review other options. Patient should call office immediately if they notice redness, warmth, fevers, chills, or residual numbness or tingling for greater than 6 hours after  injection.   3. Provided Dry-Partha hinged knee brace today. She may wean off of crutches as she feels comfortable.  4. Follow-up with Christy in 6 weeks for re-evaluation. Will consider Visco supplement injections if cortisone injection does not provide relief.      Germaine Gonzalez was in agreement with plan and had all questions answered.     Orders:  Orders Placed This Encounter   Procedures   • Large Joint Arthrocentesis: L knee       Medications:  No orders of the defined types were placed in this encounter.      Followup:  Return in about 6 weeks (around 8/28/2020).    Germaine was seen today for pain.    Diagnoses and all orders for this visit:    Primary osteoarthritis of left knee  -     Large Joint Arthrocentesis: L knee           By signing my name here, I Mariana Smith attest that all documentation on 07/18/20 at 15:46 has been prepared under the direction and in the presence of Dr. Jovany Villanueva MD.    I, Dr. Jovany Villanueva, personally performed the services described in this documentation, as scribed by Mariana Smith, in my presence, and it is both accurate and complete.        Dictated utilizing Dragon dictation

## 2020-07-17 ENCOUNTER — OFFICE VISIT (OUTPATIENT)
Dept: ORTHOPEDIC SURGERY | Facility: CLINIC | Age: 75
End: 2020-07-17

## 2020-07-17 VITALS
HEART RATE: 79 BPM | SYSTOLIC BLOOD PRESSURE: 146 MMHG | BODY MASS INDEX: 29.16 KG/M2 | HEIGHT: 65 IN | DIASTOLIC BLOOD PRESSURE: 75 MMHG | WEIGHT: 175 LBS

## 2020-07-17 DIAGNOSIS — M17.12 PRIMARY OSTEOARTHRITIS OF LEFT KNEE: Primary | ICD-10-CM

## 2020-07-17 PROCEDURE — 99204 OFFICE O/P NEW MOD 45 MIN: CPT | Performed by: ORTHOPAEDIC SURGERY

## 2020-07-17 PROCEDURE — 20610 DRAIN/INJ JOINT/BURSA W/O US: CPT | Performed by: ORTHOPAEDIC SURGERY

## 2020-07-17 RX ADMIN — TRIAMCINOLONE ACETONIDE 80 MG: 40 INJECTION, SUSPENSION INTRA-ARTICULAR; INTRAMUSCULAR at 11:21

## 2020-07-17 RX ADMIN — LIDOCAINE HYDROCHLORIDE 8 ML: 10 INJECTION, SOLUTION EPIDURAL; INFILTRATION; INTRACAUDAL; PERINEURAL at 11:21

## 2020-07-18 RX ORDER — LIDOCAINE HYDROCHLORIDE 10 MG/ML
8 INJECTION, SOLUTION EPIDURAL; INFILTRATION; INTRACAUDAL; PERINEURAL
Status: COMPLETED | OUTPATIENT
Start: 2020-07-17 | End: 2020-07-17

## 2020-07-18 RX ORDER — TRIAMCINOLONE ACETONIDE 40 MG/ML
80 INJECTION, SUSPENSION INTRA-ARTICULAR; INTRAMUSCULAR
Status: COMPLETED | OUTPATIENT
Start: 2020-07-17 | End: 2020-07-17

## 2020-08-11 RX ORDER — OMEPRAZOLE 40 MG/1
CAPSULE, DELAYED RELEASE ORAL
Qty: 90 CAPSULE | Refills: 0 | Status: SHIPPED | OUTPATIENT
Start: 2020-08-11 | End: 2020-11-12

## 2020-08-28 ENCOUNTER — OFFICE VISIT (OUTPATIENT)
Dept: ORTHOPEDIC SURGERY | Facility: CLINIC | Age: 75
End: 2020-08-28

## 2020-08-28 DIAGNOSIS — M17.12 PRIMARY OSTEOARTHRITIS OF LEFT KNEE: Primary | ICD-10-CM

## 2020-08-28 PROCEDURE — 99213 OFFICE O/P EST LOW 20 MIN: CPT | Performed by: NURSE PRACTITIONER

## 2020-08-28 NOTE — PROGRESS NOTES
Subjective:     Patient ID: Germaine Gonzalez is a 75 y.o. female.    Chief Complaint:  Follow-up DJD left knee, new patient to examiner  History of Present Illness  Germaine Gonzalez returns to clinic for 6-week follow-up after receiving corticosteroid injection last visit.  After approximately 2 weeks received significant symptom relief is not experiencing any pain at this time.  Has been able to return to all previously tolerated activities.  Denies that the knee is locking, catching or giving way.  Initially presented to clinic on 2020 see Dr. Villanueva for evaluation of her left knee after she tripped over a brick striking the anterior aspect of her right knee and twisted the left knee.  She does report pain on and off for several years, was previous long-distance runner and at last visit rated pain 6-7 out of a 10 mainly sharp in nature at the medial joint line of the anterior aspect of the knee.  Again all pain has subsided at this time is not experiencing pain with range of motion denies numbness or tingling radiating down left lower extremity.  Has completely discontinued use of crutches and bracing.  Denies other concerns present time.     Social History     Occupational History   • Not on file   Tobacco Use   • Smoking status: Former Smoker     Packs/day: 0.50     Years: 16.00     Pack years: 8.00     Types: Cigarettes     Last attempt to quit: 1990     Years since quittin.4   • Smokeless tobacco: Never Used   Substance and Sexual Activity   • Alcohol use: Yes     Alcohol/week: 2.0 standard drinks     Types: 2 Glasses of wine per week   • Drug use: No   • Sexual activity: Defer      Past Medical History:   Diagnosis Date   • Arthritis     knees   • Mcgill esophagus    • Cancer (CMS/HCC)     cervical   • Cervical cancer (CMS/HCC)    • Colon polyp    • Eczema    • Elevated liver enzymes    • GERD (gastroesophageal reflux disease)    • H/O stress fracture    • Health care maintenance    • Hypercholesteremia     • Kidney stones     sched ureteroscopy, lithotripsy   • Ocular migraine    • Postmenopausal    • Seasonal allergies    • Sjogren's syndrome (CMS/HCC)      Past Surgical History:   Procedure Laterality Date   •  SECTION      x2   • COLONOSCOPY     • CYSTOSCOPY URETEROSCOPY STONE MANIPULATION/EXTRACTION Left 10/14/2016    Procedure: LEFT URETEROSCOPY bilateral retrograde pyleogram LEFT STENT PLACEMENT.;  Surgeon: Julius Darling MD;  Location: [facility] OR;  Service:    • CYSTOSCOPY W/ URETERAL STENT PLACEMENT Right 8/3/2018    Procedure: CYSTOSCOPY URETERAL CATHETER/STENT INSERTION;  Surgeon: Farzad Cordova MD;  Location: [facility] OR;  Service: Urology   • ENDOSCOPY N/A 4/10/2019    Procedure: ESOPHAGOGASTRODUODENOSCOPY;  Surgeon: Bc Sanchez MD;  Location: [facility] OR;  Service: Gastroenterology   • HYSTERECTOMY     • UPPER GASTROINTESTINAL ENDOSCOPY     • URETEROSCOPY LASER LITHOTRIPSY WITH STENT INSERTION Left 10/26/2016    Procedure: LT URETEROSCOPY LASER LITHOTRIPSY ;  Surgeon: Julius Darling MD;  Location: [facility] OR;  Service:        Family History   Problem Relation Age of Onset   • Anxiety disorder Mother    • Bipolar disorder Mother    • Stroke Mother 83   • Thyroid disease Mother    • Depression Mother    • Heart attack Father 47   • Thyroid disease Brother    • Hypertension Brother    • Diabetes Brother 68   • Glaucoma Brother    • Depression Brother    • No Known Problems Daughter    • No Known Problems Son    • Leukemia Maternal Grandmother    • Diabetes Maternal Grandfather    • Heart attack Paternal Grandmother    • Heart attack Paternal Grandfather    • Breast cancer Neg Hx    • Colon cancer Neg Hx    • Colon polyps Neg Hx          Review of Systems   Constitutional: Negative for chills, diaphoresis, fever and unexpected weight change.   HENT: Negative for hearing loss, nosebleeds, sore throat and tinnitus.    Eyes: Negative for pain and visual disturbance.    Respiratory: Negative for cough, shortness of breath and wheezing.    Cardiovascular: Negative for chest pain and palpitations.   Gastrointestinal: Negative for abdominal pain, diarrhea, nausea and vomiting.   Endocrine: Negative for cold intolerance, heat intolerance and polydipsia.   Genitourinary: Negative for difficulty urinating, dysuria and hematuria.   Musculoskeletal: Positive for arthralgias. Negative for joint swelling and myalgias.   Skin: Negative for rash and wound.   Allergic/Immunologic: Negative for environmental allergies.   Neurological: Negative for dizziness, syncope and numbness.   Hematological: Does not bruise/bleed easily.   Psychiatric/Behavioral: Negative for dysphoric mood and sleep disturbance. The patient is not nervous/anxious.            Objective:  Physical Exam    General: No acute distress.  Eyes: conjunctiva clear; pupils equally round and reactive  ENT: external ears and nose atraumatic; oropharynx clear  CV: no peripheral edema  Resp: normal respiratory effort  Skin: no rashes or wounds; normal turgor  Psych: mood and affect appropriate; recent and remote memory intact    There were no vitals filed for this visit.  There were no vitals filed for this visit.  There is no height or weight on file to calculate BMI.      Left Knee Exam     Tenderness   The patient is experiencing tenderness in the patella.    Range of Motion   Extension: 0   Flexion: 140     Tests   Geoff:  Medial - negative Lateral - negative  Varus: negative Valgus: negative  Lachman:  Anterior - 1+    Posterior - negative  Drawer:  Anterior - negative     Posterior - negative  Patellar apprehension: positive    Other   Erythema: absent  Sensation: normal  Pulse: present  Swelling: mild  Effusion: effusion (minimal ) present    Comments:  Positive active patellar compression test  Maximal tenderness medial lateral patellar facet           Assessment:        1. Primary osteoarthritis of left knee            Plan:  1.  Discussed plan of care further options at this time.  Discussed options including Visco supplementation injections in the future.  Is not ready to proceed with any total joint replacement surgery.  At this time would like to call back to clinic when symptoms return to discuss further options.  Also discussed corticosteroid injections once every 3 months.  She verbalized understanding information agrees with plan of care.  Denies other concerns present time.  Orders:  No orders of the defined types were placed in this encounter.      Medications:  No orders of the defined types were placed in this encounter.      Followup:  No follow-ups on file.    Germaine was seen today for follow-up.    Diagnoses and all orders for this visit:    Primary osteoarthritis of left knee        Dictated utilizing Dragon dictation

## 2020-09-14 ENCOUNTER — LAB (OUTPATIENT)
Dept: INTERNAL MEDICINE | Facility: CLINIC | Age: 75
End: 2020-09-14

## 2020-09-14 DIAGNOSIS — I10 ESSENTIAL HYPERTENSION: ICD-10-CM

## 2020-09-14 DIAGNOSIS — E78.2 MIXED HYPERLIPIDEMIA: Primary | ICD-10-CM

## 2020-09-14 LAB
ALBUMIN SERPL-MCNC: 5.1 G/DL (ref 3.5–5.2)
ALBUMIN/GLOB SERPL: 2.7 G/DL
ALP SERPL-CCNC: 95 U/L (ref 39–117)
ALT SERPL-CCNC: 61 U/L (ref 1–33)
AST SERPL-CCNC: 51 U/L (ref 1–32)
BASOPHILS # BLD AUTO: 0.04 10*3/MM3 (ref 0–0.2)
BASOPHILS NFR BLD AUTO: 0.8 % (ref 0–1.5)
BILIRUB SERPL-MCNC: 0.6 MG/DL (ref 0–1.2)
BUN SERPL-MCNC: 21 MG/DL (ref 8–23)
BUN/CREAT SERPL: 24.7 (ref 7–25)
CALCIUM SERPL-MCNC: 9.7 MG/DL (ref 8.6–10.5)
CHLORIDE SERPL-SCNC: 101 MMOL/L (ref 98–107)
CHOLEST SERPL-MCNC: 256 MG/DL (ref 0–200)
CO2 SERPL-SCNC: 24.9 MMOL/L (ref 22–29)
CREAT SERPL-MCNC: 0.85 MG/DL (ref 0.57–1)
EOSINOPHIL # BLD AUTO: 0.04 10*3/MM3 (ref 0–0.4)
EOSINOPHIL NFR BLD AUTO: 0.8 % (ref 0.3–6.2)
ERYTHROCYTE [DISTWIDTH] IN BLOOD BY AUTOMATED COUNT: 13.2 % (ref 12.3–15.4)
GLOBULIN SER CALC-MCNC: 1.9 GM/DL
GLUCOSE SERPL-MCNC: 91 MG/DL (ref 65–99)
HCT VFR BLD AUTO: 42 % (ref 34–46.6)
HDLC SERPL-MCNC: 95 MG/DL (ref 40–60)
HGB BLD-MCNC: 14.3 G/DL (ref 12–15.9)
IMM GRANULOCYTES # BLD AUTO: 0.07 10*3/MM3 (ref 0–0.05)
IMM GRANULOCYTES NFR BLD AUTO: 1.3 % (ref 0–0.5)
LDLC SERPL CALC-MCNC: 140 MG/DL (ref 0–100)
LDLC/HDLC SERPL: 1.47 {RATIO}
LYMPHOCYTES # BLD AUTO: 1.28 10*3/MM3 (ref 0.7–3.1)
LYMPHOCYTES NFR BLD AUTO: 24.5 % (ref 19.6–45.3)
MCH RBC QN AUTO: 33.6 PG (ref 26.6–33)
MCHC RBC AUTO-ENTMCNC: 34 G/DL (ref 31.5–35.7)
MCV RBC AUTO: 98.6 FL (ref 79–97)
MONOCYTES # BLD AUTO: 0.49 10*3/MM3 (ref 0.1–0.9)
MONOCYTES NFR BLD AUTO: 9.4 % (ref 5–12)
NEUTROPHILS # BLD AUTO: 3.3 10*3/MM3 (ref 1.7–7)
NEUTROPHILS NFR BLD AUTO: 63.2 % (ref 42.7–76)
NRBC BLD AUTO-RTO: 0 /100 WBC (ref 0–0.2)
PLATELET # BLD AUTO: 210 10*3/MM3 (ref 140–450)
POTASSIUM SERPL-SCNC: 4.4 MMOL/L (ref 3.5–5.2)
PROT SERPL-MCNC: 7 G/DL (ref 6–8.5)
RBC # BLD AUTO: 4.26 10*6/MM3 (ref 3.77–5.28)
SODIUM SERPL-SCNC: 139 MMOL/L (ref 136–145)
TRIGL SERPL-MCNC: 105 MG/DL (ref 0–150)
VLDLC SERPL CALC-MCNC: 21 MG/DL
WBC # BLD AUTO: 5.22 10*3/MM3 (ref 3.4–10.8)

## 2020-09-17 ENCOUNTER — HOSPITAL ENCOUNTER (EMERGENCY)
Facility: HOSPITAL | Age: 75
Discharge: LEFT BEFORE DISCHARGE | End: 2020-09-17

## 2020-09-17 VITALS
DIASTOLIC BLOOD PRESSURE: 84 MMHG | RESPIRATION RATE: 18 BRPM | OXYGEN SATURATION: 99 % | WEIGHT: 172 LBS | HEIGHT: 65 IN | HEART RATE: 92 BPM | SYSTOLIC BLOOD PRESSURE: 144 MMHG | TEMPERATURE: 96.8 F | BODY MASS INDEX: 28.66 KG/M2

## 2020-09-18 ENCOUNTER — PATIENT OUTREACH (OUTPATIENT)
Dept: CASE MANAGEMENT | Facility: OTHER | Age: 75
End: 2020-09-18

## 2020-09-18 ENCOUNTER — EPISODE CHANGES (OUTPATIENT)
Dept: CASE MANAGEMENT | Facility: OTHER | Age: 75
End: 2020-09-18

## 2020-09-18 NOTE — OUTREACH NOTE
Care Plan Note      Responses   Annual Wellness Visit:   Patient Has Completed   Care Gaps Addressed  Flu Shot, Colon Cancer Screening, Mammogram   Colon Cancer Screening Type  Colonoscopy   Colonoscopy Status  Up to Date (< 10 yrs)   Flu Shot Status  Patient will Complete   Mammogram Status  Up to Date   Specific Disease Process Teaching  Hypertension   Other Patient Education/Resources   24/7 [facility] Nurse Call Line, [place]   24/7 Nurse Call Line Education Method  Verbal   MyChart Education Method  Verbal   Advanced Directives:  Send Materials   Ed Visits past 12 months:  1   Hospitalizations past 12 months  None   Medication Adherence  Medications understood        The main concerns and/or symptoms the patient would like to address are: Talked with patient. Discussed 9/17/20  visit regarding finger laceration. Patient states to be taking Bactrim/Septra as directed; has 9/21/20 PCP appointment and has changed dressing to finger. She reports to have good sensation and movement to finger; no difficulty redness to site and no fever.  Patient lives with spouse and son; independent with ADL's; meal preparation;transportation and  ambulates without assistive device. Patient is compliant with medications; medical appointments and able to monitor blood pressure. She reports no difficulty with chest pain; SOB; appetite or sleeping. She sates to have receiving COVID antibody test from [facility] and is waiting for results.      Education/instruction provided by Care Coordinator: Reviewed with patient ED recommendations; s/sx of infection;monitor symptoms and physician contact for any questions or concerns;  COVID 19 precautions; 24/7 Nurse Line Telephone number; [facility] contact information; Advance Directives; My Chart; gaps in care; [facility] Planning and Support services.  Patient verbalized understanding and states to appreciate phone call.  No further questions or concerns voiced at this time.      Follow Up Outreach Due: Follow up as needed.     Zara Luna RN  Ambulatory     9/18/2020, 14:18 EDT

## 2020-09-21 ENCOUNTER — OFFICE VISIT (OUTPATIENT)
Dept: INTERNAL MEDICINE | Facility: CLINIC | Age: 75
End: 2020-09-21

## 2020-09-21 VITALS
OXYGEN SATURATION: 98 % | BODY MASS INDEX: 27.03 KG/M2 | SYSTOLIC BLOOD PRESSURE: 130 MMHG | DIASTOLIC BLOOD PRESSURE: 78 MMHG | TEMPERATURE: 96.9 F | HEIGHT: 67 IN | WEIGHT: 172.2 LBS | RESPIRATION RATE: 18 BRPM | HEART RATE: 94 BPM

## 2020-09-21 DIAGNOSIS — Z23 NEED FOR INFLUENZA VACCINATION: ICD-10-CM

## 2020-09-21 DIAGNOSIS — E78.2 MIXED HYPERLIPIDEMIA: ICD-10-CM

## 2020-09-21 DIAGNOSIS — Z00.00 MEDICARE ANNUAL WELLNESS VISIT, SUBSEQUENT: Primary | ICD-10-CM

## 2020-09-21 DIAGNOSIS — I10 ESSENTIAL HYPERTENSION: ICD-10-CM

## 2020-09-21 DIAGNOSIS — E88.81 INSULIN RESISTANCE: ICD-10-CM

## 2020-09-21 PROCEDURE — 90694 VACC AIIV4 NO PRSRV 0.5ML IM: CPT | Performed by: INTERNAL MEDICINE

## 2020-09-21 PROCEDURE — G0008 ADMIN INFLUENZA VIRUS VAC: HCPCS | Performed by: INTERNAL MEDICINE

## 2020-09-21 PROCEDURE — 96160 PT-FOCUSED HLTH RISK ASSMT: CPT | Performed by: INTERNAL MEDICINE

## 2020-09-21 PROCEDURE — 99214 OFFICE O/P EST MOD 30 MIN: CPT | Performed by: INTERNAL MEDICINE

## 2020-09-21 PROCEDURE — G0439 PPPS, SUBSEQ VISIT: HCPCS | Performed by: INTERNAL MEDICINE

## 2020-09-21 PROCEDURE — 99397 PER PM REEVAL EST PAT 65+ YR: CPT | Performed by: INTERNAL MEDICINE

## 2020-09-21 NOTE — PROGRESS NOTES
The ABCs of the Annual Wellness Visit  Subsequent Medicare Wellness Visit    Chief Complaint   Patient presents with   • Follow-up   • Hyperlipidemia   • Laceration   • Medicare Wellness-subsequent       Subjective   History of Present Illness:  Germaine Gonzalez is a 75 y.o. female who presents for a Subsequent Medicare Wellness Visit.    HEALTH RISK ASSESSMENT    Recent Hospitalizations:  No hospitalization(s) within the last year.    Current Medical Providers:  Patient Care Team:  Roberta Boswell MD as PCP - General (Internal Medicine & Pediatrics)  Laura, Bc Cabrera MD as Consulting Physician (Gastroenterology)    Smoking Status:  Social History     Tobacco Use   Smoking Status Former Smoker   • Packs/day: 0.50   • Years: 16.00   • Pack years: 8.00   • Types: Cigarettes   • Quit date: 1990   • Years since quittin.4   Smokeless Tobacco Never Used       Alcohol Consumption:  Social History     Substance and Sexual Activity   Alcohol Use Yes   • Alcohol/week: 2.0 standard drinks   • Types: 2 Glasses of wine per week       Depression Screen:   PHQ-2/PHQ-9 Depression Screening 2020   Little interest or pleasure in doing things 0   Feeling down, depressed, or hopeless 0   Trouble falling or staying asleep, or sleeping too much 0   Feeling tired or having little energy 0   Poor appetite or overeating 0   Feeling bad about yourself - or that you are a failure or have let yourself or your family down 0   Trouble concentrating on things, such as reading the newspaper or watching television 0   Moving or speaking so slowly that other people could have noticed. Or the opposite - being so fidgety or restless that you have been moving around a lot more than usual 0   Thoughts that you would be better off dead, or of hurting yourself in some way 0   Total Score 0   If you checked off any problems, how difficult have these problems made it for you to do your work, take care of things at home, or get  along with other people? Not difficult at all       Fall Risk Screen:  JEREMY Fall Risk Assessment has not been completed.   Fall Risk Assessment was completed, and patient is at moderate risk for falls.    Health Habits and Functional and Cognitive Screening:  Functional & Cognitive Status 9/21/2020   Do you have difficulty preparing food and eating? No   Do you have difficulty bathing yourself, getting dressed or grooming yourself? No   Do you have difficulty using the toilet? No   Do you have difficulty moving around from place to place? No   Do you have trouble with steps or getting out of a bed or a chair? No   Current Diet Well Balanced Diet   Dental Exam Up to date   Eye Exam Up to date   Exercise (times per week) 5 times per week   Current Exercise Activities Include Walking   Do you need help using the phone?  No   Are you deaf or do you have serious difficulty hearing?  No   Do you need help with transportation? No   Do you need help shopping? No   Do you need help preparing meals?  No   Do you need help with housework?  No   Do you need help with laundry? No   Do you need help taking your medications? No   Do you need help managing money? No   Do you ever drive or ride in a car without wearing a seat belt? No   Have you felt unusual stress, anger or loneliness in the last month? No   Who do you live with? Spouse   If you need help, do you have trouble finding someone available to you? No   Have you been bothered in the last four weeks by sexual problems? No   Do you have difficulty concentrating, remembering or making decisions? No         Does the patient have evidence of cognitive impairment? No    Asprin use counseling:Does not need ASA (and currently is not on it)    Age-appropriate Screening Schedule:  Refer to the list below for future screening recommendations based on patient's age, sex and/or medical conditions. Orders for these recommended tests are listed in the plan section. The patient has  been provided with a written plan.    Health Maintenance   Topic Date Due   • INFLUENZA VACCINE  08/01/2020   • COLONOSCOPY  03/02/2021   • LIPID PANEL  09/14/2021   • MAMMOGRAM  02/24/2022   • TDAP/TD VACCINES (2 - Td) 01/01/2025   • ZOSTER VACCINE  Completed          The following portions of the patient's history were reviewed and updated as appropriate: allergies, current medications, past family history, past medical history, past social history, past surgical history and problem list.    Outpatient Medications Prior to Visit   Medication Sig Dispense Refill   • Biotin 5000 MCG tablet Take  by mouth Daily.     • calcium carbonate (OS-ULI) 600 MG tablet Take 600 mg by mouth 2 (Two) Times a Day With Meals.     • cycloSPORINE (RESTASIS) 0.05 % ophthalmic emulsion Administer 1 drop to both eyes 2 (Two) Times a Day.     • EPINEPHrine (EPIPEN) 0.3 MG/0.3ML solution auto-injector injection epinephrine 0.3 mg/0.3 mL injection, auto-injector     • escitalopram (LEXAPRO) 10 MG tablet TAKE 1/2 (ONE-HALF) TABLET BY MOUTH IN THE MORNING 45 tablet 2   • estradiol (ESTRACE) 0.1 MG/GM vaginal cream      • fluticasone (FLONASE) 50 MCG/ACT nasal spray 2 sprays into the nostril(s) as directed by provider Daily. 3 bottle 1   • glucosamine-chondroitin 500-400 MG capsule capsule Take 1 capsule by mouth 2 (Two) Times a Day With Meals.     • lisinopril (PRINIVIL,ZESTRIL) 10 MG tablet Take 1 tablet by mouth Daily. 90 tablet 3   • omeprazole (priLOSEC) 40 MG capsule Take 1 capsule by mouth once daily 90 capsule 0   • pseudoephedrine (Sudafed) 30 MG tablet Take 1 tablet by mouth Every 4 (Four) Hours As Needed for Congestion. 98 tablet 1   • rosuvastatin (CRESTOR) 10 MG tablet Take 1 tablet by mouth Daily. 90 tablet 3   • sulfamethoxazole-trimethoprim (BACTRIM DS,SEPTRA DS) 800-160 MG per tablet Take 1 tablet by mouth 2 (Two) Times a Day for 7 days. 14 tablet 0   • vitamin B-12 (CYANOCOBALAMIN) 1000 MCG tablet Take 5,000 mcg by mouth  "Daily.     • acetaminophen-codeine (TYLENOL #3) 300-30 MG per tablet Take 1 tablet by mouth Every 4 (Four) Hours As Needed for Moderate Pain  for up to 3 days. 12 tablet 0   • levocetirizine (XYZAL) 5 MG tablet Take 1 tablet by mouth Every Evening.       No facility-administered medications prior to visit.        Patient Active Problem List   Diagnosis   • Gastroesophageal reflux disease   • Hyperlipidemia   • Pyelonephritis   • Sjogren's syndrome with keratoconjunctivitis sicca (CMS/HCC)   • Mcgill's esophagus without dysplasia   • Hyperglycemia   • WALSH (nonalcoholic steatohepatitis)   • Pure hypercholesterolemia   • Knee pain   • Primary osteoarthritis of left knee       Advanced Care Planning:  ACP discussion was held with the patient during this visit. Patient has an advance directive (not in EMR), copy requested.    Review of Systems   Constitutional: Negative.    HENT: Negative.    Eyes: Negative.    Respiratory: Negative.    Cardiovascular: Negative.    Gastrointestinal: Negative.    Endocrine: Negative.    Genitourinary: Negative.    Musculoskeletal: Negative.    Skin: Negative.    Allergic/Immunologic: Negative.    Neurological: Negative.    Hematological: Negative.    Psychiatric/Behavioral: Negative.    All other systems reviewed and are negative.      Compared to one year ago, the patient feels her physical health is the same.  Compared to one year ago, the patient feels her mental health is the same.    Reviewed chart for potential of high risk medication in the elderly: yes  Reviewed chart for potential of harmful drug interactions in the elderly:yes    Objective         Vitals:    09/21/20 1011   BP: 130/78   BP Location: Left arm   Patient Position: Sitting   Cuff Size: Adult   Pulse: 94   Resp: 18   Temp: 96.9 °F (36.1 °C)   TempSrc: Temporal   SpO2: 98%   Weight: 78.1 kg (172 lb 3.2 oz)   Height: 170.2 cm (67.01\")       Body mass index is 26.96 kg/m².  Discussed the patient's BMI with her. The " BMI is above average; no BMI management plan is appropriate..    Physical Exam  Vitals signs and nursing note reviewed.   Constitutional:       General: She is not in acute distress.     Appearance: She is well-developed.   HENT:      Head: Normocephalic and atraumatic.      Right Ear: External ear normal.      Left Ear: External ear normal.      Nose: Nose normal.   Eyes:      Conjunctiva/sclera: Conjunctivae normal.      Pupils: Pupils are equal, round, and reactive to light.   Neck:      Musculoskeletal: Normal range of motion and neck supple.   Cardiovascular:      Rate and Rhythm: Normal rate and regular rhythm.      Heart sounds: Normal heart sounds.   Pulmonary:      Effort: Pulmonary effort is normal. No respiratory distress.      Breath sounds: Normal breath sounds. No wheezing.   Musculoskeletal: Normal range of motion.      Comments: Normal gait   Skin:     General: Skin is warm and dry.   Neurological:      Mental Status: She is alert and oriented to person, place, and time.   Psychiatric:         Behavior: Behavior normal.         Thought Content: Thought content normal.         Judgment: Judgment normal.         Lab Results   Component Value Date    GLU 91 09/14/2020    CHLPL 256 (H) 09/14/2020    TRIG 105 09/14/2020    HDL 95 (H) 09/14/2020     (H) 09/14/2020    VLDL 21 09/14/2020        Assessment/Plan   Medicare Risks and Personalized Health Plan  CMS Preventative Services Quick Reference  Advance Directive Discussion  Breast Cancer/Mammogram Screening  Colon Cancer Screening  Immunizations Discussed/Encouraged (specific immunizations; Influenza )  Obesity/Overweight   Osteoprorosis Risk  Polypharmacy    The above risks/problems have been discussed with the patient.  Pertinent information has been shared with the patient in the After Visit Summary.  Follow up plans and orders are seen below in the Assessment/Plan Section.    Diagnoses and all orders for this visit:    1. Medicare annual  wellness visit, subsequent (Primary)    2. Need for influenza vaccination  -     Fluad Quad >65 years    3. Mixed hyperlipidemia    4. Essential hypertension    5. Insulin resistance      Follow Up:  Return in about 6 months (around 3/21/2021) for Recheck, labs.     An After Visit Summary and PPPS were given to the patient.

## 2020-09-21 NOTE — PROGRESS NOTES
Germaine Gonzalez is a 75 y.o. female, who presents with a chief complaint of   Chief Complaint   Patient presents with   • Follow-up   • Hyperlipidemia   • Laceration   • Medicare Wellness-subsequent       HPI   Pt here for f/u and medicare wellness    She cut her left index finger with a rotary . [place] went [place] [facility] [place] she shaved a large part of the side off they couldn't do stitches.  She has a large scab.     She twisted her knee after a fall in the garden.  Saw ortho and doing ok at this time.     HTN - well controlled.  No ha/dizziness.     HLD - on statin.  No cramps or myalgias.     Insulin resistance - fasting glucoses normal on latest labs.  She is trying to eat a healthy diet.       The following portions of the patient's history were reviewed and updated as appropriate: allergies, current medications, past family history, past medical history, past social history, past surgical history and problem list.    Allergies: Levaquin [levofloxacin] and Tetracycline    Review of Systems   Constitutional: Negative.    HENT: Negative.    Eyes: Negative.    Respiratory: Negative.    Cardiovascular: Negative.    Gastrointestinal: Negative.    Endocrine: Negative.    Genitourinary: Negative.    Musculoskeletal: Negative.    Skin: Negative.    Allergic/Immunologic: Negative.    Neurological: Negative.    Hematological: Negative.    Psychiatric/Behavioral: Negative.    All other systems reviewed and are negative.            Wt Readings from Last 3 Encounters:   09/21/20 78.1 kg (172 lb 3.2 oz)   09/19/20 78 kg (172 lb)   09/17/20 78 kg (172 lb)     Temp Readings from Last 3 Encounters:   09/21/20 96.9 °F (36.1 °C) (Temporal)   09/19/20 97.8 °F (36.6 °C) (Infrared)   09/17/20 98.2 °F (36.8 °C) (Temporal)     BP Readings from Last 3 Encounters:   09/21/20 130/78   09/19/20 143/75   09/17/20 148/92     Pulse Readings from Last 3 Encounters:   09/21/20 94   09/19/20 90   09/17/20 85     Body mass index is 26.96  kg/m².  @LASTSAO2(3)@    Physical Exam  Vitals signs and nursing note reviewed.   Constitutional:       General: She is not in acute distress.     Appearance: She is well-developed.   HENT:      Head: Normocephalic and atraumatic.      Right Ear: External ear normal.      Left Ear: External ear normal.      Nose: Nose normal.   Eyes:      Conjunctiva/sclera: Conjunctivae normal.      Pupils: Pupils are equal, round, and reactive to light.   Neck:      Musculoskeletal: Normal range of motion and neck supple.   Cardiovascular:      Rate and Rhythm: Normal rate and regular rhythm.      Heart sounds: Normal heart sounds.   Pulmonary:      Effort: Pulmonary effort is normal. No respiratory distress.      Breath sounds: Normal breath sounds. No wheezing.   Musculoskeletal: Normal range of motion.      Comments: Normal gait   Skin:     General: Skin is warm and dry.   Neurological:      Mental Status: She is alert and oriented to person, place, and time.   Psychiatric:         Behavior: Behavior normal.         Thought Content: Thought content normal.         Judgment: Judgment normal.         Results for orders placed or performed in visit on 09/14/20   Comprehensive Metabolic Panel    Specimen: Blood   Result Value Ref Range    Glucose 91 65 - 99 mg/dL    BUN 21 8 - 23 mg/dL    Creatinine 0.85 0.57 - 1.00 mg/dL    eGFR Non African Am 66 >60 mL/min/1.73    eGFR African Am 79 >60 mL/min/1.73    BUN/Creatinine Ratio 24.7 7.0 - 25.0    Sodium 139 136 - 145 mmol/L    Potassium 4.4 3.5 - 5.2 mmol/L    Chloride 101 98 - 107 mmol/L    Total CO2 24.9 22.0 - 29.0 mmol/L    Calcium 9.7 8.6 - 10.5 mg/dL    Total Protein 7.0 6.0 - 8.5 g/dL    Albumin 5.10 3.50 - 5.20 g/dL    Globulin 1.9 gm/dL    A/G Ratio 2.7 g/dL    Total Bilirubin 0.6 0.0 - 1.2 mg/dL    Alkaline Phosphatase 95 39 - 117 U/L    AST (SGOT) 51 (H) 1 - 32 U/L    ALT (SGPT) 61 (H) 1 - 33 U/L   Lipid Panel With LDL / HDL Ratio    Specimen: Blood   Result Value Ref Range     Total Cholesterol 256 (H) 0 - 200 mg/dL    Triglycerides 105 0 - 150 mg/dL    HDL Cholesterol 95 (H) 40 - 60 mg/dL    VLDL Cholesterol Phil 21 mg/dL    LDL Chol Calc (NIH) 140 (H) 0 - 100 mg/dL    LDL/HDL RATIO 1.47    CBC & Differential    Specimen: Blood   Result Value Ref Range    WBC 5.22 3.40 - 10.80 10*3/mm3    RBC 4.26 3.77 - 5.28 10*6/mm3    Hemoglobin 14.3 12.0 - 15.9 g/dL    Hematocrit 42.0 34.0 - 46.6 %    MCV 98.6 (H) 79.0 - 97.0 fL    MCH 33.6 (H) 26.6 - 33.0 pg    MCHC 34.0 31.5 - 35.7 g/dL    RDW 13.2 12.3 - 15.4 %    Platelets 210 140 - 450 10*3/mm3    Neutrophil Rel % 63.2 42.7 - 76.0 %    Lymphocyte Rel % 24.5 19.6 - 45.3 %    Monocyte Rel % 9.4 5.0 - 12.0 %    Eosinophil Rel % 0.8 0.3 - 6.2 %    Basophil Rel % 0.8 0.0 - 1.5 %    Neutrophils Absolute 3.30 1.70 - 7.00 10*3/mm3    Lymphocytes Absolute 1.28 0.70 - 3.10 10*3/mm3    Monocytes Absolute 0.49 0.10 - 0.90 10*3/mm3    Eosinophils Absolute 0.04 0.00 - 0.40 10*3/mm3    Basophils Absolute 0.04 0.00 - 0.20 10*3/mm3    Immature Granulocyte Rel % 1.3 (H) 0.0 - 0.5 %    Immature Grans Absolute 0.07 (H) 0.00 - 0.05 10*3/mm3    nRBC 0.0 0.0 - 0.2 /100 WBC           Germaine was seen today for follow-up, hyperlipidemia, laceration and medicare wellness-subsequent.    Diagnoses and all orders for this visit:    Medicare annual wellness visit, subsequent    Need for influenza vaccination  -     Fluad Quad >65 years    Mixed hyperlipidemia    Essential hypertension    Insulin resistance      Continue current medications.  Encouraged healthy diet/exercise.  OV labs in 6   months.  Pt to call sooner if any other issues arise.        Outpatient Medications Prior to Visit   Medication Sig Dispense Refill   • Biotin 5000 MCG tablet Take  by mouth Daily.     • calcium carbonate (OS-PHIL) 600 MG tablet Take 600 mg by mouth 2 (Two) Times a Day With Meals.     • cycloSPORINE (RESTASIS) 0.05 % ophthalmic emulsion Administer 1 drop to both eyes 2 (Two) Times a Day.      • EPINEPHrine (EPIPEN) 0.3 MG/0.3ML solution auto-injector injection epinephrine 0.3 mg/0.3 mL injection, auto-injector     • escitalopram (LEXAPRO) 10 MG tablet TAKE 1/2 (ONE-HALF) TABLET BY MOUTH IN THE MORNING 45 tablet 2   • estradiol (ESTRACE) 0.1 MG/GM vaginal cream      • fluticasone (FLONASE) 50 MCG/ACT nasal spray 2 sprays into the nostril(s) as directed by provider Daily. 3 bottle 1   • glucosamine-chondroitin 500-400 MG capsule capsule Take 1 capsule by mouth 2 (Two) Times a Day With Meals.     • lisinopril (PRINIVIL,ZESTRIL) 10 MG tablet Take 1 tablet by mouth Daily. 90 tablet 3   • omeprazole (priLOSEC) 40 MG capsule Take 1 capsule by mouth once daily 90 capsule 0   • pseudoephedrine (Sudafed) 30 MG tablet Take 1 tablet by mouth Every 4 (Four) Hours As Needed for Congestion. 98 tablet 1   • rosuvastatin (CRESTOR) 10 MG tablet Take 1 tablet by mouth Daily. 90 tablet 3   • sulfamethoxazole-trimethoprim (BACTRIM DS,SEPTRA DS) 800-160 MG per tablet Take 1 tablet by mouth 2 (Two) Times a Day for 7 days. 14 tablet 0   • vitamin B-12 (CYANOCOBALAMIN) 1000 MCG tablet Take 5,000 mcg by mouth Daily.     • acetaminophen-codeine (TYLENOL #3) 300-30 MG per tablet Take 1 tablet by mouth Every 4 (Four) Hours As Needed for Moderate Pain  for up to 3 days. 12 tablet 0   • levocetirizine (XYZAL) 5 MG tablet Take 1 tablet by mouth Every Evening.       No facility-administered medications prior to visit.      No orders of the defined types were placed in this encounter.    [unfilled]  There are no discontinued medications.      Return in about 6 months (around 3/21/2021) for Recheck, labs.

## 2020-09-21 NOTE — PATIENT INSTRUCTIONS
Medicare Wellness  Personal Prevention Plan of Service     Date of Office Visit:  2020  Encounter Provider:  Roberta Boswell MD  Place of Service:  [facility]  Patient Name: Germaine Gonzalez  :  1945    As part of the Medicare Wellness portion of your visit today, we are providing you with this personalized preventive plan of services (PPPS). This plan is based upon recommendations of the [place] Preventive Services Task Force (USPSTF) and the Advisory Committee on Immunization Practices (ACIP).    This lists the preventive care services that should be considered, and provides dates of when you are due. Items listed as completed are up-to-date and do not require any further intervention.    Health Maintenance   Topic Date Due   • INFLUENZA VACCINE  2020   • MEDICARE ANNUAL WELLNESS  2020   • COLONOSCOPY  2021   • LIPID PANEL  2021   • MAMMOGRAM  2022   • TDAP/TD VACCINES (2 - Td) 2025   • HEPATITIS C SCREENING  Completed   • Pneumococcal Vaccine Once at 65 Years Old  Completed   • ZOSTER VACCINE  Completed       Orders Placed This Encounter   Procedures   • Fluad Quad >65 years       Return in about 6 months (around 3/21/2021) for Recheck, labs.

## 2020-09-27 DIAGNOSIS — E78.2 MIXED HYPERLIPIDEMIA: ICD-10-CM

## 2020-09-28 RX ORDER — ROSUVASTATIN CALCIUM 10 MG/1
TABLET, COATED ORAL
Qty: 90 TABLET | Refills: 0 | Status: SHIPPED | OUTPATIENT
Start: 2020-09-28 | End: 2020-12-14

## 2020-11-11 RX ORDER — PSEUDOEPHEDRINE HCL 30 MG
30 TABLET ORAL EVERY 4 HOURS PRN
Qty: 96 TABLET | Refills: 1 | Status: SHIPPED | OUTPATIENT
Start: 2020-11-11 | End: 2020-11-11

## 2020-11-11 RX ORDER — PSEUDOEPHEDRINE HCL 30 MG
TABLET ORAL
Qty: 48 TABLET | Refills: 0 | Status: SHIPPED | OUTPATIENT
Start: 2020-11-11 | End: 2022-02-01

## 2020-11-12 RX ORDER — OMEPRAZOLE 40 MG/1
CAPSULE, DELAYED RELEASE ORAL
Qty: 90 CAPSULE | Refills: 3 | Status: SHIPPED | OUTPATIENT
Start: 2020-11-12 | End: 2021-11-08

## 2020-11-25 DIAGNOSIS — I10 ESSENTIAL HYPERTENSION: ICD-10-CM

## 2020-11-25 RX ORDER — LISINOPRIL 10 MG/1
TABLET ORAL
Qty: 90 TABLET | Refills: 3 | Status: SHIPPED | OUTPATIENT
Start: 2020-11-25 | End: 2021-02-17 | Stop reason: SDUPTHER

## 2020-12-07 ENCOUNTER — OFFICE VISIT (OUTPATIENT)
Dept: INTERNAL MEDICINE | Facility: CLINIC | Age: 75
End: 2020-12-07

## 2020-12-07 VITALS
SYSTOLIC BLOOD PRESSURE: 132 MMHG | WEIGHT: 175 LBS | TEMPERATURE: 97.8 F | HEIGHT: 67 IN | OXYGEN SATURATION: 98 % | BODY MASS INDEX: 27.47 KG/M2 | HEART RATE: 91 BPM | DIASTOLIC BLOOD PRESSURE: 64 MMHG | RESPIRATION RATE: 18 BRPM

## 2020-12-07 DIAGNOSIS — G89.29 CHRONIC PAIN OF BOTH KNEES: Primary | ICD-10-CM

## 2020-12-07 DIAGNOSIS — M25.562 CHRONIC PAIN OF BOTH KNEES: Primary | ICD-10-CM

## 2020-12-07 DIAGNOSIS — M25.561 CHRONIC PAIN OF BOTH KNEES: Primary | ICD-10-CM

## 2020-12-07 PROCEDURE — 99213 OFFICE O/P EST LOW 20 MIN: CPT | Performed by: INTERNAL MEDICINE

## 2020-12-07 PROCEDURE — 20610 DRAIN/INJ JOINT/BURSA W/O US: CPT | Performed by: INTERNAL MEDICINE

## 2020-12-07 RX ORDER — LIDOCAINE HYDROCHLORIDE 10 MG/ML
1 INJECTION, SOLUTION INFILTRATION; PERINEURAL
Status: COMPLETED | OUTPATIENT
Start: 2020-12-07 | End: 2020-12-07

## 2020-12-07 RX ORDER — NITROFURANTOIN 25; 75 MG/1; MG/1
CAPSULE ORAL
COMMUNITY
End: 2021-02-17

## 2020-12-07 RX ORDER — TRIAMCINOLONE ACETONIDE 40 MG/ML
80 INJECTION, SUSPENSION INTRA-ARTICULAR; INTRAMUSCULAR
Status: COMPLETED | OUTPATIENT
Start: 2020-12-07 | End: 2020-12-07

## 2020-12-07 RX ADMIN — LIDOCAINE HYDROCHLORIDE 1 ML: 10 INJECTION, SOLUTION INFILTRATION; PERINEURAL at 09:14

## 2020-12-07 RX ADMIN — TRIAMCINOLONE ACETONIDE 80 MG: 40 INJECTION, SUSPENSION INTRA-ARTICULAR; INTRAMUSCULAR at 09:14

## 2020-12-07 NOTE — PROGRESS NOTES
Germaine Gonzalez is a 75 y.o. female, who presents with a chief complaint of   Chief Complaint   Patient presents with   • Knee Pain     bi lateral       HPI   Pt here for follow up on her knee pain.  She has known knee arthritis and chronic knee pain.  The pain has been worse lately.  She does not want knee replacement surgery yet.  Her left knee is worse than the right.  She needs both knees injected again.  Last injection >3 mo ago. Glucoses have been normal.       The following portions of the patient's history were reviewed and updated as appropriate: allergies, current medications, past family history, past medical history, past social history, past surgical history and problem list.    Allergies: Levaquin [levofloxacin] and Tetracycline    Review of Systems   Constitutional: Negative.    HENT: Negative.    Eyes: Negative.    Respiratory: Negative.    Cardiovascular: Negative.    Gastrointestinal: Negative.    Endocrine: Negative.    Genitourinary: Negative.    Musculoskeletal:        Knee pain   Skin: Negative.    Allergic/Immunologic: Negative.    Neurological: Negative.    Hematological: Negative.    Psychiatric/Behavioral: Negative.    All other systems reviewed and are negative.            Wt Readings from Last 3 Encounters:   12/07/20 79.4 kg (175 lb)   09/21/20 78.1 kg (172 lb 3.2 oz)   09/19/20 78 kg (172 lb)     Temp Readings from Last 3 Encounters:   12/07/20 97.8 °F (36.6 °C) (Temporal)   09/21/20 96.9 °F (36.1 °C) (Temporal)   09/19/20 97.8 °F (36.6 °C) (Infrared)     BP Readings from Last 3 Encounters:   12/07/20 132/64   09/21/20 130/78   09/19/20 143/75     Pulse Readings from Last 3 Encounters:   12/07/20 91   09/21/20 94   09/19/20 90     Body mass index is 27.4 kg/m².    @LASTSAO2(3)@    Physical Exam  Vitals signs and nursing note reviewed.   Constitutional:       General: She is not in acute distress.     Appearance: She is well-developed.   HENT:      Head: Normocephalic and atraumatic.       Right Ear: External ear normal.      Left Ear: External ear normal.      Nose: Nose normal.   Eyes:      Conjunctiva/sclera: Conjunctivae normal.      Pupils: Pupils are equal, round, and reactive to light.   Neck:      Musculoskeletal: Normal range of motion and neck supple.   Cardiovascular:      Rate and Rhythm: Normal rate and regular rhythm.      Heart sounds: Normal heart sounds.   Pulmonary:      Effort: Pulmonary effort is normal. No respiratory distress.      Breath sounds: Normal breath sounds. No wheezing.   Musculoskeletal: Normal range of motion.      Comments: Normal gait   Skin:     General: Skin is warm and dry.   Neurological:      Mental Status: She is alert and oriented to person, place, and time.   Psychiatric:         Behavior: Behavior normal.         Thought Content: Thought content normal.         Judgment: Judgment normal.       Arthrocentesis - bilateral     Date/Time: 12/7/2020 9:14 AM  Performed by: Roberta Boswell MD  Authorized by: Roberta Boswell MD   Consent: Verbal consent obtained.  Risks and benefits: risks, benefits and alternatives were discussed  Consent given by: patient  Patient understanding: patient states understanding of the procedure being performed  Patient identity confirmed: verbally with patient  Indications: pain   Location: bilateral knees.  Needle gauge: 25g.  Approach: lateral  Patient tolerance: patient tolerated the procedure well with no immediate complications        Results for orders placed or performed in visit on 09/14/20   Comprehensive Metabolic Panel    Specimen: Blood   Result Value Ref Range    Glucose 91 65 - 99 mg/dL    BUN 21 8 - 23 mg/dL    Creatinine 0.85 0.57 - 1.00 mg/dL    eGFR Non African Am 66 >60 mL/min/1.73    eGFR African Am 79 >60 mL/min/1.73    BUN/Creatinine Ratio 24.7 7.0 - 25.0    Sodium 139 136 - 145 mmol/L    Potassium 4.4 3.5 - 5.2 mmol/L    Chloride 101 98 - 107 mmol/L    Total CO2 24.9 22.0 - 29.0 mmol/L     Calcium 9.7 8.6 - 10.5 mg/dL    Total Protein 7.0 6.0 - 8.5 g/dL    Albumin 5.10 3.50 - 5.20 g/dL    Globulin 1.9 gm/dL    A/G Ratio 2.7 g/dL    Total Bilirubin 0.6 0.0 - 1.2 mg/dL    Alkaline Phosphatase 95 39 - 117 U/L    AST (SGOT) 51 (H) 1 - 32 U/L    ALT (SGPT) 61 (H) 1 - 33 U/L   Lipid Panel With LDL / HDL Ratio    Specimen: Blood   Result Value Ref Range    Total Cholesterol 256 (H) 0 - 200 mg/dL    Triglycerides 105 0 - 150 mg/dL    HDL Cholesterol 95 (H) 40 - 60 mg/dL    VLDL Cholesterol Phil 21 mg/dL    LDL Chol Calc (NIH) 140 (H) 0 - 100 mg/dL    LDL/HDL RATIO 1.47    CBC & Differential    Specimen: Blood   Result Value Ref Range    WBC 5.22 3.40 - 10.80 10*3/mm3    RBC 4.26 3.77 - 5.28 10*6/mm3    Hemoglobin 14.3 12.0 - 15.9 g/dL    Hematocrit 42.0 34.0 - 46.6 %    MCV 98.6 (H) 79.0 - 97.0 fL    MCH 33.6 (H) 26.6 - 33.0 pg    MCHC 34.0 31.5 - 35.7 g/dL    RDW 13.2 12.3 - 15.4 %    Platelets 210 140 - 450 10*3/mm3    Neutrophil Rel % 63.2 42.7 - 76.0 %    Lymphocyte Rel % 24.5 19.6 - 45.3 %    Monocyte Rel % 9.4 5.0 - 12.0 %    Eosinophil Rel % 0.8 0.3 - 6.2 %    Basophil Rel % 0.8 0.0 - 1.5 %    Neutrophils Absolute 3.30 1.70 - 7.00 10*3/mm3    Lymphocytes Absolute 1.28 0.70 - 3.10 10*3/mm3    Monocytes Absolute 0.49 0.10 - 0.90 10*3/mm3    Eosinophils Absolute 0.04 0.00 - 0.40 10*3/mm3    Basophils Absolute 0.04 0.00 - 0.20 10*3/mm3    Immature Granulocyte Rel % 1.3 (H) 0.0 - 0.5 %    Immature Grans Absolute 0.07 (H) 0.00 - 0.05 10*3/mm3    nRBC 0.0 0.0 - 0.2 /100 WBC           Diagnoses and all orders for this visit:    1. Chronic pain of both knees (Primary)  -     Arthrocentesis - bilateral     Other orders  -     Cancel: Injection Tendon or Ligament    cont nsaids/tylenol prn for knee pain    Outpatient Medications Prior to Visit   Medication Sig Dispense Refill   • Biotin 5000 MCG tablet Take  by mouth Daily.     • calcium carbonate (OS-PHIL) 600 MG tablet Take 600 mg by mouth 2 (Two) Times a Day  With Meals.     • cycloSPORINE (RESTASIS) 0.05 % ophthalmic emulsion Administer 1 drop to both eyes 2 (Two) Times a Day.     • EPINEPHrine (EPIPEN) 0.3 MG/0.3ML solution auto-injector injection epinephrine 0.3 mg/0.3 mL injection, auto-injector     • escitalopram (LEXAPRO) 10 MG tablet TAKE 1/2 (ONE-HALF) TABLET BY MOUTH IN THE MORNING 45 tablet 2   • estradiol (ESTRACE) 0.1 MG/GM vaginal cream      • fluticasone (FLONASE) 50 MCG/ACT nasal spray 2 sprays into the nostril(s) as directed by provider Daily. 3 bottle 1   • glucosamine-chondroitin 500-400 MG capsule capsule Take 1 capsule by mouth 2 (Two) Times a Day With Meals.     • levocetirizine (XYZAL) 5 MG tablet Take 1 tablet by mouth Every Evening.     • lisinopril (PRINIVIL,ZESTRIL) 10 MG tablet Take 1 tablet by mouth once daily 90 tablet 3   • nitrofurantoin, macrocrystal-monohydrate, (MACROBID) 100 MG capsule nitrofurantoin monohydrate/macrocrystals 100 mg capsule     • omeprazole (priLOSEC) 40 MG capsule Take 1 capsule by mouth once daily 90 capsule 3   • pseudoephedrine (SUDAFED) 30 MG tablet TAKE 1 TO 2 TABLETS BY MOUTH EVERY 6 HOURS 48 tablet 0   • Pseudoephedrine-DM-GG (SUDAFED COUGH PO) TAKE 1 TABLET BY MOUTH EVERY 4 HOURS AS NEEDED FOR CONGESTION     • rosuvastatin (CRESTOR) 10 MG tablet Take 1 tablet by mouth once daily 90 tablet 0   • vitamin B-12 (CYANOCOBALAMIN) 1000 MCG tablet Take 5,000 mcg by mouth Daily.       No facility-administered medications prior to visit.      No orders of the defined types were placed in this encounter.    [unfilled]  There are no discontinued medications.      Return in about 3 months (around 3/7/2021) for Recheck, labs.

## 2020-12-13 DIAGNOSIS — E78.2 MIXED HYPERLIPIDEMIA: ICD-10-CM

## 2020-12-14 RX ORDER — ROSUVASTATIN CALCIUM 10 MG/1
TABLET, COATED ORAL
Qty: 90 TABLET | Refills: 3 | Status: SHIPPED | OUTPATIENT
Start: 2020-12-14 | End: 2021-09-22 | Stop reason: SDUPTHER

## 2020-12-18 ENCOUNTER — TELEPHONE (OUTPATIENT)
Dept: GASTROENTEROLOGY | Facility: CLINIC | Age: 75
End: 2020-12-18

## 2021-01-05 ENCOUNTER — PREP FOR SURGERY (OUTPATIENT)
Dept: OTHER | Facility: HOSPITAL | Age: 76
End: 2021-01-05

## 2021-01-05 DIAGNOSIS — Z12.11 ENCOUNTER FOR SCREENING FOR MALIGNANT NEOPLASM OF COLON: Primary | ICD-10-CM

## 2021-01-05 DIAGNOSIS — Z86.010 PERSONAL HISTORY OF COLONIC POLYPS: ICD-10-CM

## 2021-01-07 PROBLEM — Z86.0100 PERSONAL HISTORY OF COLONIC POLYPS: Status: ACTIVE | Noted: 2021-01-07

## 2021-01-07 PROBLEM — Z12.11 ENCOUNTER FOR SCREENING FOR MALIGNANT NEOPLASM OF COLON: Status: ACTIVE | Noted: 2021-01-07

## 2021-01-07 PROBLEM — Z86.010 PERSONAL HISTORY OF COLONIC POLYPS: Status: ACTIVE | Noted: 2021-01-07

## 2021-01-07 NOTE — TELEPHONE ENCOUNTER
CALLED AND SPOKE WITH PATIENT.  SCHEDULED AT [place] 04/30/2021 AT 10:15AM - ARRIVE 9AM.  WILL MAIL INSTRUCTIONS.    COVID TEST ON 04/28/2021, WILL CALL WITH TIME.  NEED TO SELF QUARANTINE UNTIL AFTER PROCEDURE.  SHE UNDERSTANDS.

## 2021-01-18 NOTE — TELEPHONE ENCOUNTER
RESCHEDULED COLONOSCOPY ON 04/30/2021, BECAUSE DR. VASQUES WILL BE OUT OF TOWN THAT DAY.    MOVE TO [place] 05/10/2021AT 11:45AM - ARRIVE 10:30AM.  WILL MAIL NEW INSTRUCTIONS.

## 2021-01-26 ENCOUNTER — TRANSCRIBE ORDERS (OUTPATIENT)
Dept: ADMINISTRATIVE | Facility: HOSPITAL | Age: 76
End: 2021-01-26

## 2021-01-26 DIAGNOSIS — Z12.39 SCREENING BREAST EXAMINATION: Primary | ICD-10-CM

## 2021-01-29 ENCOUNTER — TRANSCRIBE ORDERS (OUTPATIENT)
Dept: ADMINISTRATIVE | Facility: HOSPITAL | Age: 76
End: 2021-01-29

## 2021-01-29 DIAGNOSIS — N13.30 HYDRONEPHROSIS, UNSPECIFIED HYDRONEPHROSIS TYPE: Primary | ICD-10-CM

## 2021-02-04 ENCOUNTER — HOSPITAL ENCOUNTER (OUTPATIENT)
Dept: NUCLEAR MEDICINE | Facility: HOSPITAL | Age: 76
Discharge: HOME | End: 2021-02-04

## 2021-02-04 DIAGNOSIS — N13.30 HYDRONEPHROSIS, UNSPECIFIED HYDRONEPHROSIS TYPE: ICD-10-CM

## 2021-02-04 PROCEDURE — 25010000002 FUROSEMIDE PER 20 MG: Performed by: UROLOGY

## 2021-02-04 PROCEDURE — 78708 K FLOW/FUNCT IMAGE W/DRUG: CPT

## 2021-02-04 PROCEDURE — 0 TECHNETIUM MERTIATIDE: Performed by: UROLOGY

## 2021-02-04 PROCEDURE — A9562 TC99M MERTIATIDE: HCPCS | Performed by: UROLOGY

## 2021-02-04 RX ORDER — FUROSEMIDE 10 MG/ML
40 INJECTION INTRAMUSCULAR; INTRAVENOUS
Status: COMPLETED | OUTPATIENT
Start: 2021-02-04 | End: 2021-02-04

## 2021-02-04 RX ADMIN — FUROSEMIDE 40 MG: 10 INJECTION, SOLUTION INTRAMUSCULAR; INTRAVENOUS at 11:15

## 2021-02-04 RX ADMIN — TECHNESCAN TC 99M MERTIATIDE 1 DOSE: 1 INJECTION, POWDER, LYOPHILIZED, FOR SOLUTION INTRAVENOUS at 11:15

## 2021-02-17 ENCOUNTER — OFFICE VISIT (OUTPATIENT)
Dept: INTERNAL MEDICINE | Facility: CLINIC | Age: 76
End: 2021-02-17

## 2021-02-17 VITALS
BODY MASS INDEX: 28.72 KG/M2 | HEIGHT: 67 IN | DIASTOLIC BLOOD PRESSURE: 70 MMHG | RESPIRATION RATE: 18 BRPM | TEMPERATURE: 96.8 F | SYSTOLIC BLOOD PRESSURE: 130 MMHG | WEIGHT: 183 LBS | OXYGEN SATURATION: 99 % | HEART RATE: 87 BPM

## 2021-02-17 DIAGNOSIS — I10 ESSENTIAL HYPERTENSION: ICD-10-CM

## 2021-02-17 DIAGNOSIS — J32.9 SINUSITIS, UNSPECIFIED CHRONICITY, UNSPECIFIED LOCATION: Primary | ICD-10-CM

## 2021-02-17 PROCEDURE — 99213 OFFICE O/P EST LOW 20 MIN: CPT | Performed by: INTERNAL MEDICINE

## 2021-02-17 RX ORDER — AMOXICILLIN 500 MG/1
1000 CAPSULE ORAL 2 TIMES DAILY
Qty: 40 CAPSULE | Refills: 0 | Status: SHIPPED | OUTPATIENT
Start: 2021-02-17 | End: 2021-02-27

## 2021-02-17 RX ORDER — LISINOPRIL 10 MG/1
10 TABLET ORAL DAILY
Qty: 90 TABLET | Refills: 3 | Status: SHIPPED | OUTPATIENT
Start: 2021-02-17 | End: 2022-01-31

## 2021-02-17 RX ORDER — ESCITALOPRAM OXALATE 10 MG/1
TABLET ORAL
Qty: 45 TABLET | Refills: 3 | Status: SHIPPED | OUTPATIENT
Start: 2021-02-17 | End: 2022-01-31

## 2021-02-17 NOTE — PROGRESS NOTES
Germaine Gonzalez is a 75 y.o. female, who presents with a chief complaint of   Chief Complaint   Patient presents with   • Sinusitis       HPI   Pt here bc of sinus congestion.  In the morning she blows her nose nad has dark mucus with occ blood.  She has been congested for about 10 days.  She got her covid vaccine about 2 1/2 weeks ago.  occ headache.  No fever.  She had some sneezing and occ cough.  Once she gets the mucus cleared out sx more manageable.  She has been a little off balance with the congestion. She takes sudafed prn and flonase.       The following portions of the patient's history were reviewed and updated as appropriate: allergies, current medications, past family history, past medical history, past social history, past surgical history and problem list.    Allergies: Levaquin [levofloxacin] and Tetracycline    Review of Systems   Constitutional: Negative.  Negative for fever.   HENT: Positive for nosebleeds, postnasal drip and sinus pressure.    Eyes: Negative.    Respiratory: Positive for cough.    Cardiovascular: Negative.    Gastrointestinal: Negative.    Endocrine: Negative.    Genitourinary: Negative.    Musculoskeletal: Negative.    Skin: Negative.    Allergic/Immunologic: Negative.    Neurological: Negative.    Hematological: Negative.    Psychiatric/Behavioral: Negative.    All other systems reviewed and are negative.            Wt Readings from Last 3 Encounters:   02/17/21 83 kg (183 lb)   12/07/20 79.4 kg (175 lb)   09/21/20 78.1 kg (172 lb 3.2 oz)     Temp Readings from Last 3 Encounters:   02/17/21 96.8 °F (36 °C) (Temporal)   12/07/20 97.8 °F (36.6 °C) (Temporal)   09/21/20 96.9 °F (36.1 °C) (Temporal)     BP Readings from Last 3 Encounters:   02/17/21 130/70   12/07/20 132/64   09/21/20 130/78     Pulse Readings from Last 3 Encounters:   02/17/21 87   12/07/20 91   09/21/20 94     Body mass index is 28.66 kg/m².  SpO2 Readings from Last 3 Encounters:   02/17/21 99%   12/07/20 98%    09/21/20 98%        Physical Exam  Vitals signs and nursing note reviewed.   Constitutional:       General: She is not in acute distress.     Appearance: She is well-developed.   HENT:      Head: Normocephalic and atraumatic.      Right Ear: External ear normal.      Left Ear: External ear normal.      Nose: Nose normal.   Eyes:      Conjunctiva/sclera: Conjunctivae normal.      Pupils: Pupils are equal, round, and reactive to light.   Neck:      Musculoskeletal: Normal range of motion and neck supple.   Cardiovascular:      Rate and Rhythm: Normal rate and regular rhythm.      Heart sounds: Normal heart sounds.   Pulmonary:      Effort: Pulmonary effort is normal. No respiratory distress.      Breath sounds: Normal breath sounds. No wheezing.   Musculoskeletal: Normal range of motion.      Comments: Normal gait   Skin:     General: Skin is warm and dry.   Neurological:      Mental Status: She is alert and oriented to person, place, and time.   Psychiatric:         Behavior: Behavior normal.         Thought Content: Thought content normal.         Judgment: Judgment normal.         Results for orders placed or performed in visit on 09/14/20   Comprehensive Metabolic Panel    Specimen: Blood   Result Value Ref Range    Glucose 91 65 - 99 mg/dL    BUN 21 8 - 23 mg/dL    Creatinine 0.85 0.57 - 1.00 mg/dL    eGFR Non African Am 66 >60 mL/min/1.73    eGFR African Am 79 >60 mL/min/1.73    BUN/Creatinine Ratio 24.7 7.0 - 25.0    Sodium 139 136 - 145 mmol/L    Potassium 4.4 3.5 - 5.2 mmol/L    Chloride 101 98 - 107 mmol/L    Total CO2 24.9 22.0 - 29.0 mmol/L    Calcium 9.7 8.6 - 10.5 mg/dL    Total Protein 7.0 6.0 - 8.5 g/dL    Albumin 5.10 3.50 - 5.20 g/dL    Globulin 1.9 gm/dL    A/G Ratio 2.7 g/dL    Total Bilirubin 0.6 0.0 - 1.2 mg/dL    Alkaline Phosphatase 95 39 - 117 U/L    AST (SGOT) 51 (H) 1 - 32 U/L    ALT (SGPT) 61 (H) 1 - 33 U/L   Lipid Panel With LDL / HDL Ratio    Specimen: Blood   Result Value Ref Range     Total Cholesterol 256 (H) 0 - 200 mg/dL    Triglycerides 105 0 - 150 mg/dL    HDL Cholesterol 95 (H) 40 - 60 mg/dL    VLDL Cholesterol Phil 21 mg/dL    LDL Chol Calc (NIH) 140 (H) 0 - 100 mg/dL    LDL/HDL RATIO 1.47    CBC & Differential    Specimen: Blood   Result Value Ref Range    WBC 5.22 3.40 - 10.80 10*3/mm3    RBC 4.26 3.77 - 5.28 10*6/mm3    Hemoglobin 14.3 12.0 - 15.9 g/dL    Hematocrit 42.0 34.0 - 46.6 %    MCV 98.6 (H) 79.0 - 97.0 fL    MCH 33.6 (H) 26.6 - 33.0 pg    MCHC 34.0 31.5 - 35.7 g/dL    RDW 13.2 12.3 - 15.4 %    Platelets 210 140 - 450 10*3/mm3    Neutrophil Rel % 63.2 42.7 - 76.0 %    Lymphocyte Rel % 24.5 19.6 - 45.3 %    Monocyte Rel % 9.4 5.0 - 12.0 %    Eosinophil Rel % 0.8 0.3 - 6.2 %    Basophil Rel % 0.8 0.0 - 1.5 %    Neutrophils Absolute 3.30 1.70 - 7.00 10*3/mm3    Lymphocytes Absolute 1.28 0.70 - 3.10 10*3/mm3    Monocytes Absolute 0.49 0.10 - 0.90 10*3/mm3    Eosinophils Absolute 0.04 0.00 - 0.40 10*3/mm3    Basophils Absolute 0.04 0.00 - 0.20 10*3/mm3    Immature Granulocyte Rel % 1.3 (H) 0.0 - 0.5 %    Immature Grans Absolute 0.07 (H) 0.00 - 0.05 10*3/mm3    nRBC 0.0 0.0 - 0.2 /100 WBC     Result Review :                  Assessment and Plan    There are no diagnoses linked to this encounter.      Diagnoses and all orders for this visit:    1. Sinusitis, unspecified chronicity, unspecified location (Primary)  -     amoxicillin (AMOXIL) 500 MG capsule; Take 2 capsules by mouth 2 (Two) Times a Day for 10 days.  Dispense: 40 capsule; Refill: 0    2. Essential hypertension  -     lisinopril (PRINIVIL,ZESTRIL) 10 MG tablet; Take 1 tablet by mouth Daily.  Dispense: 90 tablet; Refill: 3      Outpatient Medications Prior to Visit   Medication Sig Dispense Refill   • Biotin 5000 MCG tablet Take  by mouth Daily.     • calcium carbonate (OS-PHIL) 600 MG tablet Take 600 mg by mouth 2 (Two) Times a Day With Meals.     • estradiol (ESTRACE) 0.1 MG/GM vaginal cream      • fluticasone (FLONASE) 50  MCG/ACT nasal spray 2 sprays into the nostril(s) as directed by provider Daily. 3 bottle 1   • glucosamine-chondroitin 500-400 MG capsule capsule Take 1 capsule by mouth 2 (Two) Times a Day With Meals.     • omeprazole (priLOSEC) 40 MG capsule Take 1 capsule by mouth once daily 90 capsule 3   • pseudoephedrine (SUDAFED) 30 MG tablet TAKE 1 TO 2 TABLETS BY MOUTH EVERY 6 HOURS 48 tablet 0   • Pseudoephedrine-DM-GG (SUDAFED COUGH PO) TAKE 1 TABLET BY MOUTH EVERY 4 HOURS AS NEEDED FOR CONGESTION     • rosuvastatin (CRESTOR) 10 MG tablet Take 1 tablet by mouth once daily 90 tablet 3   • vitamin B-12 (CYANOCOBALAMIN) 1000 MCG tablet Take 5,000 mcg by mouth Daily.     • escitalopram (LEXAPRO) 10 MG tablet TAKE 1/2 (ONE-HALF) TABLET BY MOUTH IN THE MORNING 45 tablet 2   • lisinopril (PRINIVIL,ZESTRIL) 10 MG tablet Take 1 tablet by mouth once daily 90 tablet 3   • cycloSPORINE (RESTASIS) 0.05 % ophthalmic emulsion Administer 1 drop to both eyes 2 (Two) Times a Day.     • EPINEPHrine (EPIPEN) 0.3 MG/0.3ML solution auto-injector injection epinephrine 0.3 mg/0.3 mL injection, auto-injector     • levocetirizine (XYZAL) 5 MG tablet Take 1 tablet by mouth Every Evening.     • nitrofurantoin, macrocrystal-monohydrate, (MACROBID) 100 MG capsule nitrofurantoin monohydrate/macrocrystals 100 mg capsule       No facility-administered medications prior to visit.      New Medications Ordered This Visit   Medications   • lisinopril (PRINIVIL,ZESTRIL) 10 MG tablet     Sig: Take 1 tablet by mouth Daily.     Dispense:  90 tablet     Refill:  3   • amoxicillin (AMOXIL) 500 MG capsule     Sig: Take 2 capsules by mouth 2 (Two) Times a Day for 10 days.     Dispense:  40 capsule     Refill:  0     [unfilled]  Medications Discontinued During This Encounter   Medication Reason   • nitrofurantoin, macrocrystal-monohydrate, (MACROBID) 100 MG capsule *Therapy completed   • lisinopril (PRINIVIL,ZESTRIL) 10 MG tablet Reorder         Return if  symptoms worsen or fail to improve.    Patient was given instructions and counseling regarding her condition or for health maintenance advice. Please see specific information pulled into the AVS if appropriate.

## 2021-02-24 ENCOUNTER — APPOINTMENT (OUTPATIENT)
Dept: MAMMOGRAPHY | Facility: HOSPITAL | Age: 76
End: 2021-02-24

## 2021-03-16 DIAGNOSIS — I10 ESSENTIAL HYPERTENSION: ICD-10-CM

## 2021-03-16 DIAGNOSIS — E78.2 MIXED HYPERLIPIDEMIA: Primary | ICD-10-CM

## 2021-03-17 LAB
ALBUMIN SERPL-MCNC: 4.8 G/DL (ref 3.5–5.2)
ALBUMIN/GLOB SERPL: 1.8 G/DL
ALP SERPL-CCNC: 86 U/L (ref 39–117)
ALT SERPL-CCNC: 63 U/L (ref 1–33)
AST SERPL-CCNC: 58 U/L (ref 1–32)
BASOPHILS # BLD AUTO: 0.04 10*3/MM3 (ref 0–0.2)
BASOPHILS NFR BLD AUTO: 0.7 % (ref 0–1.5)
BILIRUB SERPL-MCNC: 1 MG/DL (ref 0–1.2)
BUN SERPL-MCNC: 22 MG/DL (ref 8–23)
BUN/CREAT SERPL: 25.9 (ref 7–25)
CALCIUM SERPL-MCNC: 10.1 MG/DL (ref 8.6–10.5)
CHLORIDE SERPL-SCNC: 103 MMOL/L (ref 98–107)
CHOLEST SERPL-MCNC: 268 MG/DL (ref 0–200)
CO2 SERPL-SCNC: 23.8 MMOL/L (ref 22–29)
CREAT SERPL-MCNC: 0.85 MG/DL (ref 0.57–1)
EOSINOPHIL # BLD AUTO: 0.06 10*3/MM3 (ref 0–0.4)
EOSINOPHIL NFR BLD AUTO: 1.1 % (ref 0.3–6.2)
ERYTHROCYTE [DISTWIDTH] IN BLOOD BY AUTOMATED COUNT: 12.4 % (ref 12.3–15.4)
GLOBULIN SER CALC-MCNC: 2.6 GM/DL
GLUCOSE SERPL-MCNC: 94 MG/DL (ref 65–99)
HCT VFR BLD AUTO: 44.1 % (ref 34–46.6)
HDLC SERPL-MCNC: 85 MG/DL (ref 40–60)
HGB BLD-MCNC: 15.4 G/DL (ref 12–15.9)
IMM GRANULOCYTES # BLD AUTO: 0.06 10*3/MM3 (ref 0–0.05)
IMM GRANULOCYTES NFR BLD AUTO: 1.1 % (ref 0–0.5)
LDLC SERPL CALC-MCNC: 169 MG/DL (ref 0–100)
LDLC/HDLC SERPL: 1.95 {RATIO}
LYMPHOCYTES # BLD AUTO: 1.31 10*3/MM3 (ref 0.7–3.1)
LYMPHOCYTES NFR BLD AUTO: 24.3 % (ref 19.6–45.3)
MCH RBC QN AUTO: 34.4 PG (ref 26.6–33)
MCHC RBC AUTO-ENTMCNC: 34.9 G/DL (ref 31.5–35.7)
MCV RBC AUTO: 98.4 FL (ref 79–97)
MONOCYTES # BLD AUTO: 0.48 10*3/MM3 (ref 0.1–0.9)
MONOCYTES NFR BLD AUTO: 8.9 % (ref 5–12)
NEUTROPHILS # BLD AUTO: 3.43 10*3/MM3 (ref 1.7–7)
NEUTROPHILS NFR BLD AUTO: 63.9 % (ref 42.7–76)
NRBC BLD AUTO-RTO: 0 /100 WBC (ref 0–0.2)
PLATELET # BLD AUTO: 237 10*3/MM3 (ref 140–450)
POTASSIUM SERPL-SCNC: 4.9 MMOL/L (ref 3.5–5.2)
PROT SERPL-MCNC: 7.4 G/DL (ref 6–8.5)
RBC # BLD AUTO: 4.48 10*6/MM3 (ref 3.77–5.28)
SODIUM SERPL-SCNC: 142 MMOL/L (ref 136–145)
TRIGL SERPL-MCNC: 87 MG/DL (ref 0–150)
VLDLC SERPL CALC-MCNC: 14 MG/DL (ref 5–40)
WBC # BLD AUTO: 5.38 10*3/MM3 (ref 3.4–10.8)

## 2021-03-22 ENCOUNTER — OFFICE VISIT (OUTPATIENT)
Dept: INTERNAL MEDICINE | Facility: CLINIC | Age: 76
End: 2021-03-22

## 2021-03-22 VITALS
RESPIRATION RATE: 16 BRPM | DIASTOLIC BLOOD PRESSURE: 82 MMHG | BODY MASS INDEX: 27.62 KG/M2 | OXYGEN SATURATION: 99 % | SYSTOLIC BLOOD PRESSURE: 124 MMHG | WEIGHT: 176 LBS | HEART RATE: 98 BPM | HEIGHT: 67 IN | TEMPERATURE: 97.7 F

## 2021-03-22 DIAGNOSIS — M17.0 PRIMARY OSTEOARTHRITIS OF BOTH KNEES: ICD-10-CM

## 2021-03-22 DIAGNOSIS — E78.2 MIXED HYPERLIPIDEMIA: ICD-10-CM

## 2021-03-22 DIAGNOSIS — I10 ESSENTIAL HYPERTENSION: ICD-10-CM

## 2021-03-22 DIAGNOSIS — R79.89 ELEVATED LFTS: Primary | ICD-10-CM

## 2021-03-22 PROCEDURE — 20610 DRAIN/INJ JOINT/BURSA W/O US: CPT | Performed by: INTERNAL MEDICINE

## 2021-03-22 PROCEDURE — 99214 OFFICE O/P EST MOD 30 MIN: CPT | Performed by: INTERNAL MEDICINE

## 2021-03-22 RX ORDER — LIDOCAINE HYDROCHLORIDE 10 MG/ML
1 INJECTION, SOLUTION EPIDURAL; INFILTRATION; INTRACAUDAL; PERINEURAL
Status: COMPLETED | OUTPATIENT
Start: 2021-03-22 | End: 2021-03-22

## 2021-03-22 RX ORDER — TRIAMCINOLONE ACETONIDE 40 MG/ML
80 INJECTION, SUSPENSION INTRA-ARTICULAR; INTRAMUSCULAR
Status: COMPLETED | OUTPATIENT
Start: 2021-03-22 | End: 2021-03-22

## 2021-03-22 RX ORDER — ERTAPENEM 1 G/1
INJECTION, POWDER, LYOPHILIZED, FOR SOLUTION INTRAMUSCULAR; INTRAVENOUS
COMMUNITY
End: 2021-03-22

## 2021-03-22 RX ADMIN — TRIAMCINOLONE ACETONIDE 80 MG: 40 INJECTION, SUSPENSION INTRA-ARTICULAR; INTRAMUSCULAR at 11:59

## 2021-03-22 RX ADMIN — LIDOCAINE HYDROCHLORIDE 1 ML: 10 INJECTION, SOLUTION EPIDURAL; INFILTRATION; INTRACAUDAL; PERINEURAL at 11:59

## 2021-03-22 NOTE — PROGRESS NOTES
Germaine Gonzalez is a 75 y.o. female, who presents with a chief complaint of   Chief Complaint   Patient presents with   • Follow-up     6 mo follow up review lab work and injections in her knees            HPI   Pt here for f/u      She has a hx bilat knee arthritis and would like knee injections.  She has had this done in the past and this helps her.  She has also seen ortho in the past for her knees.  She is aware that this is not a long term fix but her  just had surgery and she needs to be more active at this time to help get things done at home.       HTN - well controlled.  No ha/dizziness.     HLD - on crestor.  No cramps or myalgias.    Mild elevation of LFT's  - stable. Pt has hx WALSH.  last imaging was ct 2019.     Insulin resistance - fasting glucoses normal on latest labs.  She is trying to eat a healthy diet.     The following portions of the patient's history were reviewed and updated as appropriate: allergies, current medications, past family history, past medical history, past social history, past surgical history and problem list.    Allergies: Levaquin [levofloxacin] and Tetracycline    Review of Systems   Constitutional: Negative.    HENT: Negative.    Eyes: Negative.    Respiratory: Negative.    Cardiovascular: Negative.    Gastrointestinal: Negative.    Endocrine: Negative.    Genitourinary: Negative.    Musculoskeletal:        Knee pain   Skin: Negative.    Allergic/Immunologic: Negative.    Neurological: Negative.    Hematological: Negative.    Psychiatric/Behavioral: Negative.    All other systems reviewed and are negative.            Wt Readings from Last 3 Encounters:   03/22/21 79.8 kg (176 lb)   02/17/21 83 kg (183 lb)   12/07/20 79.4 kg (175 lb)     Temp Readings from Last 3 Encounters:   03/22/21 97.7 °F (36.5 °C) (Temporal)   02/17/21 96.8 °F (36 °C) (Temporal)   12/07/20 97.8 °F (36.6 °C) (Temporal)     BP Readings from Last 3 Encounters:   03/22/21 124/82   02/17/21 130/70    12/07/20 132/64     Pulse Readings from Last 3 Encounters:   03/22/21 98   02/17/21 87   12/07/20 91     Body mass index is 27.56 kg/m².  SpO2 Readings from Last 3 Encounters:   03/22/21 99%   02/17/21 99%   12/07/20 98%          Physical Exam  Vitals and nursing note reviewed.   Constitutional:       General: She is not in acute distress.     Appearance: She is well-developed.   HENT:      Head: Normocephalic and atraumatic.      Right Ear: External ear normal.      Left Ear: External ear normal.      Nose: Nose normal.   Eyes:      Conjunctiva/sclera: Conjunctivae normal.      Pupils: Pupils are equal, round, and reactive to light.   Cardiovascular:      Rate and Rhythm: Normal rate and regular rhythm.      Heart sounds: Normal heart sounds.   Pulmonary:      Effort: Pulmonary effort is normal. No respiratory distress.      Breath sounds: Normal breath sounds. No wheezing.   Musculoskeletal:      Cervical back: Normal range of motion and neck supple.      Comments: Crepitus of knees bilat.  No erythema or increased warmth.    Skin:     General: Skin is warm and dry.   Neurological:      Mental Status: She is alert and oriented to person, place, and time.   Psychiatric:         Behavior: Behavior normal.         Thought Content: Thought content normal.         Judgment: Judgment normal.         Results for orders placed or performed in visit on 03/16/21   Comprehensive metabolic panel    Specimen: Blood   Result Value Ref Range    Glucose 94 65 - 99 mg/dL    BUN 22 8 - 23 mg/dL    Creatinine 0.85 0.57 - 1.00 mg/dL    eGFR Non African Am 66 >60 mL/min/1.73    eGFR African Am 79 >60 mL/min/1.73    BUN/Creatinine Ratio 25.9 (H) 7.0 - 25.0    Sodium 142 136 - 145 mmol/L    Potassium 4.9 3.5 - 5.2 mmol/L    Chloride 103 98 - 107 mmol/L    Total CO2 23.8 22.0 - 29.0 mmol/L    Calcium 10.1 8.6 - 10.5 mg/dL    Total Protein 7.4 6.0 - 8.5 g/dL    Albumin 4.80 3.50 - 5.20 g/dL    Globulin 2.6 gm/dL    A/G Ratio 1.8 g/dL     Total Bilirubin 1.0 0.0 - 1.2 mg/dL    Alkaline Phosphatase 86 39 - 117 U/L    AST (SGOT) 58 (H) 1 - 32 U/L    ALT (SGPT) 63 (H) 1 - 33 U/L   Lipid Panel With LDL/HDL Ratio    Specimen: Blood   Result Value Ref Range    Total Cholesterol 268 (H) 0 - 200 mg/dL    Triglycerides 87 0 - 150 mg/dL    HDL Cholesterol 85 (H) 40 - 60 mg/dL    VLDL Cholesterol Phil 14 5 - 40 mg/dL    LDL Chol Calc (NIH) 169 (H) 0 - 100 mg/dL    LDL/HDL RATIO 1.95    CBC w AUTO Differential    Specimen: Blood   Result Value Ref Range    WBC 5.38 3.40 - 10.80 10*3/mm3    RBC 4.48 3.77 - 5.28 10*6/mm3    Hemoglobin 15.4 12.0 - 15.9 g/dL    Hematocrit 44.1 34.0 - 46.6 %    MCV 98.4 (H) 79.0 - 97.0 fL    MCH 34.4 (H) 26.6 - 33.0 pg    MCHC 34.9 31.5 - 35.7 g/dL    RDW 12.4 12.3 - 15.4 %    Platelets 237 140 - 450 10*3/mm3    Neutrophil Rel % 63.9 42.7 - 76.0 %    Lymphocyte Rel % 24.3 19.6 - 45.3 %    Monocyte Rel % 8.9 5.0 - 12.0 %    Eosinophil Rel % 1.1 0.3 - 6.2 %    Basophil Rel % 0.7 0.0 - 1.5 %    Neutrophils Absolute 3.43 1.70 - 7.00 10*3/mm3    Lymphocytes Absolute 1.31 0.70 - 3.10 10*3/mm3    Monocytes Absolute 0.48 0.10 - 0.90 10*3/mm3    Eosinophils Absolute 0.06 0.00 - 0.40 10*3/mm3    Basophils Absolute 0.04 0.00 - 0.20 10*3/mm3    Immature Granulocyte Rel % 1.1 (H) 0.0 - 0.5 %    Immature Grans Absolute 0.06 (H) 0.00 - 0.05 10*3/mm3    nRBC 0.0 0.0 - 0.2 /100 WBC     Result Review :              Large joint injection    Date/Time: 3/22/2021 11:59 AM  Performed by: Roberta Boswell MD  Authorized by: Roberta Boswell MD   Indications: pain   Location: bilateral knees.  Needle gauge: 25g.  Approach: lateral  Patient tolerance: patient tolerated the procedure well with no immediate complications  Comments: Both knees injected - 1cc lidocaine and 2cc kenalog injected into each knee              Assessment and Plan    Diagnoses and all orders for this visit:    1. Elevated LFTs (Primary)  -     US Liver; Future    2.  Mixed hyperlipidemia - cont crestor    3. Essential hypertension - cont lisinopril    4. Primary osteoarthritis of both knees  -     large joint injection     Continue current medications.  Encouraged healthy diet/exercise.  OV labs in  6  months.  Pt to call sooner if any other issues arise.        Patient's Body mass index is 27.56 kg/m². BMI is above normal parameters. Recommendations include: exercise counseling and nutrition counseling.             Outpatient Medications Prior to Visit   Medication Sig Dispense Refill   • Biotin 5000 MCG tablet Take  by mouth Daily.     • calcium carbonate (OS-ULI) 600 MG tablet Take 600 mg by mouth 2 (Two) Times a Day With Meals.     • EPINEPHrine (EPIPEN) 0.3 MG/0.3ML solution auto-injector injection epinephrine 0.3 mg/0.3 mL injection, auto-injector     • escitalopram (LEXAPRO) 10 MG tablet TAKE 1/2 (ONE-HALF) TABLET BY MOUTH IN THE MORNING 45 tablet 3   • estradiol (ESTRACE) 0.1 MG/GM vaginal cream      • fluticasone (FLONASE) 50 MCG/ACT nasal spray 2 sprays into the nostril(s) as directed by provider Daily. 3 bottle 1   • glucosamine-chondroitin 500-400 MG capsule capsule Take 1 capsule by mouth 2 (Two) Times a Day With Meals.     • lisinopril (PRINIVIL,ZESTRIL) 10 MG tablet Take 1 tablet by mouth Daily. 90 tablet 3   • omeprazole (priLOSEC) 40 MG capsule Take 1 capsule by mouth once daily 90 capsule 3   • rosuvastatin (CRESTOR) 10 MG tablet Take 1 tablet by mouth once daily 90 tablet 3   • vitamin B-12 (CYANOCOBALAMIN) 1000 MCG tablet Take 5,000 mcg by mouth Daily.     • levocetirizine (XYZAL) 5 MG tablet Take 1 tablet by mouth Every Evening.     • pseudoephedrine (SUDAFED) 30 MG tablet TAKE 1 TO 2 TABLETS BY MOUTH EVERY 6 HOURS 48 tablet 0   • Pseudoephedrine-DM-GG (SUDAFED COUGH PO) TAKE 1 TABLET BY MOUTH EVERY 4 HOURS AS NEEDED FOR CONGESTION     • cycloSPORINE (RESTASIS) 0.05 % ophthalmic emulsion Administer 1 drop to both eyes 2 (Two) Times a Day.     • ertapenem  (INVanz) 1 g injection Invanz 1 gram solution for injection       No facility-administered medications prior to visit.     No orders of the defined types were placed in this encounter.    [unfilled]  Medications Discontinued During This Encounter   Medication Reason   • cycloSPORINE (RESTASIS) 0.05 % ophthalmic emulsion *Therapy completed   • ertapenem (INVanz) 1 g injection *Therapy completed         Return in about 6 months (around 9/22/2021) for Recheck, labs.    Patient was given instructions and counseling regarding her condition or for health maintenance advice. Please see specific information pulled into the AVS if appropriate.

## 2021-04-05 ENCOUNTER — HOSPITAL ENCOUNTER (OUTPATIENT)
Dept: MAMMOGRAPHY | Facility: HOSPITAL | Age: 76
Discharge: HOME | End: 2021-04-05
Admitting: INTERNAL MEDICINE

## 2021-04-05 DIAGNOSIS — Z12.39 SCREENING BREAST EXAMINATION: ICD-10-CM

## 2021-04-05 PROCEDURE — 77063 BREAST TOMOSYNTHESIS BI: CPT

## 2021-04-05 PROCEDURE — 77067 SCR MAMMO BI INCL CAD: CPT

## 2021-04-09 ENCOUNTER — HOSPITAL ENCOUNTER (OUTPATIENT)
Dept: ULTRASOUND IMAGING | Facility: HOSPITAL | Age: 76
Discharge: HOME | End: 2021-04-09
Admitting: INTERNAL MEDICINE

## 2021-04-09 ENCOUNTER — TRANSCRIBE ORDERS (OUTPATIENT)
Dept: ADMINISTRATIVE | Facility: HOSPITAL | Age: 76
End: 2021-04-09

## 2021-04-09 DIAGNOSIS — N13.30 HYDRONEPHROSIS, UNSPECIFIED HYDRONEPHROSIS TYPE: Primary | ICD-10-CM

## 2021-04-09 DIAGNOSIS — R79.89 ELEVATED LFTS: ICD-10-CM

## 2021-04-09 PROCEDURE — 76705 ECHO EXAM OF ABDOMEN: CPT

## 2021-04-16 ENCOUNTER — TELEPHONE (OUTPATIENT)
Dept: INTERNAL MEDICINE | Facility: CLINIC | Age: 76
End: 2021-04-16

## 2021-04-16 NOTE — TELEPHONE ENCOUNTER
Patient advised liver u/s normal per result notes, and that we need to recheck blood work in 3-6 months to monitor LFT's. Patient voiced understanding.

## 2021-04-20 ENCOUNTER — TRANSCRIBE ORDERS (OUTPATIENT)
Dept: ADMINISTRATIVE | Facility: HOSPITAL | Age: 76
End: 2021-04-20

## 2021-04-20 DIAGNOSIS — U07.1 COVID-19: Primary | ICD-10-CM

## 2021-05-06 ENCOUNTER — TRANSCRIBE ORDERS (OUTPATIENT)
Dept: LAB | Facility: SURGERY CENTER | Age: 76
End: 2021-05-06

## 2021-05-06 DIAGNOSIS — Z01.818 OTHER SPECIFIED PRE-OPERATIVE EXAMINATION: Primary | ICD-10-CM

## 2021-05-07 ENCOUNTER — APPOINTMENT (OUTPATIENT)
Dept: LAB | Facility: HOSPITAL | Age: 76
End: 2021-05-07

## 2021-05-21 ENCOUNTER — TELEPHONE (OUTPATIENT)
Dept: ORTHOPEDIC SURGERY | Facility: CLINIC | Age: 76
End: 2021-05-21

## 2021-05-21 ENCOUNTER — HOSPITAL ENCOUNTER (EMERGENCY)
Facility: HOSPITAL | Age: 76
Discharge: HOME | End: 2021-05-21
Attending: EMERGENCY MEDICINE | Admitting: EMERGENCY MEDICINE

## 2021-05-21 ENCOUNTER — APPOINTMENT (OUTPATIENT)
Dept: GENERAL RADIOLOGY | Facility: HOSPITAL | Age: 76
End: 2021-05-21

## 2021-05-21 VITALS
TEMPERATURE: 98.6 F | DIASTOLIC BLOOD PRESSURE: 82 MMHG | HEIGHT: 68 IN | HEART RATE: 62 BPM | RESPIRATION RATE: 18 BRPM | WEIGHT: 170 LBS | OXYGEN SATURATION: 100 % | SYSTOLIC BLOOD PRESSURE: 145 MMHG | BODY MASS INDEX: 25.76 KG/M2

## 2021-05-21 DIAGNOSIS — S43.401A SPRAIN OF RIGHT SHOULDER, UNSPECIFIED SHOULDER SPRAIN TYPE, INITIAL ENCOUNTER: Primary | ICD-10-CM

## 2021-05-21 DIAGNOSIS — S80.211A ABRASION OF RIGHT KNEE, INITIAL ENCOUNTER: ICD-10-CM

## 2021-05-21 PROCEDURE — 99283 EMERGENCY DEPT VISIT LOW MDM: CPT

## 2021-05-21 PROCEDURE — 99283 EMERGENCY DEPT VISIT LOW MDM: CPT | Performed by: EMERGENCY MEDICINE

## 2021-05-21 PROCEDURE — 73030 X-RAY EXAM OF SHOULDER: CPT

## 2021-05-21 NOTE — TELEPHONE ENCOUNTER
Caller: SAVITA REAVES    Relationship to patient: SELF     Best call back number: [phone]    Type of visit: NEW PROBLEM / RT SHOULDER SPRAIN      If rescheduling, when is the original appointment: 6-9-21     Additional notes: PATIENT WENT TO ER 5-21-21. RT SHOULDER SPRAIN/NO FX. PATIENT IS IN SLING.  ER NOTES STATE TO FOLLOW UP WITH DR. MARINA, FIRST AVAILABILITY WITH DR. MARINA IS 6-9-21.  PATIENT SCHEDULED, HOWEVER, WOULD LIKE A CALL BACK TO DISCUSS BEING WORKED IN SOONER.

## 2021-05-24 ENCOUNTER — PATIENT OUTREACH (OUTPATIENT)
Dept: CASE MANAGEMENT | Facility: OTHER | Age: 76
End: 2021-05-24

## 2021-05-24 ENCOUNTER — OFFICE VISIT (OUTPATIENT)
Dept: ORTHOPEDIC SURGERY | Facility: CLINIC | Age: 76
End: 2021-05-24

## 2021-05-24 VITALS
SYSTOLIC BLOOD PRESSURE: 155 MMHG | HEIGHT: 68 IN | WEIGHT: 170 LBS | BODY MASS INDEX: 25.76 KG/M2 | DIASTOLIC BLOOD PRESSURE: 84 MMHG | HEART RATE: 88 BPM

## 2021-05-24 DIAGNOSIS — S49.91XA RIGHT SHOULDER INJURY, INITIAL ENCOUNTER: ICD-10-CM

## 2021-05-24 DIAGNOSIS — M17.12 PRIMARY OSTEOARTHRITIS OF LEFT KNEE: ICD-10-CM

## 2021-05-24 DIAGNOSIS — M25.562 LEFT KNEE PAIN, UNSPECIFIED CHRONICITY: Primary | ICD-10-CM

## 2021-05-24 PROCEDURE — 99214 OFFICE O/P EST MOD 30 MIN: CPT | Performed by: NURSE PRACTITIONER

## 2021-05-24 PROCEDURE — 73562 X-RAY EXAM OF KNEE 3: CPT | Performed by: NURSE PRACTITIONER

## 2021-05-24 RX ORDER — IBUPROFEN 400 MG/1
400 TABLET ORAL EVERY 6 HOURS PRN
COMMUNITY
End: 2021-06-07 | Stop reason: SDUPTHER

## 2021-05-24 RX ORDER — DESOXIMETASONE 0.5 MG/G
CREAM TOPICAL
COMMUNITY
Start: 2021-05-06

## 2021-05-24 RX ORDER — IBUPROFEN 400 MG/1
400 TABLET ORAL EVERY 6 HOURS PRN
COMMUNITY
End: 2022-02-01

## 2021-05-24 NOTE — PROGRESS NOTES
Subjective:     Patient ID: Germaine Gonzalez is a 76 y.o. female.    Chief Complaint:  Follow-up 2020 right shoulder injury, new issue to examiner, DJD left knee new onset of injury after fall  History of Present Illness  Germaine Gonzalez presents to clinic for evaluation of her right shoulder and left knee.  She had a fall on 2021 presented to [facility] ER after she was walking outside excellently tripped over an uneven segment of concrete.  Presents to clinic with spouse today for evaluation.  She was fitted with a sling to the right upper extremity encouraged to follow-up in our office.  X-ray images were completed at the shoulder encouraged to use sling until follow-up in office.  Shoulder is not as bothersome as it was previously she is experiencing greater pain at the left knee.  Rates discomfort 5-6 out of a 10 mainly aching in nature.  She is right-hand dominant denies any presence of numbness or tingling at the right upper extremity.  Denies any prior MRI again pain has significantly reduced at the right upper extremity.  She has noted some swelling at the left knee is able to extend and flex the knee without significant pain is able to bear weight at the left lower extremity without feeling as if the knee is locking, catching or getting give out on her.  Denies any presence of numbness or tingling at the left lower extremity.  No x-ray imaging completed the left knee right shoulder imaging available for viewing in chart only.  Denies other concerns present this time.    Social History     Occupational History   • Not on file   Tobacco Use   • Smoking status: Former Smoker     Packs/day: 0.50     Years: 16.00     Pack years: 8.00     Types: Cigarettes     Start date: 3/22/1971     Quit date: 1990     Years since quittin.1   • Smokeless tobacco: Never Used   Vaping Use   • Vaping Use: Never used   Substance and Sexual Activity   • Alcohol use: Yes     Alcohol/week: 2.0 standard drinks      Types: 2 Glasses of wine per week   • Drug use: No   • Sexual activity: Defer      Past Medical History:   Diagnosis Date   • Arthritis     knees   • Mcgill esophagus    • Cancer (CMS/HCC)     cervical   • Cervical cancer (CMS/HCC)    • Colon polyp    • Eczema    • Elevated liver enzymes    • GERD (gastroesophageal reflux disease)    • H/O stress fracture    • Health care maintenance    • Hypercholesteremia    • Kidney stones     sched ureteroscopy, lithotripsy   • Ocular migraine    • Postmenopausal    • Seasonal allergies    • Sjogren's syndrome (CMS/HCC)      Past Surgical History:   Procedure Laterality Date   •  SECTION      x2   • COLONOSCOPY     • CYSTOSCOPY URETEROSCOPY STONE MANIPULATION/EXTRACTION Left 10/14/2016    Procedure: LEFT URETEROSCOPY bilateral retrograde pyleogram LEFT STENT PLACEMENT.;  Surgeon: Julius Darling MD;  Location: [facility];  Service:    • CYSTOSCOPY W/ URETERAL STENT PLACEMENT Right 8/3/2018    Procedure: CYSTOSCOPY URETERAL CATHETER/STENT INSERTION;  Surgeon: Farzad Cordova MD;  Location: [facility];  Service: Urology   • ENDOSCOPY N/A 4/10/2019    Procedure: ESOPHAGOGASTRODUODENOSCOPY;  Surgeon: Bc Sanchez MD;  Location: [facility];  Service: Gastroenterology   • HYSTERECTOMY     • UPPER GASTROINTESTINAL ENDOSCOPY     • URETEROSCOPY LASER LITHOTRIPSY WITH STENT INSERTION Left 10/26/2016    Procedure: LT URETEROSCOPY LASER LITHOTRIPSY ;  Surgeon: Julius Darling MD;  Location: [facility];  Service:        Family History   Problem Relation Age of Onset   • Anxiety disorder Mother    • Bipolar disorder Mother    • Stroke Mother 83   • Thyroid disease Mother    • Depression Mother    • Heart attack Father 47   • Thyroid disease Brother    • Hypertension Brother    • Diabetes Brother 68   • Glaucoma Brother    • Depression Brother    • No Known Problems Daughter    • No Known Problems Son    • Leukemia Maternal Grandmother    • Diabetes  "Maternal Grandfather    • Heart attack Paternal Grandmother    • Heart attack Paternal Grandfather    • Breast cancer Neg Hx    • Colon cancer Neg Hx    • Colon polyps Neg Hx        Objective:  Physical Exam    Vital signs reviewed.   General: No acute distress.  Eyes: conjunctiva clear; pupils equally round and reactive  ENT: external ears and nose atraumatic; oropharynx clear  CV: no peripheral edema  Resp: normal respiratory effort  Skin: no rashes or wounds; normal turgor  Psych: mood and affect appropriate; recent and remote memory intact    Vitals:    05/24/21 1426   BP: 155/84   Pulse: 88   Weight: 77.1 kg (170 lb)   Height: 172 cm (67.72\")         05/24/21  1426   Weight: 77.1 kg (170 lb)     Body mass index is 26.06 kg/m².      Left Knee Exam     Tests   Geoff:  Medial - negative Lateral - negative  Varus: negative Valgus: negative  Lachman:  Anterior - 1+    Posterior - negative  Drawer:  Anterior - negative     Posterior - negative  Patellar apprehension: negative    Other   Erythema: absent  Sensation: normal  Pulse: present  Swelling: moderate    Comments:  Mildly positive effusion  Negative active patellar compression test  Range of motion 0 degrees extension 125 degrees of flexion  Positive crepitus throughout arc of motion      Right Shoulder Exam     Tenderness   The patient is experiencing tenderness in the acromion.    Range of Motion   External rotation: 70   Forward flexion: 170   Internal rotation 0 degrees: L5     Muscle Strength   Internal rotation: 4/5   External rotation: 4/5   Supraspinatus: 4/5   Subscapularis: 4/5   Biceps: 4/5     Tests   Paez test: negative  Cross arm: negative  Drop arm: negative    Other   Erythema: absent  Sensation: normal  Pulse: present    Comments:  Negative empty can  negative [place]'s  negative Speed's  negative bear hug exam                   Imaging:  Independently reviewed 2 view x-ray imaging right shoulder previously completed  lag negative for " acute fracture dislocation or other acute osseous abnormality  Assessment:        1. Left knee pain, unspecified chronicity    2. Primary osteoarthritis of left knee    3. Right shoulder injury, initial encounter           Plan:  1.  Discussed plan of care with patient.  Given significant symptom relief with rest may discontinue the sling continue with active range of motion as tolerated.  Discussed we will see her back in clinic in 2 weeks repeat x-ray images of her right shoulder at follow-up to assure no fracture at the greater tuberosity.  Discussed with patient do not believe that there is a rotator cuff tear present as she is able to complete range of motion activities and has fairly good strength.  Encouraged to call between now and follow-up with any questions concerns or worsening of symptoms.  Discussed aspiration corticosteroid injection is option for her left knee however due to decreased pain does not wish to proceed with any further treatment at the left knee at this time.  All questions answered.  Orders:  Orders Placed This Encounter   Procedures   • XR Knee 3+ View With Ong Left       Medications:  No orders of the defined types were placed in this encounter.      Followup:  No follow-ups on file.    Diagnoses and all orders for this visit:    1. Left knee pain, unspecified chronicity (Primary)  -     XR Knee 3+ View With Ong Left    2. Primary osteoarthritis of left knee    3. Right shoulder injury, initial encounter          I ordered and reviewed the COURTNEY today.     Dictated utilizing Dragon dictation

## 2021-05-24 NOTE — OUTREACH NOTE
Care Plan Note      Responses   Annual Wellness Visit:   Patient Has Completed   Care Gaps Addressed  Flu Shot, Pneumonia Vaccine, Colon Cancer Screening, Mammogram   Colon Cancer Screening Type  Colonoscopy   Colonoscopy Status  Up to Date (< 10 yrs)   Colon Cancer Screening Completion at [facility] or Other  [facility]   Flu Shot Status  Up to Date   Mammogram Status  Up to Date   Mammogram Completion at [facility] or Other  [facility]   Pneumonia Vaccine Status  Up to Date   Other Patient Education/Resources   24/7 [facility] Healthcare Nurse Call Line, Advanced Care Planning, [facility]   24/7 Nurse Call Line Education Method  Verbal   ACP Education Method  Send Materials   [facility]  Verbal [Active]   Does patient have depression diagnosis?  No   Advanced Directives:  Send Materials   Ed Visits past 12 months:  2 or 3   Hospitalizations past 12 months  None   Medication Adherence  Medications understood        The main concerns and/or symptoms the patient would like to address are: Talked with patient. Discussed 5/21/21 ED visit regarding sprain to right shoulder due to fall . Patient states to be compliant with ED recommendations; wearing sling as directed and states symptoms have improved. Patient has 5/24/21 ortho appointment. Patient states to have good sensation to right arm of fingers; no numbness or tingling and able to .  Patient lives with family; receiving assistance as needed.  Patient states to be compliant with medications and medical appointments.     Education/instruction provided by Care Coordinator:Reviewed with patient ED AVS recommendations;education provided;  fall and safety precautions;  24/7 Nurse Line Telephone number; AC contact information; Advance Directives; My Chart; gaps in care; AWV  and Case Management and [facility] Planning and Support services.  Patient verbalized understanding and states to appreciate phone call.  No further questions or concerns voiced at this time.       Follow Up Outreach Due: Follow up as needed.     Zara Luna RN  Ambulatory     5/24/2021, 12:06 EDT

## 2021-06-07 ENCOUNTER — OFFICE VISIT (OUTPATIENT)
Dept: ORTHOPEDIC SURGERY | Facility: CLINIC | Age: 76
End: 2021-06-07

## 2021-06-07 VITALS — WEIGHT: 170 LBS | HEIGHT: 68 IN | BODY MASS INDEX: 25.76 KG/M2

## 2021-06-07 DIAGNOSIS — M25.412 EFFUSION OF LEFT SHOULDER JOINT: ICD-10-CM

## 2021-06-07 DIAGNOSIS — M17.12 PRIMARY OSTEOARTHRITIS OF LEFT KNEE: ICD-10-CM

## 2021-06-07 DIAGNOSIS — S49.91XA RIGHT SHOULDER INJURY, INITIAL ENCOUNTER: ICD-10-CM

## 2021-06-07 DIAGNOSIS — R52 PAIN: Primary | ICD-10-CM

## 2021-06-07 PROCEDURE — 73030 X-RAY EXAM OF SHOULDER: CPT | Performed by: NURSE PRACTITIONER

## 2021-06-07 PROCEDURE — 99214 OFFICE O/P EST MOD 30 MIN: CPT | Performed by: NURSE PRACTITIONER

## 2021-06-07 PROCEDURE — 20610 DRAIN/INJ JOINT/BURSA W/O US: CPT | Performed by: NURSE PRACTITIONER

## 2021-06-07 RX ORDER — TRIAMCINOLONE ACETONIDE 40 MG/ML
80 INJECTION, SUSPENSION INTRA-ARTICULAR; INTRAMUSCULAR
Status: COMPLETED | OUTPATIENT
Start: 2021-06-07 | End: 2021-06-07

## 2021-06-07 RX ORDER — LIDOCAINE HYDROCHLORIDE 10 MG/ML
8 INJECTION, SOLUTION EPIDURAL; INFILTRATION; INTRACAUDAL; PERINEURAL
Status: COMPLETED | OUTPATIENT
Start: 2021-06-07 | End: 2021-06-07

## 2021-06-07 RX ADMIN — TRIAMCINOLONE ACETONIDE 80 MG: 40 INJECTION, SUSPENSION INTRA-ARTICULAR; INTRAMUSCULAR at 10:57

## 2021-06-07 RX ADMIN — LIDOCAINE HYDROCHLORIDE 8 ML: 10 INJECTION, SOLUTION EPIDURAL; INFILTRATION; INTRACAUDAL; PERINEURAL at 10:57

## 2021-06-07 NOTE — PROGRESS NOTES
Subjective:     Patient ID: Germaine Gonzalez is a 76 y.o. female.    Chief Complaint:  Follow-up right shoulder injury  Follow-up DJD left knee, new onset of symptoms after fall, left knee effusion   History of Present Illness  Germaine Gonzalez returns to clinic for 2-week follow-up right shoulder injury. Would also like to be seen for pain/swelling left knee. Returns to clinic for follow-up of her right shoulder.  Continues to experience maximal tenderness all the mild in nature present at the greater tuberosity of the right shoulder increased pain noted reaching out to the side and reaching above her head but reports more so is discomfort.  Pain well-tolerated does seem to be improving.  But also like to be seen for swelling and pain at her knee.  X-ray images were previously completed she is noted increased swelling increased pain noted with activities involving deep flexion transitional activity such as seated standing attempting to walk.  Pain is interfering with her day-to-day activities would like to discuss treatment options for the knee.  Denies other concerns present this time.       Social History     Occupational History   • Not on file   Tobacco Use   • Smoking status: Former Smoker     Packs/day: 0.50     Years: 16.00     Pack years: 8.00     Types: Cigarettes     Start date: 3/22/1971     Quit date: 1990     Years since quittin.2   • Smokeless tobacco: Never Used   Vaping Use   • Vaping Use: Never used   Substance and Sexual Activity   • Alcohol use: Yes     Alcohol/week: 2.0 standard drinks     Types: 2 Glasses of wine per week   • Drug use: No   • Sexual activity: Defer      Past Medical History:   Diagnosis Date   • Arthritis     knees   • Mcgill esophagus    • Cancer (CMS/HCC)     cervical   • Cervical cancer (CMS/HCC)    • Colon polyp    • Eczema    • Elevated liver enzymes    • GERD (gastroesophageal reflux disease)    • H/O stress fracture    • Health care maintenance    • Hypercholesteremia     • Kidney stones     sched ureteroscopy, lithotripsy   • Ocular migraine    • Postmenopausal    • Seasonal allergies    • Sjogren's syndrome (CMS/HCC)      Past Surgical History:   Procedure Laterality Date   •  SECTION      x2   • COLONOSCOPY     • CYSTOSCOPY URETEROSCOPY STONE MANIPULATION/EXTRACTION Left 10/14/2016    Procedure: LEFT URETEROSCOPY bilateral retrograde pyleogram LEFT STENT PLACEMENT.;  Surgeon: Julius Darling MD;  Location: [facility] OR;  Service:    • CYSTOSCOPY W/ URETERAL STENT PLACEMENT Right 8/3/2018    Procedure: CYSTOSCOPY URETERAL CATHETER/STENT INSERTION;  Surgeon: Farzad Cordova MD;  Location: [facility] OR;  Service: Urology   • ENDOSCOPY N/A 4/10/2019    Procedure: ESOPHAGOGASTRODUODENOSCOPY;  Surgeon: Bc Sanchez MD;  Location: [facility] OR;  Service: Gastroenterology   • HYSTERECTOMY     • UPPER GASTROINTESTINAL ENDOSCOPY     • URETEROSCOPY LASER LITHOTRIPSY WITH STENT INSERTION Left 10/26/2016    Procedure: LT URETEROSCOPY LASER LITHOTRIPSY ;  Surgeon: Julius Darling MD;  Location: [facility] OR;  Service:        Family History   Problem Relation Age of Onset   • Anxiety disorder Mother    • Bipolar disorder Mother    • Stroke Mother 83   • Thyroid disease Mother    • Depression Mother    • Heart attack Father 47   • Thyroid disease Brother    • Hypertension Brother    • Diabetes Brother 68   • Glaucoma Brother    • Depression Brother    • No Known Problems Daughter    • No Known Problems Son    • Leukemia Maternal Grandmother    • Diabetes Maternal Grandfather    • Heart attack Paternal Grandmother    • Heart attack Paternal Grandfather    • Breast cancer Neg Hx    • Colon cancer Neg Hx    • Colon polyps Neg Hx        Objective:  Physical Exam    General: No acute distress.  Eyes: conjunctiva clear; pupils equally round and reactive  ENT: external ears and nose atraumatic; oropharynx clear  CV: no peripheral edema  Resp: normal respiratory effort  Skin:  "no rashes or wounds; normal turgor  Psych: mood and affect appropriate; recent and remote memory intact    Vitals:    06/07/21 1027   Weight: 77.1 kg (170 lb)   Height: 172 cm (67.72\")         06/07/21  1027   Weight: 77.1 kg (170 lb)     Body mass index is 26.06 kg/m².      Ortho Exam       Right Shoulder Exam      Tenderness   The patient is experiencing tenderness in the greater tuberosity      Range of Motion   External rotation: 70   Forward flexion: 180   Internal rotation 0 degrees: L5      Muscle Strength   Internal rotation: 4/5   External rotation: 4/5   Supraspinatus: 4/5   Subscapularis: 4/5   Biceps: 4/5      Tests   Paez test: negative  Cross arm: negative  Drop arm: negative     Other   Erythema: absent  Sensation: normal  Pulse: present     Comments:    Negative empty can  negative Deshler's  negative Speed's  negative bear hug exam    Left Knee Exam    Maximal tenderness anterior aspect of knee, superolateral pain/swelling   Tests   Geoff:  Medial - negative Lateral - negative  Varus: negative Valgus: negative  Lachman:  Anterior - 1+    Posterior - negative  Drawer:  Anterior - negative     Posterior - negative  Patellar apprehension: negative     Other   Erythema: absent  Sensation: normal  Pulse: present  Swelling: moderate     Comments:    positive effusion  Negative active patellar compression test  Range of motion 0 degrees extension 125 degrees of flexion  Positive crepitus throughout arc of motion    Imaging:  Right Shoulder X-Ray  Indication: Pain  AP Internal and External Rotation views    Findings:  Hairline fracture greater tuberosity, no evidence of fracture displacement  No bony lesion  Normal soft tissues  Normal joint spaces  prior studies were available for comparison.    Assessment:        1. Pain    2. Primary osteoarthritis of left knee    3. Effusion of left shoulder joint    4. Right shoulder injury, initial encounter           Plan:  1. Discussed plan of care with " patient. Regarding shoulder discussed to avoid heavy lifting, shoulder will continue to heal over next 6 weeks. If not improving, will see her back in clinic to evaluate. Discussed treatment options for knee, wishes to proceed with aspiration and steroid injection left knee. Discussed application of ice at injection site. Plan to see her back in clinic as needed or if not improving. All questions answered.  Large Joint Arthrocentesis: L knee  Date/Time: 6/7/2021 10:57 AM  Consent given by: patient  Site marked: site marked  Timeout: Immediately prior to procedure a time out was called to verify the correct patient, procedure, equipment, support staff and site/side marked as required   Supporting Documentation  Indications: pain   Procedure Details  Location: knee - L knee  Preparation: Patient was prepped and draped in the usual sterile fashion  Needle size: 22 G  Approach: superior  Medications administered: 8 mL lidocaine PF 1% 1 %; 80 mg triamcinolone acetonide 40 MG/ML  Aspirate amount: 22 mL  Aspirate: yellow  Patient tolerance: patient tolerated the procedure well with no immediate complications         Orders:  Orders Placed This Encounter   Procedures   • Large Joint Arthrocentesis: L knee   • XR Shoulder 2+ View Right       Medications:  No orders of the defined types were placed in this encounter.      Followup:  No follow-ups on file.    Diagnoses and all orders for this visit:    1. Pain (Primary)  -     XR Shoulder 2+ View Right    2. Primary osteoarthritis of left knee    3. Effusion of left shoulder joint    4. Right shoulder injury, initial encounter    Other orders  -     Large Joint Arthrocentesis: L knee      Dictated utilizing Dragon dictation

## 2021-06-07 NOTE — PROGRESS NOTES
Returns to clinic for follow-up of her right shoulder. Continues to experience maximal tenderness all the mild in nature present at the greater tuberosity of the right shoulder increased pain noted reaching out to the side and reaching above her head but reports more so is discomfort. Pain well-tolerated does seem to be improving.  But also like to be seen for swelling and pain at her knee. X-ray images were previously completed she is noted increased swelling increased pain noted with activities involving deep flexion transitional activity such as seated standing attempting to walk. Pain is interfering with her day-to-day activities would like to discuss treatment options for the knee. Denies other concerns present this time.

## 2021-08-17 ENCOUNTER — TELEPHONE (OUTPATIENT)
Dept: GASTROENTEROLOGY | Facility: CLINIC | Age: 76
End: 2021-08-17

## 2021-09-08 ENCOUNTER — ANESTHESIA (OUTPATIENT)
Dept: SURGERY | Facility: SURGERY CENTER | Age: 76
End: 2021-09-08

## 2021-09-08 ENCOUNTER — HOSPITAL ENCOUNTER (OUTPATIENT)
Facility: SURGERY CENTER | Age: 76
Setting detail: HOSPITAL OUTPATIENT SURGERY
Discharge: HOME | End: 2021-09-08
Attending: INTERNAL MEDICINE | Admitting: INTERNAL MEDICINE

## 2021-09-08 ENCOUNTER — ANESTHESIA EVENT (OUTPATIENT)
Dept: SURGERY | Facility: SURGERY CENTER | Age: 76
End: 2021-09-08

## 2021-09-08 VITALS
RESPIRATION RATE: 16 BRPM | BODY MASS INDEX: 29.24 KG/M2 | TEMPERATURE: 98.2 F | OXYGEN SATURATION: 99 % | SYSTOLIC BLOOD PRESSURE: 136 MMHG | HEIGHT: 65 IN | DIASTOLIC BLOOD PRESSURE: 79 MMHG | HEART RATE: 77 BPM | WEIGHT: 175.5 LBS

## 2021-09-08 DIAGNOSIS — Z12.11 ENCOUNTER FOR SCREENING FOR MALIGNANT NEOPLASM OF COLON: ICD-10-CM

## 2021-09-08 DIAGNOSIS — Z86.010 PERSONAL HISTORY OF COLONIC POLYPS: ICD-10-CM

## 2021-09-08 PROCEDURE — 45380 COLONOSCOPY AND BIOPSY: CPT | Performed by: INTERNAL MEDICINE

## 2021-09-08 PROCEDURE — 88305 TISSUE EXAM BY PATHOLOGIST: CPT | Performed by: INTERNAL MEDICINE

## 2021-09-08 PROCEDURE — 25010000002 PROPOFOL 10 MG/ML EMULSION: Performed by: STUDENT IN AN ORGANIZED HEALTH CARE EDUCATION/TRAINING PROGRAM

## 2021-09-08 RX ORDER — SODIUM CHLORIDE, SODIUM LACTATE, POTASSIUM CHLORIDE, CALCIUM CHLORIDE 600; 310; 30; 20 MG/100ML; MG/100ML; MG/100ML; MG/100ML
1000 INJECTION, SOLUTION INTRAVENOUS CONTINUOUS
Status: DISCONTINUED | OUTPATIENT
Start: 2021-09-08 | End: 2021-09-08 | Stop reason: HOSPADM

## 2021-09-08 RX ORDER — LIDOCAINE HYDROCHLORIDE 20 MG/ML
INJECTION, SOLUTION INFILTRATION; PERINEURAL AS NEEDED
Status: DISCONTINUED | OUTPATIENT
Start: 2021-09-08 | End: 2021-09-08 | Stop reason: SURG

## 2021-09-08 RX ORDER — LIDOCAINE HYDROCHLORIDE 10 MG/ML
0.5 INJECTION, SOLUTION INFILTRATION; PERINEURAL ONCE AS NEEDED
Status: DISCONTINUED | OUTPATIENT
Start: 2021-09-08 | End: 2021-09-08 | Stop reason: HOSPADM

## 2021-09-08 RX ORDER — SODIUM CHLORIDE 0.9 % (FLUSH) 0.9 %
10 SYRINGE (ML) INJECTION AS NEEDED
Status: DISCONTINUED | OUTPATIENT
Start: 2021-09-08 | End: 2021-09-08 | Stop reason: HOSPADM

## 2021-09-08 RX ORDER — PROPOFOL 10 MG/ML
VIAL (ML) INTRAVENOUS AS NEEDED
Status: DISCONTINUED | OUTPATIENT
Start: 2021-09-08 | End: 2021-09-08 | Stop reason: SURG

## 2021-09-08 RX ADMIN — PROPOFOL 100 MG: 10 INJECTION, EMULSION INTRAVENOUS at 09:13

## 2021-09-08 RX ADMIN — LIDOCAINE HYDROCHLORIDE 60 MG: 20 INJECTION, SOLUTION INFILTRATION; PERINEURAL at 09:13

## 2021-09-08 RX ADMIN — SODIUM CHLORIDE, POTASSIUM CHLORIDE, SODIUM LACTATE AND CALCIUM CHLORIDE 1000 ML: 600; 310; 30; 20 INJECTION, SOLUTION INTRAVENOUS at 08:45

## 2021-09-08 RX ADMIN — PROPOFOL 160 MCG/KG/MIN: 10 INJECTION, EMULSION INTRAVENOUS at 09:14

## 2021-09-08 NOTE — H&P
Patient Care Team:  Roberta Boswell MD as PCP - General (Internal Medicine & Pediatrics)  Bc Sanchez MD as Consulting Physician (Gastroenterology)    CHIEF COMPLAINT: Personal hx colon polyps    HISTORY OF PRESENT ILLNESS:  Last exam was     Past Medical History:   Diagnosis Date   • Arthritis     knees   • Mcgill esophagus    • Cancer (CMS/HCC)     cervical   • Cervical cancer (CMS/HCC)    • Colon polyp    • Eczema    • Elevated liver enzymes    • GERD (gastroesophageal reflux disease)    • H/O stress fracture    • Health care maintenance    • Hypercholesteremia    • Hypertension    • Kidney stones     sched ureteroscopy, lithotripsy   • Ocular migraine    • Postmenopausal    • Seasonal allergies    • Sjogren's syndrome (CMS/HCC)      Past Surgical History:   Procedure Laterality Date   •  SECTION      x2   • COLONOSCOPY     • CYSTOSCOPY URETEROSCOPY STONE MANIPULATION/EXTRACTION Left 10/14/2016    Procedure: LEFT URETEROSCOPY bilateral retrograde pyleogram LEFT STENT PLACEMENT.;  Surgeon: Julius Darling MD;  Location: [facility];  Service:    • CYSTOSCOPY W/ URETERAL STENT PLACEMENT Right 8/3/2018    Procedure: CYSTOSCOPY URETERAL CATHETER/STENT INSERTION;  Surgeon: Farzad Cordova MD;  Location: [facility];  Service: Urology   • ENDOSCOPY N/A 4/10/2019    Procedure: ESOPHAGOGASTRODUODENOSCOPY;  Surgeon: Bc Sanchez MD;  Location: [facility];  Service: Gastroenterology   • HYSTERECTOMY     • UPPER GASTROINTESTINAL ENDOSCOPY     • URETEROSCOPY LASER LITHOTRIPSY WITH STENT INSERTION Left 10/26/2016    Procedure: LT URETEROSCOPY LASER LITHOTRIPSY ;  Surgeon: Julius Darling MD;  Location: [facility] OR;  Service:      Family History   Problem Relation Age of Onset   • Anxiety disorder Mother    • Bipolar disorder Mother    • Stroke Mother 83   • Thyroid disease Mother    • Depression Mother    • Heart attack Father 47   • Thyroid disease Brother    •  Hypertension Brother    • Diabetes Brother 68   • Glaucoma Brother    • Depression Brother    • No Known Problems Daughter    • No Known Problems Son    • Leukemia Maternal Grandmother    • Diabetes Maternal Grandfather    • Heart attack Paternal Grandmother    • Heart attack Paternal Grandfather    • Breast cancer Neg Hx    • Colon cancer Neg Hx    • Colon polyps Neg Hx      Social History     Tobacco Use   • Smoking status: Former Smoker     Packs/day: 0.50     Years: 16.00     Pack years: 8.00     Types: Cigarettes     Start date: 3/22/1971     Quit date: 1990     Years since quittin.4   • Smokeless tobacco: Never Used   Vaping Use   • Vaping Use: Never used   Substance Use Topics   • Alcohol use: Yes     Alcohol/week: 2.0 standard drinks     Types: 2 Glasses of wine per week     Comment: social   • Drug use: No     Medications Prior to Admission   Medication Sig Dispense Refill Last Dose   • Biotin 5000 MCG tablet Take  by mouth Daily.   2021 at Unknown time   • calcium carbonate (OS-ULI) 600 MG tablet Take 600 mg by mouth 2 (Two) Times a Day With Meals.   2021 at Unknown time   • desoximetasone (TOPICORT) 0.05 % cream APPLY CREAM TOPICALLY TO AFFECTED AREA TWICE DAILY FOR ECZEMA FOR UP TO 14 DAYS   Past Month at Unknown time   • escitalopram (LEXAPRO) 10 MG tablet TAKE 1/2 (ONE-HALF) TABLET BY MOUTH IN THE MORNING 45 tablet 3 2021 at Unknown time   • estradiol (ESTRACE) 0.1 MG/GM vaginal cream    2021 at Unknown time   • fluticasone (FLONASE) 50 MCG/ACT nasal spray 2 sprays into the nostril(s) as directed by provider Daily. 3 bottle 1 Past Week at Unknown time   • glucosamine-chondroitin 500-400 MG capsule capsule Take 1 capsule by mouth 2 (Two) Times a Day With Meals.   2021 at Unknown time   • ibuprofen (ADVIL,MOTRIN) 400 MG tablet Take 400 mg by mouth Every 6 (Six) Hours As Needed for Mild Pain .   Past Week at Unknown time   • lisinopril (PRINIVIL,ZESTRIL) 10 MG tablet Take 1  "tablet by mouth Daily. 90 tablet 3 9/7/2021 at Unknown time   • omeprazole (priLOSEC) 40 MG capsule Take 1 capsule by mouth once daily 90 capsule 3 9/7/2021 at Unknown time   • pseudoephedrine (SUDAFED) 30 MG tablet TAKE 1 TO 2 TABLETS BY MOUTH EVERY 6 HOURS 48 tablet 0 Past Month at Unknown time   • rosuvastatin (CRESTOR) 10 MG tablet Take 1 tablet by mouth once daily 90 tablet 3 9/7/2021 at Unknown time   • vitamin B-12 (CYANOCOBALAMIN) 1000 MCG tablet Take 5,000 mcg by mouth Daily.   9/7/2021 at Unknown time   • EPINEPHrine (EPIPEN) 0.3 MG/0.3ML solution auto-injector injection epinephrine 0.3 mg/0.3 mL injection, auto-injector        Allergies:  Levaquin [levofloxacin] and Tetracycline    REVIEW OF SYSTEMS:  Please see the above history of present illness for pertinent positives and negatives.  The remainder of the patient's systems have been reviewed and are negative.     Vital Signs  Temp:  [98 °F (36.7 °C)] 98 °F (36.7 °C)  Heart Rate:  [99] 99  Resp:  [16] 16  BP: (159)/(81) 159/81    Flowsheet Rows      First Filed Value   Admission Height  165.1 cm (65\") Documented at 09/08/2021 0841   Admission Weight  79.6 kg (175 lb 8 oz) Documented at 09/08/2021 0841           Physical Exam:  Physical Exam   Constitutional: Patient appears well-developed and well-nourished and in no acute distress   HEENT:   Head: Normocephalic and atraumatic.   Eyes:  Pupils are equal, round, and reactive to light. EOM are intact. Sclerae are anicteric and non-injected.  Mouth and Throat: Patient has moist mucous membranes. Oropharynx is clear of any erythema or exudate.     Neck: Neck supple. No JVD present. No thyromegaly present. No lymphadenopathy present.  Cardiovascular: Regular rate, regular rhythm, S1 normal and S2 normal.  Exam reveals no gallop and no friction rub.  No murmur heard.  Pulmonary/Chest: Lungs are clear to auscultation bilaterally. No respiratory distress. No wheezes. No rhonchi. No rales.   Abdominal: Soft. " Bowel sounds are normal. No distension and no mass. There is no hepatosplenomegaly. There is no tenderness.   Musculoskeletal: Normal Muscle tone  Extremities: No edema. Pulses are palpable in all 4 extremities.  Neurological: Patient is alert and oriented to person, place, and time. Cranial nerves II-XII are grossly intact with no focal deficits.  Skin: Skin is warm. No rash noted. Nails show no clubbing.  No cyanosis or erythema.    Debilities/Disabilities Identified: None  Emotional Behavior: Appropriate     Results Review:   I reviewed the patient's new clinical results.    Lab Results (most recent)     None          Imaging Results (Most Recent)     None        reviewed    ECG/EMG Results (most recent)     None        reviewed    Assessment/Plan   Personal hx colon polyps/  colonoscopy      I discussed the patient's findings and my recommendations with patient.     Bc Sanchez MD  09/08/21  08:56 EDT    Time: 10 min prior to procedure.

## 2021-09-08 NOTE — ANESTHESIA PREPROCEDURE EVALUATION
Anesthesia Evaluation     Patient summary reviewed and Nursing notes reviewed   no history of anesthetic complications:  NPO Solid Status: > 8 hours  NPO Liquid Status: > 2 hours           Airway   Mallampati: II  TM distance: >3 FB  Neck ROM: full  No difficulty expected  Dental      Pulmonary    Cardiovascular   Exercise tolerance: good (4-7 METS)    ECG reviewed    (+) hypertension, hyperlipidemia,     ROS comment: Stress echo reviewed    Neuro/Psych  GI/Hepatic/Renal/Endo    (+)  GERD,  liver disease fatty liver disease,     Musculoskeletal     Abdominal    Substance History      OB/GYN          Other                      Anesthesia Plan    ASA 2     MAC     intravenous induction     Anesthetic plan, all risks, benefits, and alternatives have been provided, discussed and informed consent has been obtained with: patient.

## 2021-09-08 NOTE — BRIEF OP NOTE
COLONOSCOPY  Progress Note    Germaine Gonzalez  9/8/2021    Pre-op Diagnosis:   Encounter for screening for malignant neoplasm of colon [Z12.11]  Personal history of colonic polyps [Z86.010]       Post-Op Diagnosis Codes:     * Encounter for screening for malignant neoplasm of colon [Z12.11]     * Personal history of colonic polyps [Z86.010]     * Colon polyp [K63.5]     * Diverticulosis [K57.90]    Procedure/CPT® Codes:        Procedure(s):  COLONOSCOPY    Surgeon(s):  Bc Sanchez MD    Anesthesia: Monitored Anesthesia Care    Staff:   Endo Technician: Yuri Haile Nurse: Yamilet Wilkins RN         Estimated Blood Loss: none    Urine Voided: * No values recorded between 9/8/2021  9:06 AM and 9/8/2021  9:31 AM *    Specimens:                Specimens     ID Source Type Tests Collected By Collected At Frozen?    A Large Intestine, Left / Descending Colon Tissue · TISSUE PATHOLOGY EXAM   Bc Sanchez MD 9/8/21 0919 No    Description: descending polyp    B Large Intestine, Cecum Tissue · TISSUE PATHOLOGY EXAM   Bc Sanchez MD 9/8/21 0922 No    Description: cecal polyp                Drains: * No LDAs found *    Findings: Colon to TI Good Prep  Mild sigmoid Diverticulosis  [id number]    Complications: None          Bc Sanchez MD     Date: 9/8/2021  Time: 09:33 EDT

## 2021-09-08 NOTE — ANESTHESIA POSTPROCEDURE EVALUATION
Patient: Germaine Gonzalez    Procedure Summary     Date: 09/08/21 Room / Location: [facility]    Anesthesia Start: 0907 Anesthesia Stop: 0936    Procedure: COLONOSCOPY (N/A ) Diagnosis:       Encounter for screening for malignant neoplasm of colon      Personal history of colonic polyps      Colon polyp      Diverticulosis      (Encounter for screening for malignant neoplasm of colon [Z12.11])      (Personal history of colonic polyps [Z86.010])    Surgeons: Bc Sanchez MD Provider: Serge Alejandro MD    Anesthesia Type: MAC ASA Status: 2          Anesthesia Type: MAC    Vitals  /82  HR 85  O2 98%  RR 16      Post Anesthesia Care and Evaluation    Patient location during evaluation: bedside  Patient participation: complete - patient participated  Level of consciousness: awake and alert  Pain management: adequate  Airway patency: patent  Anesthetic complications: No anesthetic complications  PONV Status: controlled  Cardiovascular status: blood pressure returned to baseline and acceptable  Respiratory status: acceptable  Hydration status: acceptable

## 2021-09-09 LAB
LAB AP CASE REPORT: NORMAL
PATH REPORT.FINAL DX SPEC: NORMAL
PATH REPORT.GROSS SPEC: NORMAL

## 2021-09-10 ENCOUNTER — TELEPHONE (OUTPATIENT)
Dept: INTERNAL MEDICINE | Facility: CLINIC | Age: 76
End: 2021-09-10

## 2021-09-10 DIAGNOSIS — I10 ESSENTIAL HYPERTENSION: ICD-10-CM

## 2021-09-10 DIAGNOSIS — E88.81 INSULIN RESISTANCE: ICD-10-CM

## 2021-09-10 DIAGNOSIS — R79.9 ABNORMAL FINDING OF BLOOD CHEMISTRY, UNSPECIFIED: ICD-10-CM

## 2021-09-10 DIAGNOSIS — E78.2 MIXED HYPERLIPIDEMIA: Primary | ICD-10-CM

## 2021-09-15 ENCOUNTER — LAB (OUTPATIENT)
Dept: INTERNAL MEDICINE | Facility: CLINIC | Age: 76
End: 2021-09-15

## 2021-09-15 DIAGNOSIS — I10 ESSENTIAL HYPERTENSION: ICD-10-CM

## 2021-09-15 DIAGNOSIS — R79.9 ABNORMAL FINDING OF BLOOD CHEMISTRY, UNSPECIFIED: ICD-10-CM

## 2021-09-15 DIAGNOSIS — E78.2 MIXED HYPERLIPIDEMIA: ICD-10-CM

## 2021-09-15 DIAGNOSIS — E88.81 INSULIN RESISTANCE: ICD-10-CM

## 2021-09-16 LAB
ALBUMIN SERPL-MCNC: 4.8 G/DL (ref 3.5–5.2)
ALBUMIN/GLOB SERPL: 2.5 G/DL
ALP SERPL-CCNC: 82 U/L (ref 39–117)
ALT SERPL-CCNC: 48 U/L (ref 1–33)
AST SERPL-CCNC: 44 U/L (ref 1–32)
BASOPHILS # BLD AUTO: 0.04 10*3/MM3 (ref 0–0.2)
BASOPHILS NFR BLD AUTO: 0.6 % (ref 0–1.5)
BILIRUB SERPL-MCNC: 0.8 MG/DL (ref 0–1.2)
BUN SERPL-MCNC: 14 MG/DL (ref 8–23)
BUN/CREAT SERPL: 17.3 (ref 7–25)
CALCIUM SERPL-MCNC: 9.8 MG/DL (ref 8.6–10.5)
CHLORIDE SERPL-SCNC: 103 MMOL/L (ref 98–107)
CHOLEST SERPL-MCNC: 248 MG/DL (ref 0–200)
CO2 SERPL-SCNC: 23.6 MMOL/L (ref 22–29)
CREAT SERPL-MCNC: 0.81 MG/DL (ref 0.57–1)
EOSINOPHIL # BLD AUTO: 0.09 10*3/MM3 (ref 0–0.4)
EOSINOPHIL NFR BLD AUTO: 1.3 % (ref 0.3–6.2)
ERYTHROCYTE [DISTWIDTH] IN BLOOD BY AUTOMATED COUNT: 12.4 % (ref 12.3–15.4)
GLOBULIN SER CALC-MCNC: 1.9 GM/DL
GLUCOSE SERPL-MCNC: 91 MG/DL (ref 65–99)
HBA1C MFR BLD: 5.7 % (ref 4.8–5.6)
HCT VFR BLD AUTO: 42 % (ref 34–46.6)
HDLC SERPL-MCNC: 68 MG/DL (ref 40–60)
HGB BLD-MCNC: 14.1 G/DL (ref 12–15.9)
IMM GRANULOCYTES # BLD AUTO: 0.06 10*3/MM3 (ref 0–0.05)
IMM GRANULOCYTES NFR BLD AUTO: 0.9 % (ref 0–0.5)
LDLC SERPL CALC-MCNC: 160 MG/DL (ref 0–100)
LDLC/HDLC SERPL: 2.31 {RATIO}
LYMPHOCYTES # BLD AUTO: 1.21 10*3/MM3 (ref 0.7–3.1)
LYMPHOCYTES NFR BLD AUTO: 17.7 % (ref 19.6–45.3)
MCH RBC QN AUTO: 33.3 PG (ref 26.6–33)
MCHC RBC AUTO-ENTMCNC: 33.6 G/DL (ref 31.5–35.7)
MCV RBC AUTO: 99.3 FL (ref 79–97)
MONOCYTES # BLD AUTO: 0.61 10*3/MM3 (ref 0.1–0.9)
MONOCYTES NFR BLD AUTO: 8.9 % (ref 5–12)
NEUTROPHILS # BLD AUTO: 4.82 10*3/MM3 (ref 1.7–7)
NEUTROPHILS NFR BLD AUTO: 70.6 % (ref 42.7–76)
NRBC BLD AUTO-RTO: 0 /100 WBC (ref 0–0.2)
PLATELET # BLD AUTO: 232 10*3/MM3 (ref 140–450)
POTASSIUM SERPL-SCNC: 4.2 MMOL/L (ref 3.5–5.2)
PROT SERPL-MCNC: 6.7 G/DL (ref 6–8.5)
RBC # BLD AUTO: 4.23 10*6/MM3 (ref 3.77–5.28)
SODIUM SERPL-SCNC: 140 MMOL/L (ref 136–145)
T4 FREE SERPL-MCNC: 1.11 NG/DL (ref 0.93–1.7)
TRIGL SERPL-MCNC: 116 MG/DL (ref 0–150)
TSH SERPL DL<=0.005 MIU/L-ACNC: 1.88 UIU/ML (ref 0.27–4.2)
VLDLC SERPL CALC-MCNC: 20 MG/DL (ref 5–40)
WBC # BLD AUTO: 6.83 10*3/MM3 (ref 3.4–10.8)

## 2021-09-22 ENCOUNTER — OFFICE VISIT (OUTPATIENT)
Dept: INTERNAL MEDICINE | Facility: CLINIC | Age: 76
End: 2021-09-22

## 2021-09-22 VITALS
SYSTOLIC BLOOD PRESSURE: 142 MMHG | HEART RATE: 80 BPM | HEIGHT: 65 IN | RESPIRATION RATE: 16 BRPM | WEIGHT: 180.8 LBS | TEMPERATURE: 97.3 F | OXYGEN SATURATION: 98 % | DIASTOLIC BLOOD PRESSURE: 82 MMHG | BODY MASS INDEX: 30.12 KG/M2

## 2021-09-22 DIAGNOSIS — M85.80 OSTEOPENIA, UNSPECIFIED LOCATION: ICD-10-CM

## 2021-09-22 DIAGNOSIS — E78.2 MIXED HYPERLIPIDEMIA: ICD-10-CM

## 2021-09-22 DIAGNOSIS — R73.03 PREDIABETES: ICD-10-CM

## 2021-09-22 DIAGNOSIS — K21.00 GASTROESOPHAGEAL REFLUX DISEASE WITH ESOPHAGITIS, UNSPECIFIED WHETHER HEMORRHAGE: ICD-10-CM

## 2021-09-22 DIAGNOSIS — M25.561 CHRONIC PAIN OF RIGHT KNEE: ICD-10-CM

## 2021-09-22 DIAGNOSIS — K75.81 NASH (NONALCOHOLIC STEATOHEPATITIS): ICD-10-CM

## 2021-09-22 DIAGNOSIS — I10 ESSENTIAL HYPERTENSION: ICD-10-CM

## 2021-09-22 DIAGNOSIS — G89.29 CHRONIC PAIN OF RIGHT KNEE: ICD-10-CM

## 2021-09-22 DIAGNOSIS — Z00.00 MEDICARE ANNUAL WELLNESS VISIT, SUBSEQUENT: Primary | ICD-10-CM

## 2021-09-22 DIAGNOSIS — E78.00 PURE HYPERCHOLESTEROLEMIA: ICD-10-CM

## 2021-09-22 DIAGNOSIS — Z78.0 POST-MENOPAUSAL: ICD-10-CM

## 2021-09-22 PROCEDURE — 1125F AMNT PAIN NOTED PAIN PRSNT: CPT | Performed by: INTERNAL MEDICINE

## 2021-09-22 PROCEDURE — 96160 PT-FOCUSED HLTH RISK ASSMT: CPT | Performed by: INTERNAL MEDICINE

## 2021-09-22 PROCEDURE — 99397 PER PM REEVAL EST PAT 65+ YR: CPT | Performed by: INTERNAL MEDICINE

## 2021-09-22 PROCEDURE — G0439 PPPS, SUBSEQ VISIT: HCPCS | Performed by: INTERNAL MEDICINE

## 2021-09-22 PROCEDURE — 1160F RVW MEDS BY RX/DR IN RCRD: CPT | Performed by: INTERNAL MEDICINE

## 2021-09-22 PROCEDURE — 1170F FXNL STATUS ASSESSED: CPT | Performed by: INTERNAL MEDICINE

## 2021-09-22 PROCEDURE — 99214 OFFICE O/P EST MOD 30 MIN: CPT | Performed by: INTERNAL MEDICINE

## 2021-09-22 RX ORDER — ROSUVASTATIN CALCIUM 20 MG/1
20 TABLET, COATED ORAL DAILY
Qty: 90 TABLET | Refills: 1 | Status: SHIPPED | OUTPATIENT
Start: 2021-09-22 | End: 2022-01-31

## 2021-09-22 RX ORDER — CYCLOSPORINE 0.5 MG/ML
EMULSION OPHTHALMIC
Status: ON HOLD | COMMUNITY
Start: 2021-08-16 | End: 2022-02-08

## 2021-09-22 NOTE — PATIENT INSTRUCTIONS
Medicare Wellness  Personal Prevention Plan of Service     Date of Office Visit:  2021  Encounter Provider:  Roberta Boswell MD  Place of Service:  [facility]  Patient Name: Germaine Gonzalez  :  1945    As part of the Medicare Wellness portion of your visit today, we are providing you with this personalized preventive plan of services (PPPS). This plan is based upon recommendations of the [place] Preventive Services Task Force (USPSTF) and the Advisory Committee on Immunization Practices (ACIP).    This lists the preventive care services that should be considered, and provides dates of when you are due. Items listed as completed are up-to-date and do not require any further intervention.    Health Maintenance   Topic Date Due   • DXA SCAN  2019   • INFLUENZA VACCINE  10/01/2021   • GASTROSCOPY (EGD)  04/10/2022   • LIPID PANEL  09/15/2022   • ANNUAL WELLNESS VISIT  2022   • MAMMOGRAM  2023   • TDAP/TD VACCINES (2 - Td or Tdap) 2025   • COLORECTAL CANCER SCREENING  2026   • HEPATITIS C SCREENING  Completed   • COVID-19 Vaccine  Completed   • Pneumococcal Vaccine 65+  Completed   • ZOSTER VACCINE  Completed       Orders Placed This Encounter   Procedures   • DEXA Bone Density Axial     Order Specific Question:   Is patient taking or have taken long term Glucocorticoid (steroids)?     Answer:   No     Order Specific Question:   Does the patient have rheumatoid arthritis?     Answer:   No     Order Specific Question:   Does the patient have secondary osteoporosis?     Answer:   No     Order Specific Question:   Reason for Exam:     Answer:   DEXA monitoring     Order Specific Question:   Does this patient have a diabetic monitoring/medication delivering device on?     Answer:   No     Order Specific Question:   Release to patient     Answer:   Immediate       Return in about 6 months (around 3/22/2022) for Recheck, labs.

## 2021-09-22 NOTE — ASSESSMENT & PLAN NOTE
A1c 5.7, which puts pt in prediabetes range. Nutritional and weight loss counseling in office today. Repeat A1c at next office visit.

## 2021-09-22 NOTE — ASSESSMENT & PLAN NOTE
AST/ALT downtrending, but still elevated. Nutritional and weight loss counseling provided. Liver US in 3/21 with normal liver anatomy

## 2021-09-22 NOTE — PROGRESS NOTES
"Chief Complaint  Hyperlipidemia, Knee Pain, Medicare Wellness-subsequent, and Prediabetes    Subjective          Geramine Gonzalez presents to [facility]  History of Present Illness   Pt here for follow up and medicare awv    Pt had colonoscopy this month. She had 2/2 tubular adenomas and needs repeat c-scope in 5 years per GI.    Pt complaining of right knee pain and has an upcoming appointment with orthopedic NP tomorrow for an X-ray and steroid injection.    WALSH - liver enzymes downtrending. No abdominal pain, N/V/D. Last Liver US in 3/21 with normal liver anatomy and gallbladder/bile duct.    HLD - on Crestor, no muscle aches or pains.    Prediabetes - A1c 5.7, exercises regularly, nutritional counseling provided in office.    Review of Systems   Constitutional: Negative.    HENT: Negative.    Eyes: Negative.    Respiratory: Negative.    Cardiovascular: Negative.    Gastrointestinal: Negative.    Endocrine: Negative.    Genitourinary: Negative.    Musculoskeletal:        Knee pain     Skin: Negative.    Allergic/Immunologic: Negative.    Neurological: Negative.    Hematological: Negative.    Psychiatric/Behavioral: Negative.    All other systems reviewed and are negative.      Objective   Vital Signs:   /82   Pulse 80   Temp 97.3 °F (36.3 °C) (Temporal)   Resp 16   Ht 165.1 cm (65\")   Wt 82 kg (180 lb 12.8 oz)   SpO2 98%   BMI 30.09 kg/m²     Physical Exam  Vitals and nursing note reviewed.   Constitutional:       General: She is not in acute distress.     Appearance: Normal appearance. She is normal weight.   HENT:      Head: Normocephalic and atraumatic.      Right Ear: Tympanic membrane, ear canal and external ear normal.      Left Ear: Tympanic membrane, ear canal and external ear normal.      Nose: Nose normal.      Mouth/Throat:      Mouth: Mucous membranes are moist.      Pharynx: No oropharyngeal exudate.   Eyes:      Pupils: Pupils are equal, round, and reactive to " light.   Cardiovascular:      Rate and Rhythm: Normal rate and regular rhythm.      Pulses: Normal pulses.      Heart sounds: Normal heart sounds. No murmur heard.   No friction rub. No gallop.    Pulmonary:      Effort: Pulmonary effort is normal. No respiratory distress.      Breath sounds: Normal breath sounds. No wheezing or rales.   Abdominal:      General: Abdomen is flat. Bowel sounds are normal. There is no distension.      Palpations: Abdomen is soft.      Tenderness: There is no abdominal tenderness.   Musculoskeletal:         General: Normal range of motion.      Cervical back: Normal range of motion.   Lymphadenopathy:      Cervical: No cervical adenopathy.   Skin:     General: Skin is warm.      Capillary Refill: Capillary refill takes less than 2 seconds.   Neurological:      General: No focal deficit present.      Mental Status: She is alert and oriented to person, place, and time. Mental status is at baseline.   Psychiatric:         Mood and Affect: Mood normal.         Behavior: Behavior normal.         Thought Content: Thought content normal.         Judgment: Judgment normal.        Result Review :   The following data was reviewed by: Roberta Boswell MD on 09/22/2021:  CMP    CMP 3/16/21 9/15/21   Glucose 94 91   BUN 22 14   Creatinine 0.85 0.81   eGFR Non  Am 66 69   eGFR African Am 79 84   Sodium 142 140   Potassium 4.9 4.2   Chloride 103 103   Calcium 10.1 9.8   Total Protein 7.4 6.7   Albumin 4.80 4.80   Globulin 2.6 1.9   Total Bilirubin 1.0 0.8   Alkaline Phosphatase 86 82   AST (SGOT) 58 (A) 44 (A)   ALT (SGPT) 63 (A) 48 (A)   (A) Abnormal value       Comments are available for some flowsheets but are not being displayed.           CBC w/diff    CBC w/Diff 3/16/21 9/15/21   WBC 5.38 6.83   RBC 4.48 4.23   Hemoglobin 15.4 14.1   Hematocrit 44.1 42.0   MCV 98.4 (A) 99.3 (A)   MCH 34.4 (A) 33.3 (A)   MCHC 34.9 33.6   RDW 12.4 12.4   Platelets 237 232   Neutrophil Rel % 63.9 70.6    Lymphocyte Rel % 24.3 17.7 (A)   Monocyte Rel % 8.9 8.9   Eosinophil Rel % 1.1 1.3   Basophil Rel % 0.7 0.6   (A) Abnormal value            Lipid Panel    Lipid Panel 3/16/21 9/15/21   Total Cholesterol 268 (A) 248 (A)   Triglycerides 87 116   HDL Cholesterol 85 (A) 68 (A)   VLDL Cholesterol 14 20   LDL Cholesterol  169 (A) 160 (A)   LDL/HDL Ratio 1.95 2.31   (A) Abnormal value       Comments are available for some flowsheets but are not being displayed.           TSH    TSH 9/15/21   TSH 1.880           A1C Last 3 Results    HGBA1C Last 3 Results 9/15/21   Hemoglobin A1C 5.70 (A)   (A) Abnormal value       Comments are available for some flowsheets but are not being displayed.                  FRAX score 14% risk of osteoporosis and 8.7% risk of hip fracture over 10 years.     Assessment and Plan    Diagnoses and all orders for this visit:    1. Medicare annual wellness visit, subsequent (Primary)    2. Pure hypercholesterolemia - on crestor but LDL still 160.  Increase Crestor.  Assessment & Plan:  Lipid abnormalities are unchanged.  Nutritional counseling was provided. and Pharmacotherapy as ordered.  Lipids will be reassessed in 6 months.      3. Gastroesophageal reflux disease with esophagitis, unspecified whether hemorrhage - cont ppi  Assessment & Plan:  Continue current regimen      4. WALSH (nonalcoholic steatohepatitis)  Assessment & Plan:  AST/ALT downtrending, but still elevated. Nutritional and weight loss counseling provided. Liver US in 3/21 with normal liver anatomy      5. Prediabetes  Assessment & Plan:  A1c 5.7, which puts pt in prediabetes range. Nutritional and weight loss counseling in office today. Repeat A1c at next office visit.      6. Chronic pain of right knee - pt has f/u with ortho tomorrow    7. Osteopenia, unspecified location  Assessment & Plan:  Due for DEXA scan. Last DEXA in 2017 significant for osteopenia.      8. Essential hypertension - cont lisinorpil  Assessment &  Plan:  Hypertension is unchanged.  Dietary sodium restriction.  Weight loss.  Regular aerobic exercise.  Continue current medications.  Ambulatory blood pressure monitoring.  Blood pressure will be reassessed at the next regular appointment.    Pt to check BP at home 1-2 times per week and bring BP diary to next appointment for assessment.      9. Post-menopausal  -     DEXA Bone Density Axial      Follow Up   Return in about 6 months (around 3/22/2022) for Recheck, labs.  Patient was given instructions and counseling regarding her condition or for health maintenance advice. Please see specific information pulled into the AVS if appropriate.     Minesh Tellez MD  Internal Medicine/Pediatrics, PGY-1    I have seen and examined the patient independently.  Agree with above.

## 2021-09-22 NOTE — PROGRESS NOTES
The ABCs of the Annual Wellness Visit  Subsequent Medicare Wellness Visit    Chief Complaint   Patient presents with   • Hyperlipidemia   • Knee Pain   • Medicare Wellness-subsequent   • Prediabetes      Subjective    History of Present Illness:  Germaine Gonzalez is a 76 y.o. female who presents for a Subsequent Medicare Wellness Visit.    The following portions of the patient's history were reviewed and   updated as appropriate: allergies, current medications, past family history, past medical history, past social history, past surgical history and problem list.    Compared to one year ago, the patient feels her physical   health is the same.    Compared to one year ago, the patient feels her mental   health is the same.    Recent Hospitalizations:  This patient has had a [facility] admission record on file within the last 365 days.    Current Medical Providers:  Patient Care Team:  Roberta Boswell MD as PCP - General (Internal Medicine & Pediatrics)  Laura, Bc Cabrera MD as Consulting Physician (Gastroenterology)    Outpatient Medications Prior to Visit   Medication Sig Dispense Refill   • Biotin 5000 MCG tablet Take  by mouth Daily.     • calcium carbonate (OS-ULI) 600 MG tablet Take 600 mg by mouth 2 (Two) Times a Day With Meals.     • desoximetasone (TOPICORT) 0.05 % cream APPLY CREAM TOPICALLY TO AFFECTED AREA TWICE DAILY FOR ECZEMA FOR UP TO 14 DAYS     • escitalopram (LEXAPRO) 10 MG tablet TAKE 1/2 (ONE-HALF) TABLET BY MOUTH IN THE MORNING 45 tablet 3   • estradiol (ESTRACE) 0.1 MG/GM vaginal cream      • fluticasone (FLONASE) 50 MCG/ACT nasal spray 2 sprays into the nostril(s) as directed by provider Daily. 3 bottle 1   • glucosamine-chondroitin 500-400 MG capsule capsule Take 1 capsule by mouth 2 (Two) Times a Day With Meals.     • ibuprofen (ADVIL,MOTRIN) 400 MG tablet Take 400 mg by mouth Every 6 (Six) Hours As Needed for Mild Pain .     • lisinopril (PRINIVIL,ZESTRIL) 10 MG tablet  Take 1 tablet by mouth Daily. 90 tablet 3   • omeprazole (priLOSEC) 40 MG capsule Take 1 capsule by mouth once daily 90 capsule 3   • rosuvastatin (CRESTOR) 10 MG tablet Take 1 tablet by mouth once daily 90 tablet 3   • vitamin B-12 (CYANOCOBALAMIN) 1000 MCG tablet Take 5,000 mcg by mouth Daily.     • EPINEPHrine (EPIPEN) 0.3 MG/0.3ML solution auto-injector injection epinephrine 0.3 mg/0.3 mL injection, auto-injector     • pseudoephedrine (SUDAFED) 30 MG tablet TAKE 1 TO 2 TABLETS BY MOUTH EVERY 6 HOURS 48 tablet 0   • Restasis 0.05 % ophthalmic emulsion INSTILL 1 DROP INTO EACH EYE TWICE DAILY       No facility-administered medications prior to visit.       No opioid medication identified on active medication list. I have reviewed chart for other potential  high risk medication/s and harmful drug interactions in the elderly.          Aspirin is not on active medication list.  Aspirin use is not indicated based on review of current medical condition/s. Risk of harm outweighs potential benefits.  .    Patient Active Problem List   Diagnosis   • Gastroesophageal reflux disease   • Hyperlipidemia   • Pyelonephritis   • Sjogren's syndrome with keratoconjunctivitis sicca (CMS/HCC)   • Mcgill's esophagus without dysplasia   • Hyperglycemia   • WALSH (nonalcoholic steatohepatitis)   • Pure hypercholesterolemia   • Knee pain   • Primary osteoarthritis of left knee   • Encounter for screening for malignant neoplasm of colon   • Personal history of colonic polyps   • Effusion of left shoulder joint   • Osteopenia   • Prediabetes   • Essential hypertension     Advance Care Planning  Advance Directive is not on file.  ACP discussion was held with the patient during this visit. Patient has an advance directive (not in EMR), copy requested.    Review of Systems   Constitutional: Negative.    HENT: Negative.    Eyes: Negative.    Respiratory: Negative.    Cardiovascular: Negative.    Gastrointestinal: Negative.    Endocrine:  "Negative.    Musculoskeletal:        Knee pain     Skin: Negative.    Allergic/Immunologic: Negative.    Neurological: Negative.    Hematological: Negative.    Psychiatric/Behavioral: Negative.    All other systems reviewed and are negative.       Objective    Vitals:    09/22/21 0831   BP: 142/82   Pulse: 80   Resp: 16   Temp: 97.3 °F (36.3 °C)   TempSrc: Temporal   SpO2: 98%   Weight: 82 kg (180 lb 12.8 oz)   Height: 165.1 cm (65\")     BMI Readings from Last 1 Encounters:   09/22/21 30.09 kg/m²   BMI is above normal parameters. Recommendations include: exercise counseling, nutrition counseling and pt seeing ortho to help with pain so she can be more active    Does the patient have evidence of cognitive impairment? No    Physical Exam  Vitals and nursing note reviewed.   Constitutional:       General: She is not in acute distress.     Appearance: She is well-developed.   HENT:      Head: Normocephalic and atraumatic.      Right Ear: External ear normal.      Left Ear: External ear normal.      Nose: Nose normal.   Eyes:      Conjunctiva/sclera: Conjunctivae normal.      Pupils: Pupils are equal, round, and reactive to light.   Cardiovascular:      Rate and Rhythm: Normal rate and regular rhythm.      Heart sounds: Normal heart sounds.   Pulmonary:      Effort: Pulmonary effort is normal. No respiratory distress.      Breath sounds: Normal breath sounds. No wheezing.   Musculoskeletal:      Cervical back: Normal range of motion and neck supple.      Right lower leg: No edema.      Left lower leg: No edema.      Comments: Walking with crutch  Right knee with effusion   Skin:     General: Skin is warm and dry.   Neurological:      Mental Status: She is alert and oriented to person, place, and time.   Psychiatric:         Behavior: Behavior normal.         Thought Content: Thought content normal.         Judgment: Judgment normal.       Lab Results   Component Value Date    GLU 91 09/15/2021    CHLPL 248 (H) 09/15/2021 "    TRIG 116 09/15/2021    HDL 68 (H) 09/15/2021     (H) 09/15/2021    VLDL 20 09/15/2021    HGBA1C 5.70 (H) 09/15/2021            HEALTH RISK ASSESSMENT    Smoking Status:  Social History     Tobacco Use   Smoking Status Former Smoker   • Packs/day: 0.50   • Years: 16.00   • Pack years: 8.00   • Types: Cigarettes   • Start date: 3/22/1971   • Quit date: 1990   • Years since quittin.4   Smokeless Tobacco Never Used     Alcohol Consumption:  Social History     Substance and Sexual Activity   Alcohol Use Yes   • Alcohol/week: 2.0 standard drinks   • Types: 2 Glasses of wine per week    Comment: social     Fall Risk Screen:    STEADI Fall Risk Assessment was completed, and patient is at MODERATE risk for falls. Assessment completed on:2021    Depression Screening:  PHQ-2/PHQ-9 Depression Screening 2021   Little interest or pleasure in doing things 0   Feeling down, depressed, or hopeless 0   Trouble falling or staying asleep, or sleeping too much -   Feeling tired or having little energy -   Poor appetite or overeating -   Feeling bad about yourself - or that you are a failure or have let yourself or your family down -   Trouble concentrating on things, such as reading the newspaper or watching television -   Moving or speaking so slowly that other people could have noticed. Or the opposite - being so fidgety or restless that you have been moving around a lot more than usual -   Thoughts that you would be better off dead, or of hurting yourself in some way -   Total Score 0   If you checked off any problems, how difficult have these problems made it for you to do your work, take care of things at home, or get along with other people? -       Health Habits and Functional and Cognitive Screening:  Functional & Cognitive Status 2021   Do you have difficulty preparing food and eating? No   Do you have difficulty bathing yourself, getting dressed or grooming yourself? No   Do you have  difficulty using the toilet? No   Do you have difficulty moving around from place to place? No   Do you have trouble with steps or getting out of a bed or a chair? No   Current Diet Well Balanced Diet   Dental Exam Up to date   Eye Exam Up to date   Exercise (times per week) 3 times per week   Current Exercises Include Walking   Current Exercise Activities Include -   Do you need help using the phone?  No   Are you deaf or do you have serious difficulty hearing?  No   Do you need help with transportation? No   Do you need help shopping? No   Do you need help preparing meals?  No   Do you need help with housework?  No   Do you need help with laundry? No   Do you need help taking your medications? No   Do you need help managing money? No   Do you ever drive or ride in a car without wearing a seat belt? No   Have you felt unusual stress, anger or loneliness in the last month? No   Who do you live with? Spouse   If you need help, do you have trouble finding someone available to you? No   Have you been bothered in the last four weeks by sexual problems? No   Do you have difficulty concentrating, remembering or making decisions? No       Age-appropriate Screening Schedule:  Refer to the list below for future screening recommendations based on patient's age, sex and/or medical conditions. Orders for these recommended tests are listed in the plan section. The patient has been provided with a written plan.    Health Maintenance   Topic Date Due   • DXA SCAN  04/28/2019   • INFLUENZA VACCINE  10/01/2021   • LIPID PANEL  09/15/2022   • MAMMOGRAM  04/05/2023   • TDAP/TD VACCINES (2 - Td or Tdap) 01/01/2025   • ZOSTER VACCINE  Completed              Assessment/Plan   CMS Preventative Services Quick Reference  Risk Factors Identified During Encounter  Fall Risk-High or Moderate  Immunizations Discussed/Encouraged (specific Immunizations; Influenza and COVID19  Obesity/Overweight   Polypharmacy  The above risks/problems have been  discussed with the patient.  Follow up actions/plans if indicated are seen below in the Assessment/Plan Section.  Pertinent information has been shared with the patient in the After Visit Summary.    Diagnoses and all orders for this visit:    1. Medicare annual wellness visit, subsequent (Primary) - discussed preventive care including cancer screening, vaccination, wearing seat belt, covid prevention, healthy diet, and exercise.    2. Pure hypercholesterolemia  Assessment & Plan:  Lipid abnormalities are unchanged.  Nutritional counseling was provided. and Pharmacotherapy as ordered.  Lipids will be reassessed in 6 months.      3. Gastroesophageal reflux disease with esophagitis, unspecified whether hemorrhage  Assessment & Plan:  Continue current regimen      4. WALSH (nonalcoholic steatohepatitis)  Assessment & Plan:  AST/ALT downtrending, but still elevated. Nutritional and weight loss counseling provided. Liver US in 3/21 with normal liver anatomy      5. Prediabetes  Assessment & Plan:  A1c 5.7, which puts pt in prediabetes range. Nutritional and weight loss counseling in office today. Repeat A1c at next office visit.      6. Chronic pain of right knee    7. Osteopenia, unspecified location  Assessment & Plan:  Due for DEXA scan. Last DEXA in 2017 significant for osteopenia.      8. Essential hypertension  Assessment & Plan:  Hypertension is unchanged.  Dietary sodium restriction.  Weight loss.  Regular aerobic exercise.  Continue current medications.  Ambulatory blood pressure monitoring.  Blood pressure will be reassessed at the next regular appointment.    Pt to check BP at home 1-2 times per week and bring BP diary to next appointment for assessment.      9. Post-menopausal  -     DEXA Bone Density Axial      Follow Up:   Return in about 6 months (around 3/22/2022) for Recheck, labs.     An After Visit Summary and PPPS were made available to the patient.

## 2021-09-22 NOTE — ASSESSMENT & PLAN NOTE
Hypertension is unchanged.  Dietary sodium restriction.  Weight loss.  Regular aerobic exercise.  Continue current medications.  Ambulatory blood pressure monitoring.  Blood pressure will be reassessed at the next regular appointment.    Pt to check BP at home 1-2 times per week and bring BP diary to next appointment for assessment.

## 2021-09-23 ENCOUNTER — OFFICE VISIT (OUTPATIENT)
Dept: ORTHOPEDIC SURGERY | Facility: CLINIC | Age: 76
End: 2021-09-23

## 2021-09-23 VITALS
WEIGHT: 180 LBS | DIASTOLIC BLOOD PRESSURE: 89 MMHG | BODY MASS INDEX: 29.99 KG/M2 | HEART RATE: 72 BPM | SYSTOLIC BLOOD PRESSURE: 132 MMHG | HEIGHT: 65 IN

## 2021-09-23 DIAGNOSIS — R52 PAIN: Primary | ICD-10-CM

## 2021-09-23 DIAGNOSIS — M25.361 KNEE INSTABILITY, RIGHT: ICD-10-CM

## 2021-09-23 DIAGNOSIS — M17.11 PRIMARY OSTEOARTHRITIS OF RIGHT KNEE: ICD-10-CM

## 2021-09-23 DIAGNOSIS — M17.12 PRIMARY OSTEOARTHRITIS OF LEFT KNEE: ICD-10-CM

## 2021-09-23 PROCEDURE — 20610 DRAIN/INJ JOINT/BURSA W/O US: CPT | Performed by: NURSE PRACTITIONER

## 2021-09-23 PROCEDURE — 73562 X-RAY EXAM OF KNEE 3: CPT | Performed by: NURSE PRACTITIONER

## 2021-09-23 PROCEDURE — 99214 OFFICE O/P EST MOD 30 MIN: CPT | Performed by: NURSE PRACTITIONER

## 2021-09-23 RX ORDER — DOXYCYCLINE HYCLATE 100 MG/1
100 CAPSULE ORAL 2 TIMES DAILY
Qty: 20 CAPSULE | Refills: 0 | Status: SHIPPED | OUTPATIENT
Start: 2021-09-23 | End: 2021-12-17

## 2021-09-23 RX ORDER — TRIAMCINOLONE ACETONIDE 40 MG/ML
80 INJECTION, SUSPENSION INTRA-ARTICULAR; INTRAMUSCULAR
Status: COMPLETED | OUTPATIENT
Start: 2021-09-23 | End: 2021-09-23

## 2021-09-23 RX ORDER — LIDOCAINE HYDROCHLORIDE 10 MG/ML
8 INJECTION, SOLUTION EPIDURAL; INFILTRATION; INTRACAUDAL; PERINEURAL
Status: COMPLETED | OUTPATIENT
Start: 2021-09-23 | End: 2021-09-23

## 2021-09-23 RX ADMIN — TRIAMCINOLONE ACETONIDE 80 MG: 40 INJECTION, SUSPENSION INTRA-ARTICULAR; INTRAMUSCULAR at 15:19

## 2021-09-23 RX ADMIN — LIDOCAINE HYDROCHLORIDE 8 ML: 10 INJECTION, SOLUTION EPIDURAL; INFILTRATION; INTRACAUDAL; PERINEURAL at 15:19

## 2021-09-23 NOTE — PROGRESS NOTES
Subjective:     Patient ID: Germaine Gonzalez is a 76 y.o. female.    Chief Complaint:  Follow-up DJD left knee  Right knee pain, new issue to examiner  History of Present Illness  Germaine Gonzalez presents to clinic with new onset of pain at her right knee.  She began experiencing symptoms 5 days ago rates discomfort an 8-9 out of a 10 described as aching, sharp stabbing, sharp, shooting in nature.  Is been seen in the past for left knee denies known injury to the right lower extremity is just recently began noticing onset of swelling.  Denies any prior x-rays, MRI, CT.  Been referred to our office by primary care for further evaluation.  Was last seen in clinic in  for the left knee left knee seems to be doing fairly well however has noted some return of swelling.  In regards to the right knee maximal tenderness present of late medial joint line as well as the anterior aspect.  Increased pain noted with all weightbearing activities, transitional activity such as in seated standing attempting to walk, ambulating long distances.  The left knee pain localized to the anterior aspect of the knee.  She is unable to transfer weight to the left knee is currently utilizing a crutch for weightbearing activities.  She has tried bracing at the right knee for support and stability does feel as if the knee is getting give out on her.  She also reports an abrasion that she suffered either from cleaning out the stove or from her dog at the medial aspect of the patella.  She is noted so warmth and redness coming from the abrasion.  Denies any presence of drainage.  Denies other concerns she has at this time.    Social History     Occupational History   • Not on file   Tobacco Use   • Smoking status: Former Smoker     Packs/day: 0.50     Years: 16.00     Pack years: 8.00     Types: Cigarettes     Start date: 3/22/1971     Quit date: 1990     Years since quittin.5   • Smokeless tobacco: Never Used   Vaping Use   • Vaping Use:  Never used   Substance and Sexual Activity   • Alcohol use: Yes     Alcohol/week: 2.0 standard drinks     Types: 2 Glasses of wine per week     Comment: social   • Drug use: No   • Sexual activity: Defer      Past Medical History:   Diagnosis Date   • Arthritis     knees   • Mcgill esophagus    • Cancer (CMS/HCC)     cervical   • Cervical cancer (CMS/HCC)    • Colon polyp    • Eczema    • Elevated liver enzymes    • GERD (gastroesophageal reflux disease)    • H/O stress fracture    • Health care maintenance    • Hypercholesteremia    • Hypertension    • Kidney stones     sched ureteroscopy, lithotripsy   • Ocular migraine    • Postmenopausal    • Seasonal allergies    • Sjogren's syndrome (CMS/HCC)      Past Surgical History:   Procedure Laterality Date   •  SECTION      x2   • COLONOSCOPY     • COLONOSCOPY N/A 2021    Procedure: COLONOSCOPY;  Surgeon: Bc Sanchez MD;  Location: [facility] OR;  Service: Gastroenterology;  Laterality: N/A;  Diverticulosis and polyps   • CYSTOSCOPY URETEROSCOPY STONE MANIPULATION/EXTRACTION Left 10/14/2016    Procedure: LEFT URETEROSCOPY bilateral retrograde pyleogram LEFT STENT PLACEMENT.;  Surgeon: Julius Darling MD;  Location: [facility];  Service:    • CYSTOSCOPY W/ URETERAL STENT PLACEMENT Right 8/3/2018    Procedure: CYSTOSCOPY URETERAL CATHETER/STENT INSERTION;  Surgeon: Farzad Cordova MD;  Location: [facility] OR;  Service: Urology   • ENDOSCOPY N/A 4/10/2019    Procedure: ESOPHAGOGASTRODUODENOSCOPY;  Surgeon: Bc Sanchez MD;  Location: [facility] OR;  Service: Gastroenterology   • HYSTERECTOMY     • UPPER GASTROINTESTINAL ENDOSCOPY     • URETEROSCOPY LASER LITHOTRIPSY WITH STENT INSERTION Left 10/26/2016    Procedure: LT URETEROSCOPY LASER LITHOTRIPSY ;  Surgeon: Julius Darling MD;  Location: [facility] OR;  Service:        Family History   Problem Relation Age of Onset   • Anxiety disorder Mother    • Bipolar disorder Mother   "  • Stroke Mother 83   • Thyroid disease Mother    • Depression Mother    • Heart attack Father 47   • Thyroid disease Brother    • Hypertension Brother    • Diabetes Brother 68   • Glaucoma Brother    • Depression Brother    • No Known Problems Daughter    • No Known Problems Son    • Leukemia Maternal Grandmother    • Diabetes Maternal Grandfather    • Heart attack Paternal Grandmother    • Heart attack Paternal Grandfather    • Breast cancer Neg Hx    • Colon cancer Neg Hx    • Colon polyps Neg Hx    Abrasion with erythema noted  Objective:  Physical Exam    Vital signs reviewed.   General: No acute distress.  Eyes: conjunctiva clear; pupils equally round and reactive  ENT: external ears and nose atraumatic; oropharynx clear  CV: no peripheral edema  Resp: normal respiratory effort  Skin: no rashes or wounds; normal turgor  Psych: mood and affect appropriate; recent and remote memory intact    Vitals:    09/23/21 1446   BP: 132/89   Pulse: 72   Weight: 81.6 kg (180 lb)   Height: 165.1 cm (65\")         09/23/21  1446   Weight: 81.6 kg (180 lb)     Body mass index is 29.95 kg/m².      Right Knee Exam     Tenderness   The patient is experiencing tenderness in the medial joint line and patella.    Range of Motion   Extension: 0   Right knee flexion: 125.     Tests   Geoff:  Medial - positive Lateral - negative  Varus: negative Valgus: negative  Lachman:  Anterior - 1+    Posterior - negative  Patellar apprehension: positive    Other   Sensation: normal  Pulse: present    Comments:  Abrasion with erythema noted medial aspect of the patella  Positive crepitus throughout arc of motion  Moderate to severe swelling, positive effusion, positive patellar apprehension      Left Knee Exam     Tenderness   The patient is experiencing tenderness in the patella.    Range of Motion   Extension: 0   Left knee flexion: 125.     Tests   Geoff:  Medial - negative Lateral - negative  Varus: negative Valgus: negative  Lachman:  " Anterior - 1+    Posterior - negative  Drawer:  Anterior - negative     Posterior - negative  Patellar apprehension: positive    Other   Erythema: absent  Sensation: normal  Pulse: present  Swelling: moderate  Effusion: no effusion present    Comments:  Positive crepitus throughout arc of motion  Positive tenderness the medial and lateral patellar facet  Moderate to severe swelling, positive effusion                 Imaging:  3 view x-ray imaging right knee ordered and independently reviewed by myself AP, lateral and sunrise views completed no evidence of fracture or dislocation, moderate medial compartment narrowing advanced patellofemoral narrowing with bone-on-bone articulation osteophytes present at the medial compartment as well as the patellofemoral joint evidence of decreased bone mineral density    Assessment:        1. Pain    2. Primary osteoarthritis of left knee    3. Primary osteoarthritis of right knee    4. Knee instability, right           Plan:  1.  Discussed plan of care with patient.  Wishes to proceed with aspiration corticosteroid injections bilateral knees.  2.  We will start doxycycline 1 tablet by mouth twice daily for 10 days due to the abrasion at the medial aspect of the patella.  Discussed application of ice at injection site.  Discussed the option of viscosupplementation injections however is received in the past did not receive any significant symptom relief we will hold off on any gel injections.  Fitted with Dry-Partha hinged brace right knee due to pseudoinstability.  Continue weightbearing as tolerated continue with strengthening exercises.  We will plan to see her back in clinic as needed.  All questions answered.  Large Joint Arthrocentesis  Date/Time: 9/23/2021 3:19 PM  Consent given by: patient  Site marked: site marked  Timeout: Immediately prior to procedure a time out was called to verify the correct patient, procedure, equipment, support staff and site/side marked as required    Supporting Documentation  Indications: pain   Procedure Details  Location: knee - Knee joint: BILATERAL.  Preparation: Patient was prepped and draped in the usual sterile fashion  Needle size: 22 G  Approach: superior  Medications administered: 8 mL lidocaine PF 1% 1 %; 80 mg triamcinolone acetonide 40 MG/ML  Aspirate amount (ml): right 26  left 15.  Aspirate: yellow  Patient tolerance: patient tolerated the procedure well with no immediate complications        Orders:  Orders Placed This Encounter   Procedures   • Large Joint Arthrocentesis   • XR Knee 3 View Right       Medications:  New Medications Ordered This Visit   Medications   • doxycycline (VIBRAMYCIN) 100 MG capsule     Sig: Take 1 capsule by mouth 2 (Two) Times a Day. Pharmacy to change salts per insurance preference; eat before taking medication     Dispense:  20 capsule     Refill:  0       Followup:  No follow-ups on file.    Diagnoses and all orders for this visit:    1. Pain (Primary)  -     XR Knee 3 View Right    2. Primary osteoarthritis of left knee    3. Primary osteoarthritis of right knee    4. Knee instability, right    Other orders  -     Large Joint Arthrocentesis  -     doxycycline (VIBRAMYCIN) 100 MG capsule; Take 1 capsule by mouth 2 (Two) Times a Day. Pharmacy to change salts per insurance preference; eat before taking medication  Dispense: 20 capsule; Refill: 0          I ordered and reviewed the COURTNEY today.     Dictated utilizing Dragon dictation

## 2021-09-27 ENCOUNTER — TELEPHONE (OUTPATIENT)
Dept: ORTHOPEDIC SURGERY | Facility: CLINIC | Age: 76
End: 2021-09-27

## 2021-09-27 DIAGNOSIS — M17.11 PRIMARY OSTEOARTHRITIS OF RIGHT KNEE: Primary | ICD-10-CM

## 2021-09-27 NOTE — TELEPHONE ENCOUNTER
Severe pain in the knees from the injections, on crutches, want to know other recommendations for pt.

## 2021-09-27 NOTE — TELEPHONE ENCOUNTER
Caller: SAVITA REAVES     Relationship to patient: SELF    Best call back number:     Patient is needing: SHE IS WANTING TO BE SET UP WITH GEL INJECTIONS IN HER RIGHT KNEE.  PLEASE CONTACT PATIENT

## 2021-09-30 ENCOUNTER — APPOINTMENT (OUTPATIENT)
Dept: BONE DENSITY | Facility: HOSPITAL | Age: 76
End: 2021-09-30

## 2021-09-30 DIAGNOSIS — Z78.0 POST-MENOPAUSAL: ICD-10-CM

## 2021-09-30 PROCEDURE — 77080 DXA BONE DENSITY AXIAL: CPT

## 2021-10-15 ENCOUNTER — TELEPHONE (OUTPATIENT)
Dept: ORTHOPEDIC SURGERY | Facility: CLINIC | Age: 76
End: 2021-10-15

## 2021-10-15 NOTE — TELEPHONE ENCOUNTER
Caller: PATIENT    Relationship to patient: SELF    Best call back number:  [phone]        Type of visit: GEL INJECTIONS       Additional notes: PT. STATES THAT SHE GOT A CALL FROM [facility] AND THEY ARE SENDING HER GEL INJECTION TO THE OFFICE.   SHE WOULD LIKE TO SCHEDULE HER GEL INJECTIONS FOR HER KNEES.   PLEASE CALL TO ADVISE.

## 2021-10-15 NOTE — TELEPHONE ENCOUNTER
LM for pt about how appt will work. Once the injection is delivered I will call her and make an appointment.

## 2021-10-25 ENCOUNTER — CLINICAL SUPPORT (OUTPATIENT)
Dept: ORTHOPEDIC SURGERY | Facility: CLINIC | Age: 76
End: 2021-10-25

## 2021-10-25 VITALS — BODY MASS INDEX: 29.99 KG/M2 | HEIGHT: 65 IN | WEIGHT: 180 LBS

## 2021-10-25 DIAGNOSIS — M17.12 PRIMARY OSTEOARTHRITIS OF LEFT KNEE: Primary | ICD-10-CM

## 2021-10-25 DIAGNOSIS — M17.11 PRIMARY OSTEOARTHRITIS OF RIGHT KNEE: ICD-10-CM

## 2021-10-25 PROCEDURE — 20610 DRAIN/INJ JOINT/BURSA W/O US: CPT | Performed by: ORTHOPAEDIC SURGERY

## 2021-10-25 NOTE — PROGRESS NOTES
Large Joint Arthrocentesis: R knee  Date/Time: 10/25/2021 8:10 AM  Consent given by: patient  Site marked: site marked  Timeout: Immediately prior to procedure a time out was called to verify the correct patient, procedure, equipment, support staff and site/side marked as required   Supporting Documentation  Indications: pain   Procedure Details  Location: knee - R knee  Preparation: Patient was prepped and draped in the usual sterile fashion  Needle gauge: 21X1-1/2.  Approach: superior  Medications administered: 88 mg Hyaluronan 88 MG/4ML  Patient tolerance: patient tolerated the procedure well with no immediate complications          Patient presents to clinic today for right knee viscosupplement one shot injection. I explained details of injections as well as risks, benefits and alternatives with the patient today, had all questions answered, wished to proceed with injection.  I will see patient back in 6 weeks for follow up on injection. Patient was instructed to watch for signs or symptoms of infection including redness, swelling, warmth to the touch, or significant increased pain and to contact our office immediately if any of these issues were noted.

## 2021-11-08 RX ORDER — OMEPRAZOLE 40 MG/1
CAPSULE, DELAYED RELEASE ORAL
Qty: 90 CAPSULE | Refills: 0 | Status: SHIPPED | OUTPATIENT
Start: 2021-11-08 | End: 2021-12-22 | Stop reason: SDUPTHER

## 2021-12-13 ENCOUNTER — TELEPHONE (OUTPATIENT)
Dept: INTERNAL MEDICINE | Facility: CLINIC | Age: 76
End: 2021-12-13

## 2021-12-13 NOTE — TELEPHONE ENCOUNTER
Caller: Germaine Gonzalez    Relationship to patient: Self    Best call back number: [phone] (H)    Chief complaint: NO APPETITE, BLOOD IN DRAINAGE, OVERALL NOT FEELING WELL     Type of visit: OFFICE VISIT    Requested date:ASAP     If rescheduling, when is the original appointment: N/A    Additional notes: PATIENT IS REQUESTING AN APPOINTMENT THIS WEEK, PREFERABLY AROUND THE SAME TIME AS HER  THAT IS SCHEDULED ON Friday 12/17/2021 AT 8:45 AM BUT WILL TAKE ANYTHING EARLIER AS WELL BUT ONLY WANTS TO SEE DR VEGA

## 2021-12-16 ENCOUNTER — CLINICAL SUPPORT (OUTPATIENT)
Dept: ORTHOPEDIC SURGERY | Facility: CLINIC | Age: 76
End: 2021-12-16

## 2021-12-16 DIAGNOSIS — M17.12 PRIMARY OSTEOARTHRITIS OF LEFT KNEE: Primary | ICD-10-CM

## 2021-12-16 PROCEDURE — 20610 DRAIN/INJ JOINT/BURSA W/O US: CPT | Performed by: ORTHOPAEDIC SURGERY

## 2021-12-16 RX ORDER — NEOMYCIN SULFATE, POLYMYXIN B SULFATE, AND DEXAMETHASONE 3.5; 10000; 1 MG/G; [USP'U]/G; MG/G
OINTMENT OPHTHALMIC
Status: ON HOLD | COMMUNITY
Start: 2021-11-01 | End: 2022-02-08

## 2021-12-16 NOTE — PROGRESS NOTES
Large Joint Arthrocentesis: L knee  Date/Time: 12/16/2021 8:53 AM  Consent given by: patient  Site marked: site marked  Timeout: Immediately prior to procedure a time out was called to verify the correct patient, procedure, equipment, support staff and site/side marked as required   Supporting Documentation  Indications: pain   Procedure Details  Location: knee - L knee  Preparation: Patient was prepped and draped in the usual sterile fashion  Needle gauge: 21g.  Approach: superior  Medications administered: 88 mg Hyaluronan 88 MG/4ML  Patient tolerance: patient tolerated the procedure well with no immediate complications        Patient presents to clinic today for left knee viscosupplement one shot injection. I explained details of injections as well as risks, benefits and alternatives with the patient today, had all questions answered, wished to proceed with injection.  I will see patient back in 6 weeks for follow up on injection. Patient was instructed to watch for signs or symptoms of infection including redness, swelling, warmth to the touch, or significant increased pain and to contact our office immediately if any of these issues were noted.

## 2021-12-17 ENCOUNTER — OFFICE VISIT (OUTPATIENT)
Dept: INTERNAL MEDICINE | Facility: CLINIC | Age: 76
End: 2021-12-17

## 2021-12-17 VITALS
OXYGEN SATURATION: 98 % | RESPIRATION RATE: 16 BRPM | HEART RATE: 88 BPM | HEIGHT: 65 IN | DIASTOLIC BLOOD PRESSURE: 80 MMHG | BODY MASS INDEX: 29.96 KG/M2 | SYSTOLIC BLOOD PRESSURE: 125 MMHG | WEIGHT: 179.8 LBS | TEMPERATURE: 97.8 F

## 2021-12-17 DIAGNOSIS — K21.00 GASTROESOPHAGEAL REFLUX DISEASE WITH ESOPHAGITIS, UNSPECIFIED WHETHER HEMORRHAGE: Primary | ICD-10-CM

## 2021-12-17 DIAGNOSIS — I10 ESSENTIAL HYPERTENSION: ICD-10-CM

## 2021-12-17 DIAGNOSIS — M17.11 PRIMARY OSTEOARTHRITIS OF RIGHT KNEE: ICD-10-CM

## 2021-12-17 DIAGNOSIS — R73.03 PREDIABETES: ICD-10-CM

## 2021-12-17 DIAGNOSIS — E78.2 MIXED HYPERLIPIDEMIA: ICD-10-CM

## 2021-12-17 PROCEDURE — 99214 OFFICE O/P EST MOD 30 MIN: CPT | Performed by: INTERNAL MEDICINE

## 2021-12-17 RX ORDER — TRAMADOL HYDROCHLORIDE 50 MG/1
50 TABLET ORAL EVERY 6 HOURS PRN
Qty: 40 TABLET | Refills: 0 | Status: SHIPPED | OUTPATIENT
Start: 2021-12-17 | End: 2022-01-31 | Stop reason: SDUPTHER

## 2021-12-17 RX ORDER — SUCRALFATE 1 G/1
1 TABLET ORAL 4 TIMES DAILY
Qty: 28 TABLET | Refills: 0 | Status: SHIPPED | OUTPATIENT
Start: 2021-12-17 | End: 2021-12-24

## 2021-12-17 NOTE — PROGRESS NOTES
Germaine Gonzalez is a 76 y.o. female, who presents with a chief complaint of   Chief Complaint   Patient presents with   • Weight Loss   • Nose Bleed           HPI   Pt here bc of upset stomach.  She has had worsening knee pain.  She is planning on a right knee replacement in January.  She has been taking lots of ibuprofen .  She has had fluid removed from her knee a couple of times.     She has drainage from her ear and some blood nose issues.     htn - well controlled.  No CAD, soa, HARRELL.    Prediabetes - well controlled. Last a1c 5.7    The following portions of the patient's history were reviewed and updated as appropriate: allergies, current medications, past family history, past medical history, past social history, past surgical history and problem list.    Allergies: Levaquin [levofloxacin] and Tetracycline    Review of Systems   Constitutional: Negative.    HENT: Negative.    Eyes: Negative.    Respiratory: Negative.    Cardiovascular: Negative.    Gastrointestinal: Positive for nausea.        Upset stomach   Endocrine: Negative.    Genitourinary: Negative.    Musculoskeletal: Negative.    Skin: Negative.    Allergic/Immunologic: Negative.    Neurological: Negative.    Hematological: Negative.    Psychiatric/Behavioral: Negative.    All other systems reviewed and are negative.            Wt Readings from Last 3 Encounters:   12/17/21 81.6 kg (179 lb 12.8 oz)   10/25/21 81.6 kg (180 lb)   09/23/21 81.6 kg (180 lb)     Temp Readings from Last 3 Encounters:   12/17/21 97.8 °F (36.6 °C)   09/22/21 97.3 °F (36.3 °C) (Temporal)   09/08/21 98.2 °F (36.8 °C) (Infrared)     BP Readings from Last 3 Encounters:   12/17/21 125/80   09/23/21 132/89   09/22/21 142/82     Pulse Readings from Last 3 Encounters:   12/17/21 88   09/23/21 72   09/22/21 80     Body mass index is 29.92 kg/m².  SpO2 Readings from Last 3 Encounters:   12/17/21 98%   09/22/21 98%   09/08/21 99%          Physical Exam  Vitals and nursing note  reviewed.   Constitutional:       General: She is not in acute distress.     Appearance: She is well-developed.   HENT:      Head: Normocephalic and atraumatic.      Right Ear: External ear normal.      Left Ear: External ear normal.      Nose: Nose normal.      Comments: Turbinates irritated bilat.     Eyes:      Conjunctiva/sclera: Conjunctivae normal.      Pupils: Pupils are equal, round, and reactive to light.   Cardiovascular:      Rate and Rhythm: Normal rate and regular rhythm.      Heart sounds: Normal heart sounds.   Pulmonary:      Effort: Pulmonary effort is normal. No respiratory distress.      Breath sounds: Normal breath sounds. No wheezing.   Musculoskeletal:      Cervical back: Normal range of motion and neck supple.      Right lower leg: No edema.      Left lower leg: No edema.   Skin:     General: Skin is warm and dry.   Neurological:      Mental Status: She is alert and oriented to person, place, and time.   Psychiatric:         Behavior: Behavior normal.         Thought Content: Thought content normal.         Judgment: Judgment normal.         Results for orders placed or performed in visit on 09/15/21   Hemoglobin A1c    Specimen: Blood   Result Value Ref Range    Hemoglobin A1C 5.70 (H) 4.80 - 5.60 %   Lipid Panel With LDL / HDL Ratio    Specimen: Blood   Result Value Ref Range    Total Cholesterol 248 (H) 0 - 200 mg/dL    Triglycerides 116 0 - 150 mg/dL    HDL Cholesterol 68 (H) 40 - 60 mg/dL    VLDL Cholesterol Phil 20 5 - 40 mg/dL    LDL Chol Calc (NIH) 160 (H) 0 - 100 mg/dL    LDL/HDL RATIO 2.31    TSH    Specimen: Blood   Result Value Ref Range    TSH 1.880 0.270 - 4.200 uIU/mL   T4, Free    Specimen: Blood   Result Value Ref Range    Free T4 1.11 0.93 - 1.70 ng/dL   Comprehensive Metabolic Panel    Specimen: Blood   Result Value Ref Range    Glucose 91 65 - 99 mg/dL    BUN 14 8 - 23 mg/dL    Creatinine 0.81 0.57 - 1.00 mg/dL    eGFR Non African Am 69 >60 mL/min/1.73    eGFR African Am 84  >60 mL/min/1.73    BUN/Creatinine Ratio 17.3 7.0 - 25.0    Sodium 140 136 - 145 mmol/L    Potassium 4.2 3.5 - 5.2 mmol/L    Chloride 103 98 - 107 mmol/L    Total CO2 23.6 22.0 - 29.0 mmol/L    Calcium 9.8 8.6 - 10.5 mg/dL    Total Protein 6.7 6.0 - 8.5 g/dL    Albumin 4.80 3.50 - 5.20 g/dL    Globulin 1.9 gm/dL    A/G Ratio 2.5 g/dL    Total Bilirubin 0.8 0.0 - 1.2 mg/dL    Alkaline Phosphatase 82 39 - 117 U/L    AST (SGOT) 44 (H) 1 - 32 U/L    ALT (SGPT) 48 (H) 1 - 33 U/L   CBC & Differential    Specimen: Blood   Result Value Ref Range    WBC 6.83 3.40 - 10.80 10*3/mm3    RBC 4.23 3.77 - 5.28 10*6/mm3    Hemoglobin 14.1 12.0 - 15.9 g/dL    Hematocrit 42.0 34.0 - 46.6 %    MCV 99.3 (H) 79.0 - 97.0 fL    MCH 33.3 (H) 26.6 - 33.0 pg    MCHC 33.6 31.5 - 35.7 g/dL    RDW 12.4 12.3 - 15.4 %    Platelets 232 140 - 450 10*3/mm3    Neutrophil Rel % 70.6 42.7 - 76.0 %    Lymphocyte Rel % 17.7 (L) 19.6 - 45.3 %    Monocyte Rel % 8.9 5.0 - 12.0 %    Eosinophil Rel % 1.3 0.3 - 6.2 %    Basophil Rel % 0.6 0.0 - 1.5 %    Neutrophils Absolute 4.82 1.70 - 7.00 10*3/mm3    Lymphocytes Absolute 1.21 0.70 - 3.10 10*3/mm3    Monocytes Absolute 0.61 0.10 - 0.90 10*3/mm3    Eosinophils Absolute 0.09 0.00 - 0.40 10*3/mm3    Basophils Absolute 0.04 0.00 - 0.20 10*3/mm3    Immature Granulocyte Rel % 0.9 (H) 0.0 - 0.5 %    Immature Grans Absolute 0.06 (H) 0.00 - 0.05 10*3/mm3    nRBC 0.0 0.0 - 0.2 /100 WBC     Result Review :                  Assessment and Plan    Diagnoses and all orders for this visit:    1. Gastroesophageal reflux disease with esophagitis, unspecified whether hemorrhage (Primary) - gerd flared bc of recent increase in ibuprofen 2/2 knee pain.  Stop all nsaids.  Ok for Tylenol.  Cont omeprazole.  Add sucralfate for 1 week    2. Primary osteoarthritis of right knee - cont f/u with ortho.  No nsaids.  Ok for tylenol.  Will give tramadol for pain.  Pt's chronic medical issues are optimized and she is at acceptable risk for  total knee replacement.      3. Prediabetes - well controlled    4. Mixed hyperlipidemia- well controlled    5. Essential hypertension- well controlled    6. Epistaxis  - hold flonase x 2 weeks.  Ok for saline nasal spray          Outpatient Medications Prior to Visit   Medication Sig Dispense Refill   • Biotin 5000 MCG tablet Take  by mouth Daily.     • calcium carbonate (OS-ULI) 600 MG tablet Take 600 mg by mouth 2 (Two) Times a Day With Meals.     • desoximetasone (TOPICORT) 0.05 % cream APPLY CREAM TOPICALLY TO AFFECTED AREA TWICE DAILY FOR ECZEMA FOR UP TO 14 DAYS     • EPINEPHrine (EPIPEN) 0.3 MG/0.3ML solution auto-injector injection epinephrine 0.3 mg/0.3 mL injection, auto-injector     • escitalopram (LEXAPRO) 10 MG tablet TAKE 1/2 (ONE-HALF) TABLET BY MOUTH IN THE MORNING 45 tablet 3   • estradiol (ESTRACE) 0.1 MG/GM vaginal cream      • fluticasone (FLONASE) 50 MCG/ACT nasal spray 2 sprays into the nostril(s) as directed by provider Daily. 3 bottle 1   • glucosamine-chondroitin 500-400 MG capsule capsule Take 1 capsule by mouth 2 (Two) Times a Day With Meals.     • ibuprofen (ADVIL,MOTRIN) 400 MG tablet Take 400 mg by mouth Every 6 (Six) Hours As Needed for Mild Pain .     • lisinopril (PRINIVIL,ZESTRIL) 10 MG tablet Take 1 tablet by mouth Daily. 90 tablet 3   • neomycin-polymyxin-dexamethamethasone (POLYDEX) 3.5-53871-8.1 ointment ophthalmic ointment      • omeprazole (priLOSEC) 40 MG capsule Take 1 capsule by mouth once daily 90 capsule 0   • Restasis 0.05 % ophthalmic emulsion INSTILL 1 DROP INTO EACH EYE TWICE DAILY     • rosuvastatin (CRESTOR) 20 MG tablet Take 1 tablet by mouth Daily. 90 tablet 1   • vitamin B-12 (CYANOCOBALAMIN) 1000 MCG tablet Take 5,000 mcg by mouth Daily.     • doxycycline (VIBRAMYCIN) 100 MG capsule Take 1 capsule by mouth 2 (Two) Times a Day. Pharmacy to change salts per insurance preference; eat before taking medication 20 capsule 0   • pseudoephedrine (SUDAFED) 30 MG tablet  TAKE 1 TO 2 TABLETS BY MOUTH EVERY 6 HOURS 48 tablet 0     No facility-administered medications prior to visit.     New Medications Ordered This Visit   Medications   • traMADol (ULTRAM) 50 MG tablet     Sig: Take 1 tablet by mouth Every 6 (Six) Hours As Needed for Moderate Pain .     Dispense:  40 tablet     Refill:  0     Unable to tolerate nsaids bc of gastritis/gerd flare   • sucralfate (Carafate) 1 g tablet     Sig: Take 1 tablet by mouth 4 (Four) Times a Day for 7 days.     Dispense:  28 tablet     Refill:  0     [unfilled]  Medications Discontinued During This Encounter   Medication Reason   • doxycycline (VIBRAMYCIN) 100 MG capsule          Return if symptoms worsen or fail to improve.    Patient was given instructions and counseling regarding her condition or for health maintenance advice. Please see specific information pulled into the AVS if appropriate.

## 2021-12-22 ENCOUNTER — OFFICE VISIT (OUTPATIENT)
Dept: INTERNAL MEDICINE | Facility: CLINIC | Age: 76
End: 2021-12-22

## 2021-12-22 VITALS
HEIGHT: 65 IN | WEIGHT: 168.6 LBS | BODY MASS INDEX: 28.09 KG/M2 | RESPIRATION RATE: 20 BRPM | HEART RATE: 71 BPM | OXYGEN SATURATION: 99 % | TEMPERATURE: 98.7 F | SYSTOLIC BLOOD PRESSURE: 120 MMHG | DIASTOLIC BLOOD PRESSURE: 70 MMHG

## 2021-12-22 DIAGNOSIS — K21.00 GASTROESOPHAGEAL REFLUX DISEASE WITH ESOPHAGITIS, UNSPECIFIED WHETHER HEMORRHAGE: Primary | ICD-10-CM

## 2021-12-22 PROCEDURE — 99213 OFFICE O/P EST LOW 20 MIN: CPT | Performed by: INTERNAL MEDICINE

## 2021-12-22 RX ORDER — OMEPRAZOLE 40 MG/1
40 CAPSULE, DELAYED RELEASE ORAL 2 TIMES DAILY
Qty: 60 CAPSULE | Refills: 0 | Status: SHIPPED | OUTPATIENT
Start: 2021-12-22 | End: 2022-01-31

## 2021-12-22 NOTE — PROGRESS NOTES
"      Germaine Gonzalez is a 76 y.o. female, who presents with a chief complaint of   Chief Complaint   Patient presents with   • Nausea   • Vomiting           HPI   Pt here for f/u.      Pt's nausea worse in am but then gets better during the day once she has meds in her system.  She isn't eating much.  She has had some \"heaves\" right when she gets up.  She is burping a lot.  She has stopped her nsaids.  She is trying to eat a more bland diet.  She is on omeprazole daily.      The tramadol is helping her knee pain.  She hasnt needed much of the medication.        The following portions of the patient's history were reviewed and updated as appropriate: allergies, current medications, past family history, past medical history, past social history, past surgical history and problem list.    Allergies: Levaquin [levofloxacin] and Tetracycline    Review of Systems   Constitutional: Negative.    HENT: Negative.    Eyes: Negative.    Respiratory: Negative.    Cardiovascular: Negative.    Gastrointestinal: Positive for nausea and vomiting.   Endocrine: Negative.    Genitourinary: Negative.    Musculoskeletal: Negative.    Skin: Negative.    Allergic/Immunologic: Negative.    Neurological: Negative.    Hematological: Negative.    Psychiatric/Behavioral: Negative.    All other systems reviewed and are negative.            Wt Readings from Last 3 Encounters:   12/22/21 76.5 kg (168 lb 9.6 oz)   12/17/21 81.6 kg (179 lb 12.8 oz)   10/25/21 81.6 kg (180 lb)     Temp Readings from Last 3 Encounters:   12/22/21 98.7 °F (37.1 °C) (Temporal)   12/17/21 97.8 °F (36.6 °C)   09/22/21 97.3 °F (36.3 °C) (Temporal)     BP Readings from Last 3 Encounters:   12/22/21 120/70   12/17/21 125/80   09/23/21 132/89     Pulse Readings from Last 3 Encounters:   12/22/21 71   12/17/21 88   09/23/21 72     Body mass index is 28.06 kg/m².  SpO2 Readings from Last 3 Encounters:   12/22/21 99%   12/17/21 98%   09/22/21 98%          Physical Exam  Vitals and " nursing note reviewed.   Constitutional:       General: She is not in acute distress.     Appearance: She is well-developed.   HENT:      Head: Normocephalic and atraumatic.      Right Ear: External ear normal.      Left Ear: External ear normal.      Nose: Nose normal.   Eyes:      Conjunctiva/sclera: Conjunctivae normal.      Pupils: Pupils are equal, round, and reactive to light.   Cardiovascular:      Rate and Rhythm: Normal rate and regular rhythm.      Heart sounds: Normal heart sounds.   Pulmonary:      Effort: Pulmonary effort is normal. No respiratory distress.      Breath sounds: Normal breath sounds. No wheezing.   Musculoskeletal:         General: Normal range of motion.      Cervical back: Normal range of motion and neck supple.      Comments: Normal gait   Skin:     General: Skin is warm and dry.   Neurological:      Mental Status: She is alert and oriented to person, place, and time.   Psychiatric:         Behavior: Behavior normal.         Thought Content: Thought content normal.         Judgment: Judgment normal.         Results for orders placed or performed in visit on 09/15/21   Hemoglobin A1c    Specimen: Blood   Result Value Ref Range    Hemoglobin A1C 5.70 (H) 4.80 - 5.60 %   Lipid Panel With LDL / HDL Ratio    Specimen: Blood   Result Value Ref Range    Total Cholesterol 248 (H) 0 - 200 mg/dL    Triglycerides 116 0 - 150 mg/dL    HDL Cholesterol 68 (H) 40 - 60 mg/dL    VLDL Cholesterol Phil 20 5 - 40 mg/dL    LDL Chol Calc (NIH) 160 (H) 0 - 100 mg/dL    LDL/HDL RATIO 2.31    TSH    Specimen: Blood   Result Value Ref Range    TSH 1.880 0.270 - 4.200 uIU/mL   T4, Free    Specimen: Blood   Result Value Ref Range    Free T4 1.11 0.93 - 1.70 ng/dL   Comprehensive Metabolic Panel    Specimen: Blood   Result Value Ref Range    Glucose 91 65 - 99 mg/dL    BUN 14 8 - 23 mg/dL    Creatinine 0.81 0.57 - 1.00 mg/dL    eGFR Non African Am 69 >60 mL/min/1.73    eGFR African Am 84 >60 mL/min/1.73     BUN/Creatinine Ratio 17.3 7.0 - 25.0    Sodium 140 136 - 145 mmol/L    Potassium 4.2 3.5 - 5.2 mmol/L    Chloride 103 98 - 107 mmol/L    Total CO2 23.6 22.0 - 29.0 mmol/L    Calcium 9.8 8.6 - 10.5 mg/dL    Total Protein 6.7 6.0 - 8.5 g/dL    Albumin 4.80 3.50 - 5.20 g/dL    Globulin 1.9 gm/dL    A/G Ratio 2.5 g/dL    Total Bilirubin 0.8 0.0 - 1.2 mg/dL    Alkaline Phosphatase 82 39 - 117 U/L    AST (SGOT) 44 (H) 1 - 32 U/L    ALT (SGPT) 48 (H) 1 - 33 U/L   CBC & Differential    Specimen: Blood   Result Value Ref Range    WBC 6.83 3.40 - 10.80 10*3/mm3    RBC 4.23 3.77 - 5.28 10*6/mm3    Hemoglobin 14.1 12.0 - 15.9 g/dL    Hematocrit 42.0 34.0 - 46.6 %    MCV 99.3 (H) 79.0 - 97.0 fL    MCH 33.3 (H) 26.6 - 33.0 pg    MCHC 33.6 31.5 - 35.7 g/dL    RDW 12.4 12.3 - 15.4 %    Platelets 232 140 - 450 10*3/mm3    Neutrophil Rel % 70.6 42.7 - 76.0 %    Lymphocyte Rel % 17.7 (L) 19.6 - 45.3 %    Monocyte Rel % 8.9 5.0 - 12.0 %    Eosinophil Rel % 1.3 0.3 - 6.2 %    Basophil Rel % 0.6 0.0 - 1.5 %    Neutrophils Absolute 4.82 1.70 - 7.00 10*3/mm3    Lymphocytes Absolute 1.21 0.70 - 3.10 10*3/mm3    Monocytes Absolute 0.61 0.10 - 0.90 10*3/mm3    Eosinophils Absolute 0.09 0.00 - 0.40 10*3/mm3    Basophils Absolute 0.04 0.00 - 0.20 10*3/mm3    Immature Granulocyte Rel % 0.9 (H) 0.0 - 0.5 %    Immature Grans Absolute 0.06 (H) 0.00 - 0.05 10*3/mm3    nRBC 0.0 0.0 - 0.2 /100 WBC     Result Review :                  Assessment and Plan    Diagnoses and all orders for this visit:    1. Gastroesophageal reflux disease with esophagitis, unspecified whether hemorrhage (Primary)  -     omeprazole (priLOSEC) 40 MG capsule; Take 1 capsule by mouth 2 (Two) Times a Day.  Dispense: 60 capsule; Refill: 0           \plain      Outpatient Medications Prior to Visit   Medication Sig Dispense Refill   • Biotin 5000 MCG tablet Take  by mouth Daily.     • calcium carbonate (OS-ULI) 600 MG tablet Take 600 mg by mouth 2 (Two) Times a Day With Meals.      • desoximetasone (TOPICORT) 0.05 % cream APPLY CREAM TOPICALLY TO AFFECTED AREA TWICE DAILY FOR ECZEMA FOR UP TO 14 DAYS     • EPINEPHrine (EPIPEN) 0.3 MG/0.3ML solution auto-injector injection epinephrine 0.3 mg/0.3 mL injection, auto-injector     • escitalopram (LEXAPRO) 10 MG tablet TAKE 1/2 (ONE-HALF) TABLET BY MOUTH IN THE MORNING 45 tablet 3   • estradiol (ESTRACE) 0.1 MG/GM vaginal cream      • fluticasone (FLONASE) 50 MCG/ACT nasal spray 2 sprays into the nostril(s) as directed by provider Daily. 3 bottle 1   • glucosamine-chondroitin 500-400 MG capsule capsule Take 1 capsule by mouth 2 (Two) Times a Day With Meals.     • ibuprofen (ADVIL,MOTRIN) 400 MG tablet Take 400 mg by mouth Every 6 (Six) Hours As Needed for Mild Pain .     • lisinopril (PRINIVIL,ZESTRIL) 10 MG tablet Take 1 tablet by mouth Daily. 90 tablet 3   • neomycin-polymyxin-dexamethamethasone (POLYDEX) 3.5-83807-9.1 ointment ophthalmic ointment      • pseudoephedrine (SUDAFED) 30 MG tablet TAKE 1 TO 2 TABLETS BY MOUTH EVERY 6 HOURS 48 tablet 0   • Restasis 0.05 % ophthalmic emulsion INSTILL 1 DROP INTO EACH EYE TWICE DAILY     • rosuvastatin (CRESTOR) 20 MG tablet Take 1 tablet by mouth Daily. 90 tablet 1   • sucralfate (Carafate) 1 g tablet Take 1 tablet by mouth 4 (Four) Times a Day for 7 days. 28 tablet 0   • traMADol (ULTRAM) 50 MG tablet Take 1 tablet by mouth Every 6 (Six) Hours As Needed for Moderate Pain . 40 tablet 0   • vitamin B-12 (CYANOCOBALAMIN) 1000 MCG tablet Take 5,000 mcg by mouth Daily.     • omeprazole (priLOSEC) 40 MG capsule Take 1 capsule by mouth once daily 90 capsule 0     No facility-administered medications prior to visit.     New Medications Ordered This Visit   Medications   • omeprazole (priLOSEC) 40 MG capsule     Sig: Take 1 capsule by mouth 2 (Two) Times a Day.     Dispense:  60 capsule     Refill:  0     [unfilled]  Medications Discontinued During This Encounter   Medication Reason   • omeprazole  (priLOSEC) 40 MG capsule Reorder         Return if symptoms worsen or fail to improve.    Patient was given instructions and counseling regarding her condition or for health maintenance advice. Please see specific information pulled into the AVS if appropriate.

## 2022-01-02 ENCOUNTER — PREP FOR SURGERY (OUTPATIENT)
Dept: OTHER | Facility: HOSPITAL | Age: 77
End: 2022-01-02

## 2022-01-02 DIAGNOSIS — M17.11 PRIMARY OSTEOARTHRITIS OF RIGHT KNEE: Primary | ICD-10-CM

## 2022-01-02 PROBLEM — Z96.659 S/P TOTAL KNEE ARTHROPLASTY: Status: ACTIVE | Noted: 2022-01-02

## 2022-01-02 RX ORDER — MELOXICAM 7.5 MG/1
15 TABLET ORAL ONCE
Status: CANCELLED | OUTPATIENT
Start: 2022-01-02 | End: 2022-01-02

## 2022-01-02 RX ORDER — CEFAZOLIN SODIUM 2 G/50ML
2 SOLUTION INTRAVENOUS ONCE
Status: CANCELLED | OUTPATIENT
Start: 2022-01-02 | End: 2022-01-02

## 2022-01-02 RX ORDER — PREGABALIN 75 MG/1
150 CAPSULE ORAL ONCE
Status: CANCELLED | OUTPATIENT
Start: 2022-01-02 | End: 2022-01-02

## 2022-01-02 RX ORDER — ACETAMINOPHEN 500 MG
1000 TABLET ORAL ONCE
Status: CANCELLED | OUTPATIENT
Start: 2022-01-02 | End: 2022-01-02

## 2022-01-11 ENCOUNTER — OFFICE VISIT (OUTPATIENT)
Dept: INTERNAL MEDICINE | Facility: CLINIC | Age: 77
End: 2022-01-11

## 2022-01-11 VITALS
SYSTOLIC BLOOD PRESSURE: 124 MMHG | OXYGEN SATURATION: 99 % | DIASTOLIC BLOOD PRESSURE: 76 MMHG | HEIGHT: 65 IN | BODY MASS INDEX: 26.66 KG/M2 | TEMPERATURE: 98.6 F | HEART RATE: 68 BPM | WEIGHT: 160 LBS | RESPIRATION RATE: 20 BRPM

## 2022-01-11 DIAGNOSIS — M25.561 CHRONIC PAIN OF BOTH KNEES: ICD-10-CM

## 2022-01-11 DIAGNOSIS — R19.7 WATERY DIARRHEA: ICD-10-CM

## 2022-01-11 DIAGNOSIS — R11.2 NON-INTRACTABLE VOMITING WITH NAUSEA, UNSPECIFIED VOMITING TYPE: Primary | ICD-10-CM

## 2022-01-11 DIAGNOSIS — R63.4 WEIGHT LOSS, NON-INTENTIONAL: ICD-10-CM

## 2022-01-11 DIAGNOSIS — G89.29 CHRONIC PAIN OF BOTH KNEES: ICD-10-CM

## 2022-01-11 DIAGNOSIS — R35.0 URINARY FREQUENCY: ICD-10-CM

## 2022-01-11 DIAGNOSIS — K21.00 GASTROESOPHAGEAL REFLUX DISEASE WITH ESOPHAGITIS, UNSPECIFIED WHETHER HEMORRHAGE: ICD-10-CM

## 2022-01-11 DIAGNOSIS — M25.562 CHRONIC PAIN OF BOTH KNEES: ICD-10-CM

## 2022-01-11 PROCEDURE — 99214 OFFICE O/P EST MOD 30 MIN: CPT | Performed by: NURSE PRACTITIONER

## 2022-01-11 RX ORDER — SUCRALFATE 1 G/1
1 TABLET ORAL 4 TIMES DAILY
Qty: 120 TABLET | Refills: 0 | Status: SHIPPED | OUTPATIENT
Start: 2022-01-11 | End: 2022-02-01

## 2022-01-11 RX ORDER — HYDROCODONE BITARTRATE AND ACETAMINOPHEN 5; 325 MG/1; MG/1
1 TABLET ORAL EVERY 6 HOURS PRN
Qty: 20 TABLET | Refills: 0 | Status: SHIPPED | OUTPATIENT
Start: 2022-01-11 | End: 2022-02-01

## 2022-01-11 NOTE — PROGRESS NOTES
"Germaine Gonzalez is a 76 y.o. female presenting today for   Chief Complaint   Patient presents with   • Nausea   • Vomiting   • Diarrhea       Subjective    History of Present Illness     Loss of appetite started about one month ago. She has had intermittent nausea and vomiting. Diarrhea started today. Watery, yellowish/dark-brown. Denies hematemesis, melena, or BRRB. There has been a 20# wgt loss in the last 2mo.    She has been taking quite a bit of Ibuprofen d/t OA of the knees. This was switched to Tramadol by her PCP 12/2021. Tramadol is not providing any pain relief.    The following portions of the patient's history were reviewed and updated as appropriate: allergies, current medications, problem list, past medical history, past surgical history, family history, and social history.    Review of Systems   Constitutional: Positive for unexpected weight loss.   Gastrointestinal: Positive for diarrhea, nausea, vomiting and GERD. Negative for abdominal distention, abdominal pain, anal bleeding, blood in stool and constipation.         Objective    Vitals:    01/11/22 1542   BP: 124/76   Pulse: 68   Resp: 20   Temp: 98.6 °F (37 °C)   TempSrc: Temporal   SpO2: 99%   Weight: 72.6 kg (160 lb)   Height: 165.1 cm (65\")     Body mass index is 26.63 kg/m².  Nursing notes and vitals reviewed.    Physical Exam  Constitutional:       General: She is not in acute distress.     Appearance: She is well-developed.   Neck:      Thyroid: No thyroid mass or thyromegaly.   Cardiovascular:      Rate and Rhythm: Regular rhythm.      Heart sounds: S1 normal and S2 normal.   Pulmonary:      Effort: Pulmonary effort is normal.      Breath sounds: Normal breath sounds.   Abdominal:      General: Abdomen is flat. Bowel sounds are normal.      Palpations: Abdomen is soft. There is no hepatomegaly, splenomegaly or mass.      Tenderness: There is no abdominal tenderness.   Musculoskeletal:      Cervical back: Neck supple.   Lymphadenopathy:      " Cervical: No cervical adenopathy.   Neurological:      Mental Status: She is alert and oriented to person, place, and time.   Psychiatric:         Attention and Perception: She is attentive.         Behavior: Behavior normal.         Thought Content: Thought content normal.           Assessment and Plan    Diagnoses and all orders for this visit:    1. Non-intractable vomiting with nausea, unspecified vomiting type (Primary)  -     CBC (No Diff)  -     Comprehensive Metabolic Panel  -     Helicobacter Pylori, IgA IgG IgM  -     TSH  -     Urinalysis With Microscopic - Urine, Clean Catch  -     Urine Culture - , Urine, Clean Catch    2. Watery diarrhea  -     CBC (No Diff)  -     Comprehensive Metabolic Panel  -     Urine Culture - , Urine, Clean Catch  -     Stool Culture (Reference Lab) - Stool, Per Rectum; Future    3. Weight loss, non-intentional  -     CBC (No Diff)  -     Comprehensive Metabolic Panel  -     TSH  -     Urine Culture - , Urine, Clean Catch    4. Gastroesophageal reflux disease with esophagitis, unspecified whether hemorrhage  -     Helicobacter Pylori, IgA IgG IgM  -     sucralfate (Carafate) 1 g tablet; Take 1 tablet by mouth 4 (Four) Times a Day.  Dispense: 120 tablet; Refill: 0    5. Chronic pain of both knees  -     HYDROcodone-acetaminophen (Norco) 5-325 MG per tablet; Take 1 tablet by mouth Every 6 (Six) Hours As Needed for Severe Pain .  Dispense: 20 tablet; Refill: 0    6. Urinary frequency  -     Urine Culture - , Urine, Clean Catch      Cont Omeprazole BID.  D/C all NSAIDs.      Medications, including side effects, were discussed with the patient. Patient verbalized understanding.  The plan of care was discussed. All questions were answered. Patient verbalized understanding.        No follow-ups on file.

## 2022-01-12 DIAGNOSIS — R19.7 WATERY DIARRHEA: ICD-10-CM

## 2022-01-13 LAB
ALBUMIN SERPL-MCNC: 4.6 G/DL (ref 3.7–4.7)
ALBUMIN/GLOB SERPL: 1.9 {RATIO} (ref 1.2–2.2)
ALP SERPL-CCNC: 112 IU/L (ref 44–121)
ALT SERPL-CCNC: 62 IU/L (ref 0–32)
APPEARANCE UR: CLEAR
AST SERPL-CCNC: 93 IU/L (ref 0–40)
BACTERIA #/AREA URNS HPF: ABNORMAL /[HPF]
BACTERIA UR CULT: NORMAL
BACTERIA UR CULT: NORMAL
BILIRUB SERPL-MCNC: 0.9 MG/DL (ref 0–1.2)
BILIRUB UR QL STRIP: NEGATIVE
BUN SERPL-MCNC: 11 MG/DL (ref 8–27)
BUN/CREAT SERPL: 15 (ref 12–28)
CALCIUM SERPL-MCNC: 9.9 MG/DL (ref 8.7–10.3)
CASTS URNS QL MICRO: ABNORMAL /LPF
CHLORIDE SERPL-SCNC: 98 MMOL/L (ref 96–106)
CO2 SERPL-SCNC: 17 MMOL/L (ref 20–29)
COLOR UR: YELLOW
CREAT SERPL-MCNC: 0.75 MG/DL (ref 0.57–1)
EPI CELLS #/AREA URNS HPF: ABNORMAL /HPF (ref 0–10)
ERYTHROCYTE [DISTWIDTH] IN BLOOD BY AUTOMATED COUNT: 12.2 % (ref 11.7–15.4)
GLOBULIN SER CALC-MCNC: 2.4 G/DL (ref 1.5–4.5)
GLUCOSE SERPL-MCNC: ABNORMAL MG/DL
GLUCOSE UR QL: NEGATIVE
H PYLORI IGA SER-ACNC: <9 UNITS (ref 0–8.9)
H PYLORI IGG SER IA-ACNC: 0.29 INDEX VALUE (ref 0–0.79)
H PYLORI IGM SER-ACNC: <9 UNITS (ref 0–8.9)
HCT VFR BLD AUTO: 44.6 % (ref 34–46.6)
HGB BLD-MCNC: 15.3 G/DL (ref 11.1–15.9)
HGB UR QL STRIP: ABNORMAL
KETONES UR QL STRIP: ABNORMAL
LEUKOCYTE ESTERASE UR QL STRIP: ABNORMAL
MCH RBC QN AUTO: 32.7 PG (ref 26.6–33)
MCHC RBC AUTO-ENTMCNC: 34.3 G/DL (ref 31.5–35.7)
MCV RBC AUTO: 95 FL (ref 79–97)
MICRO URNS: ABNORMAL
NITRITE UR QL STRIP: NEGATIVE
PH UR STRIP: 6.5 [PH] (ref 5–7.5)
PLATELET # BLD AUTO: 277 X10E3/UL (ref 150–450)
POTASSIUM SERPL-SCNC: ABNORMAL MMOL/L
PROT SERPL-MCNC: 7 G/DL (ref 6–8.5)
PROT UR QL STRIP: ABNORMAL
RBC # BLD AUTO: 4.68 X10E6/UL (ref 3.77–5.28)
RBC #/AREA URNS HPF: >30 /HPF (ref 0–2)
SODIUM SERPL-SCNC: 137 MMOL/L (ref 134–144)
SP GR UR: 1.02 (ref 1–1.03)
TSH SERPL DL<=0.005 MIU/L-ACNC: 3.14 UIU/ML (ref 0.45–4.5)
UROBILINOGEN UR STRIP-MCNC: 0.2 MG/DL (ref 0.2–1)
WBC # BLD AUTO: 5.5 X10E3/UL (ref 3.4–10.8)
WBC #/AREA URNS HPF: ABNORMAL /HPF (ref 0–5)

## 2022-01-16 LAB
BACTERIA SPEC CULT: NORMAL
BACTERIA SPEC CULT: NORMAL
CAMPYLOBACTER STL CULT: NORMAL
E COLI SXT STL QL IA: NEGATIVE
SALM + SHIG STL CULT: NORMAL

## 2022-01-30 DIAGNOSIS — I10 ESSENTIAL HYPERTENSION: ICD-10-CM

## 2022-01-30 DIAGNOSIS — K21.00 GASTROESOPHAGEAL REFLUX DISEASE WITH ESOPHAGITIS, UNSPECIFIED WHETHER HEMORRHAGE: ICD-10-CM

## 2022-01-30 DIAGNOSIS — E78.2 MIXED HYPERLIPIDEMIA: ICD-10-CM

## 2022-01-31 ENCOUNTER — OFFICE VISIT (OUTPATIENT)
Dept: INTERNAL MEDICINE | Facility: CLINIC | Age: 77
End: 2022-01-31

## 2022-01-31 VITALS
WEIGHT: 160.6 LBS | OXYGEN SATURATION: 98 % | HEART RATE: 111 BPM | SYSTOLIC BLOOD PRESSURE: 108 MMHG | DIASTOLIC BLOOD PRESSURE: 60 MMHG | BODY MASS INDEX: 26.76 KG/M2 | TEMPERATURE: 98.2 F | RESPIRATION RATE: 20 BRPM | HEIGHT: 65 IN

## 2022-01-31 DIAGNOSIS — R73.03 PREDIABETES: ICD-10-CM

## 2022-01-31 DIAGNOSIS — Z01.818 PRE-OP EVALUATION: Primary | ICD-10-CM

## 2022-01-31 DIAGNOSIS — K21.00 GASTROESOPHAGEAL REFLUX DISEASE WITH ESOPHAGITIS, UNSPECIFIED WHETHER HEMORRHAGE: ICD-10-CM

## 2022-01-31 DIAGNOSIS — E78.2 MIXED HYPERLIPIDEMIA: ICD-10-CM

## 2022-01-31 DIAGNOSIS — M17.11 PRIMARY OSTEOARTHRITIS OF RIGHT KNEE: ICD-10-CM

## 2022-01-31 DIAGNOSIS — I10 ESSENTIAL HYPERTENSION: ICD-10-CM

## 2022-01-31 LAB
EXPIRATION DATE: NORMAL
HBA1C MFR BLD: 5.8 %
Lab: NORMAL

## 2022-01-31 PROCEDURE — 99214 OFFICE O/P EST MOD 30 MIN: CPT | Performed by: INTERNAL MEDICINE

## 2022-01-31 PROCEDURE — 83036 HEMOGLOBIN GLYCOSYLATED A1C: CPT | Performed by: INTERNAL MEDICINE

## 2022-01-31 RX ORDER — TRAMADOL HYDROCHLORIDE 50 MG/1
50 TABLET ORAL EVERY 6 HOURS PRN
Qty: 40 TABLET | Refills: 0 | Status: SHIPPED | OUTPATIENT
Start: 2022-01-31 | End: 2022-02-08 | Stop reason: HOSPADM

## 2022-01-31 RX ORDER — ESCITALOPRAM OXALATE 10 MG/1
TABLET ORAL
Qty: 45 TABLET | Refills: 0 | Status: SHIPPED | OUTPATIENT
Start: 2022-01-31 | End: 2022-01-31 | Stop reason: SDUPTHER

## 2022-01-31 RX ORDER — LISINOPRIL 10 MG/1
TABLET ORAL
Qty: 90 TABLET | Refills: 0 | Status: SHIPPED | OUTPATIENT
Start: 2022-01-31 | End: 2022-01-31 | Stop reason: SDUPTHER

## 2022-01-31 RX ORDER — ROSUVASTATIN CALCIUM 20 MG/1
TABLET, COATED ORAL
Qty: 90 TABLET | Refills: 0 | Status: SHIPPED | OUTPATIENT
Start: 2022-01-31 | End: 2022-01-31 | Stop reason: SDUPTHER

## 2022-01-31 RX ORDER — OMEPRAZOLE 40 MG/1
40 CAPSULE, DELAYED RELEASE ORAL 2 TIMES DAILY
Qty: 180 CAPSULE | Refills: 0 | Status: SHIPPED | OUTPATIENT
Start: 2022-01-31 | End: 2022-03-22 | Stop reason: SDUPTHER

## 2022-01-31 RX ORDER — OMEPRAZOLE 40 MG/1
CAPSULE, DELAYED RELEASE ORAL
Qty: 60 CAPSULE | Refills: 0 | Status: SHIPPED | OUTPATIENT
Start: 2022-01-31 | End: 2022-01-31 | Stop reason: SDUPTHER

## 2022-01-31 RX ORDER — ESCITALOPRAM OXALATE 10 MG/1
5 TABLET ORAL EVERY MORNING
Qty: 45 TABLET | Refills: 3 | Status: SHIPPED | OUTPATIENT
Start: 2022-01-31 | End: 2022-03-22 | Stop reason: SDUPTHER

## 2022-01-31 RX ORDER — LISINOPRIL 10 MG/1
10 TABLET ORAL DAILY
Qty: 90 TABLET | Refills: 3 | Status: SHIPPED | OUTPATIENT
Start: 2022-01-31 | End: 2022-03-22 | Stop reason: SDUPTHER

## 2022-01-31 RX ORDER — ROSUVASTATIN CALCIUM 20 MG/1
20 TABLET, COATED ORAL DAILY
Qty: 90 TABLET | Refills: 3 | Status: SHIPPED | OUTPATIENT
Start: 2022-01-31 | End: 2022-03-22 | Stop reason: SDUPTHER

## 2022-02-01 ENCOUNTER — APPOINTMENT (OUTPATIENT)
Dept: PREADMISSION TESTING | Facility: HOSPITAL | Age: 77
End: 2022-02-01

## 2022-02-01 ENCOUNTER — PRE-ADMISSION TESTING (OUTPATIENT)
Dept: PREADMISSION TESTING | Facility: HOSPITAL | Age: 77
End: 2022-02-01

## 2022-02-01 VITALS
HEART RATE: 97 BPM | SYSTOLIC BLOOD PRESSURE: 131 MMHG | WEIGHT: 159 LBS | RESPIRATION RATE: 20 BRPM | OXYGEN SATURATION: 99 % | DIASTOLIC BLOOD PRESSURE: 80 MMHG | BODY MASS INDEX: 26.49 KG/M2 | HEIGHT: 65 IN

## 2022-02-01 DIAGNOSIS — M17.11 PRIMARY OSTEOARTHRITIS OF RIGHT KNEE: Primary | ICD-10-CM

## 2022-02-01 LAB
ABO GROUP BLD: NORMAL
ABO GROUP BLD: NORMAL
ANION GAP SERPL CALCULATED.3IONS-SCNC: 17 MMOL/L (ref 5–15)
APTT PPP: 20.4 SECONDS (ref 24.3–38.1)
BACTERIA UR QL AUTO: ABNORMAL /HPF
BASOPHILS # BLD AUTO: 0.01 10*3/MM3 (ref 0–0.2)
BASOPHILS NFR BLD AUTO: 0.3 % (ref 0–1.5)
BILIRUB UR QL STRIP: NEGATIVE
BLD GP AB SCN SERPL QL: NEGATIVE
BUN SERPL-MCNC: 9 MG/DL (ref 8–23)
BUN/CREAT SERPL: 10.8 (ref 7–25)
CALCIUM SPEC-SCNC: 10.4 MG/DL (ref 8.6–10.5)
CHLORIDE SERPL-SCNC: 101 MMOL/L (ref 98–107)
CLARITY UR: CLEAR
CO2 SERPL-SCNC: 22 MMOL/L (ref 22–29)
COLOR UR: YELLOW
CREAT SERPL-MCNC: 0.83 MG/DL (ref 0.57–1)
DEPRECATED RDW RBC AUTO: 44.4 FL (ref 37–54)
EOSINOPHIL # BLD AUTO: 0.08 10*3/MM3 (ref 0–0.4)
EOSINOPHIL NFR BLD AUTO: 2 % (ref 0.3–6.2)
ERYTHROCYTE [DISTWIDTH] IN BLOOD BY AUTOMATED COUNT: 12.2 % (ref 12.3–15.4)
GFR SERPL CREATININE-BSD FRML MDRD: 67 ML/MIN/1.73
GLUCOSE SERPL-MCNC: 99 MG/DL (ref 65–99)
GLUCOSE UR STRIP-MCNC: NEGATIVE MG/DL
HCT VFR BLD AUTO: 44.6 % (ref 34–46.6)
HGB BLD-MCNC: 14.9 G/DL (ref 12–15.9)
HGB UR QL STRIP.AUTO: ABNORMAL
HYALINE CASTS UR QL AUTO: ABNORMAL /LPF
IMM GRANULOCYTES # BLD AUTO: 0.02 10*3/MM3 (ref 0–0.05)
IMM GRANULOCYTES NFR BLD AUTO: 0.5 % (ref 0–0.5)
INR PPP: 1 (ref 0.9–1.1)
KETONES UR QL STRIP: NEGATIVE
LEUKOCYTE ESTERASE UR QL STRIP.AUTO: ABNORMAL
LYMPHOCYTES # BLD AUTO: 0.89 10*3/MM3 (ref 0.7–3.1)
LYMPHOCYTES NFR BLD AUTO: 22.4 % (ref 19.6–45.3)
MCH RBC QN AUTO: 32.6 PG (ref 26.6–33)
MCHC RBC AUTO-ENTMCNC: 33.4 G/DL (ref 31.5–35.7)
MCV RBC AUTO: 97.6 FL (ref 79–97)
MONOCYTES # BLD AUTO: 0.49 10*3/MM3 (ref 0.1–0.9)
MONOCYTES NFR BLD AUTO: 12.3 % (ref 5–12)
NEUTROPHILS NFR BLD AUTO: 2.49 10*3/MM3 (ref 1.7–7)
NEUTROPHILS NFR BLD AUTO: 62.5 % (ref 42.7–76)
NITRITE UR QL STRIP: NEGATIVE
PH UR STRIP.AUTO: 6 [PH] (ref 4.5–8)
PLATELET # BLD AUTO: 201 10*3/MM3 (ref 140–450)
PMV BLD AUTO: 10.7 FL (ref 6–12)
POTASSIUM SERPL-SCNC: 4.2 MMOL/L (ref 3.5–5.2)
PROT UR QL STRIP: ABNORMAL
PROTHROMBIN TIME: 13.4 SECONDS (ref 12.1–15)
RBC # BLD AUTO: 4.57 10*6/MM3 (ref 3.77–5.28)
RBC # UR STRIP: ABNORMAL /HPF
REF LAB TEST METHOD: ABNORMAL
RH BLD: POSITIVE
RH BLD: POSITIVE
SODIUM SERPL-SCNC: 140 MMOL/L (ref 136–145)
SP GR UR STRIP: 1.02 (ref 1–1.03)
SQUAMOUS #/AREA URNS HPF: ABNORMAL /HPF
T&S EXPIRATION DATE: NORMAL
UROBILINOGEN UR QL STRIP: ABNORMAL
WBC # UR STRIP: ABNORMAL /HPF
WBC NRBC COR # BLD: 3.98 10*3/MM3 (ref 3.4–10.8)

## 2022-02-01 PROCEDURE — 85610 PROTHROMBIN TIME: CPT | Performed by: ORTHOPAEDIC SURGERY

## 2022-02-01 PROCEDURE — 86900 BLOOD TYPING SEROLOGIC ABO: CPT

## 2022-02-01 PROCEDURE — 80048 BASIC METABOLIC PNL TOTAL CA: CPT | Performed by: ORTHOPAEDIC SURGERY

## 2022-02-01 PROCEDURE — 85730 THROMBOPLASTIN TIME PARTIAL: CPT | Performed by: ORTHOPAEDIC SURGERY

## 2022-02-01 PROCEDURE — 81001 URINALYSIS AUTO W/SCOPE: CPT | Performed by: ORTHOPAEDIC SURGERY

## 2022-02-01 PROCEDURE — 85025 COMPLETE CBC W/AUTO DIFF WBC: CPT | Performed by: ORTHOPAEDIC SURGERY

## 2022-02-01 PROCEDURE — 93010 ELECTROCARDIOGRAM REPORT: CPT | Performed by: INTERNAL MEDICINE

## 2022-02-01 PROCEDURE — 36415 COLL VENOUS BLD VENIPUNCTURE: CPT

## 2022-02-01 PROCEDURE — 86850 RBC ANTIBODY SCREEN: CPT | Performed by: ORTHOPAEDIC SURGERY

## 2022-02-01 PROCEDURE — 86901 BLOOD TYPING SEROLOGIC RH(D): CPT

## 2022-02-01 PROCEDURE — 86900 BLOOD TYPING SEROLOGIC ABO: CPT | Performed by: ORTHOPAEDIC SURGERY

## 2022-02-01 PROCEDURE — 87086 URINE CULTURE/COLONY COUNT: CPT | Performed by: ORTHOPAEDIC SURGERY

## 2022-02-01 PROCEDURE — 86901 BLOOD TYPING SEROLOGIC RH(D): CPT | Performed by: ORTHOPAEDIC SURGERY

## 2022-02-01 PROCEDURE — 87081 CULTURE SCREEN ONLY: CPT | Performed by: ORTHOPAEDIC SURGERY

## 2022-02-01 PROCEDURE — 93005 ELECTROCARDIOGRAM TRACING: CPT

## 2022-02-01 NOTE — DISCHARGE INSTRUCTIONS
PRE-ADMISSION TESTING INSTRUCTIONS FOR TOTAL JOINT PATIENTS    Take these medications the morning of surgery with a small sip of water: Lexapro, Crestor , Omeprazole      No aspirin, advil, aleve, ibuprofen, naproxen, diet pills, decongestants, or vitamin/herbal supplements for a week prior to your surgery.    Do not take any insulin or diabetes medications the morning of surgery.      Start your Bactroban ointment on _____2/3______.  You will need to apply the ointment with a clean Q-tip 3 times a day in both sides of your nose for 5 days and the morning of surgery.    General Instructions:    • Do not eat solid food after midnight the night before surgery.  No gum, mints, or hard candy after midnight the night before surgery.  • You may drink clear liquids the day of surgery up until 2 hours before your arrival time.  • Clear liquids are liquids you can see through. Nothing RED in color.    Plain water    Sports drinks  Sodas     Gelatin (Jell-O)  Fruit juices without pulp such as white grape juice and apple juice  Popsicles that contain no fruit or yogurt  Tea or coffee (no cream or milk added)    • It is beneficial for you to have a clear drink that contains carbohydrates just before you leave your house and before your fasting time begins.  We suggest a 20 ounce bottle of Gatorade or Powerade for non-diabetic patients or a 20 ounce bottle of G2 or Powerade Zero for diabetic patients.     • Patients who avoid smoking, chewing tobacco and alcohol for 4 weeks prior to surgery have a reduced risk of post-operative complications.  If at all possible, quit smoking as many days before surgery as you can.    • Do not smoke, use chewing tobacco or drink alcohol after midnight the day of surgery.    • Bring your C-PAP/ BI-PAP machine if you use one.  • Wear clean comfortable clothes and socks.  • Do not wear contact lenses, lotion, deodorant, or make-up.  Bring a case for your glasses if applicable.   • You may brush  your teeth the morning of surgery.  • You may wear dentures/partials, do not put adhesive/glue on them.  • Leave all other jewelry and valuables at home.  • NOTIFY YOUR SURGEON IF YOU BECOME ILL, HAVE A FEVER, PRODUCTIVE COUGH, OR CANNOT BE HERE THE DAY OF SURGERY.      Preventing a Surgical Site Infection:    • Shower the night before and on the morning of surgery using the chlorhexidine soap you were given.  Use a clean washcloth with the soap.  Place clean sheets on your bed after showering the night before surgery. Do not use the CHG soap on your hair, face, or private areas. Wash your body gently for five (5) minutes. Do not scrub your skin.  Dry with a clean towel and dress in clean clothing.    • Do not shave the surgical area for 10 days-2 weeks prior to surgery  because the razor can irritate skin and make it easier to develop an infection.  • Make sure you, your family, and all healthcare providers clean their hands with soap and water or an alcohol based hand  before caring for you or your wound.      Day of surgery:    Your surgeon’s office will advise you of your arrival time for the day of surgery.    Upon arrival, a Pre-op nurse and Anesthesia provider will review your health history, obtain vital signs, and answer questions you may have.  The only belongings needed at this time will be your home medications and if applicable your C-PAP/BI-PAP machine.  If you are staying overnight your family can leave the rest of your belongings in the car and bring them to your room later.  A Pre-op nurse will start an IV and you may receive medication in preparation for surgery, including something to help you relax.  Your family will be able to see you in the Pre-op area.  While you are in surgery your family should notify the waiting room  if they leave the waiting room area and provide a contact phone number.    If you have any questions, you can call the Pre-Admission Department at [phone]  [phone] or your surgeon's office.    Please be aware that surgery does come with discomfort.  We want to make every effort to control your discomfort so please discuss any uncontrolled symptoms with your nurse.   Your doctor will most likely have prescribed pain medications.     You may have bruising or discomfort from the tourniquet used in surgery.     Please leave all luggage in the car the morning of surgery.  You will be transported to your hospital room following the recovery period.  Your family can get your luggage at that time.      You may receive a survey regarding the care you received. Your feedback is very important and will be used to collect the necessary data to help us to continue to provide excellent care.

## 2022-02-01 NOTE — PAT
Pt here for PAT/Joint visit.  Pre-op tests completed, chg soap given, and instructions reviewed.  Instructed clears until 2 hrs prior to arrival time, voiced understanding. Denies metal/fake jewelry allergies, Arrow pain pump reviewed, covid 2/5, ERAS handouts given and reviewed

## 2022-02-02 ENCOUNTER — OFFICE VISIT (OUTPATIENT)
Dept: ORTHOPEDIC SURGERY | Facility: CLINIC | Age: 77
End: 2022-02-02

## 2022-02-02 VITALS — BODY MASS INDEX: 26.49 KG/M2 | WEIGHT: 159 LBS | HEIGHT: 65 IN

## 2022-02-02 DIAGNOSIS — M17.11 PRIMARY OSTEOARTHRITIS OF RIGHT KNEE: Primary | ICD-10-CM

## 2022-02-02 LAB
BACTERIA SPEC AEROBE CULT: ABNORMAL
BACTERIA SPEC AEROBE CULT: ABNORMAL
MRSA SPEC QL CULT: NORMAL
QT INTERVAL: 413 MS

## 2022-02-02 PROCEDURE — S0260 H&P FOR SURGERY: HCPCS | Performed by: ORTHOPAEDIC SURGERY

## 2022-02-02 RX ORDER — OXYCODONE HYDROCHLORIDE AND ACETAMINOPHEN 5; 325 MG/1; MG/1
1 TABLET ORAL EVERY 4 HOURS PRN
Qty: 42 TABLET | Refills: 0 | Status: SHIPPED | OUTPATIENT
Start: 2022-02-02 | End: 2022-02-09 | Stop reason: SDUPTHER

## 2022-02-02 NOTE — H&P
History & Physical       Patient: Germaine Gonzalez    YOB: 1945    Medical Record Number: [id number]    Surgeon:  Dr. Jovany Villanueva    Chief Complaints:   Chief Complaint   Patient presents with   • Right Knee - Pain, Pre-op Exam       Subjective:  This problem is not new to this examiner.     History of Present Illness: 76 y.o. female presents with   Chief Complaint   Patient presents with   • Right Knee - Pain, Pre-op Exam   . Onset of symptoms was years ago and has been progressively worsening despite more conservative treatment measures.  Symptoms are associated with ability to move, exercise, and perform activities of daily living.  Symptoms are aggravated by weight bearing and ROM necessary for activities of daily living.   Symptoms improve with rest, ice and elevation only minimally.      Allergies:   Allergies   Allergen Reactions   • Levaquin [Levofloxacin] Nausea And Vomiting   • Tetracycline Nausea And Vomiting       Medications:   Home Medications:  Current Outpatient Medications on File Prior to Visit   Medication Sig   • Biotin 5000 MCG tablet Take  by mouth Daily.   • calcium carbonate (OS-ULI) 600 MG tablet Take 600 mg by mouth 2 (Two) Times a Day With Meals.   • desoximetasone (TOPICORT) 0.05 % cream APPLY CREAM TOPICALLY TO AFFECTED AREA TWICE DAILY FOR ECZEMA FOR UP TO 14 DAYS   • EPINEPHrine (EPIPEN) 0.3 MG/0.3ML solution auto-injector injection epinephrine 0.3 mg/0.3 mL injection, auto-injector   • escitalopram (LEXAPRO) 10 MG tablet Take 0.5 tablets by mouth Every Morning.   • estradiol (ESTRACE) 0.1 MG/GM vaginal cream    • glucosamine-chondroitin 500-400 MG capsule capsule Take 1 capsule by mouth 2 (Two) Times a Day With Meals.   • lisinopril (PRINIVIL,ZESTRIL) 10 MG tablet Take 1 tablet by mouth Daily.   • neomycin-polymyxin-dexamethamethasone (POLYDEX) 3.5-96299-7.1 ointment ophthalmic ointment    • omeprazole (priLOSEC) 40 MG capsule Take 1 capsule by mouth 2 (Two) Times a Day.    • Restasis 0.05 % ophthalmic emulsion INSTILL 1 DROP INTO EACH EYE TWICE DAILY   • rosuvastatin (CRESTOR) 20 MG tablet Take 1 tablet by mouth Daily.   • traMADol (ULTRAM) 50 MG tablet Take 1 tablet by mouth Every 6 (Six) Hours As Needed for Moderate Pain .   • vitamin B-12 (CYANOCOBALAMIN) 1000 MCG tablet Take 5,000 mcg by mouth Daily.     No current facility-administered medications on file prior to visit.     Current Medications:  Scheduled Meds:  Continuous Infusions:No current facility-administered medications for this visit.    PRN Meds:.    I have reviewed the patient's medical history in detail and updated the computerized patient record.  Review and summarization of old records include:    Past Medical History:   Diagnosis Date   • Arthritis     knees   • Mcgill esophagus    • Cancer (HCC)     cervical   • Cervical cancer (HCC)    • Colon polyp    • Eczema    • Elevated liver enzymes    • GERD (gastroesophageal reflux disease)    • H/O stress fracture    • Health care maintenance    • Hypercholesteremia    • Hypertension    • Kidney stones     sched ureteroscopy, lithotripsy   • Knee swelling    • Ocular migraine    • PONV (postoperative nausea and vomiting)    • Postmenopausal    • Seasonal allergies    • Sjogren's syndrome (HCC)     autoimmune disorder         Past Surgical History:   Procedure Laterality Date   •  SECTION      x2   • COLONOSCOPY     • COLONOSCOPY N/A 2021    Procedure: COLONOSCOPY;  Surgeon: Bc Sanchez MD;  Location: [facility] OR;  Service: Gastroenterology;  Laterality: N/A;  Diverticulosis and polyps   • CYSTOSCOPY URETEROSCOPY STONE MANIPULATION/EXTRACTION Left 10/14/2016    Procedure: LEFT URETEROSCOPY bilateral retrograde pyleogram LEFT STENT PLACEMENT.;  Surgeon: Julius Darling MD;  Location: [facility] OR;  Service:    • CYSTOSCOPY W/ URETERAL STENT PLACEMENT Right 8/3/2018    Procedure: CYSTOSCOPY URETERAL CATHETER/STENT INSERTION;  Surgeon: Art  Farzad JERRY MD;  Location: [facility] OR;  Service: Urology   • ENDOSCOPY N/A 4/10/2019    Procedure: ESOPHAGOGASTRODUODENOSCOPY;  Surgeon: Bc Sanchez MD;  Location: [facility] OR;  Service: Gastroenterology   • HYSTERECTOMY     • UPPER GASTROINTESTINAL ENDOSCOPY     • URETEROSCOPY LASER LITHOTRIPSY WITH STENT INSERTION Left 10/26/2016    Procedure: LT URETEROSCOPY LASER LITHOTRIPSY ;  Surgeon: Julius Darling MD;  Location: [facility] OR;  Service:         Social History     Occupational History   • Not on file   Tobacco Use   • Smoking status: Former Smoker     Packs/day: 0.50     Years: 16.00     Pack years: 8.00     Types: Cigarettes     Start date: 3/22/1971     Quit date: 1990     Years since quittin.8   • Smokeless tobacco: Never Used   Vaping Use   • Vaping Use: Never used   Substance and Sexual Activity   • Alcohol use: Yes     Alcohol/week: 2.0 standard drinks     Types: 2 Glasses of wine per week     Comment: social   • Drug use: No   • Sexual activity: Defer      Social History     Social History Narrative   • Not on file        Family History   Problem Relation Age of Onset   • Anxiety disorder Mother    • Bipolar disorder Mother    • Stroke Mother 83   • Thyroid disease Mother    • Depression Mother    • Heart attack Father 47   • Thyroid disease Brother    • Hypertension Brother    • Diabetes Brother 68   • Glaucoma Brother    • Depression Brother    • No Known Problems Daughter    • No Known Problems Son    • Leukemia Maternal Grandmother    • Diabetes Maternal Grandfather    • Heart attack Paternal Grandmother    • Heart attack Paternal Grandfather    • Breast cancer Neg Hx    • Colon cancer Neg Hx    • Colon polyps Neg Hx        ROS: 14 point review of systems was performed and was negative except for documented findings in HPI and today's encounter.     Allergies:   Allergies   Allergen Reactions   • Levaquin [Levofloxacin] Nausea And Vomiting   • Tetracycline Nausea And  Vomiting     Constitutional:  Denies fever, shaking or chills   Eyes:  Denies change in visual acuity   HENT:  Denies nasal congestion or sore throat   Respiratory:  Denies cough or shortness of breath   Cardiovascular:  Denies chest pain or severe LE edema   GI:  Denies abdominal pain, nausea, vomiting, bloody stools or diarrhea   Musculoskeletal:  Denies numbness tingling or loss of motor function except as outlined above in history of present illness.  : Denies painful urination or hematuria  Integument:  Denies rash, lesion or ulceration   Neurologic:  Denies headache or focal weakness  Endocrine:  Denies lymphadenopathy  Psych:  Denies confusion or change in mental status   Hem:  Denies active bleeding    Physical Exam: 76 y.o. female  Body mass index is 26.46 kg/m².  There were no vitals filed for this visit.  Vital signs reviewed.   General Appearance:    Alert, cooperative, in no acute distress                  Eyes: conjunctiva clear  ENT: external ears and nose atraumatic  CV: no peripheral edema  Resp: normal respiratory effort  Skin: no rashes or wounds; normal turgor  Psych: mood and affect appropriate  Lymph: no nodes appreciated  Neuro: gross sensation intact  Vascular:  Palpable peripheral pulse in noted extremity  Musculoskeletal Extremities:   Right knee- ROM 0-125   Maximal TTP medial and anterior knee    Stable to varus valgus 0/30    Debilities/Disabilities Identified: None      Diagnostic Tests:  Pre-Admission Testing on 02/01/2022   Component Date Value Ref Range Status   • QT Interval 02/01/2022 413  ms Final   • Color, UA 02/01/2022 Yellow  Yellow, Straw Final   • Appearance, UA 02/01/2022 Clear  Clear Final   • pH, UA 02/01/2022 6.0  4.5 - 8.0 Final   • Specific Gravity, UA 02/01/2022 1.020  1.003 - 1.030 Final   • Glucose, UA 02/01/2022 Negative  Negative Final   • Ketones, UA 02/01/2022 Negative  Negative Final   • Bilirubin, UA 02/01/2022 Negative  Negative Final   • Blood, UA  02/01/2022 Small (1+)* Negative Final   • Protein, UA 02/01/2022 Trace* Negative Final   • Leuk Esterase, UA 02/01/2022 Small (1+)* Negative Final   • Nitrite, UA 02/01/2022 Negative  Negative Final   • Urobilinogen, UA 02/01/2022 0.2 E.U./dL  0.2 - 1.0 E.U./dL Final   • PTT 02/01/2022 20.4* 24.3 - 38.1 seconds Final   • Protime 02/01/2022 13.4  12.1 - 15.0 Seconds Final   • INR 02/01/2022 1.00  0.90 - 1.10 Final   • Glucose 02/01/2022 99  65 - 99 mg/dL Final   • BUN 02/01/2022 9  8 - 23 mg/dL Final   • Creatinine 02/01/2022 0.83  0.57 - 1.00 mg/dL Final   • Sodium 02/01/2022 140  136 - 145 mmol/L Final   • Potassium 02/01/2022 4.2  3.5 - 5.2 mmol/L Final   • Chloride 02/01/2022 101  98 - 107 mmol/L Final   • CO2 02/01/2022 22.0  22.0 - 29.0 mmol/L Final   • Calcium 02/01/2022 10.4  8.6 - 10.5 mg/dL Final   • eGFR Non  Amer 02/01/2022 67  >60 mL/min/1.73 Final   • BUN/Creatinine Ratio 02/01/2022 10.8  7.0 - 25.0 Final   • Anion Gap 02/01/2022 17.0* 5.0 - 15.0 mmol/L Final   • ABO Type 02/01/2022 O   Final   • RH type 02/01/2022 Positive   Final   • Antibody Screen 02/01/2022 Negative   Final   • T&S Expiration Date 02/01/2022 2/15/2022 11:59:00 PM   Final   • WBC 02/01/2022 3.98  3.40 - 10.80 10*3/mm3 Final   • RBC 02/01/2022 4.57  3.77 - 5.28 10*6/mm3 Final   • Hemoglobin 02/01/2022 14.9  12.0 - 15.9 g/dL Final   • Hematocrit 02/01/2022 44.6  34.0 - 46.6 % Final   • MCV 02/01/2022 97.6* 79.0 - 97.0 fL Final   • MCH 02/01/2022 32.6  26.6 - 33.0 pg Final   • MCHC 02/01/2022 33.4  31.5 - 35.7 g/dL Final   • RDW 02/01/2022 12.2* 12.3 - 15.4 % Final   • RDW-SD 02/01/2022 44.4  37.0 - 54.0 fl Final   • MPV 02/01/2022 10.7  6.0 - 12.0 fL Final   • Platelets 02/01/2022 201  140 - 450 10*3/mm3 Final   • Neutrophil % 02/01/2022 62.5  42.7 - 76.0 % Final   • Lymphocyte % 02/01/2022 22.4  19.6 - 45.3 % Final   • Monocyte % 02/01/2022 12.3* 5.0 - 12.0 % Final   • Eosinophil % 02/01/2022 2.0  0.3 - 6.2 % Final   •  Basophil % 02/01/2022 0.3  0.0 - 1.5 % Final   • Immature Grans % 02/01/2022 0.5  0.0 - 0.5 % Final   • Neutrophils, Absolute 02/01/2022 2.49  1.70 - 7.00 10*3/mm3 Final   • Lymphocytes, Absolute 02/01/2022 0.89  0.70 - 3.10 10*3/mm3 Final   • Monocytes, Absolute 02/01/2022 0.49  0.10 - 0.90 10*3/mm3 Final   • Eosinophils, Absolute 02/01/2022 0.08  0.00 - 0.40 10*3/mm3 Final   • Basophils, Absolute 02/01/2022 0.01  0.00 - 0.20 10*3/mm3 Final   • Immature Grans, Absolute 02/01/2022 0.02  0.00 - 0.05 10*3/mm3 Final   • RBC, UA 02/01/2022 3-5* None Seen /HPF Final   • WBC, UA 02/01/2022 6-12* None Seen /HPF Final   • Bacteria, UA 02/01/2022 Trace* None Seen /HPF Final   • Squamous Epithelial Cells, UA 02/01/2022 3-6* None Seen, 0-2 /HPF Final   • Hyaline Casts, UA 02/01/2022 None Seen  None Seen /LPF Final   • Methodology 02/01/2022 Manual Light Microscopy   Final   • ABO Type 02/01/2022 O   Final   • RH type 02/01/2022 Positive   Final   Office Visit on 01/31/2022   Component Date Value Ref Range Status   • Hemoglobin A1C 01/31/2022 5.8  % Final   • Lot Number 01/31/2022 [id number]   Final   • Expiration Date 01/31/2022 06/30/2023   Final     No results found.      Assessment:  Patient Active Problem List   Diagnosis   • Gastroesophageal reflux disease   • Hyperlipidemia   • Pyelonephritis   • Sjogren's syndrome with keratoconjunctivitis sicca (HCC)   • Mcgill's esophagus without dysplasia   • Hyperglycemia   • WALSH (nonalcoholic steatohepatitis)   • Pure hypercholesterolemia   • Knee pain   • Primary osteoarthritis of left knee   • Encounter for screening for malignant neoplasm of colon   • Personal history of colonic polyps   • Effusion of left shoulder joint   • Osteopenia   • Prediabetes   • Essential hypertension   • Primary osteoarthritis of right knee   • Knee instability, right   • S/P total knee arthroplasty       Plan:  I reviewed anatomy of a total joint arthroplasty in laymen's terms, as well as typical  postoperative recovery and possibly 6-12 months for maximal recovery, and possible need for rehabilitation stay after hospitalization. We also discussed risks, benefits, alternatives, and limitations of procedure with risks including but not limited to neurovascular damage, bleeding, infection, malalignment, chronic pian, failure of implants, osteolysis, loosening of implants, loss of motion, weakness, stiffness, instability, DVT, pulmonary embolus, death, stroke, complex regional pain syndrome, myocardial infarction, and need for additional procedures. No guarantees were given regarding results of surgery.      Germaine Gonzalez was given the opportunity to ask and have all questions answered today.  The patient voiced understanding of the risks, benefits, and alternative forms of treatment that were discussed and the patient consents to proceed with surgery.     Patient's blood clot history is negative.    Plan for DVT prophylaxis is Eliquis due to side effects from Ibuprofen    Patient's MRSA infection history is negative.    Patient's skin infection history is negative.    Discharge Plan: POD 1-2 to home    Date: 2/2/2022    Dictated utilizing Dragon dictation

## 2022-02-02 NOTE — PROGRESS NOTES
Cc: f/u right knee pain, DJD    Patient presented to clinic today for preoperative history and physical visit in anticipation of upcoming scheduled right total knee arthroplasty.    I reviewed anatomy and a model of a total knee arthroplasty, as well as typical postoperative recovery of 6-12 months before maximal recovery, and possible need for rehabilitation stay after hospitalization. We also discussed risks, benefits, and alternatives of procedure with risks including but not limited to neurovascular damage, bleeding, infection, malalignment, chronic pian, failure of implants, osteolysis, loosening of implants, loss of motion, weakness, stiffness, instability, DVT, pulmonary embolus, death, stroke, complex regional pain syndrome, myocardial infarction, and need for additional procedures. Patient understood all these and had all questions answered before consenting to the procedure. No guarantees were given in regards to results from the surgery.  Patient has been medically optimize by his primary care physician.    Postoperative DVT prophylaxis will be with Eliquis     I have given patient prescription today as well for Bactroban for nasal swabs     All remaining questions answered today.

## 2022-02-02 NOTE — H&P (VIEW-ONLY)
History & Physical       Patient: Germaine Gonzalez    YOB: 1945    Medical Record Number: [id number]    Surgeon:  Dr. Jovany Villanueva    Chief Complaints:   Chief Complaint   Patient presents with   • Right Knee - Pain, Pre-op Exam       Subjective:  This problem is not new to this examiner.     History of Present Illness: 76 y.o. female presents with   Chief Complaint   Patient presents with   • Right Knee - Pain, Pre-op Exam   . Onset of symptoms was years ago and has been progressively worsening despite more conservative treatment measures.  Symptoms are associated with ability to move, exercise, and perform activities of daily living.  Symptoms are aggravated by weight bearing and ROM necessary for activities of daily living.   Symptoms improve with rest, ice and elevation only minimally.      Allergies:   Allergies   Allergen Reactions   • Levaquin [Levofloxacin] Nausea And Vomiting   • Tetracycline Nausea And Vomiting       Medications:   Home Medications:  Current Outpatient Medications on File Prior to Visit   Medication Sig   • Biotin 5000 MCG tablet Take  by mouth Daily.   • calcium carbonate (OS-ULI) 600 MG tablet Take 600 mg by mouth 2 (Two) Times a Day With Meals.   • desoximetasone (TOPICORT) 0.05 % cream APPLY CREAM TOPICALLY TO AFFECTED AREA TWICE DAILY FOR ECZEMA FOR UP TO 14 DAYS   • EPINEPHrine (EPIPEN) 0.3 MG/0.3ML solution auto-injector injection epinephrine 0.3 mg/0.3 mL injection, auto-injector   • escitalopram (LEXAPRO) 10 MG tablet Take 0.5 tablets by mouth Every Morning.   • estradiol (ESTRACE) 0.1 MG/GM vaginal cream    • glucosamine-chondroitin 500-400 MG capsule capsule Take 1 capsule by mouth 2 (Two) Times a Day With Meals.   • lisinopril (PRINIVIL,ZESTRIL) 10 MG tablet Take 1 tablet by mouth Daily.   • neomycin-polymyxin-dexamethamethasone (POLYDEX) 3.5-50864-0.1 ointment ophthalmic ointment    • omeprazole (priLOSEC) 40 MG capsule Take 1 capsule by mouth 2 (Two) Times a Day.    • Restasis 0.05 % ophthalmic emulsion INSTILL 1 DROP INTO EACH EYE TWICE DAILY   • rosuvastatin (CRESTOR) 20 MG tablet Take 1 tablet by mouth Daily.   • traMADol (ULTRAM) 50 MG tablet Take 1 tablet by mouth Every 6 (Six) Hours As Needed for Moderate Pain .   • vitamin B-12 (CYANOCOBALAMIN) 1000 MCG tablet Take 5,000 mcg by mouth Daily.     No current facility-administered medications on file prior to visit.     Current Medications:  Scheduled Meds:  Continuous Infusions:No current facility-administered medications for this visit.    PRN Meds:.    I have reviewed the patient's medical history in detail and updated the computerized patient record.  Review and summarization of old records include:    Past Medical History:   Diagnosis Date   • Arthritis     knees   • Mcgill esophagus    • Cancer (HCC)     cervical   • Cervical cancer (HCC)    • Colon polyp    • Eczema    • Elevated liver enzymes    • GERD (gastroesophageal reflux disease)    • H/O stress fracture    • Health care maintenance    • Hypercholesteremia    • Hypertension    • Kidney stones     sched ureteroscopy, lithotripsy   • Knee swelling    • Ocular migraine    • PONV (postoperative nausea and vomiting)    • Postmenopausal    • Seasonal allergies    • Sjogren's syndrome (HCC)     autoimmune disorder         Past Surgical History:   Procedure Laterality Date   •  SECTION      x2   • COLONOSCOPY     • COLONOSCOPY N/A 2021    Procedure: COLONOSCOPY;  Surgeon: Bc Sanchez MD;  Location: [facility] OR;  Service: Gastroenterology;  Laterality: N/A;  Diverticulosis and polyps   • CYSTOSCOPY URETEROSCOPY STONE MANIPULATION/EXTRACTION Left 10/14/2016    Procedure: LEFT URETEROSCOPY bilateral retrograde pyleogram LEFT STENT PLACEMENT.;  Surgeon: Julius Darling MD;  Location: [facility] OR;  Service:    • CYSTOSCOPY W/ URETERAL STENT PLACEMENT Right 8/3/2018    Procedure: CYSTOSCOPY URETERAL CATHETER/STENT INSERTION;  Surgeon: Art  Farzad JERRY MD;  Location: [facility] OR;  Service: Urology   • ENDOSCOPY N/A 4/10/2019    Procedure: ESOPHAGOGASTRODUODENOSCOPY;  Surgeon: Bc Sanchez MD;  Location: [facility] OR;  Service: Gastroenterology   • HYSTERECTOMY     • UPPER GASTROINTESTINAL ENDOSCOPY     • URETEROSCOPY LASER LITHOTRIPSY WITH STENT INSERTION Left 10/26/2016    Procedure: LT URETEROSCOPY LASER LITHOTRIPSY ;  Surgeon: Julius Darling MD;  Location: [facility] OR;  Service:         Social History     Occupational History   • Not on file   Tobacco Use   • Smoking status: Former Smoker     Packs/day: 0.50     Years: 16.00     Pack years: 8.00     Types: Cigarettes     Start date: 3/22/1971     Quit date: 1990     Years since quittin.8   • Smokeless tobacco: Never Used   Vaping Use   • Vaping Use: Never used   Substance and Sexual Activity   • Alcohol use: Yes     Alcohol/week: 2.0 standard drinks     Types: 2 Glasses of wine per week     Comment: social   • Drug use: No   • Sexual activity: Defer      Social History     Social History Narrative   • Not on file        Family History   Problem Relation Age of Onset   • Anxiety disorder Mother    • Bipolar disorder Mother    • Stroke Mother 83   • Thyroid disease Mother    • Depression Mother    • Heart attack Father 47   • Thyroid disease Brother    • Hypertension Brother    • Diabetes Brother 68   • Glaucoma Brother    • Depression Brother    • No Known Problems Daughter    • No Known Problems Son    • Leukemia Maternal Grandmother    • Diabetes Maternal Grandfather    • Heart attack Paternal Grandmother    • Heart attack Paternal Grandfather    • Breast cancer Neg Hx    • Colon cancer Neg Hx    • Colon polyps Neg Hx        ROS: 14 point review of systems was performed and was negative except for documented findings in HPI and today's encounter.     Allergies:   Allergies   Allergen Reactions   • Levaquin [Levofloxacin] Nausea And Vomiting   • Tetracycline Nausea And  Vomiting     Constitutional:  Denies fever, shaking or chills   Eyes:  Denies change in visual acuity   HENT:  Denies nasal congestion or sore throat   Respiratory:  Denies cough or shortness of breath   Cardiovascular:  Denies chest pain or severe LE edema   GI:  Denies abdominal pain, nausea, vomiting, bloody stools or diarrhea   Musculoskeletal:  Denies numbness tingling or loss of motor function except as outlined above in history of present illness.  : Denies painful urination or hematuria  Integument:  Denies rash, lesion or ulceration   Neurologic:  Denies headache or focal weakness  Endocrine:  Denies lymphadenopathy  Psych:  Denies confusion or change in mental status   Hem:  Denies active bleeding    Physical Exam: 76 y.o. female  Body mass index is 26.46 kg/m².  There were no vitals filed for this visit.  Vital signs reviewed.   General Appearance:    Alert, cooperative, in no acute distress                  Eyes: conjunctiva clear  ENT: external ears and nose atraumatic  CV: no peripheral edema  Resp: normal respiratory effort  Skin: no rashes or wounds; normal turgor  Psych: mood and affect appropriate  Lymph: no nodes appreciated  Neuro: gross sensation intact  Vascular:  Palpable peripheral pulse in noted extremity  Musculoskeletal Extremities:   Right knee- ROM 0-125   Maximal TTP medial and anterior knee    Stable to varus valgus 0/30    Debilities/Disabilities Identified: None      Diagnostic Tests:  Pre-Admission Testing on 02/01/2022   Component Date Value Ref Range Status   • QT Interval 02/01/2022 413  ms Final   • Color, UA 02/01/2022 Yellow  Yellow, Straw Final   • Appearance, UA 02/01/2022 Clear  Clear Final   • pH, UA 02/01/2022 6.0  4.5 - 8.0 Final   • Specific Gravity, UA 02/01/2022 1.020  1.003 - 1.030 Final   • Glucose, UA 02/01/2022 Negative  Negative Final   • Ketones, UA 02/01/2022 Negative  Negative Final   • Bilirubin, UA 02/01/2022 Negative  Negative Final   • Blood, UA  02/01/2022 Small (1+)* Negative Final   • Protein, UA 02/01/2022 Trace* Negative Final   • Leuk Esterase, UA 02/01/2022 Small (1+)* Negative Final   • Nitrite, UA 02/01/2022 Negative  Negative Final   • Urobilinogen, UA 02/01/2022 0.2 E.U./dL  0.2 - 1.0 E.U./dL Final   • PTT 02/01/2022 20.4* 24.3 - 38.1 seconds Final   • Protime 02/01/2022 13.4  12.1 - 15.0 Seconds Final   • INR 02/01/2022 1.00  0.90 - 1.10 Final   • Glucose 02/01/2022 99  65 - 99 mg/dL Final   • BUN 02/01/2022 9  8 - 23 mg/dL Final   • Creatinine 02/01/2022 0.83  0.57 - 1.00 mg/dL Final   • Sodium 02/01/2022 140  136 - 145 mmol/L Final   • Potassium 02/01/2022 4.2  3.5 - 5.2 mmol/L Final   • Chloride 02/01/2022 101  98 - 107 mmol/L Final   • CO2 02/01/2022 22.0  22.0 - 29.0 mmol/L Final   • Calcium 02/01/2022 10.4  8.6 - 10.5 mg/dL Final   • eGFR Non  Amer 02/01/2022 67  >60 mL/min/1.73 Final   • BUN/Creatinine Ratio 02/01/2022 10.8  7.0 - 25.0 Final   • Anion Gap 02/01/2022 17.0* 5.0 - 15.0 mmol/L Final   • ABO Type 02/01/2022 O   Final   • RH type 02/01/2022 Positive   Final   • Antibody Screen 02/01/2022 Negative   Final   • T&S Expiration Date 02/01/2022 2/15/2022 11:59:00 PM   Final   • WBC 02/01/2022 3.98  3.40 - 10.80 10*3/mm3 Final   • RBC 02/01/2022 4.57  3.77 - 5.28 10*6/mm3 Final   • Hemoglobin 02/01/2022 14.9  12.0 - 15.9 g/dL Final   • Hematocrit 02/01/2022 44.6  34.0 - 46.6 % Final   • MCV 02/01/2022 97.6* 79.0 - 97.0 fL Final   • MCH 02/01/2022 32.6  26.6 - 33.0 pg Final   • MCHC 02/01/2022 33.4  31.5 - 35.7 g/dL Final   • RDW 02/01/2022 12.2* 12.3 - 15.4 % Final   • RDW-SD 02/01/2022 44.4  37.0 - 54.0 fl Final   • MPV 02/01/2022 10.7  6.0 - 12.0 fL Final   • Platelets 02/01/2022 201  140 - 450 10*3/mm3 Final   • Neutrophil % 02/01/2022 62.5  42.7 - 76.0 % Final   • Lymphocyte % 02/01/2022 22.4  19.6 - 45.3 % Final   • Monocyte % 02/01/2022 12.3* 5.0 - 12.0 % Final   • Eosinophil % 02/01/2022 2.0  0.3 - 6.2 % Final   •  Basophil % 02/01/2022 0.3  0.0 - 1.5 % Final   • Immature Grans % 02/01/2022 0.5  0.0 - 0.5 % Final   • Neutrophils, Absolute 02/01/2022 2.49  1.70 - 7.00 10*3/mm3 Final   • Lymphocytes, Absolute 02/01/2022 0.89  0.70 - 3.10 10*3/mm3 Final   • Monocytes, Absolute 02/01/2022 0.49  0.10 - 0.90 10*3/mm3 Final   • Eosinophils, Absolute 02/01/2022 0.08  0.00 - 0.40 10*3/mm3 Final   • Basophils, Absolute 02/01/2022 0.01  0.00 - 0.20 10*3/mm3 Final   • Immature Grans, Absolute 02/01/2022 0.02  0.00 - 0.05 10*3/mm3 Final   • RBC, UA 02/01/2022 3-5* None Seen /HPF Final   • WBC, UA 02/01/2022 6-12* None Seen /HPF Final   • Bacteria, UA 02/01/2022 Trace* None Seen /HPF Final   • Squamous Epithelial Cells, UA 02/01/2022 3-6* None Seen, 0-2 /HPF Final   • Hyaline Casts, UA 02/01/2022 None Seen  None Seen /LPF Final   • Methodology 02/01/2022 Manual Light Microscopy   Final   • ABO Type 02/01/2022 O   Final   • RH type 02/01/2022 Positive   Final   Office Visit on 01/31/2022   Component Date Value Ref Range Status   • Hemoglobin A1C 01/31/2022 5.8  % Final   • Lot Number 01/31/2022 [id number]   Final   • Expiration Date 01/31/2022 06/30/2023   Final     No results found.      Assessment:  Patient Active Problem List   Diagnosis   • Gastroesophageal reflux disease   • Hyperlipidemia   • Pyelonephritis   • Sjogren's syndrome with keratoconjunctivitis sicca (HCC)   • Mcgill's esophagus without dysplasia   • Hyperglycemia   • WALSH (nonalcoholic steatohepatitis)   • Pure hypercholesterolemia   • Knee pain   • Primary osteoarthritis of left knee   • Encounter for screening for malignant neoplasm of colon   • Personal history of colonic polyps   • Effusion of left shoulder joint   • Osteopenia   • Prediabetes   • Essential hypertension   • Primary osteoarthritis of right knee   • Knee instability, right   • S/P total knee arthroplasty       Plan:  I reviewed anatomy of a total joint arthroplasty in laymen's terms, as well as typical  postoperative recovery and possibly 6-12 months for maximal recovery, and possible need for rehabilitation stay after hospitalization. We also discussed risks, benefits, alternatives, and limitations of procedure with risks including but not limited to neurovascular damage, bleeding, infection, malalignment, chronic pian, failure of implants, osteolysis, loosening of implants, loss of motion, weakness, stiffness, instability, DVT, pulmonary embolus, death, stroke, complex regional pain syndrome, myocardial infarction, and need for additional procedures. No guarantees were given regarding results of surgery.      Germaine Gonzalez was given the opportunity to ask and have all questions answered today.  The patient voiced understanding of the risks, benefits, and alternative forms of treatment that were discussed and the patient consents to proceed with surgery.     Patient's blood clot history is negative.    Plan for DVT prophylaxis is Eliquis due to side effects from Ibuprofen    Patient's MRSA infection history is negative.    Patient's skin infection history is negative.    Discharge Plan: POD 1-2 to home    Date: 2/2/2022    Dictated utilizing Dragon dictation

## 2022-02-05 ENCOUNTER — LAB (OUTPATIENT)
Dept: LAB | Facility: HOSPITAL | Age: 77
End: 2022-02-05

## 2022-02-05 DIAGNOSIS — M17.11 PRIMARY OSTEOARTHRITIS OF RIGHT KNEE: ICD-10-CM

## 2022-02-05 LAB — SARS-COV-2 RNA PNL SPEC NAA+PROBE: NOT DETECTED

## 2022-02-05 PROCEDURE — C9803 HOPD COVID-19 SPEC COLLECT: HCPCS

## 2022-02-05 PROCEDURE — 87635 SARS-COV-2 COVID-19 AMP PRB: CPT | Performed by: ORTHOPAEDIC SURGERY

## 2022-02-07 ENCOUNTER — ANESTHESIA EVENT (OUTPATIENT)
Dept: PERIOP | Facility: HOSPITAL | Age: 77
End: 2022-02-07

## 2022-02-07 ENCOUNTER — HOSPITAL ENCOUNTER (OUTPATIENT)
Dept: GENERAL RADIOLOGY | Facility: HOSPITAL | Age: 77
Discharge: HOME | End: 2022-02-07

## 2022-02-08 ENCOUNTER — ANESTHESIA (OUTPATIENT)
Dept: PERIOP | Facility: HOSPITAL | Age: 77
End: 2022-02-08

## 2022-02-08 ENCOUNTER — APPOINTMENT (OUTPATIENT)
Dept: GENERAL RADIOLOGY | Facility: HOSPITAL | Age: 77
End: 2022-02-08

## 2022-02-08 ENCOUNTER — READMISSION MANAGEMENT (OUTPATIENT)
Dept: CALL CENTER | Facility: HOSPITAL | Age: 77
End: 2022-02-08

## 2022-02-08 ENCOUNTER — HOSPITAL ENCOUNTER (OUTPATIENT)
Facility: HOSPITAL | Age: 77
Discharge: HOME | End: 2022-02-08
Attending: ORTHOPAEDIC SURGERY | Admitting: ORTHOPAEDIC SURGERY

## 2022-02-08 VITALS
WEIGHT: 158.6 LBS | DIASTOLIC BLOOD PRESSURE: 68 MMHG | BODY MASS INDEX: 26.39 KG/M2 | TEMPERATURE: 97.8 F | OXYGEN SATURATION: 96 % | RESPIRATION RATE: 13 BRPM | SYSTOLIC BLOOD PRESSURE: 89 MMHG | HEART RATE: 84 BPM

## 2022-02-08 DIAGNOSIS — Z96.651 STATUS POST TOTAL RIGHT KNEE REPLACEMENT: Primary | ICD-10-CM

## 2022-02-08 DIAGNOSIS — M17.11 PRIMARY OSTEOARTHRITIS OF RIGHT KNEE: ICD-10-CM

## 2022-02-08 PROCEDURE — 63710000001 ACETAMINOPHEN 500 MG TABLET: Performed by: ORTHOPAEDIC SURGERY

## 2022-02-08 PROCEDURE — 63710000001 PREGABALIN 75 MG CAPSULE: Performed by: ORTHOPAEDIC SURGERY

## 2022-02-08 PROCEDURE — C1889 IMPLANT/INSERT DEVICE, NOC: HCPCS | Performed by: ORTHOPAEDIC SURGERY

## 2022-02-08 PROCEDURE — A9270 NON-COVERED ITEM OR SERVICE: HCPCS | Performed by: ORTHOPAEDIC SURGERY

## 2022-02-08 PROCEDURE — 73560 X-RAY EXAM OF KNEE 1 OR 2: CPT

## 2022-02-08 PROCEDURE — 27447 TOTAL KNEE ARTHROPLASTY: CPT | Performed by: SPECIALIST/TECHNOLOGIST, OTHER

## 2022-02-08 PROCEDURE — C1713 ANCHOR/SCREW BN/BN,TIS/BN: HCPCS | Performed by: ORTHOPAEDIC SURGERY

## 2022-02-08 PROCEDURE — 63710000001 POVIDONE-IODINE 10 % SOLUTION 118 ML BOTTLE: Performed by: ORTHOPAEDIC SURGERY

## 2022-02-08 PROCEDURE — 63710000001 MELOXICAM 7.5 MG TABLET: Performed by: ORTHOPAEDIC SURGERY

## 2022-02-08 PROCEDURE — C1776 JOINT DEVICE (IMPLANTABLE): HCPCS | Performed by: ORTHOPAEDIC SURGERY

## 2022-02-08 PROCEDURE — 25010000002 MIDAZOLAM PER 1MG: Performed by: NURSE ANESTHETIST, CERTIFIED REGISTERED

## 2022-02-08 PROCEDURE — 25010000002 DEXAMETHASONE PER 1 MG: Performed by: NURSE ANESTHETIST, CERTIFIED REGISTERED

## 2022-02-08 PROCEDURE — 27599 UNLISTED PX FEMUR/KNEE: CPT | Performed by: SPECIALIST/TECHNOLOGIST, OTHER

## 2022-02-08 PROCEDURE — 0 LIDOCAINE 1 % SOLUTION: Performed by: NURSE ANESTHETIST, CERTIFIED REGISTERED

## 2022-02-08 PROCEDURE — 25010000002 PROPOFOL 10 MG/ML EMULSION: Performed by: NURSE ANESTHETIST, CERTIFIED REGISTERED

## 2022-02-08 PROCEDURE — 27599 UNLISTED PX FEMUR/KNEE: CPT | Performed by: ORTHOPAEDIC SURGERY

## 2022-02-08 PROCEDURE — 25010000002 ONDANSETRON PER 1 MG: Performed by: NURSE ANESTHETIST, CERTIFIED REGISTERED

## 2022-02-08 PROCEDURE — 25010000002 PHENYLEPHRINE 10 MG/ML SOLUTION: Performed by: NURSE ANESTHETIST, CERTIFIED REGISTERED

## 2022-02-08 PROCEDURE — 76942 ECHO GUIDE FOR BIOPSY: CPT | Performed by: ORTHOPAEDIC SURGERY

## 2022-02-08 PROCEDURE — 27447 TOTAL KNEE ARTHROPLASTY: CPT | Performed by: ORTHOPAEDIC SURGERY

## 2022-02-08 PROCEDURE — G0378 HOSPITAL OBSERVATION PER HR: HCPCS

## 2022-02-08 PROCEDURE — 0 CEFAZOLIN SODIUM-DEXTROSE 2-3 GM-%(50ML) RECONSTITUTED SOLUTION: Performed by: ORTHOPAEDIC SURGERY

## 2022-02-08 PROCEDURE — L1830 KO IMMOB CANVAS LONG PRE OTS: HCPCS | Performed by: ORTHOPAEDIC SURGERY

## 2022-02-08 PROCEDURE — 25010000002 VANCOMYCIN 1 G RECONSTITUTED SOLUTION: Performed by: ORTHOPAEDIC SURGERY

## 2022-02-08 DEVICE — DEV CONTRL TISS STRATAFIX SPIRAL MNCRYL UD 3/0 PLS 45CM: Type: IMPLANTABLE DEVICE | Site: KNEE | Status: FUNCTIONAL

## 2022-02-08 DEVICE — COMP FEM/KN PERSONA CR CMT COCR STD SZ7 RT: Type: IMPLANTABLE DEVICE | Site: KNEE | Status: FUNCTIONAL

## 2022-02-08 DEVICE — ART/SRF KN PERSONA/VE MC EF 6TO7 12MM RT: Type: IMPLANTABLE DEVICE | Site: KNEE | Status: FUNCTIONAL

## 2022-02-08 DEVICE — STEM TIB/KN PERSONA CMT 5D SZD RT: Type: IMPLANTABLE DEVICE | Site: KNEE | Status: FUNCTIONAL

## 2022-02-08 DEVICE — CMT BONE R 1X40: Type: IMPLANTABLE DEVICE | Site: KNEE | Status: FUNCTIONAL

## 2022-02-08 DEVICE — DEV WND/CLS CONTRL TISS STRATAFIX SYMM PDS PLS CTX 60CM VIL: Type: IMPLANTABLE DEVICE | Site: KNEE | Status: FUNCTIONAL

## 2022-02-08 DEVICE — EXT STEM FEM/KN PERSONA TPR 14XPLS30MM: Type: IMPLANTABLE DEVICE | Site: KNEE | Status: FUNCTIONAL

## 2022-02-08 DEVICE — CAP EXT STEM KN UPCHRG: Type: IMPLANTABLE DEVICE | Site: KNEE | Status: FUNCTIONAL

## 2022-02-08 DEVICE — CAP TOTL KN CMT PRIMARY: Type: IMPLANTABLE DEVICE | Site: KNEE | Status: FUNCTIONAL

## 2022-02-08 RX ORDER — CEFAZOLIN SODIUM 2 G/50ML
2 SOLUTION INTRAVENOUS ONCE
Status: COMPLETED | OUTPATIENT
Start: 2022-02-08 | End: 2022-02-08

## 2022-02-08 RX ORDER — DEXAMETHASONE SODIUM PHOSPHATE 4 MG/ML
8 INJECTION, SOLUTION INTRA-ARTICULAR; INTRALESIONAL; INTRAMUSCULAR; INTRAVENOUS; SOFT TISSUE ONCE AS NEEDED
Status: COMPLETED | OUTPATIENT
Start: 2022-02-08 | End: 2022-02-08

## 2022-02-08 RX ORDER — BUPIVACAINE HYDROCHLORIDE 7.5 MG/ML
INJECTION, SOLUTION EPIDURAL; RETROBULBAR
Status: COMPLETED | OUTPATIENT
Start: 2022-02-08 | End: 2022-02-08

## 2022-02-08 RX ORDER — MELOXICAM 7.5 MG/1
15 TABLET ORAL ONCE
Status: COMPLETED | OUTPATIENT
Start: 2022-02-08 | End: 2022-02-08

## 2022-02-08 RX ORDER — SENNA PLUS 8.6 MG/1
1 TABLET ORAL NIGHTLY
Qty: 30 TABLET | Refills: 0 | Status: SHIPPED | OUTPATIENT
Start: 2022-02-08 | End: 2022-03-15

## 2022-02-08 RX ORDER — PROPYLENE GLYCOL 0.06 MG/ML
SOLUTION/ DROPS OPHTHALMIC
COMMUNITY

## 2022-02-08 RX ORDER — DEXMEDETOMIDINE HYDROCHLORIDE 100 UG/ML
INJECTION, SOLUTION INTRAVENOUS AS NEEDED
Status: DISCONTINUED | OUTPATIENT
Start: 2022-02-08 | End: 2022-02-08 | Stop reason: SURG

## 2022-02-08 RX ORDER — FENTANYL CITRATE 50 UG/ML
50 INJECTION, SOLUTION INTRAMUSCULAR; INTRAVENOUS
Status: DISCONTINUED | OUTPATIENT
Start: 2022-02-08 | End: 2022-02-08 | Stop reason: HOSPADM

## 2022-02-08 RX ORDER — BUPIVACAINE HYDROCHLORIDE 5 MG/ML
INJECTION, SOLUTION EPIDURAL; INTRACAUDAL
Status: COMPLETED | OUTPATIENT
Start: 2022-02-08 | End: 2022-02-08

## 2022-02-08 RX ORDER — BUPIVACAINE HYDROCHLORIDE 2.5 MG/ML
INJECTION, SOLUTION EPIDURAL; INFILTRATION; INTRACAUDAL
Status: COMPLETED | OUTPATIENT
Start: 2022-02-08 | End: 2022-02-08

## 2022-02-08 RX ORDER — DOCUSATE SODIUM 100 MG/1
100 CAPSULE, LIQUID FILLED ORAL 2 TIMES DAILY PRN
Qty: 30 CAPSULE | Refills: 0 | Status: SHIPPED | OUTPATIENT
Start: 2022-02-08 | End: 2022-03-15

## 2022-02-08 RX ORDER — FAMOTIDINE 10 MG/ML
20 INJECTION, SOLUTION INTRAVENOUS
Status: COMPLETED | OUTPATIENT
Start: 2022-02-08 | End: 2022-02-08

## 2022-02-08 RX ORDER — SODIUM CHLORIDE 0.9 % (FLUSH) 0.9 %
10 SYRINGE (ML) INJECTION AS NEEDED
Status: DISCONTINUED | OUTPATIENT
Start: 2022-02-08 | End: 2022-02-08 | Stop reason: HOSPADM

## 2022-02-08 RX ORDER — SODIUM CHLORIDE 9 MG/ML
40 INJECTION, SOLUTION INTRAVENOUS AS NEEDED
Status: DISCONTINUED | OUTPATIENT
Start: 2022-02-08 | End: 2022-02-08 | Stop reason: HOSPADM

## 2022-02-08 RX ORDER — ACETAMINOPHEN 500 MG
1000 TABLET ORAL ONCE
Status: COMPLETED | OUTPATIENT
Start: 2022-02-08 | End: 2022-02-08

## 2022-02-08 RX ORDER — SODIUM CHLORIDE 9 MG/ML
INJECTION, SOLUTION INTRAVENOUS AS NEEDED
Status: DISCONTINUED | OUTPATIENT
Start: 2022-02-08 | End: 2022-02-08 | Stop reason: HOSPADM

## 2022-02-08 RX ORDER — OXYCODONE HYDROCHLORIDE AND ACETAMINOPHEN 5; 325 MG/1; MG/1
1 TABLET ORAL ONCE AS NEEDED
Status: DISCONTINUED | OUTPATIENT
Start: 2022-02-08 | End: 2022-02-08 | Stop reason: HOSPADM

## 2022-02-08 RX ORDER — PREGABALIN 75 MG/1
150 CAPSULE ORAL ONCE
Status: COMPLETED | OUTPATIENT
Start: 2022-02-08 | End: 2022-02-08

## 2022-02-08 RX ORDER — ONDANSETRON 2 MG/ML
4 INJECTION INTRAMUSCULAR; INTRAVENOUS ONCE AS NEEDED
Status: COMPLETED | OUTPATIENT
Start: 2022-02-08 | End: 2022-02-08

## 2022-02-08 RX ORDER — SODIUM CHLORIDE, SODIUM LACTATE, POTASSIUM CHLORIDE, CALCIUM CHLORIDE 600; 310; 30; 20 MG/100ML; MG/100ML; MG/100ML; MG/100ML
9 INJECTION, SOLUTION INTRAVENOUS CONTINUOUS PRN
Status: DISCONTINUED | OUTPATIENT
Start: 2022-02-08 | End: 2022-02-08 | Stop reason: HOSPADM

## 2022-02-08 RX ORDER — OXYCODONE HYDROCHLORIDE AND ACETAMINOPHEN 5; 325 MG/1; MG/1
1-2 TABLET ORAL EVERY 4 HOURS PRN
Qty: 42 TABLET | Refills: 0 | Status: SHIPPED | OUTPATIENT
Start: 2022-02-08 | End: 2022-02-23 | Stop reason: SDUPTHER

## 2022-02-08 RX ORDER — ONDANSETRON 2 MG/ML
4 INJECTION INTRAMUSCULAR; INTRAVENOUS ONCE AS NEEDED
Status: DISCONTINUED | OUTPATIENT
Start: 2022-02-08 | End: 2022-02-08 | Stop reason: HOSPADM

## 2022-02-08 RX ORDER — MIDAZOLAM HYDROCHLORIDE 2 MG/2ML
0.5 INJECTION, SOLUTION INTRAMUSCULAR; INTRAVENOUS
Status: DISCONTINUED | OUTPATIENT
Start: 2022-02-08 | End: 2022-02-08 | Stop reason: HOSPADM

## 2022-02-08 RX ORDER — TRANEXAMIC ACID 100 MG/ML
INJECTION, SOLUTION INTRAVENOUS AS NEEDED
Status: DISCONTINUED | OUTPATIENT
Start: 2022-02-08 | End: 2022-02-08 | Stop reason: SURG

## 2022-02-08 RX ORDER — LIDOCAINE HYDROCHLORIDE 10 MG/ML
INJECTION, SOLUTION INFILTRATION; PERINEURAL AS NEEDED
Status: DISCONTINUED | OUTPATIENT
Start: 2022-02-08 | End: 2022-02-08 | Stop reason: SURG

## 2022-02-08 RX ORDER — PHENYLEPHRINE HYDROCHLORIDE 10 MG/ML
INJECTION INTRAVENOUS AS NEEDED
Status: DISCONTINUED | OUTPATIENT
Start: 2022-02-08 | End: 2022-02-08 | Stop reason: SURG

## 2022-02-08 RX ORDER — SODIUM CHLORIDE 0.9 % (FLUSH) 0.9 %
10 SYRINGE (ML) INJECTION EVERY 12 HOURS SCHEDULED
Status: DISCONTINUED | OUTPATIENT
Start: 2022-02-08 | End: 2022-02-08 | Stop reason: HOSPADM

## 2022-02-08 RX ORDER — LIDOCAINE HYDROCHLORIDE 10 MG/ML
0.5 INJECTION, SOLUTION EPIDURAL; INFILTRATION; INTRACAUDAL; PERINEURAL ONCE AS NEEDED
Status: DISCONTINUED | OUTPATIENT
Start: 2022-02-08 | End: 2022-02-08 | Stop reason: HOSPADM

## 2022-02-08 RX ORDER — MAGNESIUM HYDROXIDE 1200 MG/15ML
LIQUID ORAL AS NEEDED
Status: DISCONTINUED | OUTPATIENT
Start: 2022-02-08 | End: 2022-02-08 | Stop reason: HOSPADM

## 2022-02-08 RX ORDER — PREGABALIN 75 MG/1
75 CAPSULE ORAL 2 TIMES DAILY
Qty: 60 CAPSULE | Refills: 0 | Status: SHIPPED | OUTPATIENT
Start: 2022-02-08 | End: 2022-03-15

## 2022-02-08 RX ORDER — PROPOFOL 10 MG/ML
VIAL (ML) INTRAVENOUS CONTINUOUS PRN
Status: DISCONTINUED | OUTPATIENT
Start: 2022-02-08 | End: 2022-02-08 | Stop reason: SURG

## 2022-02-08 RX ORDER — VANCOMYCIN HYDROCHLORIDE 1 G/20ML
INJECTION, POWDER, LYOPHILIZED, FOR SOLUTION INTRAVENOUS AS NEEDED
Status: DISCONTINUED | OUTPATIENT
Start: 2022-02-08 | End: 2022-02-08 | Stop reason: HOSPADM

## 2022-02-08 RX ORDER — PROPOFOL 10 MG/ML
VIAL (ML) INTRAVENOUS AS NEEDED
Status: DISCONTINUED | OUTPATIENT
Start: 2022-02-08 | End: 2022-02-08 | Stop reason: SURG

## 2022-02-08 RX ORDER — KETAMINE HYDROCHLORIDE 10 MG/ML
INJECTION INTRAMUSCULAR; INTRAVENOUS AS NEEDED
Status: DISCONTINUED | OUTPATIENT
Start: 2022-02-08 | End: 2022-02-08 | Stop reason: SURG

## 2022-02-08 RX ADMIN — TRANEXAMIC ACID 1000 MG: 100 INJECTION, SOLUTION INTRAVENOUS at 07:48

## 2022-02-08 RX ADMIN — PHENYLEPHRINE HYDROCHLORIDE 100 MCG: 10 INJECTION INTRAVENOUS at 08:40

## 2022-02-08 RX ADMIN — DEXMEDETOMIDINE 50 MCG: 100 INJECTION, SOLUTION, CONCENTRATE INTRAVENOUS at 07:29

## 2022-02-08 RX ADMIN — PHENYLEPHRINE HYDROCHLORIDE 100 MCG: 10 INJECTION INTRAVENOUS at 09:30

## 2022-02-08 RX ADMIN — FAMOTIDINE 20 MG: 10 INJECTION, SOLUTION INTRAVENOUS at 06:20

## 2022-02-08 RX ADMIN — PROPOFOL 35 MCG/KG/MIN: 10 INJECTION, EMULSION INTRAVENOUS at 07:37

## 2022-02-08 RX ADMIN — BUPIVACAINE HYDROCHLORIDE 10 ML: 2.5 INJECTION, SOLUTION EPIDURAL; INFILTRATION; INTRACAUDAL; PERINEURAL at 07:20

## 2022-02-08 RX ADMIN — TRANEXAMIC ACID 1000 MG: 100 INJECTION, SOLUTION INTRAVENOUS at 09:33

## 2022-02-08 RX ADMIN — PHENYLEPHRINE HYDROCHLORIDE 100 MCG: 10 INJECTION INTRAVENOUS at 08:51

## 2022-02-08 RX ADMIN — ONDANSETRON 4 MG: 2 INJECTION INTRAMUSCULAR; INTRAVENOUS at 06:21

## 2022-02-08 RX ADMIN — MELOXICAM 15 MG: 7.5 TABLET ORAL at 06:05

## 2022-02-08 RX ADMIN — PHENYLEPHRINE HYDROCHLORIDE 100 MCG: 10 INJECTION INTRAVENOUS at 09:14

## 2022-02-08 RX ADMIN — MIDAZOLAM HYDROCHLORIDE 0.5 MG: 1 INJECTION, SOLUTION INTRAMUSCULAR; INTRAVENOUS at 07:01

## 2022-02-08 RX ADMIN — PHENYLEPHRINE HYDROCHLORIDE 100 MCG: 10 INJECTION INTRAVENOUS at 07:43

## 2022-02-08 RX ADMIN — KETAMINE HYDROCHLORIDE 10 MG: 10 INJECTION, SOLUTION INTRAMUSCULAR; INTRAVENOUS at 09:14

## 2022-02-08 RX ADMIN — BUPIVACAINE HYDROCHLORIDE 10 ML: 5 INJECTION, SOLUTION EPIDURAL; INTRACAUDAL; PERINEURAL at 07:20

## 2022-02-08 RX ADMIN — BUPIVACAINE HYDROCHLORIDE 10 ML: 2.5 INJECTION, SOLUTION EPIDURAL; INFILTRATION; INTRACAUDAL at 07:26

## 2022-02-08 RX ADMIN — BUPIVACAINE HYDROCHLORIDE 10 ML: 5 INJECTION, SOLUTION EPIDURAL; INTRACAUDAL at 07:26

## 2022-02-08 RX ADMIN — BUPIVACAINE HYDROCHLORIDE 1.1 ML: 7.5 INJECTION, SOLUTION EPIDURAL; RETROBULBAR at 07:10

## 2022-02-08 RX ADMIN — SODIUM CHLORIDE, POTASSIUM CHLORIDE, SODIUM LACTATE AND CALCIUM CHLORIDE 9 ML/HR: 600; 310; 30; 20 INJECTION, SOLUTION INTRAVENOUS at 06:20

## 2022-02-08 RX ADMIN — CEFAZOLIN SODIUM 2 G: 2 SOLUTION INTRAVENOUS at 07:37

## 2022-02-08 RX ADMIN — PHENYLEPHRINE HYDROCHLORIDE 100 MCG: 10 INJECTION INTRAVENOUS at 08:28

## 2022-02-08 RX ADMIN — BUPIVACAINE HYDROCHLORIDE 8 ML/HR: 5 INJECTION, SOLUTION EPIDURAL; INTRACAUDAL; PERINEURAL at 10:03

## 2022-02-08 RX ADMIN — KETAMINE HYDROCHLORIDE 10 MG: 10 INJECTION, SOLUTION INTRAMUSCULAR; INTRAVENOUS at 07:37

## 2022-02-08 RX ADMIN — DEXAMETHASONE SODIUM PHOSPHATE 8 MG: 4 INJECTION, SOLUTION INTRAMUSCULAR; INTRAVENOUS at 06:20

## 2022-02-08 RX ADMIN — LIDOCAINE HYDROCHLORIDE 1 ML: 10 INJECTION, SOLUTION INFILTRATION; PERINEURAL at 07:08

## 2022-02-08 RX ADMIN — BUPIVACAINE HYDROCHLORIDE 10 ML: 2.5 INJECTION, SOLUTION EPIDURAL; INFILTRATION; INTRACAUDAL at 07:29

## 2022-02-08 RX ADMIN — KETAMINE HYDROCHLORIDE 10 MG: 10 INJECTION, SOLUTION INTRAMUSCULAR; INTRAVENOUS at 08:30

## 2022-02-08 RX ADMIN — Medication 40 MG: at 07:37

## 2022-02-08 RX ADMIN — PREGABALIN 150 MG: 75 CAPSULE ORAL at 06:05

## 2022-02-08 RX ADMIN — ACETAMINOPHEN 1000 MG: 500 TABLET ORAL at 06:05

## 2022-02-08 RX ADMIN — BUPIVACAINE HYDROCHLORIDE 10 ML: 5 INJECTION, SOLUTION EPIDURAL; INTRACAUDAL at 07:29

## 2022-02-08 RX ADMIN — PHENYLEPHRINE HYDROCHLORIDE 100 MCG: 10 INJECTION INTRAVENOUS at 08:15

## 2022-02-08 NOTE — OP NOTE
Date of Surgery: 2/8/2022    Preoperative diagnosis: right knee osteoarthritis    Postoperative diagnosis: right knee osteoarthritis    Procedure:  1.right total knee arthroplasty  2. Intraoperative use of kinetic knee balance sensor for implant stability during total knee arthroplasty    Surgeon: Rich Ba.: Maksim Nicholson CSA-retraction, irrigation, closure, dressing application    spinal with regional block    Estimated blood loss: <500ml    Fluids: per anesthesia    Specimens: None    Complications: None    Drains: None    Tourniquet time: Tourniquet Times:     Inflated: 2/8/2022  8:09 AM     Deflated: 2/8/2022  9:41 AM    Implants used: Reji Persona total knee arthroplasty system with a size D tibia with 30 mm stem extension, size 7 cruciate retaining femoral component , 12 mm highly cross-linked medial congruent polyethylene insert, cement    Examination under anesthesia: left knee passive range of motion 2-120°, varus alignment, no open wounds lacerations or abrasions over knee, stable to varus and valgus stress at 2 and 30°, 2+ dorsalis pedis pulse.    Indications for procedure: Patient is a pleasant 76 y.o. female who has had significant pain to right knee over the last several years, has failed conservative treatment with intra-articular injections, bracing, home exercise program, activity modification, anti-inflammatory medications. Has had persistent pain limiting activities of daily living and even has had pain at rest. We discussed treatment options and patient wished to proceed with above-mentioned surgery. Was explained details of procedure as well as risks benefits and alternatives as documented on history and physical and had all questions answered. No guarantees were given in regards to results of the surgery.    Details of procedure: Patient was seen, evaluated, and cleared for surgery by anesthesia. Had been medically optimized by primary care physician. Patient was met in the preoperative  hold area, operative site was marked, consent was reviewed, history and physical was updated, and preoperative labs were reviewed. Regional block and spinal were placed per anesthesia and patient was taken to the operating room and placed in supine position on a regular OR table. Examination under anesthesia was carried out this time. Nonsterile tourniquet was then applied to right lower extremity. Patient was secured to the table with a waist strap and all bony prominences were well-padded. right lower extremity was then sterilely prepped and draped in standard fashion.  Formal timeout was completed including confirmation of history and physical, operative consent, operative site, patient identification number, and preoperative antibiotics administration. right leg was then exsanguinated and tourniquet inflated to 250 mmHg. Procedure was then begun with longitudinal incision over the anterior aspect of the right knee through skin and subcutaneous tissue with 15 blade. Minimal subcutaneous flaps were developed at this point in time, and standard medial parapatellar arthrotomy was then created at this point. Portion of suprapatellar and infrapatellar fat pad were resected this point in time. Medial tibial peel was carried out in order to allow for further exposure the knee. Medial and lateral meniscus were resected this time and ACL was transected.  Patella was then everted and measured with a caliper-the patella height was only 18 mm thick and thus was not amenable for resurfacing.  Thus we proceeded with debridement of marginal osteophytes noted most significantly along lateral and superior aspects of the patella.    Attention was then turned to the distal femur with the knee being brought into a flexed position.  Reference pin for I-Assist navigational system was placed in the distal femur in-line with Whitesides line in retrograde fashion.  Navigational distal femoral cutting block was positioned at this time as  well and calibrated with multiple planes of knee and hip motion carried out to set reference positions for navigation device.  Navigational device was then adjusted to 0 degrees valgus cut on distal femur and 3 degrees flexion cut on distal femur.  Distal femoral cutting block was pinned into position at this site, navigational device removed. Depth of the resection was checked with a sickle and noted to be appropriate. Additional pin was placed for security of the cutting block and oscillating saw was then used to create a distal femoral cut in standard fashion while collateral ligaments were protected with Z retractors. Bone was removed and measured at this time.  Cut validation was completed with navigational device at this point time as well confirming appropriate cut consistent with set parameters as noted above. Pins and cutting block were removed as well. Contents of the notch were then resected including the ACL, PCL, and posterior horns of medial and lateral meniscus. Posterior retractor was then placed and tibia was subluxated anteriorly.   Attention was then turned to the proximal tibia. Additional release of lateral tissues was completed at this time to allow for appropriate visualization of the proximal tibia articular surface. I-Assist navigational device was then pinned in position over the tibial tubercle with 3 pins at this time, external guide was placed in line with the tibial crest and center of the ankle joint and clamped into position over the ankle while proximal guide pins were impacted into the top of the tibia.  Navigational pods were then calibrated with range of motion of the hip and once noted to be appropriate, external guide from tibia was removed.  Adjustments were made to the navigational device to place the cut in neutral varus and 4 degrees posterior slope.  Tibial cutting block resection depth was set with a sickle, block was pinned into position at this time, and alignment  assessed with the drop wong noted to be in line with tibial crest, medial third of tibial tubercle, and center of the ankle joint.  Oscillating saw was then used to complete the proximal tibial cut while collateral ligaments were protected with Z retractors. Bone fragments were removed and measured.  Tibial cut was then validated with validation device from navigational system and confirmed to be within reasonable position of above-mentioned set parameters for the proximal tibia cut. Knee was brought into full extension with extension gap being checked with spacer block at this time and noted be acceptable with knee brought into full extension, stable medial and lateral collateral stability at full extension.  Navigational guide was then removed at this point time.   Attention was then returned to the distal femur with a femoral sizing guide being placed, transverse epicondylar axis and Whitesides line being assessed and used to determine appropriate external rotation of 5 degrees.  Femoral sizing guide was secured to the distal femur with 2 screws, sizing caliper was adjusted to the anterior femur and femur was sized at a 7.  2 drill holes were made through the sizing guide which was then removed including 2 screws. Size 7 femoral cutting block was then impacted onto the distal femoral cut surface and pinned into position. Sickle was used to check the anterior cut and there was no evidence of anticipated notching. Collateral ligaments were protected with Z retractors while anterior, posterior, and chamfer cuts were created in standard fashion with oscillating saw. Femoral cutting block was removed at this time as well as bone fragments. Posterior capsule was stripped at this time. Size 7 femoral trial was placed. Size D tibial baseplate trial with 12 mm polyethylene trial was inserted. Knee was then ranged from 0-130°, good varus and valgus stability at full extension and 30° as well as throughout mid flexion with  good AP translation at 90° of flexion noted.  Patella was noted to track in midline with no evidence of subluxation. Knee was ranged from full extension to full flexion and tibial rotation was noted with midline of tibial trial being marked with Bovie electrocautery at the proximal tibia. Femoral and patellar and tibial trials were removed at this point in time and tibia was subluxated anteriorly with posterior retractor. Tibial trial baseplate was placed once again, position confirmed with drop wong as well as with previous marking for tibial rotation and assessing for bony coverage of implant. Once position of tibial baseplate was noted to be appropriate, 2 pins were placed at this time, and reamer and punch were then used through the tibial baseplate.  All trial components were once again removed at this time and pulsatile lavage was used to thoroughly clean all bony cut surfaces as well as subcutaneous tissue. Final implants were opened on the back table this time. Cement was prepared on the back table and was applied to the cut surfaces of the distal femur, proximal tibia, and patella as well as to the backside of the implants. Tibial component was placed first followed by distal femur. Extraneous cement was removed with freer at this time. Once all implants were in position knee was brought into full extension with axial compression to allow for cement to cure fully.  Once cement was completely hardened, trial polyethylene insert was assessed, range of motion of knee from 0-130° was achieved with good varus and valgus stability throughout range of motion and good AP translation at 90° of flexion. Thus a 12 mm polyethylene insert was opened on the back table, inserted and locked in appropriately into the tibial baseplate. Knee was thoroughly irrigated with a second evaluation for any additional bone fragments or cement particles. Knee was then thoroughly irrigated with Betadine solution followed by pulsatile  lavage.  1 g vancomycin powder added to deep and subcutaneous tissues for infection prophylaxis.  Attention was then turned to closure of the wound with running self locking #2 suture ×1, 2-0 Vicryls in inverted fashion for deep subcutaneous closure, 3-0 running self locking strata-fix suture, and Prineo glue and mesh for skin. Wound was dressed with Telfa, 4 x 4 gauze, web roll, ABD pad, Ace wrap, and patient was placed in knee immobilizer and given ice pack. At the end of procedure all lap, needle and sponge counts were correct ×2. Patient had brisk cap refill all digits right lower extremity, compartments are soft and easily compressible at the end of the procedure.    Disposition: Patient was taken to recovery room in stable condition. Will be admitted for pain control and initiation of therapy, weight-bear as tolerated right lower extremity, DVT prophylaxis will be started on postoperative day #1 with aspirin. Results of the procedure were discussed immediately postoperatively with patient's family and they had all questions answered at that time.

## 2022-02-08 NOTE — ANESTHESIA PROCEDURE NOTES
Peripheral Block      Patient reassessed immediately prior to procedure    Patient location during procedure: pre-op  Start time: 2/8/2022 7:23 AM  Stop time: 2/8/2022 7:26 AM  Reason for block: at surgeon's request and post-op pain management  Performed by  CRNA: Nura Adams CRNA  Preanesthetic Checklist  Completed: patient identified, IV checked, site marked, risks and benefits discussed, surgical consent, monitors and equipment checked, pre-op evaluation and timeout performed  Prep:  Sterile barriers:cap, gloves, gown, mask and sterile barriers  Prep: ChloraPrep  Patient monitoring: blood pressure monitoring, continuous pulse oximetry and EKG  Procedure    Sedation: yes  Performed under: spinal  Guidance:ultrasound guided    ULTRASOUND INTERPRETATION. Using ultrasound guidance a 21 G gauge needle was placed in close proximity to the nerve, at which point, under ultrasound guidance anesthetic was injected in the area of the nerve and spread of the anesthesia was seen on ultrasound in close proximity thereto.  There were no abnormalities seen on ultrasound; a digital image was taken; and the patient tolerated the procedure with no complications. Images:still images obtained, printed/placed on chart    Laterality:right  Anesthesia block type: Genicular x3 Vastus medialis.  Injection Technique:single-shot  Needle Type:echogenic  Needle Gauge:21 G      Medications Used: bupivacaine PF (MARCAINE) injection 0.25%, 10 mL  bupivacaine PF (MARCAINE) injection 0.5%, 10 mL  Med administered at 2/8/2022 7:26 AM      Medications  Comment:5cc per location Superior medial, Superior Lateral, Inferior Medial, Vastus medialis    Post Assessment  Injection Assessment: negative aspiration for heme, no paresthesia on injection and incremental injection  Patient Tolerance:comfortable throughout block  Complications:no

## 2022-02-08 NOTE — DISCHARGE INSTR - APPOINTMENTS
Please arrive at 8:15 for your 8:30 am appointment with physical therapy with [facility] on 2/10/2022

## 2022-02-08 NOTE — CASE MANAGEMENT/SOCIAL WORK
Discharge Planning Assessment   Bhavesh Azul     Patient Name: Germaine Gonzalez  MRN: [id number]  Today's Date: 2/8/2022    Admit Date: 2/8/2022     Discharge Needs Assessment    No documentation.                Discharge Plan     Row Name 02/08/22 5803       Plan    Plan Comments I spoke with the patient in her room in PACU regarding he discharge plan and needs. The patient lives in a two story home with a second floor bedroom with her .  She advised that there is 1 step to enter her home.  Her  will be able to assist her after discharge.   We discussed her plan for physical therapy and she advised that she would like [facility].  I was able to get her an appointment for 2/10/2022 at 8:30 am.  We then discussed DME and I asked if she had a rolling walker and a bedside or 3 in 1 commode and she advised that she did not have any DME at home.  I explained that I could provide both at discharge and she is agreeable to that and [facility]. She had no further questions or concerns at this time. CM will follow.              Continued Care and Services - Admitted Since 2/8/2022     Durable Medical Equipment Coordination complete.    Service Provider Request Status Selected Services Address Phone Fax Patient Preferred    [facility]   Selected Durable Medical Equipment [address] [place] [id number] [phone] --              Expected Discharge Date and Time     Expected Discharge Date Expected Discharge Time    Feb 8, 2022          Demographic Summary    No documentation.                Functional Status    No documentation.                Psychosocial    No documentation.                Abuse/Neglect    No documentation.                Legal    No documentation.                Substance Abuse    No documentation.                Patient Forms    No documentation.                   Bessie Maradiaga RN

## 2022-02-08 NOTE — DISCHARGE PLACEMENT REQUEST
"Germaine Gonzalez (76 y.o. Female)             Date of Birth Social Security Number Address Home Phone MRN    1945  [address] [address] [phone] [id number]    Druze Marital Status             None        Admission Date Admission Type Admitting Provider Attending Provider Department, Room/Bed    2/8/22 Elective Jovany Villanueva MD Kenney, Nicholas A, MD [facility] OR, [facility] OR/MAIN OR    Discharge Date Discharge Disposition Discharge Destination           Home or Self Care              Attending Provider: Jovany Villanueva MD    Allergies: Levaquin [Levofloxacin], Tetracycline    Isolation: None   Infection: None   Code Status: Prior   Advance Care Planning Activity    Ht: 165.1 cm (65\")   Wt: 71.9 kg (158 lb 9.6 oz)    Admission Cmt: None   Principal Problem: Primary osteoarthritis of right knee [M17.11]                 Active Insurance as of 2/8/2022     Primary Coverage     Payor Plan Insurance Group Employer/Plan Group    HUMANA MEDICARE REPLACEMENT HUMANA MEDICARE REPLACEMENT [id number]     Payor Plan Address Payor Plan Phone Number Payor Plan Fax Number Effective Dates    [address] [address] [phone]  1/1/2013 - None Entered    [facility] [id number]       Subscriber Name Subscriber Birth Date Member ID       GERMAINE GONZALEZ 1945 [id number]                 Emergency Contacts      (Rel.) Home Phone Work Phone Mobile Phone    Kaden Gonzalez (Spouse) [phone] -- [phone]          "

## 2022-02-08 NOTE — ANESTHESIA PROCEDURE NOTES
Peripheral Block      Patient reassessed immediately prior to procedure    Patient location during procedure: pre-op  Start time: 2/8/2022 7:16 AM  Stop time: 2/8/2022 7:29 AM  Reason for block: at surgeon's request and post-op pain management  Performed by  CRNA: Nura Adams CRNA  Preanesthetic Checklist  Completed: patient identified, IV checked, site marked, risks and benefits discussed, surgical consent, monitors and equipment checked, pre-op evaluation and timeout performed  Prep:  Pt Position: supine  Sterile barriers:cap, gloves, gown, mask and sterile barriers  Prep: ChloraPrep  Patient monitoring: blood pressure monitoring, continuous pulse oximetry and EKG  Procedure    Sedation: yes  Performed under: spinal  Guidance:ultrasound guided    ULTRASOUND INTERPRETATION. Using ultrasound guidance a 21 G gauge needle was placed in close proximity to the nerve, at which point, under ultrasound guidance anesthetic was injected in the area of the nerve and spread of the anesthesia was seen on ultrasound in close proximity thereto.  There were no abnormalities seen on ultrasound; a digital image was taken; and the patient tolerated the procedure with no complications. Images:still images obtained, printed/placed on chart    Laterality:right  Block Type:adductor canal block  Injection Technique:catheter  Needle Type:echogenic  Needle Gauge:20 G  Resistance on Injection: none  Catheter Size:16 G  Cath Depth at skin: 5 cm    Medications Used: bupivacaine PF (MARCAINE) injection 0.25%, 10 mL  bupivacaine PF (MARCAINE) injection 0.5%, 10 mL  Med administered at 2/8/2022 7:20 AM      Post Assessment  Injection Assessment: negative aspiration for heme, no paresthesia on injection and incremental injection  Patient Tolerance:comfortable throughout block  Complications:no

## 2022-02-08 NOTE — INTERVAL H&P NOTE
H&P reviewed. The patient was examined and there are no changes to the H&P.    Vitals:    02/08/22 0539   BP: 130/82   BP Location: Right arm   Patient Position: Lying   Pulse: 96   Resp: 13   Temp: 98.1 °F (36.7 °C)   TempSrc: Oral   SpO2: 98%   Weight: 71.9 kg (158 lb 9.6 oz)

## 2022-02-08 NOTE — ANESTHESIA PREPROCEDURE EVALUATION
Anesthesia Evaluation     Patient summary reviewed and Nursing notes reviewed   no history of anesthetic complications:  NPO Solid Status: > 8 hours  NPO Liquid Status: > 8 hours           Airway   Mallampati: II  TM distance: >3 FB  Neck ROM: full  No difficulty expected  Dental - normal exam     Pulmonary - normal exam   (+) a smoker (4/10/1990) Former,   (-) shortness of breath  Cardiovascular - normal exam  Exercise tolerance: good (4-7 METS)    ECG reviewed    (+) hypertension, hyperlipidemia,   (-) angina    ROS comment: · Left ventricular function is normal. Calculated EF = 58.6%.  · Normal stress echo with no significant echocardiographic evidence for myocardial ischemia.        Neuro/Psych  (+) headaches,     GI/Hepatic/Renal/Endo    (+)  GERD,  hepatitis, liver disease (WALSH), renal disease,     Musculoskeletal     Abdominal    Substance History   (+) alcohol use,      OB/GYN negative ob/gyn ROS         Other   arthritis,    history of cancer remission                    Anesthesia Plan    ASA 2     spinal and regional     intravenous induction     Anesthetic plan, all risks, benefits, and alternatives have been provided, discussed and informed consent has been obtained with: patient.  Use of blood products discussed with patient  Consented to blood products.       CODE STATUS:

## 2022-02-08 NOTE — ANESTHESIA PROCEDURE NOTES
Spinal Block    Pre-sedation assessment completed: 2/8/2022 7:00 AM    Patient location during procedure: pre-op  Start Time: 2/8/2022 7:08 AM  Stop Time: 2/8/2022 7:10 AM  Indication:at surgeon's request  Performed By  CRNA: Nura Adams CRNA  Preanesthetic Checklist  Completed: patient identified, IV checked, site marked, risks and benefits discussed, surgical consent, monitors and equipment checked, pre-op evaluation and timeout performed  Spinal Block Prep:  Patient Position:sitting  Sterile Tech:cap, gloves and sterile barriers  Prep:Chloraprep  Patient Monitoring:EKG, continuous pulse oximetry and blood pressure monitoring  Spinal Block Procedure  Approach:midline  Guidance:palpation technique  Location:L3-L4  Needle Type:Pencan  Needle Gauge:25 G  Placement of Spinal needle event:cerebrospinal fluid aspirated  Paresthesia: no  Fluid Appearance:clear  Medications: bupivacaine PF (MARCAINE) injection 0.75%, 1.1 mL  Med Administered at 2/8/2022 7:10 AM   Post Assessment  Patient Tolerance:patient tolerated the procedure well with no apparent complications  Complications no

## 2022-02-08 NOTE — DISCHARGE SUMMARY
Orthopedic Discharge Summary      Patient: Germaine Gonzalez      YOB: 1945    Medical Record Number: [id number]    Attending Physician: Jovany Villanueva MD  Consulting Physician(s):   Date of Admission: 2/8/2022  5:33 AM  Date of Discharge: 02/08/2022      Patient Active Problem List   Diagnosis   • Gastroesophageal reflux disease   • Hyperlipidemia   • Pyelonephritis   • Sjogren's syndrome with keratoconjunctivitis sicca (HCC)   • Mcgill's esophagus without dysplasia   • Hyperglycemia   • WALSH (nonalcoholic steatohepatitis)   • Pure hypercholesterolemia   • Knee pain   • Primary osteoarthritis of left knee   • Encounter for screening for malignant neoplasm of colon   • Personal history of colonic polyps   • Effusion of left shoulder joint   • Osteopenia   • Prediabetes   • Essential hypertension   • Primary osteoarthritis of right knee   • Knee instability, right   • S/P total knee arthroplasty     Status Post: PA TOTAL KNEE ARTHROPLASTY [04172] (TOTAL KNEE ARTHROPLASTY AND ALL ASSOCIATED PROCEDURES), RIGHT      Allergies   Allergen Reactions   • Levaquin [Levofloxacin] Nausea And Vomiting   • Tetracycline Nausea And Vomiting       Current Medications:     Discharge Medications      New Medications      Instructions Start Date   docusate sodium 100 MG capsule  Commonly known as: COLACE   100 mg, Oral, 2 Times Daily PRN      elastomeric 8 mL/hr reservoir 1 each device 1 Device, sodium chloride 0.9 % solution 300 mL with bupivacaine (PF) 0.5 % solution 100 mL   8 mL/hr (8 mL/hr), Intradermal, Continuous      elastomeric 8 mL/hr reservoir 1 each device 1 Device, sodium chloride 0.9 % solution 300 mL with bupivacaine (PF) 0.5 % solution 100 mL   8 mL/hr (8 mL/hr), Intradermal, Continuous      Eliquis 2.5 MG tablet tablet  Generic drug: apixaban   2.5 mg, Oral, 2 Times Daily      pregabalin 75 MG capsule  Commonly known as: Lyrica   75 mg, Oral, 2 Times Daily      Senna-Time 8.6 MG tablet  Generic drug:  senna   1 tablet, Oral, Nightly         Changes to Medications      Instructions Start Date   oxyCODONE-acetaminophen 5-325 MG per tablet  Commonly known as: PERCOCET  What changed: Another medication with the same name was added. Make sure you understand how and when to take each.   1 tablet, Oral, Every 4 Hours PRN      oxyCODONE-acetaminophen 5-325 MG per tablet  Commonly known as: PERCOCET  What changed: You were already taking a medication with the same name, and this prescription was added. Make sure you understand how and when to take each.   Take 1-2 tablets by mouth Every 4 (Four) Hours As Needed for Moderate Pain.         Continue These Medications      Instructions Start Date   Biotin 5000 MCG tablet   Oral, Daily      calcium carbonate 600 MG tablet  Commonly known as: OS-ULI   600 mg, Oral, 2 Times Daily With Meals      desoximetasone 0.05 % cream  Commonly known as: TOPICORT   APPLY CREAM TOPICALLY TO AFFECTED AREA TWICE DAILY FOR ECZEMA FOR UP TO 14 DAYS      EPINEPHrine 0.3 MG/0.3ML solution auto-injector injection  Commonly known as: EPIPEN   epinephrine 0.3 mg/0.3 mL injection, auto-injector      escitalopram 10 MG tablet  Commonly known as: LEXAPRO   5 mg, Oral, Every Morning      estradiol 0.1 MG/GM vaginal cream  Commonly known as: ESTRACE   No dose, route, or frequency recorded.      glucosamine-chondroitin 500-400 MG capsule capsule   1 capsule, Oral, 2 Times Daily With Meals      lisinopril 10 MG tablet  Commonly known as: PRINIVIL,ZESTRIL   10 mg, Oral, Daily      omeprazole 40 MG capsule  Commonly known as: priLOSEC   40 mg, Oral, 2 Times Daily      rosuvastatin 20 MG tablet  Commonly known as: CRESTOR   20 mg, Oral, Daily      Systane Complete 0.6 % solution  Generic drug: Propylene Glycol   Ophthalmic      vitamin B-12 1000 MCG tablet  Commonly known as: CYANOCOBALAMIN   5,000 mcg, Oral, Daily         Stop These Medications    mupirocin 2 % ointment  Commonly known as: BACTROBAN      traMADol 50 MG tablet  Commonly known as: ULTRAM                Past Medical History:   Diagnosis Date   • Arthritis     knees   • Mcgill esophagus    • Cancer (HCC)     cervical   • Cervical cancer (HCC)    • Colon polyp    • Eczema    • Elevated liver enzymes    • GERD (gastroesophageal reflux disease)    • H/O stress fracture    • Health care maintenance    • Hypercholesteremia    • Hypertension    • Kidney stones     sched ureteroscopy, lithotripsy   • Knee swelling    • Ocular migraine    • PONV (postoperative nausea and vomiting)    • Postmenopausal    • Seasonal allergies    • Sjogren's syndrome (HCC)     autoimmune disorder      Past Surgical History:   Procedure Laterality Date   •  SECTION      x2   • COLONOSCOPY     • COLONOSCOPY N/A 2021    Procedure: COLONOSCOPY;  Surgeon: Bc Sanchez MD;  Location: [facility] OR;  Service: Gastroenterology;  Laterality: N/A;  Diverticulosis and polyps   • CYSTOSCOPY URETEROSCOPY STONE MANIPULATION/EXTRACTION Left 10/14/2016    Procedure: LEFT URETEROSCOPY bilateral retrograde pyleogram LEFT STENT PLACEMENT.;  Surgeon: Julius Darling MD;  Location: [facility];  Service:    • CYSTOSCOPY W/ URETERAL STENT PLACEMENT Right 8/3/2018    Procedure: CYSTOSCOPY URETERAL CATHETER/STENT INSERTION;  Surgeon: Farzad Cordova MD;  Location: [facility] OR;  Service: Urology   • ENDOSCOPY N/A 4/10/2019    Procedure: ESOPHAGOGASTRODUODENOSCOPY;  Surgeon: Bc Sanchez MD;  Location: [facility] OR;  Service: Gastroenterology   • HYSTERECTOMY     • UPPER GASTROINTESTINAL ENDOSCOPY     • URETEROSCOPY LASER LITHOTRIPSY WITH STENT INSERTION Left 10/26/2016    Procedure: LT URETEROSCOPY LASER LITHOTRIPSY ;  Surgeon: Julius Darling MD;  Location: [facility];  Service:      Social History     Occupational History   • Not on file   Tobacco Use   • Smoking status: Former Smoker     Packs/day: 0.50     Years: 16.00     Pack years: 8.00     Types:  Cigarettes     Start date: 3/22/1971     Quit date: 1990     Years since quittin.8   • Smokeless tobacco: Never Used   Vaping Use   • Vaping Use: Never used   Substance and Sexual Activity   • Alcohol use: Yes     Alcohol/week: 2.0 standard drinks     Types: 2 Glasses of wine per week     Comment: social   • Drug use: No   • Sexual activity: Defer      Social History     Social History Narrative   • Not on file     Family History   Problem Relation Age of Onset   • Anxiety disorder Mother    • Bipolar disorder Mother    • Stroke Mother 83   • Thyroid disease Mother    • Depression Mother    • Heart attack Father 47   • Thyroid disease Brother    • Hypertension Brother    • Diabetes Brother 68   • Glaucoma Brother    • Depression Brother    • No Known Problems Daughter    • No Known Problems Son    • Leukemia Maternal Grandmother    • Diabetes Maternal Grandfather    • Heart attack Paternal Grandmother    • Heart attack Paternal Grandfather    • Breast cancer Neg Hx    • Colon cancer Neg Hx    • Colon polyps Neg Hx          Physical Exam: 76 y.o. female  General Appearance:    Alert, cooperative, in no acute distress                      Vitals:    22 1005 22 1010 22 1020 22 1030   BP: 100/55 97/56 103/60 91/55   BP Location:       Patient Position:       Pulse: 70 72 66 64   Resp: 12 11 11 10   Temp:       TempSrc:       SpO2: 99% 95% 94% 95%   Weight:            Head:    Normocephalic, without obvious abnormality, atraumatic   Eyes:            Lids and lashes normal, conjunctivae and sclerae normal, no   icterus, no pallor, corneas clear, PERRLA   Ears:    Ears appear intact with no abnormalities noted   Throat:   No oral lesions, no thrush, oral mucosa moist   Neck:   No adenopathy, supple, trachea midline, no thyromegaly, no    carotid bruit, no JVD   Back:     No kyphosis present, no scoliosis present, no skin lesions,       erythema or scars, no tenderness to percussion or                    palpation,   range of motion normal   Lungs:     Clear to auscultation,respirations regular, even and                   unlabored    Heart:    Regular rhythm and normal rate, normal S1 and S2, no            murmur, no gallop, no rub, no click   Chest Wall:    No abnormalities observed   Abdomen:     Normal bowel sounds, no masses, no organomegaly, soft        non-tender, non-distended, no guarding, no rebound                 tenderness   Rectal:     Deferred   Extremities:   Incision intact without signs or symptoms of infection.               Neurovascular status remains intact to operative extremity.      Moves all extremities well, no edema, no cyanosis, no              redness   Pulses:   Pulses palpable and equal bilaterally   Skin:   No bleeding, bruising or rash   Lymph nodes:   No palpable adenopathy   Neurologic:   Cranial nerves 2 - 12 grossly intact, sensation intact, DTR        present and equal bilaterally           Hospital Course:  76 y.o. female admitted to [facility] [place] to services of Jovany Villanueva MD with Primary osteoarthritis of right knee [M17.11]  S/P total knee arthroplasty [Z96.659] on 2/8/2022 and underwent PA TOTAL KNEE ARTHROPLASTY [48429] (TOTAL KNEE ARTHROPLASTY AND ALL ASSOCIATED PROCEDURES)  Per Jovany Villanueva MD. Antibiotic and VTE prophylaxis were per SCIP protocols. Post-operatively the patient transferred to the post-operative floor where the patient underwent mobilization therapy that included active as well as passive ROM exercises. Opioids were titrated to achieve appropriate pain management to allow for participation in mobilization exercises. Vital signs are now stable. The incision is intact without signs or symptoms of infection. Operative extremity neurovascular status remains intact.   Appropriate education re: incision care, activity levels, medications, and follow up visits was completed and all questions were answered. The patient is now deemed  stable for discharge to Home.      DIAGNOSTIC TESTS:     No visits with results within 2 Day(s) from this visit.   Latest known visit with results is:   Lab on 02/05/2022   Component Date Value Ref Range Status   • COVID19 02/05/2022 Not Detected  Not Detected - Ref. Range Final       No results found.    Discharge and Follow up Instructions:   Weightbearing as tolerated right lower extremity  Eliquis twice daily  Outpatient physical therapy  Follow-up in Ortho office in 2 weeks previously scheduled appointment  Follow-up primary care 1 to 2 weeks postop evaluation    Date: 2/8/2022    JESSICA Whelan

## 2022-02-08 NOTE — DISCHARGE INSTRUCTIONS
Total Knee Replacement Discharge Instructions:    I. ACTIVITIES:  1. Exercises:  ? Complete exercise program as taught by the hospital physical therapist 2 times per day  ? Exercise program will be advanced by the physical therapist  ? During the day be up ambulating every 2 hours (while awake) for short distances  ? Complete the ankle pump exercises at least 10 times per hour (while awake)  ? Elevate legs most of the day the first week post operatively and thereafter elevate legs when in bed and for at least 30 minutes during the day. Caution must be taken to avoid pillow placement under the bend of the knee as this can lead to flexion contractures of the knee.  ? Use cold packs 20-30 minutes approximately 5 times per day. This should be done before and after completing your exercises and at any time you are experiencing pain/ stiffness in your operative extremity.  ? Apply 6 inch ace wraps to operative extremity from ankle to groin. Please keep in place at all times except when bathing.      2. Activities of Daily Living:  ? No tub baths, hot tubs, or swimming pools for 4 weeks  ? May shower on post-operative day #3- Do not scrub or rub around the incision or mesh. No soap or alcohol to incision, just let water run over wound.         II. RESTRICTIONS  ? Do not cross legs or kneel  ? Your surgeon will discuss with you when you will be able to drive again.  ? Weight bearing as tolerated  ? First week stay inside on even terrain. May go up and down stairs one stair at a time utilizing the hand rail  ? After one week, you may venture outside.     III. PRECAUTIONS:  ? Everyone that comes near you should wash their hands  ? No elective dental, genital-urinary, or colon procedures or surgical procedures for 12 weeks after surgery unless absolutely necessary.  ?  If dental work or surgical procedure is deemed absolutely necessary, you will need to contact your surgeon as you will need to take antibiotics 1 hour prior to  any dental work (including teeth cleanings).  ? Please discuss with your surgeon prophylactic antibiotics as the length of time this intervention will be necessary for you varies with each patient’s health history and situation.  ? Avoid sick people. If you must be around someone who is ill, they should wear a mask.  ? Avoid visits to the Emergency Room or Urgent Care unless you are having a life              threatening event.     IV. INCISION CARE:  ? Wash your hands prior to dressing changes  ? Incision and knee should be covered with an ACE wrap daily for compression. No creams or ointments to the incision  ? Do not touch or pick at the incision  ? Check incision every day and notify surgeon immediately if any of the following signs or symptoms are noted:  o Increase in redness  o Increase in swelling around the incision and of the entire extremity  o Increase in pain  o Drainage oozing from the incision  o Pulling apart of the edges of the incision  o Increase in overall body temperature (greater than 100.5 degrees)  ? You will be given further instructions on removal of the glue and mesh over your incision at your first follow up visit at the office.     V. MEDICATIONS:   1. Anticoagulants: You will be discharged on an anticoagulant, typically either Aspirin or Eliquis. This is a prophylactic medication that helps prevent blood clots during your post-operative period. The type and length of dosage varies based on your individual needs, procedure performed, and surgeon’s preference.  ? While taking the anticoagulant, you should avoid taking any additional aspirin, ibuprofen (Advil or Motrin), Aleve (Naprosyn) or other non-steroidal anti-inflammatory medications.   ? Notify surgeon immediately if any alphonso bleeding is noted in the urine, stool, emesis, or from the nose or the incision. Blood in the stool will often appear as black rather than red. Blood in urine may appear as pink. Blood in emesis may appear as  brown/black like coffee grounds.  ? You will need to apply pressure for longer periods of time to any cuts or abrasions to stop bleeding  ? Avoid alcohol while taking anticoagulants    2. Stool Softeners: You will be at greater risk of constipation after surgery due to being less mobile and the pain medications.   ? Take stool softeners as instructed by your surgeon while on pain medications. Over the counter Colace 100 mg 1-2 capsules twice daily.   ? If stools become too loose or too frequent, please decreases the dosage or stop the stool softener.  ? If constipation occurs despite use of stool softeners, you are to continue the stool softeners and add a laxative (Milk of Magnesia 1 ounce daily as needed)  ? Drink plenty of fluids, and eat fruits and vegetables during your recovery time    3. Pain Medications utilized after surgery are narcotics and the law requires that the following information be given to all patients that are prescribed narcotics:  ? CLASSIFICATION: Pain medications are called Opioids and are narcotics  ? LEGALITIES: It is illegal to share narcotics with others and to drive within 24 hours of taking narcotics  ? POTENTIAL SIDE EFFECTS: Potential side effects of opioids include: nausea, vomiting, itching, dizziness, drowsiness, dry mouth, constipation, and difficulty urinating.  ? POTENTIAL ADVERSE EFFECTS:   o Opioid tolerance can develop with use of pain medications and this simply means that it requires more and more of the medication to control pain; however, this is seen more in patients that use opioids for longer periods of time.  o Opioid dependence can develop with use of Opioids and this simply means that to stop the medication can cause withdrawal symptoms; however, this is seen with patients that use Opioids for longer periods of time.  o Opioid addiction can develop with use of Opioids and the incidence of this is very unlikely in patients who take the medications as ordered and  stop the medications as instructed.  o Opioid overdose can be dangerous, but is unlikely when the medication is taken as ordered and stopped when ordered. It is important not to mix opioids with alcohol or with and type of sedative such as Benadryl as this can lead to over sedation and respiratory difficulty.  ? DOSAGE:   o Pain medications will need to be taken consistently for the first week to decrease pain and promote adequate pain relief and participation in physical therapy.  o After the initial surgical pain begins to resolve, you may begin to decrease the pain medication. By the end of 6 weeks, you should be off of pain medications.  o Refills will not be given by the office during evening hours, on weekends, or after 12 weeks post-op.  o To seek refills on pain medications during the initial 6 week post-operative period, you must call the office 48 hours in advance to request the refill. The office will then notify you when to  the prescription. DO NOT wait until you are out of the medication to request a refill.    VI. FOLLOW-UP VISITS:  ? You will need to follow up in the office with Christy Lindsay Nurse Practitioner in 2 weeks. Please call  [phone]  to schedule this appointment.  ? You will need to follow up with your primary care physician within 4 weeks.  ? If you have any concerns or suspected complications prior to your follow up visit, please call your surgeons office. Do not wait until your appointment time if you suspect complications. These will need to be addressed in the office promptly.

## 2022-02-08 NOTE — ANESTHESIA POSTPROCEDURE EVALUATION
Patient: Germaine Gonzalez    Procedure Summary     Date: 02/08/22 Room / Location:  [facility] /  [facility]    Anesthesia Start: 0730 Anesthesia Stop: 0955    Procedure: TOTAL KNEE ARTHROPLASTY AND ALL ASSOCIATED PROCEDURES (Right Knee) Diagnosis:       Primary osteoarthritis of right knee      (Primary osteoarthritis of right knee [M17.11])    Surgeons: Jovany Villanueva MD Provider: Nura Adams CRNA    Anesthesia Type: spinal, regional ASA Status: 2          Anesthesia Type: spinal, regional    Vitals  Vitals Value Taken Time   BP 91/55 02/08/22 1030   Temp 97.8 °F (36.6 °C) 02/08/22 0951   Pulse 64 02/08/22 1030   Resp 10 02/08/22 1030   SpO2 95 % 02/08/22 1030           Post Anesthesia Care and Evaluation    Patient location during evaluation: bedside  Patient participation: complete - patient participated  Level of consciousness: awake and alert  Pain score: 0  Pain management: adequate  Airway patency: patent  Anesthetic complications: No anesthetic complications  PONV Status: none  Cardiovascular status: acceptable  Respiratory status: acceptable  Hydration status: acceptable  Post Neuraxial Block status: Motor and sensory function returned to baseline and No signs or symptoms of PDPH

## 2022-02-08 NOTE — ANESTHESIA PROCEDURE NOTES
Peripheral Block      Patient reassessed immediately prior to procedure    Patient location during procedure: pre-op  Start time: 2/8/2022 7:28 AM  Stop time: 2/8/2022 7:29 AM  Reason for block: at surgeon's request and post-op pain management  Performed by  CRNA: Nura Adams CRNA  Preanesthetic Checklist  Completed: patient identified, IV checked, site marked, risks and benefits discussed, surgical consent, monitors and equipment checked, pre-op evaluation and timeout performed  Prep:  Pt Position: supine  Sterile barriers:cap, gloves, gown, mask and sterile barriers  Prep: ChloraPrep  Patient monitoring: blood pressure monitoring, continuous pulse oximetry and EKG  Procedure    Sedation: yes  Performed under: spinal  Guidance:ultrasound guided    ULTRASOUND INTERPRETATION. Using ultrasound guidance a 21 G gauge needle was placed in close proximity to the nerve, at which point, under ultrasound guidance anesthetic was injected in the area of the nerve and spread of the anesthesia was seen on ultrasound in close proximity thereto.  There were no abnormalities seen on ultrasound; a digital image was taken; and the patient tolerated the procedure with no complications. Images:still images obtained, printed/placed on chart    Laterality:right  Block Type:iPack  Injection Technique:single-shot  Needle Type:echogenic  Needle Gauge:21 G  Resistance on Injection: none    Medications Used: bupivacaine PF (MARCAINE) injection 0.25%, 10 mL  bupivacaine PF (MARCAINE) injection 0.5%, 10 mL  Med administered at 2/8/2022 7:29 AM      Post Assessment  Injection Assessment: negative aspiration for heme, no paresthesia on injection and incremental injection  Patient Tolerance:comfortable throughout block  Complications:no

## 2022-02-09 ENCOUNTER — TRANSITIONAL CARE MANAGEMENT TELEPHONE ENCOUNTER (OUTPATIENT)
Dept: CALL CENTER | Facility: HOSPITAL | Age: 77
End: 2022-02-09

## 2022-02-09 ENCOUNTER — TELEPHONE (OUTPATIENT)
Dept: ORTHOPEDIC SURGERY | Facility: CLINIC | Age: 77
End: 2022-02-09

## 2022-02-09 NOTE — OUTREACH NOTE
Call Center TCM Note      Responses   [facility] discharged from? [place]   Does the patient have one of the following disease processes/diagnoses(primary or secondary)? Total Joint Replacement   Joint surgery performed? Knee   TCM attempt successful? No   Unsuccessful attempts Attempt 1          Ifeoma Douglas MA    2/9/2022, 13:50 EST

## 2022-02-09 NOTE — OUTREACH NOTE
Call Center TCM Note      Responses   [facility] patient discharged from? [place]   Does the patient have one of the following disease processes/diagnoses(primary or secondary)? Total Joint Replacement   Joint surgery performed? Knee   TCM attempt successful? Yes   Discharge diagnosis TOTAL KNEE ARTHROPLASTY    Does the patient have all medications related to this admission filled (includes all antibiotics, pain medications, etc.) Yes   Is the patient taking all medications as directed (includes completed medication regime)? Yes   Is the patient able to teach back alternate methods of pain control? Ice,  Reposition,  Other,  Correct alignment,  Knee-elevation/no pillow under knee,  Short, frequent activity   Does the patient have a follow up appointment with their surgeon? Yes   Has the patient kept scheduled appointments due by today? N/A   Home health comments Pt not doing HH, starts oupt P.T. tomorrow.   What DME was ordered? rolling walker and a bedside or 3 in 1 [facility]   Has all DME been delivered? Yes   Psychosocial issues? No   Does the patient have a wound vac in place? N/A   Has the patient fallen since discharge? No   Did the patient receive a copy of their discharge instructions? Yes   Nursing interventions Reviewed instructions with patient   What is the patient's perception of their functional status since discharge? Improving   Is the patient able to teach back signs and symptoms of infection? Temp >100.4 for 24h or longer,  Blisters around incision,  Severe discomfort or pain,  Shortness of breath or chest pain,  Changes in mobility,  Increased swelling or redness around incision (not associated with surgical edema),  Incisional drainage   Is the patient able to teach back how to prevent infection? Check incision daily,  Shower only as directed by surgeon,  No lotion or creams,  Monitor blood sugar if diabetic,  Wash hands before and after touching incision,  Keep incision covered  if drainage,  Eat well-balanced diet,  No tub baths, hot tub or swimming,  Keep incision covered if pets in house   Is the patient able to teach back signs and symptoms of DVT? Redness in calf,  Swelling in calf,  Area hot to touch,  Shortness of breath or chest pain,  Severe pain in calf   Is the patient able to teach back home safety measures? Ability to shower,  Accessibility to necessary areas in home,  Modifications with ADLs such as dressing, cooking, toileting,  Modifications to reach items   Did the patient implement home safety suggestions from pre-surgery classes if attended? N/A   If the patient is a current smoker, are they able to teach back resources for cessation? Not a smoker   Is the patient/caregiver able to teach back the hierarchy of who to call/visit for symptoms/problems? PCP, Specialist, Home health nurse, Urgent Care, ED, 911 Yes   TCM call completed? Yes   Wrap up additional comments Pt tired but doing well s/p TKR. More pain today since block has worn off, but all medicaitons in place. Pt not doing HH, starts oupt P.T. tomorrow. POST OP is 02/23/2022. Pt plans to keep sched fwp with PCP 03/22/2022.          Ifeoma Douglas MA    2/9/2022, 16:11 EST

## 2022-02-09 NOTE — OUTREACH NOTE
Prep Survey      Responses   [facility] patient discharged from? [place]   Is LACE score < 7 ? Yes   Emergency Room discharge w/ pulse ox? No   Eligibility [facility]   Date of Admission 02/08/22   Date of Discharge 02/08/22   Discharge Disposition Home or Self Care   Discharge diagnosis TOTAL KNEE ARTHROPLASTY    Does the patient have one of the following disease processes/diagnoses(primary or secondary)? Total Joint Replacement   Does the patient have Home health ordered? No  [[facility]]   Is there a DME ordered? Yes   What DME was ordered? rolling walker and a bedside or 3 in 1 commode - [facility]   Prep survey completed? Yes          Dena Miller RN

## 2022-02-09 NOTE — TELEPHONE ENCOUNTER
Patient calling to let us know that her on Q pain pump had dislodged last night or early this morning- her and her  were able verify that the entire cathter had came out (black tip was exposed). I had remove the rest of the then dressings and strap for the pump and wash the area with antibacterial soap, cover with a clean band aid. They report the patient is feeling and doing great. She is doing her home exercises and while on the phone verified she was able to get all of her post op medications and is taking them as prescribed.     S/P total knee arthroplasty Right-02.08.2021

## 2022-02-09 NOTE — CASE MANAGEMENT/SOCIAL WORK
Case Management Discharge Note      Final Note: Discharged home.         Selected Continued Care - Discharged on 2/8/2022 Admission date: 2/8/2022 - Discharge disposition: Home or Self Care    Destination    No services have been selected for the patient.              Durable Medical Equipment Coordination complete.    Service Provider Selected Services Address Phone Fax Patient Preferred    [facility]  [facility] [address], [place] [id number] [phone] --          Dialysis/Infusion    No services have been selected for the patient.              Home Medical Care    No services have been selected for the patient.              Therapy    No services have been selected for the patient.              Community Resources    No services have been selected for the patient.              Community & DME    No services have been selected for the patient.                       Final Discharge Disposition Code: 01 - home or self-care

## 2022-02-10 ENCOUNTER — APPOINTMENT (OUTPATIENT)
Dept: PHYSICAL THERAPY | Facility: HOSPITAL | Age: 77
End: 2022-02-10

## 2022-02-10 ENCOUNTER — DOCUMENTATION (OUTPATIENT)
Dept: ORTHOPEDIC SURGERY | Facility: CLINIC | Age: 77
End: 2022-02-10

## 2022-02-10 ENCOUNTER — HOSPITAL ENCOUNTER (OUTPATIENT)
Dept: PHYSICAL THERAPY | Facility: HOSPITAL | Age: 77
Setting detail: THERAPIES SERIES
Discharge: HOME | End: 2022-02-10

## 2022-02-10 DIAGNOSIS — Z96.651 STATUS POST TOTAL RIGHT KNEE REPLACEMENT: Primary | ICD-10-CM

## 2022-02-10 PROCEDURE — 97161 PT EVAL LOW COMPLEX 20 MIN: CPT

## 2022-02-10 ASSESSMENT — KOOS JR
KOOS JR SCORE: 54.84
KOOS JR SCORE: 13

## 2022-02-10 NOTE — THERAPY EVALUATION
Outpatient Physical Therapy Ortho Initial Evaluation   Adilene Azul     Patient Name: Germaine Gonzalez  : 1945  MRN: [id number]  Today's Date: 2/10/2022      Visit Date: 02/10/2022    Patient Active Problem List   Diagnosis   • Gastroesophageal reflux disease   • Hyperlipidemia   • Pyelonephritis   • Sjogren's syndrome with keratoconjunctivitis sicca (HCC)   • Mcgill's esophagus without dysplasia   • Hyperglycemia   • WALSH (nonalcoholic steatohepatitis)   • Pure hypercholesterolemia   • Knee pain   • Primary osteoarthritis of left knee   • Encounter for screening for malignant neoplasm of colon   • Personal history of colonic polyps   • Effusion of left shoulder joint   • Osteopenia   • Prediabetes   • Essential hypertension   • Primary osteoarthritis of right knee   • Knee instability, right   • S/P total knee arthroplasty        Past Medical History:   Diagnosis Date   • Arthritis     knees   • Mcgill esophagus    • Cancer (HCC)     cervical   • Cervical cancer (HCC)    • Colon polyp    • Eczema    • Elevated liver enzymes    • GERD (gastroesophageal reflux disease)    • H/O stress fracture    • Health care maintenance    • Hypercholesteremia    • Hypertension    • Kidney stones     sched ureteroscopy, lithotripsy   • Knee swelling    • Ocular migraine    • PONV (postoperative nausea and vomiting)    • Postmenopausal    • Seasonal allergies    • Sjogren's syndrome (HCC)     autoimmune disorder         Past Surgical History:   Procedure Laterality Date   •  SECTION      x2   • COLONOSCOPY     • COLONOSCOPY N/A 2021    Procedure: COLONOSCOPY;  Surgeon: Bc Sanchez MD;  Location: [facility] OR;  Service: Gastroenterology;  Laterality: N/A;  Diverticulosis and polyps   • CYSTOSCOPY URETEROSCOPY STONE MANIPULATION/EXTRACTION Left 10/14/2016    Procedure: LEFT URETEROSCOPY bilateral retrograde pyleogram LEFT STENT PLACEMENT.;  Surgeon: Julius Darling MD;  Location: [facility] OR;   Service:    • CYSTOSCOPY W/ URETERAL STENT PLACEMENT Right 8/3/2018    Procedure: CYSTOSCOPY URETERAL CATHETER/STENT INSERTION;  Surgeon: Farzad Cordova MD;  Location: [facility] OR;  Service: Urology   • ENDOSCOPY N/A 4/10/2019    Procedure: ESOPHAGOGASTRODUODENOSCOPY;  Surgeon: Bc Sanchez MD;  Location: [facility] OR;  Service: Gastroenterology   • HYSTERECTOMY     • TOTAL KNEE ARTHROPLASTY Right 2/8/2022    Procedure: TOTAL KNEE ARTHROPLASTY AND ALL ASSOCIATED PROCEDURES;  Surgeon: Jovany Villanueva MD;  Location: [facility] OR;  Service: Orthopedics;  Laterality: Right;   • UPPER GASTROINTESTINAL ENDOSCOPY     • URETEROSCOPY LASER LITHOTRIPSY WITH STENT INSERTION Left 10/26/2016    Procedure: LT URETEROSCOPY LASER LITHOTRIPSY ;  Surgeon: Julius Darling MD;  Location: [facility] OR;  Service:        Visit Dx:     ICD-10-CM ICD-9-CM   1. Status post total right knee replacement  Z96.651 V43.65          Patient History     Row Name 02/10/22 0800             History    Chief Complaint Difficulty Walking; Difficulty with daily activities; Joint stiffness; Joint swelling; Muscle tenderness; Muscle weakness; Pain; Swelling  -AS      Type of Pain Knee pain  Right  -AS      Date Current Problem(s) Began 02/08/22  -AS      Brief Description of Current Complaint Germaine Gonzalez presents to outpatient PT s/p right TKA performed on 2-8-22 by Dr. Villanueva. Patient was D/C from hospital on same day with referral for outpatient PT. Patient is now ambulating with a FWW at this time and reports her pain is well controlled with ice and medication.  -AS      Previous treatment for THIS PROBLEM Surgery  -AS      Surgery Date: 02/08/22  -AS      Patient/Caregiver Goals Relieve pain; Return to prior level of function; Improve mobility; Improve strength; Decrease swelling  -AS      Patient's Rating of General Health Good  -AS      How has patient tried to help current problem? rest, ice, medication, ACE wrap, FWW  -AS       What clinical tests have you had for this problem? X-ray  -AS      Results of Clinical Tests Right Knee OA  -AS      Surgery/Hospitalization Right TKA 2-8-22  -AS              Pain     Pain Location Knee  -AS      Pain Frequency Constant/continuous  -AS      Pain Description Aching  -AS      What Performance Factors Make the Current Problem(s) WORSE? walking, bending knee  -AS      What Performance Factors Make the Current Problem(s) BETTER? rest  -AS              Daily Activities    Primary Language English  -AS      Are you able to read Yes  -AS      Are you able to write Yes  -AS      How does patient learn best? Listening; Reading; Demonstration  -AS      Teaching needs identified Home Exercise Program; Management of Condition  -AS      Patient is concerned about/has problems with Climbing Stairs; Coordination; Difficulty with self care (i.e. bathing, dressing, toileting:; Flexibility; Performing home management (household chores, shopping, care of dependents); Performing job responsibilities/community activities (work, school,; Performing sports, recreation, and play activities; Walking  -AS      Does patient have problems with the following? None  -AS      Barriers to learning None  -AS      Pt Participated in POC and Goals Yes  -AS              Safety    Are you being hurt, hit, or frightened by anyone at home or in your life? No  -AS      Are you being neglected by a caregiver No  -AS            User Key  (r) = Recorded By, (t) = Taken By, (c) = Cosigned By    Initials Name Provider Type    AS Narinder Chapman, PT Physical Therapist                 PT Ortho     Row Name 02/10/22 0800       Precautions and Contraindications    Precautions/Limitations no known precautions/limitations  -AS       Subjective Pain    Able to rate subjective pain? yes  -AS       Posture/Observations    Posture- WNL Posture is WNL  -AS    Observations Ecchymosis/bruising; Edema; Incision healing  -AS       Patellar Accessory  Motions    Superior glide Right:; WNL  -AS    Inferior glide Right:; WNL  -AS    Medial glide Right:; WNL  -AS    Lateral glide Right:; WNL  -AS       Right Lower Ext    Rt Knee Extension/Flexion AROM 0-4-123 degrees post stretch  0-8-68 pre stretch  -AS       MMT Right Lower Ext    Rt Hip Flexion MMT, Gross Movement (4-/5) good minus  -AS    Rt Hip Extension MMT, Gross Movement (4-/5) good minus  -AS    Rt Hip ABduction MMT, Gross Movement (4-/5) good minus  -AS    Rt Knee Extension MMT, Gross Movement (4-/5) good minus  -AS    Rt Knee Flexion MMT, Gross Movement (4-/5) good minus  -AS       Sensation    Sensation WNL? WNL  -AS    Light Touch No apparent deficits  -AS       Lower Extremity Flexibility    Hamstrings Right:; Mildly limited  -AS       Gait/Stairs (Locomotion)    Comment (Gait/Stairs) Patient ambulates with a FWW at this time. She has limited heel strike and shortened step and stride length on her right side.  -AS          User Key  (r) = Recorded By, (t) = Taken By, (c) = Cosigned By    Initials Name Provider Type    AS Narinder Chapman, PT Physical Therapist                            Therapy Education  Given: HEP, Symptoms/condition management, Pain management, Mobility training, Edema management  Program: New  How Provided: Verbal, Demonstration, Written  Provided to: Patient  Level of Understanding: Teach back education performed, Verbalized, Demonstrated      PT OP Goals     Row Name 02/10/22 0800          PT Short Term Goals    STG Date to Achieve 03/03/22  -AS     STG 1 Patient to demonstrate compliance with her initial HEP for flexibility, ROM and strengthening.  -AS     STG 2 Patient to report right knee pain on VAS of 2-3/10 at worst with activity.  -AS     STG 3 Patient to demonstrate improved right knee and RLE strength to 4/5 in all planes.  -AS     STG 4 Patient to demonstrate improved right knee ROM to 0-1-110 degrees.  -AS     STG 5 Patient to ambulate short distances without the  use of an AD and with an improved gait pattern.  -AS            Long Term Goals    LTG Date to Achieve 03/24/22  -AS     LTG 1 Patient to demonstrate compliance with her advanced HEP for flexibility, ROM and strengthening.  -AS     LTG 2 Patient to report right knee pain on VAS of 1-2/10 at worst with activity.  -AS     LTG 3 Patient to demonstrate improved right knee and RLE strength to 4+/5 in all planes.  -AS     LTG 4 Patient to demonstrate improved right knee ROM to 0-120 degrees.  -AS     LTG 5 Patient to ambulate community distances without an AD and with a normal gait pattern.  -AS     LTG 6 Patient to report overall improved function on LEFS to 55/80.  -AS            Time Calculation    PT Goal Re-Cert Due Date 03/10/22  -AS           User Key  (r) = Recorded By, (t) = Taken By, (c) = Cosigned By    Initials Name Provider Type    AS Narinder Chapman, PT Physical Therapist                 PT Assessment/Plan     Row Name 02/10/22 0800          PT Assessment    Functional Limitations Impaired gait; Impaired locomotion; Limitation in home management; Limitations in community activities; Performance in leisure activities; Performance in self-care ADL; Performance in sport activities; Performance in work activities  -AS     Impairments Edema; Gait; Impaired flexibility; Muscle strength; Pain; Range of motion  -AS     Assessment Comments Patient reports to outpatient PT s/p right TKA. Surgery was performed on 2-8-22 by Dr. Villanueva. Patient has limited right knee ROM, limited RLE strength, and increased right knee pain and edema with activity. Patient has limited function at this time secondary to the above.  -AS     Please refer to paper survey for additional self-reported information Yes  -AS     Rehab Potential Good  -AS     Patient/caregiver participated in establishment of treatment plan and goals Yes  -AS     Patient would benefit from skilled therapy intervention Yes  -AS            PT Plan    PT  Frequency 2x/week; 3x/week  -AS     Predicted Duration of Therapy Intervention (PT) 6-8 weeks  -AS     Planned CPT's? PT RE-EVAL: 63050; PT THER PROC EA 15 MIN: 45436; PT THER ACT EA 15 MIN: 76552; PT MANUAL THERAPY EA 15 MIN: 03946; PT NEUROMUSC RE-EDUCATION EA 15 MIN: 27286; PT GAIT TRAINING EA 15 MIN: 71724  -AS           User Key  (r) = Recorded By, (t) = Taken By, (c) = Cosigned By    Initials Name Provider Type    AS Narinder Chapman, PT Physical Therapist                 Modalities     Row Name 02/10/22 0800             Ice    Ice Applied Yes  -AS      Location Right Knee  -AS      PT Ice Rx Minutes 10  -AS      Ice S/P Rx Yes  -AS            User Key  (r) = Recorded By, (t) = Taken By, (c) = Cosigned By    Initials Name Provider Type    AS Narinder Chapman, PT Physical Therapist               OP Exercises     Row Name 02/10/22 0800             Subjective Pain    Able to rate subjective pain? yes  -AS      Pre-Treatment Pain Level 5  -AS      Post-Treatment Pain Level 3  -AS              Exercise 1    Exercise Name 1 Heel Slides  -AS      Time 1 8 min  -AS              Exercise 2    Exercise Name 2 Wall Slides  -AS              Exercise 3    Exercise Name 3 Bike  -AS              Exercise 4    Exercise Name 4 Manual Flexion  -AS      Reps 4 10  -AS      Time 4 10 sec hold each  -AS              Exercise 5    Exercise Name 5 HS Stretch  -AS      Reps 5 10  -AS      Time 5 10 sec hold each  -AS              Exercise 6    Exercise Name 6 Heel Prop  -AS      Time 6 5 min  -AS              Exercise 7    Exercise Name 7 Manual Extension  -AS      Reps 7 10  -AS      Time 7 10 sec hold each  -AS              Exercise 8    Exercise Name 8 QS  -AS      Reps 8 25  -AS      Time 8 5 sec hold each  -AS              Exercise 9    Exercise Name 9 3-Way Hip  -AS      Reps 9 25 each  -AS              Exercise 10    Exercise Name 10 LAQ  -AS      Reps 10 25  -AS            User Key  (r) = Recorded By, (t) = Taken  By, (c) = Cosigned By    Initials Name Provider Type    AS Narinder Chapman, PT Physical Therapist                              Outcome Measure Options: Lower Extremity Functional Scale (LEFS)  Lower Extremity Functional Index  Any of your usual work, housework or school activities: Extreme difficulty or unable to perform activity  Your usual hobbies, recreational or sporting activities: Extreme difficulty or unable to perform activity  Getting into or out of the bath: Extreme difficulty or unable to perform activity  Walking between rooms: Extreme difficulty or unable to perform activity  Putting on your shoes or socks: Extreme difficulty or unable to perform activity  Squatting: Extreme difficulty or unable to perform activity  Lifting an object, like a bag of groceries from the floor: Extreme difficulty or unable to perform activity  Performing light activities around your home: Quite a bit of difficulty  Performing heavy activities around your home: Extreme difficulty or unable to perform activity  Getting into or out of a car: Extreme difficulty or unable to perform activity  Walking 2 blocks: Extreme difficulty or unable to perform activity  Walking a mile: Extreme difficulty or unable to perform activity  Going up or down 10 stairs (about 1 flight of stairs): Quite a bit of difficulty  Standing for 1 hour: Extreme difficulty or unable to perform activity  Sitting for 1 hour: Quite a bit of difficulty  Running on even ground: Extreme difficulty or unable to perform activity  Running on uneven ground: Extreme difficulty or unable to perform activity  Making sharp turns while running fast: Extreme difficulty or unable to perform activity  Hopping: Extreme difficulty or unable to perform activity  Rolling over in bed: Extreme difficulty or unable to perform activity  Total: 3      Time Calculation:     Start Time: 0831  Stop Time: 0937  Time Calculation (min): 66 min  Untimed Charges  PT Ice Rx Minutes:  10  Total Minutes  Untimed Charges Total Minutes: 10   Total Minutes: 10     Therapy Charges for Today     Code Description Service Date Service Provider Modifiers Qty    39739027796 HC PT EVAL LOW COMPLEXITY 4 2/10/2022 Narinder Chapman, PT GP 1          PT G-Codes  Outcome Measure Options: Lower Extremity Functional Scale (LEFS)  Total: 3         Narinder Chapman, PT  2/10/2022

## 2022-02-15 ENCOUNTER — HOSPITAL ENCOUNTER (OUTPATIENT)
Dept: PHYSICAL THERAPY | Facility: HOSPITAL | Age: 77
Setting detail: THERAPIES SERIES
Discharge: HOME | End: 2022-02-15

## 2022-02-15 DIAGNOSIS — Z96.651 STATUS POST TOTAL RIGHT KNEE REPLACEMENT: Primary | ICD-10-CM

## 2022-02-15 PROCEDURE — 97110 THERAPEUTIC EXERCISES: CPT

## 2022-02-15 NOTE — THERAPY TREATMENT NOTE
Outpatient Physical Therapy Ortho Treatment Note  FRANCIA Alfaro     Patient Name: Germaine Gonzalez  : 1945  MRN: [id number]  Today's Date: 2/15/2022      Visit Date: 02/15/2022    Visit Dx:    ICD-10-CM ICD-9-CM   1. Status post total right knee replacement  Z96.651 V43.65       Patient Active Problem List   Diagnosis   • Gastroesophageal reflux disease   • Hyperlipidemia   • Pyelonephritis   • Sjogren's syndrome with keratoconjunctivitis sicca (HCC)   • Mcgill's esophagus without dysplasia   • Hyperglycemia   • WALSH (nonalcoholic steatohepatitis)   • Pure hypercholesterolemia   • Knee pain   • Primary osteoarthritis of left knee   • Encounter for screening for malignant neoplasm of colon   • Personal history of colonic polyps   • Effusion of left shoulder joint   • Osteopenia   • Prediabetes   • Essential hypertension   • Primary osteoarthritis of right knee   • Knee instability, right   • S/P total knee arthroplasty        Past Medical History:   Diagnosis Date   • Arthritis     knees   • Mcgill esophagus    • Cancer (HCC)     cervical   • Cervical cancer (HCC)    • Colon polyp    • Eczema    • Elevated liver enzymes    • GERD (gastroesophageal reflux disease)    • H/O stress fracture    • Health care maintenance    • Hypercholesteremia    • Hypertension    • Kidney stones     sched ureteroscopy, lithotripsy   • Knee swelling    • Ocular migraine    • PONV (postoperative nausea and vomiting)    • Postmenopausal    • Seasonal allergies    • Sjogren's syndrome (HCC)     autoimmune disorder         Past Surgical History:   Procedure Laterality Date   •  SECTION      x2   • COLONOSCOPY     • COLONOSCOPY N/A 2021    Procedure: COLONOSCOPY;  Surgeon: Bc Sanchez MD;  Location: [facility];  Service: Gastroenterology;  Laterality: N/A;  Diverticulosis and polyps   • CYSTOSCOPY URETEROSCOPY STONE MANIPULATION/EXTRACTION Left 10/14/2016    Procedure: LEFT URETEROSCOPY bilateral  retrograde pyleogram LEFT STENT PLACEMENT.;  Surgeon: Julius Darling MD;  Location: [facility] OR;  Service:    • CYSTOSCOPY W/ URETERAL STENT PLACEMENT Right 8/3/2018    Procedure: CYSTOSCOPY URETERAL CATHETER/STENT INSERTION;  Surgeon: Farzad Cordova MD;  Location: [facility] OR;  Service: Urology   • ENDOSCOPY N/A 4/10/2019    Procedure: ESOPHAGOGASTRODUODENOSCOPY;  Surgeon: Bc Sanchez MD;  Location: [facility] OR;  Service: Gastroenterology   • HYSTERECTOMY     • TOTAL KNEE ARTHROPLASTY Right 2/8/2022    Procedure: TOTAL KNEE ARTHROPLASTY AND ALL ASSOCIATED PROCEDURES;  Surgeon: Jovany Villanueva MD;  Location: [facility] OR;  Service: Orthopedics;  Laterality: Right;   • UPPER GASTROINTESTINAL ENDOSCOPY     • URETEROSCOPY LASER LITHOTRIPSY WITH STENT INSERTION Left 10/26/2016    Procedure: LT URETEROSCOPY LASER LITHOTRIPSY ;  Surgeon: Julius Darling MD;  Location: [facility];  Service:         PT Ortho     Row Name 02/15/22 1000       Right Lower Ext    Rt Knee Extension/Flexion AROM 0-4-124 degrees post stretching  -AS          User Key  (r) = Recorded By, (t) = Taken By, (c) = Cosigned By    Initials Name Provider Type    AS Narinder Chapman, PT Physical Therapist                             PT Assessment/Plan     Row Name 02/15/22 1000          PT Assessment    Assessment Comments Progressed patient with ROM and strengthening exercises today. Patient tolerated her treatment session well today.  -AS            PT Plan    PT Plan Comments Continue with current treatment plan.  -AS           User Key  (r) = Recorded By, (t) = Taken By, (c) = Cosigned By    Initials Name Provider Type    AS Narinder Chapman, PT Physical Therapist                 Modalities     Row Name 02/15/22 1000             Ice    Ice Applied Yes  -AS      Location Right Knee  -AS      PT Ice Rx Minutes 10  -AS      Ice S/P Rx Yes  -AS              Functional Testing    Outcome Measure Options Lower Extremity  "Functional Scale (LEFS)  -AS            User Key  (r) = Recorded By, (t) = Taken By, (c) = Cosigned By    Initials Name Provider Type    AS Narinder Chapman, PT Physical Therapist               OP Exercises     Row Name 02/15/22 1000             Subjective Comments    Subjective Comments Patient states her knee \"is really hurting today\".  -AS              Exercise 1    Exercise Name 1 Heel Slides  -AS      Time 1 8 min  -AS              Exercise 2    Exercise Name 2 Wall Slides  -AS      Time 2 8 min  -AS              Exercise 3    Exercise Name 3 Bike  -AS              Exercise 4    Exercise Name 4 Manual Flexion  -AS      Reps 4 10  -AS      Time 4 10 sec hold each  -AS              Exercise 5    Exercise Name 5 HS Stretch  -AS      Reps 5 10  -AS      Time 5 10 sec hold each  -AS              Exercise 6    Exercise Name 6 Heel Prop  -AS      Time 6 5 min  -AS              Exercise 7    Exercise Name 7 Manual Extension  -AS      Reps 7 10  -AS      Time 7 10 sec hold each  -AS              Exercise 8    Exercise Name 8 QS  -AS      Reps 8 25  -AS      Time 8 5 sec hold each  -AS              Exercise 9    Exercise Name 9 3-Way Hip  -AS      Reps 9 25 each  -AS              Exercise 10    Exercise Name 10 LAQ  -AS      Reps 10 25  -AS              Exercise 11    Exercise Name 11 TKE  -AS              Exercise 12    Exercise Name 12 Heel Raises  -AS      Reps 12 25  -AS              Exercise 13    Exercise Name 13 Mini Squats  -AS      Reps 13 25  -AS            User Key  (r) = Recorded By, (t) = Taken By, (c) = Cosigned By    Initials Name Provider Type    AS Narinder Chapman, PT Physical Therapist                                      Outcome Measure Options: Lower Extremity Functional Scale (LEFS)         Time Calculation:   Start Time: 1025  Stop Time: 1128  Time Calculation (min): 63 min  Untimed Charges  PT Ice Rx Minutes: 10  Total Minutes  Untimed Charges Total Minutes: 10   Total Minutes: " 10  Therapy Charges for Today     Code Description Service Date Service Provider Modifiers Qty    87366651178 HC PT THER PROC EA 15 MIN 2/15/2022 Narinder Chapman, PT GP 2          PT G-Codes  Outcome Measure Options: Lower Extremity Functional Scale (LEFS)         Narinder Chapman, PT  2/15/2022

## 2022-02-17 ENCOUNTER — HOSPITAL ENCOUNTER (OUTPATIENT)
Dept: PHYSICAL THERAPY | Facility: HOSPITAL | Age: 77
Setting detail: THERAPIES SERIES
Discharge: HOME | End: 2022-02-17

## 2022-02-17 DIAGNOSIS — Z96.651 STATUS POST TOTAL RIGHT KNEE REPLACEMENT: Primary | ICD-10-CM

## 2022-02-17 PROCEDURE — 97110 THERAPEUTIC EXERCISES: CPT

## 2022-02-17 PROCEDURE — 97140 MANUAL THERAPY 1/> REGIONS: CPT

## 2022-02-17 NOTE — THERAPY TREATMENT NOTE
Outpatient Physical Therapy Ortho Treatment Note  FRANCIA Alfaro     Patient Name: Germaine Gonzalez  : 1945  MRN: [id number]  Today's Date: 2022      Visit Date: 2022    Visit Dx:    ICD-10-CM ICD-9-CM   1. Status post total right knee replacement  Z96.651 V43.65       Patient Active Problem List   Diagnosis   • Gastroesophageal reflux disease   • Hyperlipidemia   • Pyelonephritis   • Sjogren's syndrome with keratoconjunctivitis sicca (HCC)   • Mcgill's esophagus without dysplasia   • Hyperglycemia   • WALSH (nonalcoholic steatohepatitis)   • Pure hypercholesterolemia   • Knee pain   • Primary osteoarthritis of left knee   • Encounter for screening for malignant neoplasm of colon   • Personal history of colonic polyps   • Effusion of left shoulder joint   • Osteopenia   • Prediabetes   • Essential hypertension   • Primary osteoarthritis of right knee   • Knee instability, right   • S/P total knee arthroplasty        Past Medical History:   Diagnosis Date   • Arthritis     knees   • Mcgill esophagus    • Cancer (HCC)     cervical   • Cervical cancer (HCC)    • Colon polyp    • Eczema    • Elevated liver enzymes    • GERD (gastroesophageal reflux disease)    • H/O stress fracture    • Health care maintenance    • Hypercholesteremia    • Hypertension    • Kidney stones     sched ureteroscopy, lithotripsy   • Knee swelling    • Ocular migraine    • PONV (postoperative nausea and vomiting)    • Postmenopausal    • Seasonal allergies    • Sjogren's syndrome (HCC)     autoimmune disorder         Past Surgical History:   Procedure Laterality Date   •  SECTION      x2   • COLONOSCOPY     • COLONOSCOPY N/A 2021    Procedure: COLONOSCOPY;  Surgeon: Bc Sanchez MD;  [place] [facility] [place]  Service: Gastroenterology;  Laterality: N/A;  Diverticulosis and polyps   • CYSTOSCOPY URETEROSCOPY STONE MANIPULATION/EXTRACTION Left 10/14/2016    Procedure: LEFT URETEROSCOPY bilateral  retrograde pyleogram LEFT STENT PLACEMENT.;  Surgeon: Julius Darling MD;  Location: [facility] OR;  Service:    • CYSTOSCOPY W/ URETERAL STENT PLACEMENT Right 8/3/2018    Procedure: CYSTOSCOPY URETERAL CATHETER/STENT INSERTION;  Surgeon: Farzad Cordova MD;  Location: [facility] OR;  Service: Urology   • ENDOSCOPY N/A 4/10/2019    Procedure: ESOPHAGOGASTRODUODENOSCOPY;  Surgeon: Bc Sanchez MD;  Location: [facility] OR;  Service: Gastroenterology   • HYSTERECTOMY     • TOTAL KNEE ARTHROPLASTY Right 2/8/2022    Procedure: TOTAL KNEE ARTHROPLASTY AND ALL ASSOCIATED PROCEDURES;  Surgeon: Jovany Villanueva MD;  Location: [facility] OR;  Service: Orthopedics;  Laterality: Right;   • UPPER GASTROINTESTINAL ENDOSCOPY     • URETEROSCOPY LASER LITHOTRIPSY WITH STENT INSERTION Left 10/26/2016    Procedure: LT URETEROSCOPY LASER LITHOTRIPSY ;  Surgeon: Julius Darling MD;  Location: [facility];  Service:                         PT Assessment/Plan     Row Name 02/17/22 0935          PT Assessment    Assessment Comments Pt ambulates well without use of AD. Trial of KT tape for edema/ecchymosis applied posterior knee.  EContinues to tolerate progression of ther ex well.  -KM            PT Plan    PT Plan Comments Continue per POC  -KM           User Key  (r) = Recorded By, (t) = Taken By, (c) = Cosigned By    Initials Name Provider Type    Lina Sanches PTA Physical Therapy Assistant                 Modalities     Row Name 02/17/22 0935             Ice    Location Right Knee  -KM      PT Ice Rx Minutes 10  -KM      Ice S/P Rx Yes  -KM              Functional Testing    Outcome Measure Options Lower Extremity Functional Scale (LEFS)  -KM              Other Treatment Provided    Taping / Bracing KT- 2 fan strips for edema  -KM            User Key  (r) = Recorded By, (t) = Taken By, (c) = Cosigned By    Initials Name Provider Type    Lina Sanches PTA Physical Therapy Assistant               OP Exercises      Row Name 02/17/22 0935             Subjective Comments    Subjective Comments Pt states her knee is sore, states she has been compliant with HEP  -KM              Exercise 1    Exercise Name 1 Heel Slides  -KM      Time 1 8 min  -KM              Exercise 2    Exercise Name 2 Wall Slides  -KM      Time 2 8 min  -KM              Exercise 3    Exercise Name 3 Bike  -KM              Exercise 4    Exercise Name 4 Manual Flexion  -KM      Reps 4 10  -KM      Time 4 10 sec hold each  -KM              Exercise 5    Exercise Name 5 HS Stretch  -KM      Reps 5 10  -KM      Time 5 10 sec hold each  -KM              Exercise 6    Exercise Name 6 Heel Prop  -KM      Time 6 5 min  -KM              Exercise 7    Exercise Name 7 Manual Extension  -KM      Reps 7 10  -KM      Time 7 10 sec hold each  -KM              Exercise 8    Exercise Name 8 QS  -KM      Reps 8 25  -KM      Time 8 5 sec hold each  -KM              Exercise 9    Exercise Name 9 3-Way Hip  -KM      Reps 9 25 each  -KM              Exercise 10    Exercise Name 10 LAQ  -KM      Reps 10 25  -KM              Exercise 11    Exercise Name 11 TKE  -KM      Reps 11 25  -KM      Time 11 Gold  -KM              Exercise 12    Exercise Name 12 Heel Raises  -KM      Reps 12 25  -KM              Exercise 13    Exercise Name 13 Mini Squats  -KM      Reps 13 25  -KM            User Key  (r) = Recorded By, (t) = Taken By, (c) = Cosigned By    Initials Name Provider Type    Lina Sanches, BEATRIZ Physical Therapy Assistant                                      Outcome Measure Options: Lower Extremity Functional Scale (LEFS)         Time Calculation:   Start Time: 0935  Stop Time: 1040  Time Calculation (min): 65 min  Untimed Charges  PT Ice Rx Minutes: 10  Total Minutes  Untimed Charges Total Minutes: 10   Total Minutes: 10  Therapy Charges for Today     Code Description Service Date Service Provider Modifiers Qty    45111829964 HC PT MANUAL THERAPY EA 15 MIN 2/17/2022  Lina Cage, PTA GP 1    09888351655 HC PT THER PROC EA 15 MIN 2/17/2022 Lina Cage, BEATRIZ GP 1          PT G-Codes  Outcome Measure Options: Lower Extremity Functional Scale (LEFS)         Lina Cage PTA  2/17/2022

## 2022-02-22 ENCOUNTER — HOSPITAL ENCOUNTER (OUTPATIENT)
Dept: PHYSICAL THERAPY | Facility: HOSPITAL | Age: 77
Setting detail: THERAPIES SERIES
Discharge: HOME | End: 2022-02-22

## 2022-02-22 DIAGNOSIS — Z96.651 STATUS POST TOTAL RIGHT KNEE REPLACEMENT: Primary | ICD-10-CM

## 2022-02-22 PROCEDURE — 97110 THERAPEUTIC EXERCISES: CPT

## 2022-02-22 NOTE — THERAPY TREATMENT NOTE
Outpatient Physical Therapy Ortho Treatment Note  FRANCIA Alfaro     Patient Name: Germaine Gonzalez  : 1945  MRN: [id number]  Today's Date: 2022      Visit Date: 2022    Visit Dx:    ICD-10-CM ICD-9-CM   1. Status post total right knee replacement  Z96.651 V43.65       Patient Active Problem List   Diagnosis   • Gastroesophageal reflux disease   • Hyperlipidemia   • Pyelonephritis   • Sjogren's syndrome with keratoconjunctivitis sicca (HCC)   • Mcgill's esophagus without dysplasia   • Hyperglycemia   • WALSH (nonalcoholic steatohepatitis)   • Pure hypercholesterolemia   • Knee pain   • Primary osteoarthritis of left knee   • Encounter for screening for malignant neoplasm of colon   • Personal history of colonic polyps   • Effusion of left shoulder joint   • Osteopenia   • Prediabetes   • Essential hypertension   • Primary osteoarthritis of right knee   • Knee instability, right   • S/P total knee arthroplasty        Past Medical History:   Diagnosis Date   • Arthritis     knees   • Mcgill esophagus    • Cancer (HCC)     cervical   • Cervical cancer (HCC)    • Colon polyp    • Eczema    • Elevated liver enzymes    • GERD (gastroesophageal reflux disease)    • H/O stress fracture    • Health care maintenance    • Hypercholesteremia    • Hypertension    • Kidney stones     sched ureteroscopy, lithotripsy   • Knee swelling    • Ocular migraine    • PONV (postoperative nausea and vomiting)    • Postmenopausal    • Seasonal allergies    • Sjogren's syndrome (HCC)     autoimmune disorder         Past Surgical History:   Procedure Laterality Date   •  SECTION      x2   • COLONOSCOPY     • COLONOSCOPY N/A 2021    Procedure: COLONOSCOPY;  Surgeon: Bc Sanchez MD;  [place] [facility] [place];  Service: Gastroenterology;  Laterality: N/A;  Diverticulosis and polyps   • CYSTOSCOPY URETEROSCOPY STONE MANIPULATION/EXTRACTION Left 10/14/2016    Procedure: LEFT URETEROSCOPY bilateral  retrograde pyleogram LEFT STENT PLACEMENT.;  Surgeon: Julius Darling MD;  Location: [facility] OR;  Service:    • CYSTOSCOPY W/ URETERAL STENT PLACEMENT Right 8/3/2018    Procedure: CYSTOSCOPY URETERAL CATHETER/STENT INSERTION;  Surgeon: Farzad Cordova MD;  Location: [facility] OR;  Service: Urology   • ENDOSCOPY N/A 4/10/2019    Procedure: ESOPHAGOGASTRODUODENOSCOPY;  Surgeon: Bc Sanchez MD;  Location: [facility] OR;  Service: Gastroenterology   • HYSTERECTOMY     • TOTAL KNEE ARTHROPLASTY Right 2/8/2022    Procedure: TOTAL KNEE ARTHROPLASTY AND ALL ASSOCIATED PROCEDURES;  Surgeon: Jovany Villanueva MD;  Location: [facility] OR;  Service: Orthopedics;  Laterality: Right;   • UPPER GASTROINTESTINAL ENDOSCOPY     • URETEROSCOPY LASER LITHOTRIPSY WITH STENT INSERTION Left 10/26/2016    Procedure: LT URETEROSCOPY LASER LITHOTRIPSY ;  Surgeon: Julius Darling MD;  Location: [facility];  Service:         PT Ortho     Row Name 02/22/22 1000       Right Lower Ext    Rt Knee Extension/Flexion AROM 0-2-125 degrees post stretching  -AS          User Key  (r) = Recorded By, (t) = Taken By, (c) = Cosigned By    Initials Name Provider Type    AS Narinder Chapman, PT Physical Therapist                             PT Assessment/Plan     Row Name 02/22/22 1000          PT Assessment    Assessment Comments Patient continues to do very well with PT at this time.  -AS            PT Plan    PT Plan Comments Continue with current treatment plan.  -AS           User Key  (r) = Recorded By, (t) = Taken By, (c) = Cosigned By    Initials Name Provider Type    AS Narinder Chapman, PT Physical Therapist                   OP Exercises     Row Name 02/22/22 1000             Subjective Comments    Subjective Comments Patient states her knee is still very sore and she continues to have pain but she does understand she is only 2 weeks post op. She states she is scheduled to see her MD tomorrow.  -AS               Exercise 1    Exercise Name 1 Heel Slides  -AS      Time 1 8 min  -AS              Exercise 2    Exercise Name 2 Wall Slides  -AS      Time 2 8 min  -AS              Exercise 3    Exercise Name 3 Bike  -AS              Exercise 4    Exercise Name 4 Manual Flexion  -AS      Reps 4 10  -AS      Time 4 10 sec hold each  -AS              Exercise 5    Exercise Name 5 HS Stretch  -AS      Reps 5 10  -AS      Time 5 10 sec hold each  -AS              Exercise 6    Exercise Name 6 Heel Prop  -AS      Time 6 5 min  -AS              Exercise 7    Exercise Name 7 Manual Extension  -AS      Reps 7 10  -AS      Time 7 10 sec hold each  -AS              Exercise 8    Exercise Name 8 QS  -AS      Reps 8 25  -AS      Time 8 5 sec hold each  -AS              Exercise 9    Exercise Name 9 3-Way Hip  -AS      Reps 9 25 each  -AS              Exercise 10    Exercise Name 10 LAQ  -AS      Reps 10 25  -AS              Exercise 11    Exercise Name 11 TKE  -AS      Reps 11 25  -AS      Time 11 Gold  -AS              Exercise 12    Exercise Name 12 Heel Raises  -AS      Reps 12 25  -AS              Exercise 13    Exercise Name 13 Mini Squats  -AS      Reps 13 25  -AS              Exercise 14    Exercise Name 14 FWD Step Ups - 6 inch step  -AS      Reps 14 25 each leg  -AS            User Key  (r) = Recorded By, (t) = Taken By, (c) = Cosigned By    Initials Name Provider Type    AS Narinder Chapman, PT Physical Therapist                                                Time Calculation:   Start Time: 0957  Stop Time: 1058  Time Calculation (min): 61 min  Therapy Charges for Today     Code Description Service Date Service Provider Modifiers Qty    34851103229  PT THER PROC EA 15 MIN 2/22/2022 Narinder Chapman, PT GP 2                    Narinder Chapman, PT  2/22/2022

## 2022-02-23 ENCOUNTER — OFFICE VISIT (OUTPATIENT)
Dept: ORTHOPEDIC SURGERY | Facility: CLINIC | Age: 77
End: 2022-02-23

## 2022-02-23 ENCOUNTER — OFFICE VISIT (OUTPATIENT)
Dept: INTERNAL MEDICINE | Facility: CLINIC | Age: 77
End: 2022-02-23

## 2022-02-23 VITALS
WEIGHT: 158.6 LBS | BODY MASS INDEX: 26.42 KG/M2 | RESPIRATION RATE: 20 BRPM | DIASTOLIC BLOOD PRESSURE: 72 MMHG | HEART RATE: 82 BPM | OXYGEN SATURATION: 99 % | SYSTOLIC BLOOD PRESSURE: 126 MMHG | TEMPERATURE: 97.8 F | HEIGHT: 65 IN

## 2022-02-23 VITALS — HEIGHT: 65 IN | WEIGHT: 158 LBS | BODY MASS INDEX: 26.33 KG/M2

## 2022-02-23 DIAGNOSIS — Z96.651 STATUS POST TOTAL RIGHT KNEE REPLACEMENT: ICD-10-CM

## 2022-02-23 DIAGNOSIS — K22.70 BARRETT'S ESOPHAGUS WITHOUT DYSPLASIA: ICD-10-CM

## 2022-02-23 DIAGNOSIS — Z96.651 STATUS POST TOTAL RIGHT KNEE REPLACEMENT: Primary | ICD-10-CM

## 2022-02-23 DIAGNOSIS — K21.00 GASTROESOPHAGEAL REFLUX DISEASE WITH ESOPHAGITIS, UNSPECIFIED WHETHER HEMORRHAGE: ICD-10-CM

## 2022-02-23 PROCEDURE — 99024 POSTOP FOLLOW-UP VISIT: CPT | Performed by: NURSE PRACTITIONER

## 2022-02-23 PROCEDURE — 99213 OFFICE O/P EST LOW 20 MIN: CPT | Performed by: INTERNAL MEDICINE

## 2022-02-23 RX ORDER — PREDNISONE 1 MG/1
TABLET ORAL
Qty: 21 TABLET | Refills: 0 | Status: SHIPPED | OUTPATIENT
Start: 2022-02-23 | End: 2022-03-15

## 2022-02-23 RX ORDER — OXYCODONE HYDROCHLORIDE AND ACETAMINOPHEN 5; 325 MG/1; MG/1
TABLET ORAL
Qty: 36 TABLET | Refills: 0 | Status: SHIPPED | OUTPATIENT
Start: 2022-02-23 | End: 2022-03-07 | Stop reason: SDUPTHER

## 2022-02-23 NOTE — PROGRESS NOTES
Germaine Gonzalez is a 76 y.o. female, who presents with a chief complaint of   Chief Complaint   Patient presents with   • Hospital Follow Up Visit           HPI   Pt here for f/u after right TKA.  She is doing ok overall.  She is in physical therapy.  The therapy is tough but she is hanging in there.    Stomach upset - she is trying to eat a little more.  Her stomach feels a little better.  She just hasnt been very hungry.  The diarrhea is resolved.  Nausea decreased and no further vomiting.      The following portions of the patient's history were reviewed and updated as appropriate: allergies, current medications, past family history, past medical history, past social history, past surgical history and problem list.    Allergies: Levaquin [levofloxacin] and Tetracycline    Review of Systems   Constitutional: Negative.    HENT: Negative.    Eyes: Negative.    Respiratory: Negative.    Cardiovascular: Negative.    Gastrointestinal: Positive for nausea.   Endocrine: Negative.    Genitourinary: Negative.    Musculoskeletal: Negative.    Skin: Negative.    Allergic/Immunologic: Negative.    Neurological: Negative.    Hematological: Negative.    Psychiatric/Behavioral: Negative.    All other systems reviewed and are negative.            Wt Readings from Last 3 Encounters:   02/23/22 71.9 kg (158 lb 9.6 oz)   02/23/22 71.7 kg (158 lb)   02/08/22 71.9 kg (158 lb 9.6 oz)     Temp Readings from Last 3 Encounters:   02/23/22 97.8 °F (36.6 °C) (Temporal)   02/08/22 97.8 °F (36.6 °C) (Oral)   01/31/22 98.2 °F (36.8 °C) (Temporal)     BP Readings from Last 3 Encounters:   02/23/22 126/72   02/08/22 (!) 89/68   02/01/22 131/80     Pulse Readings from Last 3 Encounters:   02/23/22 82   02/08/22 84   02/01/22 97     Body mass index is 26.39 kg/m².  SpO2 Readings from Last 3 Encounters:   02/23/22 99%   02/08/22 96%   02/01/22 99%          Physical Exam  Vitals and nursing note reviewed.   Constitutional:       General: She is  not in acute distress.     Appearance: She is well-developed.   HENT:      Head: Normocephalic and atraumatic.      Right Ear: External ear normal.      Left Ear: External ear normal.      Nose: Nose normal.   Eyes:      Conjunctiva/sclera: Conjunctivae normal.      Pupils: Pupils are equal, round, and reactive to light.   Cardiovascular:      Rate and Rhythm: Normal rate and regular rhythm.      Heart sounds: Normal heart sounds.   Pulmonary:      Effort: Pulmonary effort is normal. No respiratory distress.      Breath sounds: Normal breath sounds. No wheezing.   Musculoskeletal:         General: Normal range of motion.      Cervical back: Normal range of motion and neck supple.      Comments: Normal gait   Skin:     General: Skin is warm and dry.   Neurological:      General: No focal deficit present.      Mental Status: She is alert and oriented to person, place, and time.   Psychiatric:         Mood and Affect: Mood normal.         Behavior: Behavior normal.         Thought Content: Thought content normal.         Judgment: Judgment normal.         Results for orders placed or performed in visit on 02/05/22   COVID-19,[facility] [employer] IN-HOUSE,Nasal Swab No Transport Media 3-4 HR TAT - Swab, Nasal Cavity    Specimen: Nasal Cavity; Swab   Result Value Ref Range    COVID19 Not Detected Not Detected - Ref. Range     Result Review :       Data reviewed: Recent hospitalization notes right tka           Assessment and Plan    Diagnoses and all orders for this visit:    1. Status post total right knee replacement (Primary) - doing well post op. Cont physical therapy.     2. Gastroesophageal reflux disease with esophagitis, unspecified whether hemorrhage  -     Ambulatory Referral to Gastroenterology    3. Mcgill's esophagus without dysplasia  -     Ambulatory Referral to Gastroenterology               Outpatient Medications Prior to Visit   Medication Sig Dispense Refill   • apixaban (ELIQUIS) 2.5 MG tablet tablet Take 1  tablet by mouth 2 (Two) Times a Day. 60 tablet 0   • Biotin 5000 MCG tablet Take  by mouth Daily.     • calcium carbonate (OS-ULI) 600 MG tablet Take 600 mg by mouth 2 (Two) Times a Day With Meals.     • desoximetasone (TOPICORT) 0.05 % cream APPLY CREAM TOPICALLY TO AFFECTED AREA TWICE DAILY FOR ECZEMA FOR UP TO 14 DAYS     • docusate sodium (COLACE) 100 MG capsule Take 1 capsule by mouth 2 (Two) Times a Day As Needed for Constipation. 30 capsule 0   • EPINEPHrine (EPIPEN) 0.3 MG/0.3ML solution auto-injector injection epinephrine 0.3 mg/0.3 mL injection, auto-injector     • escitalopram (LEXAPRO) 10 MG tablet Take 0.5 tablets by mouth Every Morning. 45 tablet 3   • estradiol (ESTRACE) 0.1 MG/GM vaginal cream      • glucosamine-chondroitin 500-400 MG capsule capsule Take 1 capsule by mouth 2 (Two) Times a Day With Meals.     • lisinopril (PRINIVIL,ZESTRIL) 10 MG tablet Take 1 tablet by mouth Daily. 90 tablet 3   • omeprazole (priLOSEC) 40 MG capsule Take 1 capsule by mouth 2 (Two) Times a Day. 180 capsule 0   • oxyCODONE-acetaminophen (PERCOCET) 5-325 MG per tablet 1-2 tablets every 4-6 hours, prn moderate-severe pain 36 tablet 0   • predniSONE (DELTASONE) 5 MG tablet 6 tabs day 1, 5 tabs day 2, 4 tabs day 3, 3 tabs day 4, 2 tabs day 5, 1 tab day 6 21 tablet 0   • pregabalin (Lyrica) 75 MG capsule Take 1 capsule by mouth 2 (Two) Times a Day. 60 capsule 0   • Propylene Glycol (Systane Complete) 0.6 % solution Apply  to eye(s) as directed by provider.     • rosuvastatin (CRESTOR) 20 MG tablet Take 1 tablet by mouth Daily. 90 tablet 3   • senna (SENOKOT) 8.6 MG tablet Take 1 tablet by mouth Every Night. 30 tablet 0   • vitamin B-12 (CYANOCOBALAMIN) 1000 MCG tablet Take 5,000 mcg by mouth Daily.       No facility-administered medications prior to visit.     No orders of the defined types were placed in this encounter.    [unfilled]  There are no discontinued medications.      No follow-ups on file.    Patient was  given instructions and counseling regarding her condition or for health maintenance advice. Please see specific information pulled into the AVS if appropriate.

## 2022-02-23 NOTE — PROGRESS NOTES
CC: s/p right total knee arthroplasty, DOS 2022    Interval History: Germaine Gonzalez returns for 2 week postoperative visit.  She is doing well. Pain is controlled with pain medication and is  improving. She denies any wound problem, fevers, or chills. Patient is continuing to work with outpatient PT. Ambulating without assistive device. Continuing DVT prophylaxis with use of Eliquis BID.     Physical Examination: Right knee was examined   Incision clean and dry   ROM 0-125,  4/5 strength   Stable to varus and valgus stress   Flex/extend ankle and toes   Positive sensation right foot   No calf pain, negative Homans sign bilaterally    Assessment/Plan:  Germaine Gonzalez is recovering from surgery as expected.  We will continue outpatient therapy for range of motion, strengthening, and gait normalization.    She is to follow up in clinic in 4 weeks with x-rays. Patient had all question answered today. Will continue DVT prophylaxis for an additional 2 weeks. Discussed with patient to avoid immersing incision for 4 weeks total after surgery.     Medications:  New Medications Ordered This Visit   Medications   • predniSONE (DELTASONE) 5 MG tablet     Si tabs day 1, 5 tabs day 2, 4 tabs day 3, 3 tabs day 4, 2 tabs day 5, 1 tab day 6     Dispense:  21 tablet     Refill:  0   • oxyCODONE-acetaminophen (PERCOCET) 5-325 MG per tablet     Si-2 tablets every 4-6 hours, prn moderate-severe pain     Dispense:  36 tablet     Refill:  0       JESSICA Whelan

## 2022-02-24 ENCOUNTER — HOSPITAL ENCOUNTER (OUTPATIENT)
Dept: PHYSICAL THERAPY | Facility: HOSPITAL | Age: 77
Setting detail: THERAPIES SERIES
Discharge: HOME | End: 2022-02-24

## 2022-02-24 DIAGNOSIS — Z96.651 STATUS POST TOTAL RIGHT KNEE REPLACEMENT: Primary | ICD-10-CM

## 2022-02-24 PROCEDURE — 97110 THERAPEUTIC EXERCISES: CPT

## 2022-02-24 NOTE — THERAPY TREATMENT NOTE
Outpatient Physical Therapy Ortho Treatment Note  FRANCIA Alfaro     Patient Name: Germaine Gonzalez  : 1945  MRN: [id number]  Today's Date: 2022      Visit Date: 2022    Visit Dx:    ICD-10-CM ICD-9-CM   1. Status post total right knee replacement  Z96.651 V43.65       Patient Active Problem List   Diagnosis   • Gastroesophageal reflux disease   • Hyperlipidemia   • Pyelonephritis   • Sjogren's syndrome with keratoconjunctivitis sicca (HCC)   • Mcgill's esophagus without dysplasia   • Hyperglycemia   • WALSH (nonalcoholic steatohepatitis)   • Pure hypercholesterolemia   • Knee pain   • Primary osteoarthritis of left knee   • Encounter for screening for malignant neoplasm of colon   • Personal history of colonic polyps   • Effusion of left shoulder joint   • Osteopenia   • Prediabetes   • Essential hypertension   • Primary osteoarthritis of right knee   • Knee instability, right   • S/P total knee arthroplasty        Past Medical History:   Diagnosis Date   • Arthritis     knees   • Mcgill esophagus    • Cancer (HCC)     cervical   • Cervical cancer (HCC)    • Colon polyp    • Eczema    • Elevated liver enzymes    • GERD (gastroesophageal reflux disease)    • H/O stress fracture    • Health care maintenance    • Hypercholesteremia    • Hypertension    • Kidney stones     sched ureteroscopy, lithotripsy   • Knee swelling    • Ocular migraine    • PONV (postoperative nausea and vomiting)    • Postmenopausal    • Seasonal allergies    • Sjogren's syndrome (HCC)     autoimmune disorder         Past Surgical History:   Procedure Laterality Date   •  SECTION      x2   • COLONOSCOPY     • COLONOSCOPY N/A 2021    Procedure: COLONOSCOPY;  Surgeon: Bc Sanchez MD;  Location: [facility] MAIN OR;  Service: Gastroenterology;  Laterality: N/A;  Diverticulosis and polyps   • CYSTOSCOPY URETEROSCOPY STONE MANIPULATION/EXTRACTION Left 10/14/2016    Procedure: LEFT URETEROSCOPY bilateral  retrograde pyleogram LEFT STENT PLACEMENT.;  Surgeon: Julius Darling MD;  Location: [facility] OR;  Service:    • CYSTOSCOPY W/ URETERAL STENT PLACEMENT Right 8/3/2018    Procedure: CYSTOSCOPY URETERAL CATHETER/STENT INSERTION;  Surgeon: Farzad Cordova MD;  Location: [facility] OR;  Service: Urology   • ENDOSCOPY N/A 4/10/2019    Procedure: ESOPHAGOGASTRODUODENOSCOPY;  Surgeon: Bc Sanchez MD;  Location: [facility] OR;  Service: Gastroenterology   • HYSTERECTOMY     • TOTAL KNEE ARTHROPLASTY Right 2/8/2022    Procedure: TOTAL KNEE ARTHROPLASTY AND ALL ASSOCIATED PROCEDURES;  Surgeon: Jovany Villanueva MD;  Location: [facility] OR;  Service: Orthopedics;  Laterality: Right;   • UPPER GASTROINTESTINAL ENDOSCOPY     • URETEROSCOPY LASER LITHOTRIPSY WITH STENT INSERTION Left 10/26/2016    Procedure: LT URETEROSCOPY LASER LITHOTRIPSY ;  Surgeon: Julius Darling MD;  Location: [facility];  Service:         PT Ortho     Row Name 02/24/22 1000       Right Lower Ext    Rt Knee Extension/Flexion AROM 0-2-127 degrees post stretch  -AS          User Key  (r) = Recorded By, (t) = Taken By, (c) = Cosigned By    Initials Name Provider Type    AS Narinder Chapman, PT Physical Therapist                             PT Assessment/Plan     Row Name 02/24/22 1000          PT Assessment    Assessment Comments Patient continues to do very well with PT at this time.  -AS            PT Plan    PT Plan Comments Continue with current treatment plan.  -AS           User Key  (r) = Recorded By, (t) = Taken By, (c) = Cosigned By    Initials Name Provider Type    AS Narinder Chapman, PT Physical Therapist                 Modalities     Row Name 02/24/22 1000             Ice    Ice Applied Yes  -AS      Location Right Knee  -AS      PT Ice Rx Minutes 10  -AS      Ice S/P Rx Yes  -AS              Functional Testing    Outcome Measure Options Lower Extremity Functional Scale (LEFS)  -AS              Other Treatment  "Provided    Taping / Bracing KT- 2 fan strips for edema  -AS            User Key  (r) = Recorded By, (t) = Taken By, (c) = Cosigned By    Initials Name Provider Type    AS Narinder Chapman, PT Physical Therapist               OP Exercises     Row Name 02/24/22 1000             Subjective Comments    Subjective Comments Patient states her knee is \"sore behind the knee but it is getting better\". She states she was seen by Christy Lindsay yesterday and she is pleased with her progress.  -AS              Exercise 1    Exercise Name 1 Heel Slides  -AS      Time 1 6 min  -AS              Exercise 2    Exercise Name 2 Wall Slides  -AS      Time 2 6 min  -AS              Exercise 3    Exercise Name 3 Bike  -AS              Exercise 4    Exercise Name 4 Manual Flexion  -AS      Reps 4 10  -AS      Time 4 10 sec hold each  -AS              Exercise 5    Exercise Name 5 HS Stretch  -AS      Reps 5 10  -AS      Time 5 10 sec hold each  -AS              Exercise 6    Exercise Name 6 Heel Prop  -AS      Time 6 5 min  -AS              Exercise 7    Exercise Name 7 Manual Extension  -AS      Reps 7 10  -AS      Time 7 10 sec hold each  -AS              Exercise 8    Exercise Name 8 QS  -AS      Reps 8 25  -AS      Time 8 5 sec hold each  -AS              Exercise 9    Exercise Name 9 3-Way Hip  -AS      Reps 9 25 each  -AS              Exercise 10    Exercise Name 10 LAQ  -AS      Reps 10 25  -AS              Exercise 11    Exercise Name 11 TKE  -AS      Reps 11 25  -AS      Time 11 Gold  -AS              Exercise 12    Exercise Name 12 Heel Raises  -AS      Reps 12 25  -AS              Exercise 13    Exercise Name 13 Mini Squats  -AS      Reps 13 25  -AS              Exercise 14    Exercise Name 14 FWD Step Ups - 6 inch step  -AS      Reps 14 25 each leg  -AS              Exercise 15    Exercise Name 15 Lateral Step Overs - 6 inch step  -AS      Reps 15 15  -AS            User Key  (r) = Recorded By, (t) = Taken By, (c) = " Cosigned By    Initials Name Provider Type    AS Narinder Chapman, PT Physical Therapist                                      Outcome Measure Options: Lower Extremity Functional Scale (LEFS)         Time Calculation:   Start Time: 0959  Stop Time: 1051  Time Calculation (min): 52 min  Untimed Charges  PT Ice Rx Minutes: 10  Total Minutes  Untimed Charges Total Minutes: 10   Total Minutes: 10  Therapy Charges for Today     Code Description Service Date Service Provider Modifiers Qty    91087560938 HC PT THER PROC EA 15 MIN 2/24/2022 Narinder Chapman, PT GP 3          PT G-Codes  Outcome Measure Options: Lower Extremity Functional Scale (LEFS)         Narinder Chapman, PT  2/24/2022

## 2022-03-01 ENCOUNTER — HOSPITAL ENCOUNTER (OUTPATIENT)
Dept: PHYSICAL THERAPY | Facility: HOSPITAL | Age: 77
Setting detail: THERAPIES SERIES
Discharge: HOME | End: 2022-03-01

## 2022-03-01 DIAGNOSIS — Z96.651 STATUS POST TOTAL RIGHT KNEE REPLACEMENT: Primary | ICD-10-CM

## 2022-03-01 PROCEDURE — 97110 THERAPEUTIC EXERCISES: CPT

## 2022-03-01 PROCEDURE — 97140 MANUAL THERAPY 1/> REGIONS: CPT

## 2022-03-01 NOTE — THERAPY TREATMENT NOTE
Outpatient Physical Therapy Ortho Treatment Note  FRANCIA Alfaro     Patient Name: Germaine Gonzalez  : 1945  MRN: [id number]  Today's Date: 3/1/2022      Visit Date: 2022    Visit Dx:    ICD-10-CM ICD-9-CM   1. Status post total right knee replacement  Z96.651 V43.65       Patient Active Problem List   Diagnosis   • Gastroesophageal reflux disease   • Hyperlipidemia   • Pyelonephritis   • Sjogren's syndrome with keratoconjunctivitis sicca (HCC)   • Mcgill's esophagus without dysplasia   • Hyperglycemia   • WALSH (nonalcoholic steatohepatitis)   • Pure hypercholesterolemia   • Knee pain   • Primary osteoarthritis of left knee   • Encounter for screening for malignant neoplasm of colon   • Personal history of colonic polyps   • Effusion of left shoulder joint   • Osteopenia   • Prediabetes   • Essential hypertension   • Primary osteoarthritis of right knee   • Knee instability, right   • S/P total knee arthroplasty        Past Medical History:   Diagnosis Date   • Arthritis     knees   • Mcgill esophagus    • Cancer (HCC)     cervical   • Cervical cancer (HCC)    • Colon polyp    • Eczema    • Elevated liver enzymes    • GERD (gastroesophageal reflux disease)    • H/O stress fracture    • Health care maintenance    • Hypercholesteremia    • Hypertension    • Kidney stones     sched ureteroscopy, lithotripsy   • Knee swelling    • Ocular migraine    • PONV (postoperative nausea and vomiting)    • Postmenopausal    • Seasonal allergies    • Sjogren's syndrome (HCC)     autoimmune disorder         Past Surgical History:   Procedure Laterality Date   •  SECTION      x2   • COLONOSCOPY     • COLONOSCOPY N/A 2021    Procedure: COLONOSCOPY;  Surgeon: Bc Sanchez MD;  Location: [facility] MAIN OR;  Service: Gastroenterology;  Laterality: N/A;  Diverticulosis and polyps   • CYSTOSCOPY URETEROSCOPY STONE MANIPULATION/EXTRACTION Left 10/14/2016    Procedure: LEFT URETEROSCOPY bilateral  retrograde pyleogram LEFT STENT PLACEMENT.;  Surgeon: Julius Darling MD;  Location: [facility] OR;  Service:    • CYSTOSCOPY W/ URETERAL STENT PLACEMENT Right 8/3/2018    Procedure: CYSTOSCOPY URETERAL CATHETER/STENT INSERTION;  Surgeon: Farzad Cordova MD;  Location: [facility] OR;  Service: Urology   • ENDOSCOPY N/A 4/10/2019    Procedure: ESOPHAGOGASTRODUODENOSCOPY;  Surgeon: Bc Sanchez MD;  Location: [facility] OR;  Service: Gastroenterology   • HYSTERECTOMY     • TOTAL KNEE ARTHROPLASTY Right 2/8/2022    Procedure: TOTAL KNEE ARTHROPLASTY AND ALL ASSOCIATED PROCEDURES;  Surgeon: Jovany Villanueva MD;  Location: [facility] OR;  Service: Orthopedics;  Laterality: Right;   • UPPER GASTROINTESTINAL ENDOSCOPY     • URETEROSCOPY LASER LITHOTRIPSY WITH STENT INSERTION Left 10/26/2016    Procedure: LT URETEROSCOPY LASER LITHOTRIPSY ;  Surgeon: Julius Darling MD;  Location: [facility];  Service:                         PT Assessment/Plan     Row Name 03/01/22 1000          PT Assessment    Assessment Comments Pt is progressing well with PT; improved AROM and overall function noted.  -KM            PT Plan    PT Plan Comments Continue per POC  -KM           User Key  (r) = Recorded By, (t) = Taken By, (c) = Cosigned By    Initials Name Provider Type    Lina Sanches, PTA Physical Therapy Assistant                   OP Exercises     Row Name 03/01/22 1000             Subjective Comments    Subjective Comments Pt states her knee is really sore.  -KM              Exercise 1    Exercise Name 1 Heel Slides  -KM      Time 1 6 min  -KM              Exercise 2    Exercise Name 2 Wall Slides  -KM      Time 2 6 min  -KM              Exercise 3    Exercise Name 3 Bike  -KM              Exercise 4    Exercise Name 4 Manual Flexion  -KM      Reps 4 10  -KM      Time 4 10 sec hold each  -KM              Exercise 5    Exercise Name 5 HS Stretch  -KM      Reps 5 10  -KM      Time 5 10 sec hold each  -KM               Exercise 6    Exercise Name 6 Heel Prop  -KM      Time 6 5 min  -KM              Exercise 7    Exercise Name 7 Manual Extension  -KM      Reps 7 10  -KM      Time 7 10 sec hold each  -KM              Exercise 8    Exercise Name 8 QS  -KM      Reps 8 25  -KM      Time 8 5 sec hold each  -KM              Exercise 9    Exercise Name 9 3-Way Hip  -KM      Reps 9 30 each  -KM              Exercise 10    Exercise Name 10 LAQ  -KM      Reps 10 30  -KM              Exercise 11    Exercise Name 11 TKE  -KM      Reps 11 25  -KM      Time 11 Gold  -KM              Exercise 12    Exercise Name 12 Heel Raises  -KM      Reps 12 25  -KM              Exercise 13    Exercise Name 13 Mini Squats  -KM      Reps 13 25  -KM              Exercise 14    Exercise Name 14 FWD Step Ups - 6 inch step  -KM      Reps 14 25 each leg  -KM              Exercise 15    Exercise Name 15 Lateral Step Overs - 6 inch step  -KM      Reps 15 15  -KM            User Key  (r) = Recorded By, (t) = Taken By, (c) = Cosigned By    Initials Name Provider Type    Lina Sanches PTA Physical Therapy Assistant                                                Time Calculation:   Start Time: 1000  Stop Time: 1055  Time Calculation (min): 55 min  Therapy Charges for Today     Code Description Service Date Service Provider Modifiers Qty    28122514302 HC PT MANUAL THERAPY EA 15 MIN 3/1/2022 Lina Cage PTA GP 1    69828335585 HC PT THER PROC EA 15 MIN 3/1/2022 Lina Cage PTA GP 1                    Lina Cage PTA  3/1/2022

## 2022-03-03 ENCOUNTER — HOSPITAL ENCOUNTER (OUTPATIENT)
Dept: PHYSICAL THERAPY | Facility: HOSPITAL | Age: 77
Setting detail: THERAPIES SERIES
Discharge: HOME | End: 2022-03-03

## 2022-03-03 DIAGNOSIS — Z96.651 STATUS POST TOTAL RIGHT KNEE REPLACEMENT: Primary | ICD-10-CM

## 2022-03-03 PROCEDURE — 97110 THERAPEUTIC EXERCISES: CPT

## 2022-03-03 NOTE — THERAPY TREATMENT NOTE
Outpatient Physical Therapy Ortho Treatment Note  FRANCIA Alfaro     Patient Name: Germaine Gonzalez  : 1945  MRN: [id number]  Today's Date: 3/3/2022      Visit Date: 2022    Visit Dx:    ICD-10-CM ICD-9-CM   1. Status post total right knee replacement  Z96.651 V43.65       Patient Active Problem List   Diagnosis   • Gastroesophageal reflux disease   • Hyperlipidemia   • Pyelonephritis   • Sjogren's syndrome with keratoconjunctivitis sicca (HCC)   • Mcgill's esophagus without dysplasia   • Hyperglycemia   • WALSH (nonalcoholic steatohepatitis)   • Pure hypercholesterolemia   • Knee pain   • Primary osteoarthritis of left knee   • Encounter for screening for malignant neoplasm of colon   • Personal history of colonic polyps   • Effusion of left shoulder joint   • Osteopenia   • Prediabetes   • Essential hypertension   • Primary osteoarthritis of right knee   • Knee instability, right   • S/P total knee arthroplasty        Past Medical History:   Diagnosis Date   • Arthritis     knees   • Mcgill esophagus    • Cancer (HCC)     cervical   • Cervical cancer (HCC)    • Colon polyp    • Eczema    • Elevated liver enzymes    • GERD (gastroesophageal reflux disease)    • H/O stress fracture    • Health care maintenance    • Hypercholesteremia    • Hypertension    • Kidney stones     sched ureteroscopy, lithotripsy   • Knee swelling    • Ocular migraine    • PONV (postoperative nausea and vomiting)    • Postmenopausal    • Seasonal allergies    • Sjogren's syndrome (HCC)     autoimmune disorder         Past Surgical History:   Procedure Laterality Date   •  SECTION      x2   • COLONOSCOPY     • COLONOSCOPY N/A 2021    Procedure: COLONOSCOPY;  Surgeon: Bc Sanchez MD;  [place] [facility] [place];  Service: Gastroenterology;  Laterality: N/A;  Diverticulosis and polyps   • CYSTOSCOPY URETEROSCOPY STONE MANIPULATION/EXTRACTION Left 10/14/2016    Procedure: LEFT URETEROSCOPY bilateral  retrograde pyleogram LEFT STENT PLACEMENT.;  Surgeon: Julius Darling MD;  Location: [facility] OR;  Service:    • CYSTOSCOPY W/ URETERAL STENT PLACEMENT Right 8/3/2018    Procedure: CYSTOSCOPY URETERAL CATHETER/STENT INSERTION;  Surgeon: Farzad Cordova MD;  Location: [facility] OR;  Service: Urology   • ENDOSCOPY N/A 4/10/2019    Procedure: ESOPHAGOGASTRODUODENOSCOPY;  Surgeon: Bc Sanchez MD;  Location: [facility] OR;  Service: Gastroenterology   • HYSTERECTOMY     • TOTAL KNEE ARTHROPLASTY Right 2/8/2022    Procedure: TOTAL KNEE ARTHROPLASTY AND ALL ASSOCIATED PROCEDURES;  Surgeon: Jovany Villanueva MD;  Location: [facility] OR;  Service: Orthopedics;  Laterality: Right;   • UPPER GASTROINTESTINAL ENDOSCOPY     • URETEROSCOPY LASER LITHOTRIPSY WITH STENT INSERTION Left 10/26/2016    Procedure: LT URETEROSCOPY LASER LITHOTRIPSY ;  Surgeon: Julius Darling MD;  Location: [facility];  Service:         PT Ortho     Row Name 03/03/22 1000       Right Lower Ext    Rt Knee Extension/Flexion AROM 0-3-126 degrees post stretching  -AS          User Key  (r) = Recorded By, (t) = Taken By, (c) = Cosigned By    Initials Name Provider Type    AS Narinder Chapman, PT Physical Therapist                             PT Assessment/Plan     Row Name 03/03/22 1000          PT Assessment    Assessment Comments Patient continues to progress well with PT as her ROM, strength, function, and gait all continue to improve. Patient's extension ROM continues to be limited with flexion ROM doing very well at this time.  -AS            PT Plan    PT Plan Comments Continue with current treatment plan.  -AS           User Key  (r) = Recorded By, (t) = Taken By, (c) = Cosigned By    Initials Name Provider Type    AS Narinder Chapman, PT Physical Therapist                   OP Exercises     Row Name 03/03/22 1000             Subjective Comments    Subjective Comments Patient states she is doing well overall but states she  is sore.  -AS              Exercise 1    Exercise Name 1 Heel Slides  -AS      Time 1 5 min  -AS              Exercise 2    Exercise Name 2 Wall Slides  -AS      Time 2 5 min  -AS              Exercise 3    Exercise Name 3 Bike  -AS              Exercise 4    Exercise Name 4 Manual Flexion  -AS      Reps 4 10  -AS      Time 4 10 sec hold each  -AS              Exercise 5    Exercise Name 5 HS Stretch  -AS      Reps 5 10  -AS      Time 5 10 sec hold each  -AS              Exercise 6    Exercise Name 6 Heel Prop  -AS      Time 6 5 min  -AS              Exercise 7    Exercise Name 7 Manual Extension  -AS      Reps 7 10  -AS      Time 7 10 sec hold each  -AS              Exercise 8    Exercise Name 8 QS  -AS      Reps 8 25  -AS      Time 8 5 sec hold each  -AS              Exercise 9    Exercise Name 9 3-Way Hip  -AS      Reps 9 25 each  -AS      Time 9 1#  -AS              Exercise 10    Exercise Name 10 LAQ  -AS      Reps 10 25  -AS      Time 10 1#  -AS              Exercise 11    Exercise Name 11 TKE  -AS      Reps 11 25  -AS      Time 11 Gold  -AS              Exercise 12    Exercise Name 12 Heel Raises  -AS      Reps 12 25  -AS              Exercise 13    Exercise Name 13 Mini Squats  -AS      Reps 13 25  -AS              Exercise 14    Exercise Name 14 FWD Step Ups - 6 inch step  -AS      Reps 14 25 each leg  -AS              Exercise 15    Exercise Name 15 Lateral Step Overs - 6 inch step  -AS      Reps 15 25  -AS            User Key  (r) = Recorded By, (t) = Taken By, (c) = Cosigned By    Initials Name Provider Type    AS Narinder Chapman, PT Physical Therapist                                                Time Calculation:   Start Time: 1005  Stop Time: 1107  Time Calculation (min): 62 min  Therapy Charges for Today     Code Description Service Date Service Provider Modifiers Qty    70835212879  PT THER PROC EA 15 MIN 3/3/2022 Narinder Chapman, PT GP 2                    Narinder Chapman,  PT  3/3/2022

## 2022-03-04 ENCOUNTER — TELEPHONE (OUTPATIENT)
Dept: INTERNAL MEDICINE | Facility: CLINIC | Age: 77
End: 2022-03-04

## 2022-03-04 DIAGNOSIS — R73.03 PREDIABETES: Primary | ICD-10-CM

## 2022-03-04 DIAGNOSIS — I10 ESSENTIAL HYPERTENSION: ICD-10-CM

## 2022-03-04 DIAGNOSIS — E78.2 MIXED HYPERLIPIDEMIA: ICD-10-CM

## 2022-03-07 DIAGNOSIS — Z96.651 STATUS POST TOTAL RIGHT KNEE REPLACEMENT: ICD-10-CM

## 2022-03-07 RX ORDER — OXYCODONE HYDROCHLORIDE AND ACETAMINOPHEN 5; 325 MG/1; MG/1
TABLET ORAL
Qty: 36 TABLET | Refills: 0 | Status: SHIPPED | OUTPATIENT
Start: 2022-03-07 | End: 2022-04-05

## 2022-03-08 ENCOUNTER — HOSPITAL ENCOUNTER (OUTPATIENT)
Dept: PHYSICAL THERAPY | Facility: HOSPITAL | Age: 77
Setting detail: THERAPIES SERIES
Discharge: HOME | End: 2022-03-08

## 2022-03-08 DIAGNOSIS — Z96.651 STATUS POST TOTAL RIGHT KNEE REPLACEMENT: Primary | ICD-10-CM

## 2022-03-08 PROCEDURE — 97140 MANUAL THERAPY 1/> REGIONS: CPT

## 2022-03-08 PROCEDURE — 97110 THERAPEUTIC EXERCISES: CPT

## 2022-03-08 NOTE — THERAPY TREATMENT NOTE
Outpatient Physical Therapy Ortho Treatment Note  FRANCIA Alfaro     Patient Name: Germaine Gonzalez  : 1945  MRN: [id number]  Today's Date: 3/8/2022      Visit Date: 2022    Visit Dx:    ICD-10-CM ICD-9-CM   1. Status post total right knee replacement  Z96.651 V43.65       Patient Active Problem List   Diagnosis   • Gastroesophageal reflux disease   • Hyperlipidemia   • Pyelonephritis   • Sjogren's syndrome with keratoconjunctivitis sicca (HCC)   • Mcgill's esophagus without dysplasia   • Hyperglycemia   • WALSH (nonalcoholic steatohepatitis)   • Pure hypercholesterolemia   • Knee pain   • Primary osteoarthritis of left knee   • Encounter for screening for malignant neoplasm of colon   • Personal history of colonic polyps   • Effusion of left shoulder joint   • Osteopenia   • Prediabetes   • Essential hypertension   • Primary osteoarthritis of right knee   • Knee instability, right   • S/P total knee arthroplasty        Past Medical History:   Diagnosis Date   • Arthritis     knees   • Mcgill esophagus    • Cancer (HCC)     cervical   • Cervical cancer (HCC)    • Colon polyp    • Eczema    • Elevated liver enzymes    • GERD (gastroesophageal reflux disease)    • H/O stress fracture    • Health care maintenance    • Hypercholesteremia    • Hypertension    • Kidney stones     sched ureteroscopy, lithotripsy   • Knee swelling    • Ocular migraine    • PONV (postoperative nausea and vomiting)    • Postmenopausal    • Seasonal allergies    • Sjogren's syndrome (HCC)     autoimmune disorder         Past Surgical History:   Procedure Laterality Date   •  SECTION      x2   • COLONOSCOPY     • COLONOSCOPY N/A 2021    Procedure: COLONOSCOPY;  Surgeon: Bc Sanchez MD;  Location: [facility] OR;  Service: Gastroenterology;  Laterality: N/A;  Diverticulosis and polyps   • CYSTOSCOPY URETEROSCOPY STONE MANIPULATION/EXTRACTION Left 10/14/2016    Procedure: LEFT URETEROSCOPY bilateral  retrograde pyleogram LEFT STENT PLACEMENT.;  Surgeon: Julius Darling MD;  Location: [facility] OR;  Service:    • CYSTOSCOPY W/ URETERAL STENT PLACEMENT Right 8/3/2018    Procedure: CYSTOSCOPY URETERAL CATHETER/STENT INSERTION;  Surgeon: Farzad Cordova MD;  Location: [facility] OR;  Service: Urology   • ENDOSCOPY N/A 4/10/2019    Procedure: ESOPHAGOGASTRODUODENOSCOPY;  Surgeon: Bc Sanchez MD;  Location: [facility] OR;  Service: Gastroenterology   • HYSTERECTOMY     • TOTAL KNEE ARTHROPLASTY Right 2/8/2022    Procedure: TOTAL KNEE ARTHROPLASTY AND ALL ASSOCIATED PROCEDURES;  Surgeon: Jovany Villanueva MD;  Location: [facility] OR;  Service: Orthopedics;  Laterality: Right;   • UPPER GASTROINTESTINAL ENDOSCOPY     • URETEROSCOPY LASER LITHOTRIPSY WITH STENT INSERTION Left 10/26/2016    Procedure: LT URETEROSCOPY LASER LITHOTRIPSY ;  Surgeon: Julius Darling MD;  Location: [facility];  Service:                         PT Assessment/Plan     Row Name 03/08/22 0930          PT Assessment    Assessment Comments Pt active extension seems to be slightly limited. Pt is progressing well with strength and function.  -KM            PT Plan    PT Plan Comments Continue per POC  -KM           User Key  (r) = Recorded By, (t) = Taken By, (c) = Cosigned By    Initials Name Provider Type    Lina Sanches, BEATRIZ Physical Therapy Assistant                   OP Exercises     Row Name 03/08/22 0930             Subjective Comments    Subjective Comments Pt states her knee is really sore, possibly due to the weather.  -KM              Exercise 1    Exercise Name 1 Heel Slides  -KM      Time 1 5 min  -KM              Exercise 2    Exercise Name 2 Wall Slides  -KM      Time 2 5 min  -KM              Exercise 3    Exercise Name 3 Bike  -KM              Exercise 4    Exercise Name 4 Manual Flexion  -KM      Reps 4 10  -KM      Time 4 10 sec hold each  -KM              Exercise 5    Exercise Name 5 HS Stretch  -KM       Reps 5 10  -KM      Time 5 10 sec hold each  -KM              Exercise 6    Exercise Name 6 Heel Prop  -KM      Time 6 5 min  -KM              Exercise 7    Exercise Name 7 Manual Extension  -KM      Reps 7 10  -KM      Time 7 10 sec hold each  -KM              Exercise 8    Exercise Name 8 QS (ankle)  -KM      Reps 8 25  -KM      Time 8 5 sec hold each  -KM              Exercise 9    Exercise Name 9 3-Way Hip  -KM      Reps 9 25 each  -KM      Time 9 1#  -KM              Exercise 10    Exercise Name 10 LAQ  -KM      Reps 10 25  -KM      Time 10 1#  -KM              Exercise 11    Exercise Name 11 TKE  -KM      Reps 11 25  -KM      Time 11 Gold  -KM              Exercise 12    Exercise Name 12 Heel Raises  -KM      Reps 12 25  -KM              Exercise 13    Exercise Name 13 Mini Squats  -KM      Reps 13 25  -KM              Exercise 14    Exercise Name 14 FWD Step Ups - 6 inch step  -KM      Reps 14 25 each leg  -KM              Exercise 15    Exercise Name 15 Lateral Step Overs - 6 inch step  -KM      Reps 15 25  -KM            User Key  (r) = Recorded By, (t) = Taken By, (c) = Cosigned By    Initials Name Provider Type    Lina Sanches PTA Physical Therapy Assistant                                                Time Calculation:   Start Time: 0930  Stop Time: 1020  Time Calculation (min): 50 min  Therapy Charges for Today     Code Description Service Date Service Provider Modifiers Qty    60335995949 HC PT THER PROC EA 15 MIN 3/8/2022 Lina Cage PTA GP 2    01035938123 HC PT MANUAL THERAPY EA 15 MIN 3/8/2022 Lina Cage PTA GP 1    77222434045 HC PT THER PROC EA 15 MIN 3/8/2022 Lina Cage PTA GP 1                    Lina Cage PTA  3/8/2022

## 2022-03-08 NOTE — THERAPY TREATMENT NOTE
Outpatient Physical Therapy Ortho Treatment Note  FRANCIA Alfaro     Patient Name: Germaine Gonzalez  : 1945  MRN: [id number]  Today's Date: 3/8/2022      Visit Date: 2022    Visit Dx:    ICD-10-CM ICD-9-CM   1. Status post total right knee replacement  Z96.651 V43.65       Patient Active Problem List   Diagnosis   • Gastroesophageal reflux disease   • Hyperlipidemia   • Pyelonephritis   • Sjogren's syndrome with keratoconjunctivitis sicca (HCC)   • Mcgill's esophagus without dysplasia   • Hyperglycemia   • WALSH (nonalcoholic steatohepatitis)   • Pure hypercholesterolemia   • Knee pain   • Primary osteoarthritis of left knee   • Encounter for screening for malignant neoplasm of colon   • Personal history of colonic polyps   • Effusion of left shoulder joint   • Osteopenia   • Prediabetes   • Essential hypertension   • Primary osteoarthritis of right knee   • Knee instability, right   • S/P total knee arthroplasty        Past Medical History:   Diagnosis Date   • Arthritis     knees   • Mcgill esophagus    • Cancer (HCC)     cervical   • Cervical cancer (HCC)    • Colon polyp    • Eczema    • Elevated liver enzymes    • GERD (gastroesophageal reflux disease)    • H/O stress fracture    • Health care maintenance    • Hypercholesteremia    • Hypertension    • Kidney stones     sched ureteroscopy, lithotripsy   • Knee swelling    • Ocular migraine    • PONV (postoperative nausea and vomiting)    • Postmenopausal    • Seasonal allergies    • Sjogren's syndrome (HCC)     autoimmune disorder         Past Surgical History:   Procedure Laterality Date   •  SECTION      x2   • COLONOSCOPY     • COLONOSCOPY N/A 2021    Procedure: COLONOSCOPY;  Surgeon: Bc Sanchez MD;  Location: [facility] OR;  Service: Gastroenterology;  Laterality: N/A;  Diverticulosis and polyps   • CYSTOSCOPY URETEROSCOPY STONE MANIPULATION/EXTRACTION Left 10/14/2016    Procedure: LEFT URETEROSCOPY bilateral  retrograde pyleogram LEFT STENT PLACEMENT.;  Surgeon: Julius Darling MD;  Location: [facility];  Service:    • CYSTOSCOPY W/ URETERAL STENT PLACEMENT Right 8/3/2018    Procedure: CYSTOSCOPY URETERAL CATHETER/STENT INSERTION;  Surgeon: Farzad Cordova MD;  Location: [facility];  Service: Urology   • ENDOSCOPY N/A 4/10/2019    Procedure: ESOPHAGOGASTRODUODENOSCOPY;  Surgeon: Bc Sanchez MD;  Location: [facility];  Service: Gastroenterology   • HYSTERECTOMY     • TOTAL KNEE ARTHROPLASTY Right 2/8/2022    Procedure: TOTAL KNEE ARTHROPLASTY AND ALL ASSOCIATED PROCEDURES;  Surgeon: Jovany Villanueva MD;  Location: [facility];  Service: Orthopedics;  Laterality: Right;   • UPPER GASTROINTESTINAL ENDOSCOPY     • URETEROSCOPY LASER LITHOTRIPSY WITH STENT INSERTION Left 10/26/2016    Procedure: LT URETEROSCOPY LASER LITHOTRIPSY ;  Surgeon: Julius Darling MD;  Location: [facility];  Service:                                                                  Time Calculation:   Start Time: 0845  Stop Time: 0932  Time Calculation (min): 47 min  Therapy Charges for Today     Code Description Service Date Service Provider Modifiers Qty    71819729695 HC PT THER PROC EA 15 MIN 3/8/2022 Lina Cage PTA GP 2                    Lina Cage PTA  3/8/2022

## 2022-03-10 ENCOUNTER — HOSPITAL ENCOUNTER (OUTPATIENT)
Dept: PHYSICAL THERAPY | Facility: HOSPITAL | Age: 77
Setting detail: THERAPIES SERIES
Discharge: HOME | End: 2022-03-10

## 2022-03-10 DIAGNOSIS — Z96.651 STATUS POST TOTAL RIGHT KNEE REPLACEMENT: Primary | ICD-10-CM

## 2022-03-10 PROCEDURE — 97110 THERAPEUTIC EXERCISES: CPT

## 2022-03-10 NOTE — THERAPY RE-EVALUATION
Outpatient Physical Therapy Ortho Re-Evaluation   Adilene Azul     Patient Name: Germaine Gonzalez  : 1945  MRN: [id number]  Today's Date: 3/10/2022      Visit Date: 03/10/2022    Patient Active Problem List   Diagnosis   • Gastroesophageal reflux disease   • Hyperlipidemia   • Pyelonephritis   • Sjogren's syndrome with keratoconjunctivitis sicca (HCC)   • Mcgill's esophagus without dysplasia   • Hyperglycemia   • WALSH (nonalcoholic steatohepatitis)   • Pure hypercholesterolemia   • Knee pain   • Primary osteoarthritis of left knee   • Encounter for screening for malignant neoplasm of colon   • Personal history of colonic polyps   • Effusion of left shoulder joint   • Osteopenia   • Prediabetes   • Essential hypertension   • Primary osteoarthritis of right knee   • Knee instability, right   • S/P total knee arthroplasty        Past Medical History:   Diagnosis Date   • Arthritis     knees   • Mcgill esophagus    • Cancer (HCC)     cervical   • Cervical cancer (HCC)    • Colon polyp    • Eczema    • Elevated liver enzymes    • GERD (gastroesophageal reflux disease)    • H/O stress fracture    • Health care maintenance    • Hypercholesteremia    • Hypertension    • Kidney stones     sched ureteroscopy, lithotripsy   • Knee swelling    • Ocular migraine    • PONV (postoperative nausea and vomiting)    • Postmenopausal    • Seasonal allergies    • Sjogren's syndrome (HCC)     autoimmune disorder         Past Surgical History:   Procedure Laterality Date   •  SECTION      x2   • COLONOSCOPY     • COLONOSCOPY N/A 2021    Procedure: COLONOSCOPY;  Surgeon: Bc Sanchez MD;  Location: [facility] OR;  Service: Gastroenterology;  Laterality: N/A;  Diverticulosis and polyps   • CYSTOSCOPY URETEROSCOPY STONE MANIPULATION/EXTRACTION Left 10/14/2016    Procedure: LEFT URETEROSCOPY bilateral retrograde pyleogram LEFT STENT PLACEMENT.;  Surgeon: Julius Darling MD;  Location: [facility] OR;  Service:     • CYSTOSCOPY W/ URETERAL STENT PLACEMENT Right 8/3/2018    Procedure: CYSTOSCOPY URETERAL CATHETER/STENT INSERTION;  Surgeon: Farzad Cordova MD;  Location: [facility] OR;  Service: Urology   • ENDOSCOPY N/A 4/10/2019    Procedure: ESOPHAGOGASTRODUODENOSCOPY;  Surgeon: Bc Sanchez MD;  Location: [facility] OR;  Service: Gastroenterology   • HYSTERECTOMY     • TOTAL KNEE ARTHROPLASTY Right 2/8/2022    Procedure: TOTAL KNEE ARTHROPLASTY AND ALL ASSOCIATED PROCEDURES;  Surgeon: Jovany Villanueva MD;  Location: [facility] OR;  Service: Orthopedics;  Laterality: Right;   • UPPER GASTROINTESTINAL ENDOSCOPY     • URETEROSCOPY LASER LITHOTRIPSY WITH STENT INSERTION Left 10/26/2016    Procedure: LT URETEROSCOPY LASER LITHOTRIPSY ;  Surgeon: Julius Darling MD;  Location: [facility] OR;  Service:        Visit Dx:     ICD-10-CM ICD-9-CM   1. Status post total right knee replacement  Z96.651 V43.65              PT Ortho     Row Name 03/10/22 1000       Precautions and Contraindications    Precautions/Limitations no known precautions/limitations  -AS       Posture/Observations    Posture- WNL Posture is WNL  -AS    Observations Incision healing  -AS       Patellar Accessory Motions    Superior glide Right:;WNL  -AS    Inferior glide Right:;WNL  -AS    Medial glide Right:;WNL  -AS    Lateral glide Right:;WNL  -AS       Right Lower Ext    Rt Knee Extension/Flexion AROM 0-1-127 degrees  -AS       MMT Right Lower Ext    Rt Hip Flexion MMT, Gross Movement (4/5) good  -AS    Rt Hip Extension MMT, Gross Movement (4/5) good  -AS    Rt Hip ABduction MMT, Gross Movement (4/5) good  -AS    Rt Knee Extension MMT, Gross Movement (4/5) good  -AS    Rt Knee Flexion MMT, Gross Movement (4/5) good  -AS       Sensation    Sensation WNL? WNL  -AS    Light Touch No apparent deficits  -AS       Lower Extremity Flexibility    Hamstrings Right:;Mildly limited  -AS          User Key  (r) = Recorded By, (t) = Taken By, (c) = Cosigned By     Initials Name Provider Type    AS Narinder Chapman, PT Physical Therapist                                   PT OP Goals     Row Name 03/10/22 1000          PT Short Term Goals    STG 1 Patient to demonstrate compliance with her initial HEP for flexibility, ROM and strengthening.  -AS     STG 1 Progress Met  -AS     STG 2 Patient to report right knee pain on VAS of 2-3/10 at worst with activity.  -AS     STG 2 Progress Ongoing  -AS     STG 3 Patient to demonstrate improved right knee and RLE strength to 4/5 in all planes.  -AS     STG 3 Progress Met  -AS     STG 4 Patient to demonstrate improved right knee ROM to 0-1-110 degrees.  -AS     STG 4 Progress Met  -AS     STG 5 Patient to ambulate short distances without the use of an AD and with an improved gait pattern.  -AS     STG 5 Progress Met  -AS            Long Term Goals    LTG 1 Patient to demonstrate compliance with her advanced HEP for flexibility, ROM and strengthening.  -AS     LTG 1 Progress Met  -AS     LTG 2 Patient to report right knee pain on VAS of 1-2/10 at worst with activity.  -AS     LTG 2 Progress Ongoing  -AS     LTG 3 Patient to demonstrate improved right knee and RLE strength to 4+/5 in all planes.  -AS     LTG 3 Progress Ongoing  -AS     LTG 4 Patient to demonstrate improved right knee ROM to 0-120 degrees.  -AS     LTG 4 Progress Partially Met;Ongoing;Progressing  -AS     LTG 5 Patient to ambulate community distances without an AD and with a normal gait pattern.  -AS     LTG 5 Progress Partially Met;Ongoing;Progressing  -AS     LTG 6 Patient to report overall improved function on LEFS to 55/80.  -AS     LTG 6 Progress Ongoing  -AS           User Key  (r) = Recorded By, (t) = Taken By, (c) = Cosigned By    Initials Name Provider Type    AS Narinder Chapman, PT Physical Therapist                 PT Assessment/Plan     Row Name 03/10/22 1000          PT Assessment    Functional Limitations Impaired gait;Performance in leisure  "activities;Performance in sport activities;Performance in work activities  -AS     Impairments Gait;Impaired flexibility;Muscle strength;Pain;Range of motion  -AS     Assessment Comments Patient continues to improve very well with PT. Patient has right knee ROM of 0-1-127 degrees and strength of 4/5. She ambulates without an AD, however she does have a slight antalgic gait pattern. Patient continues to report right knee pain.  -AS     Please refer to paper survey for additional self-reported information Yes  -AS     Rehab Potential Good  -AS     Patient/caregiver participated in establishment of treatment plan and goals Yes  -AS     Patient would benefit from skilled therapy intervention Yes  -AS            PT Plan    PT Frequency 2x/week  -AS     Predicted Duration of Therapy Intervention (PT) 4-6 weeks  -AS     Planned CPT's? PT RE-EVAL: 98746;PT THER PROC EA 15 MIN: 62656;PT THER ACT EA 15 MIN: 27900;PT MANUAL THERAPY EA 15 MIN: 88229;PT NEUROMUSC RE-EDUCATION EA 15 MIN: 74838  -AS     PT Plan Comments Continue with current treatment plan.  -AS           User Key  (r) = Recorded By, (t) = Taken By, (c) = Cosigned By    Initials Name Provider Type    AS Narinder Chapman, PT Physical Therapist                   OP Exercises     Row Name 03/10/22 1000             Subjective Comments    Subjective Comments Patient states she is \"doing well\".  -AS              Exercise 1    Exercise Name 1 Heel Slides  -AS      Time 1 5 min  -AS              Exercise 2    Exercise Name 2 Wall Slides  -AS      Time 2 5 min  -AS              Exercise 3    Exercise Name 3 Bike  -AS              Exercise 4    Exercise Name 4 Manual Flexion  -AS      Reps 4 10  -AS      Time 4 10 sec hold each  -AS              Exercise 5    Exercise Name 5 HS Stretch  -AS      Reps 5 10  -AS      Time 5 10 sec hold each  -AS              Exercise 6    Exercise Name 6 Heel Prop  -AS      Time 6 5 min  -AS      Additional Comments 1#  -AS              " Exercise 7    Exercise Name 7 Manual Extension  -AS      Reps 7 10  -AS      Time 7 10 sec hold each  -AS              Exercise 8    Exercise Name 8 QS (ankle)  -AS      Reps 8 25  -AS      Time 8 5 sec hold each  -AS              Exercise 9    Exercise Name 9 3-Way Hip  -AS      Reps 9 25 each  -AS      Time 9 1#  -AS              Exercise 10    Exercise Name 10 LAQ  -AS      Reps 10 25  -AS      Time 10 1#  -AS              Exercise 11    Exercise Name 11 TKE  -AS      Reps 11 25  -AS      Time 11 Gold  -AS              Exercise 12    Exercise Name 12 Heel Raises  -AS      Reps 12 25  -AS              Exercise 13    Exercise Name 13 Mini Squats  -AS      Reps 13 25  -AS              Exercise 14    Exercise Name 14 FWD Step Ups - 6 inch step  -AS      Reps 14 25 each leg  -AS              Exercise 15    Exercise Name 15 Lateral Step Overs - 6 inch step  -AS      Reps 15 25  -AS            User Key  (r) = Recorded By, (t) = Taken By, (c) = Cosigned By    Initials Name Provider Type    AS Narinder Chapman, PT Physical Therapist                                        Time Calculation:     Start Time: 1005  Stop Time: 1108  Time Calculation (min): 63 min     Therapy Charges for Today     Code Description Service Date Service Provider Modifiers Qty    96608508610 HC PT THER PROC EA 15 MIN 3/10/2022 Narinder Chapman, PT GP 2                    Narinder Chapman, PT  3/10/2022

## 2022-03-15 ENCOUNTER — HOSPITAL ENCOUNTER (OUTPATIENT)
Dept: PHYSICAL THERAPY | Facility: HOSPITAL | Age: 77
Setting detail: THERAPIES SERIES
Discharge: HOME | End: 2022-03-15

## 2022-03-15 ENCOUNTER — HOSPITAL ENCOUNTER (OUTPATIENT)
Dept: CT IMAGING | Facility: HOSPITAL | Age: 77
Discharge: HOME | End: 2022-03-15
Admitting: INTERNAL MEDICINE

## 2022-03-15 ENCOUNTER — OFFICE VISIT (OUTPATIENT)
Dept: INTERNAL MEDICINE | Facility: CLINIC | Age: 77
End: 2022-03-15

## 2022-03-15 VITALS
SYSTOLIC BLOOD PRESSURE: 135 MMHG | HEART RATE: 105 BPM | DIASTOLIC BLOOD PRESSURE: 70 MMHG | WEIGHT: 143 LBS | OXYGEN SATURATION: 99 % | BODY MASS INDEX: 23.82 KG/M2 | HEIGHT: 65 IN

## 2022-03-15 DIAGNOSIS — E44.1 MILD PROTEIN-CALORIE MALNUTRITION: ICD-10-CM

## 2022-03-15 DIAGNOSIS — R11.0 NAUSEA: Primary | ICD-10-CM

## 2022-03-15 DIAGNOSIS — R63.4 UNINTENTIONAL WEIGHT LOSS: ICD-10-CM

## 2022-03-15 DIAGNOSIS — Z96.651 STATUS POST TOTAL RIGHT KNEE REPLACEMENT: Primary | ICD-10-CM

## 2022-03-15 PROCEDURE — 97110 THERAPEUTIC EXERCISES: CPT

## 2022-03-15 PROCEDURE — 0 IOPAMIDOL PER 1 ML: Performed by: INTERNAL MEDICINE

## 2022-03-15 PROCEDURE — 0 DIATRIZOATE MEGLUMINE & SODIUM PER 1 ML: Performed by: INTERNAL MEDICINE

## 2022-03-15 PROCEDURE — 99214 OFFICE O/P EST MOD 30 MIN: CPT | Performed by: INTERNAL MEDICINE

## 2022-03-15 PROCEDURE — 74177 CT ABD & PELVIS W/CONTRAST: CPT

## 2022-03-15 RX ADMIN — IOPAMIDOL 100 ML: 755 INJECTION, SOLUTION INTRAVENOUS at 14:34

## 2022-03-15 RX ADMIN — DIATRIZOATE MEGLUMINE AND DIATRIZOATE SODIUM 30 ML: 600; 100 SOLUTION ORAL; RECTAL at 13:00

## 2022-03-15 NOTE — PROGRESS NOTES
Germaine Gonzlaez is a 76 y.o. female, who presents with a chief complaint of   Chief Complaint   Patient presents with   • Vomiting     Loss of appetite            HPI   Pt here bc of continued upset stomach and nausea.  Diarrhea resolved.  She has lost 15 pounds in the past 3 weeks.  She is on omeprazole bid.  She has a known hx of barretts esophagus.  She missed her GI appt bc of knee replacement surgery.  S/p hysterectomy 2/2 cervical cancer.  She has had a hx of kidney stones and hydronephrosis.  She is a former smoker.  She quit smoking 4/10/90.  She smoked about 20 years prior to that.       The following portions of the patient's history were reviewed and updated as appropriate: allergies, current medications, past family history, past medical history, past social history, past surgical history and problem list.    Allergies: Levaquin [levofloxacin] and Tetracycline    Review of Systems   Constitutional: Negative.    HENT: Negative.    Eyes: Negative.    Respiratory: Negative.    Cardiovascular: Negative.    Gastrointestinal: Positive for nausea.   Endocrine: Negative.    Genitourinary: Negative.    Musculoskeletal: Negative.    Skin: Negative.    Allergic/Immunologic: Negative.    Neurological: Negative.    Hematological: Negative.    Psychiatric/Behavioral: Negative.    All other systems reviewed and are negative.            Wt Readings from Last 3 Encounters:   03/15/22 64.9 kg (143 lb)   02/23/22 71.9 kg (158 lb 9.6 oz)   02/23/22 71.7 kg (158 lb)     Temp Readings from Last 3 Encounters:   02/23/22 97.8 °F (36.6 °C) (Temporal)   02/08/22 97.8 °F (36.6 °C) (Oral)   01/31/22 98.2 °F (36.8 °C) (Temporal)     BP Readings from Last 3 Encounters:   03/15/22 135/70   02/23/22 126/72   02/08/22 (!) 89/68     Pulse Readings from Last 3 Encounters:   03/15/22 105   02/23/22 82   02/08/22 84     Body mass index is 23.8 kg/m².  SpO2 Readings from Last 3 Encounters:   03/15/22 99%   02/23/22 99%   02/08/22 96%           Physical Exam  Vitals and nursing note reviewed.   Constitutional:       General: She is not in acute distress.     Appearance: She is well-developed.   HENT:      Head: Normocephalic and atraumatic.      Right Ear: External ear normal.      Left Ear: External ear normal.      Nose: Nose normal.   Eyes:      Conjunctiva/sclera: Conjunctivae normal.      Pupils: Pupils are equal, round, and reactive to light.   Cardiovascular:      Rate and Rhythm: Normal rate and regular rhythm.      Heart sounds: Normal heart sounds.   Pulmonary:      Effort: Pulmonary effort is normal. No respiratory distress.      Breath sounds: Normal breath sounds. No wheezing.   Abdominal:      General: There is no distension.      Palpations: Abdomen is soft.      Tenderness: There is no abdominal tenderness. There is no guarding or rebound.   Musculoskeletal:         General: Normal range of motion.      Cervical back: Normal range of motion and neck supple.      Comments: Normal gait   Skin:     General: Skin is warm and dry.   Neurological:      Mental Status: She is alert and oriented to person, place, and time.   Psychiatric:         Behavior: Behavior normal.         Thought Content: Thought content normal.         Judgment: Judgment normal.         Results for orders placed or performed in visit on 02/05/22   COVID-19,[facility] IN-HOUSE,Nasal Swab No Transport Media 3-4 HR TAT - Swab, Nasal Cavity    Specimen: Nasal Cavity; Swab   Result Value Ref Range    COVID19 Not Detected Not Detected - Ref. Range     Result Review :                  Assessment and Plan    Diagnoses and all orders for this visit:    1. Nausea (Primary)  -     Amylase  -     Lipase  -     Gamma GT  -     CT Abdomen Pelvis With Contrast    2. Unintentional weight loss  -     Amylase  -     Lipase  -     Gamma GT  -     CT Abdomen Pelvis With Contrast    3. Mild protein-calorie malnutrition (HCC)   -     Gamma GT  -     CT Abdomen Pelvis With Contrast       Will  add more labs to blood drawn this am and check ct abd/pelvis.          Outpatient Medications Prior to Visit   Medication Sig Dispense Refill   • Biotin 5000 MCG tablet Take  by mouth Daily.     • calcium carbonate (OS-ULI) 600 MG tablet Take 600 mg by mouth 2 (Two) Times a Day With Meals.     • desoximetasone (TOPICORT) 0.05 % cream APPLY CREAM TOPICALLY TO AFFECTED AREA TWICE DAILY FOR ECZEMA FOR UP TO 14 DAYS     • escitalopram (LEXAPRO) 10 MG tablet Take 0.5 tablets by mouth Every Morning. 45 tablet 3   • estradiol (ESTRACE) 0.1 MG/GM vaginal cream      • glucosamine-chondroitin 500-400 MG capsule capsule Take 1 capsule by mouth 2 (Two) Times a Day With Meals.     • lisinopril (PRINIVIL,ZESTRIL) 10 MG tablet Take 1 tablet by mouth Daily. 90 tablet 3   • oxyCODONE-acetaminophen (PERCOCET) 5-325 MG per tablet 1-2 tablets every 4-6 hours, prn moderate-severe pain 36 tablet 0   • Propylene Glycol (Systane Complete) 0.6 % solution Apply  to eye(s) as directed by provider.     • rosuvastatin (CRESTOR) 20 MG tablet Take 1 tablet by mouth Daily. 90 tablet 3   • vitamin B-12 (CYANOCOBALAMIN) 1000 MCG tablet Take 5,000 mcg by mouth Daily.     • omeprazole (priLOSEC) 40 MG capsule Take 1 capsule by mouth 2 (Two) Times a Day. 180 capsule 0   • apixaban (ELIQUIS) 2.5 MG tablet tablet Take 1 tablet by mouth 2 (Two) Times a Day. 60 tablet 0   • docusate sodium (COLACE) 100 MG capsule Take 1 capsule by mouth 2 (Two) Times a Day As Needed for Constipation. 30 capsule 0   • EPINEPHrine (EPIPEN) 0.3 MG/0.3ML solution auto-injector injection epinephrine 0.3 mg/0.3 mL injection, auto-injector     • predniSONE (DELTASONE) 5 MG tablet 6 tabs day 1, 5 tabs day 2, 4 tabs day 3, 3 tabs day 4, 2 tabs day 5, 1 tab day 6 21 tablet 0   • pregabalin (Lyrica) 75 MG capsule Take 1 capsule by mouth 2 (Two) Times a Day. 60 capsule 0   • senna (SENOKOT) 8.6 MG tablet Take 1 tablet by mouth Every Night. 30 tablet 0     No facility-administered  medications prior to visit.     No orders of the defined types were placed in this encounter.    [unfilled]  Medications Discontinued During This Encounter   Medication Reason   • predniSONE (DELTASONE) 5 MG tablet *Therapy completed   • pregabalin (Lyrica) 75 MG capsule    • docusate sodium (COLACE) 100 MG capsule *Therapy completed   • EPINEPHrine (EPIPEN) 0.3 MG/0.3ML solution auto-injector injection *Therapy completed   • apixaban (ELIQUIS) 2.5 MG tablet tablet *Therapy completed   • senna (SENOKOT) 8.6 MG tablet          Return if symptoms worsen or fail to improve.    Patient was given instructions and counseling regarding her condition or for health maintenance advice. Please see specific information pulled into the AVS if appropriate.

## 2022-03-15 NOTE — THERAPY TREATMENT NOTE
Outpatient Physical Therapy Ortho Treatment Note  FRANCIA Alfaro     Patient Name: Germaine Gonzalez  : 1945  MRN: [id number]  Today's Date: 3/15/2022      Visit Date: 03/15/2022    Visit Dx:    ICD-10-CM ICD-9-CM   1. Status post total right knee replacement  Z96.651 V43.65       Patient Active Problem List   Diagnosis   • Gastroesophageal reflux disease   • Hyperlipidemia   • Pyelonephritis   • Sjogren's syndrome with keratoconjunctivitis sicca (HCC)   • Mcgill's esophagus without dysplasia   • Hyperglycemia   • WALSH (nonalcoholic steatohepatitis)   • Pure hypercholesterolemia   • Knee pain   • Primary osteoarthritis of left knee   • Encounter for screening for malignant neoplasm of colon   • Personal history of colonic polyps   • Effusion of left shoulder joint   • Osteopenia   • Prediabetes   • Essential hypertension   • Primary osteoarthritis of right knee   • Knee instability, right   • S/P total knee arthroplasty        Past Medical History:   Diagnosis Date   • Arthritis     knees   • Mcgill esophagus    • Cancer (HCC)     cervical   • Cervical cancer (HCC)    • Colon polyp    • Eczema    • Elevated liver enzymes    • GERD (gastroesophageal reflux disease)    • H/O stress fracture    • Health care maintenance    • Hypercholesteremia    • Hypertension    • Kidney stones     sched ureteroscopy, lithotripsy   • Knee swelling    • Ocular migraine    • PONV (postoperative nausea and vomiting)    • Postmenopausal    • Seasonal allergies    • Sjogren's syndrome (HCC)     autoimmune disorder         Past Surgical History:   Procedure Laterality Date   •  SECTION      x2   • COLONOSCOPY     • COLONOSCOPY N/A 2021    Procedure: COLONOSCOPY;  Surgeon: Bc Sanchez MD;  Location: [facility];  Service: Gastroenterology;  Laterality: N/A;  Diverticulosis and polyps   • CYSTOSCOPY URETEROSCOPY STONE MANIPULATION/EXTRACTION Left 10/14/2016    Procedure: LEFT URETEROSCOPY bilateral  "retrograde pyleogram LEFT STENT PLACEMENT.;  Surgeon: Julius Darling MD;  Location: [facility] OR;  Service:    • CYSTOSCOPY W/ URETERAL STENT PLACEMENT Right 8/3/2018    Procedure: CYSTOSCOPY URETERAL CATHETER/STENT INSERTION;  Surgeon: Farzad Cordova MD;  Location: [facility] OR;  Service: Urology   • ENDOSCOPY N/A 4/10/2019    Procedure: ESOPHAGOGASTRODUODENOSCOPY;  Surgeon: Bc Sanchez MD;  Location: [facility] OR;  Service: Gastroenterology   • HYSTERECTOMY     • TOTAL KNEE ARTHROPLASTY Right 2/8/2022    Procedure: TOTAL KNEE ARTHROPLASTY AND ALL ASSOCIATED PROCEDURES;  Surgeon: Jovany Villanueva MD;  Location: [facility] OR;  Service: Orthopedics;  Laterality: Right;   • UPPER GASTROINTESTINAL ENDOSCOPY     • URETEROSCOPY LASER LITHOTRIPSY WITH STENT INSERTION Left 10/26/2016    Procedure: LT URETEROSCOPY LASER LITHOTRIPSY ;  Surgeon: Julius Darling MD;  Location: [facility];  Service:         PT Ortho     Row Name 03/15/22 0900       Right Lower Ext    Rt Knee Extension/Flexion AROM 0-1-126 degrees  -AS          User Key  (r) = Recorded By, (t) = Taken By, (c) = Cosigned By    Initials Name Provider Type    AS Narinder Chapman, PT Physical Therapist                             PT Assessment/Plan     Row Name 03/15/22 0900          PT Assessment    Assessment Comments Patient continues to do very well with PT at this time. Patient had to leave her treatment session early today due to an appointment with her PCP this morning.  -AS            PT Plan    PT Plan Comments Continue with current treatment plan.  -AS           User Key  (r) = Recorded By, (t) = Taken By, (c) = Cosigned By    Initials Name Provider Type    AS Narinder Chapman, PT Physical Therapist                   OP Exercises     Row Name 03/15/22 0900             Subjective Comments    Subjective Comments Patient states her knee is doing \"very well\". She states she has an MD appointment this am with her PCP for " "something \"non related to the knee\".  -AS              Exercise 1    Exercise Name 1 Heel Slides  -AS      Time 1 5 min  -AS              Exercise 2    Exercise Name 2 Wall Slides  -AS      Time 2 5 min  -AS              Exercise 3    Exercise Name 3 Bike  -AS              Exercise 4    Exercise Name 4 Manual Flexion  -AS      Reps 4 10  -AS      Time 4 10 sec hold each  -AS              Exercise 5    Exercise Name 5 HS Stretch  -AS      Reps 5 10  -AS      Time 5 10 sec hold each  -AS              Exercise 6    Exercise Name 6 Heel Prop  -AS      Time 6 5 min  -AS      Additional Comments 1#  -AS              Exercise 7    Exercise Name 7 Manual Extension  -AS      Reps 7 10  -AS      Time 7 10 sec hold each  -AS              Exercise 8    Exercise Name 8 QS (ankle)  -AS      Reps 8 25  -AS      Time 8 5 sec hold each  -AS              Exercise 9    Exercise Name 9 3-Way Hip  -AS      Reps 9 25 each  -AS      Time 9 1#  -AS              Exercise 10    Exercise Name 10 LAQ  -AS      Reps 10 25  -AS      Time 10 1#  -AS              Exercise 11    Exercise Name 11 TKE  -AS      Reps 11 --  -AS      Time 11 --  -AS              Exercise 12    Exercise Name 12 Heel Raises  -AS      Reps 12 --  -AS              Exercise 13    Exercise Name 13 Mini Squats  -AS      Reps 13 --  -AS              Exercise 14    Exercise Name 14 FWD Step Ups - 6 inch step  -AS      Reps 14 --  -AS              Exercise 15    Exercise Name 15 Lateral Step Overs - 6 inch step  -AS      Reps 15 --  -AS            User Key  (r) = Recorded By, (t) = Taken By, (c) = Cosigned By    Initials Name Provider Type    AS Narinder Chapman PT Physical Therapist                                                Time Calculation:   Start Time: 0905  Stop Time: 0931  Time Calculation (min): 26 min  Therapy Charges for Today     Code Description Service Date Service Provider Modifiers Qty    [id number]  PT THER PROC EA 15 MIN 3/15/2022 Narinder Chapman " Yuri, PT GP 1                    Narinder Chapman, PT  3/15/2022

## 2022-03-17 ENCOUNTER — HOSPITAL ENCOUNTER (OUTPATIENT)
Dept: PHYSICAL THERAPY | Facility: HOSPITAL | Age: 77
Setting detail: THERAPIES SERIES
Discharge: HOME | End: 2022-03-17

## 2022-03-17 PROCEDURE — 97110 THERAPEUTIC EXERCISES: CPT

## 2022-03-17 NOTE — THERAPY TREATMENT NOTE
Outpatient Physical Therapy Ortho Treatment Note  FRANCIA Alfaro     Patient Name: Germaine Gonzalez  : 1945  MRN: [id number]  Today's Date: 3/17/2022      Visit Date: 2022    Visit Dx:  No diagnosis found.    Patient Active Problem List   Diagnosis   • Gastroesophageal reflux disease   • Hyperlipidemia   • Pyelonephritis   • Sjogren's syndrome with keratoconjunctivitis sicca (HCC)   • Mcgill's esophagus without dysplasia   • Hyperglycemia   • WALSH (nonalcoholic steatohepatitis)   • Pure hypercholesterolemia   • Knee pain   • Primary osteoarthritis of left knee   • Encounter for screening for malignant neoplasm of colon   • Personal history of colonic polyps   • Effusion of left shoulder joint   • Osteopenia   • Prediabetes   • Essential hypertension   • Primary osteoarthritis of right knee   • Knee instability, right   • S/P total knee arthroplasty        Past Medical History:   Diagnosis Date   • Arthritis     knees   • Mcgill esophagus    • Cancer (HCC)     cervical   • Cervical cancer (HCC)    • Colon polyp    • Eczema    • Elevated liver enzymes    • GERD (gastroesophageal reflux disease)    • H/O stress fracture    • Health care maintenance    • Hypercholesteremia    • Hypertension    • Kidney stones     sched ureteroscopy, lithotripsy   • Knee swelling    • Ocular migraine    • PONV (postoperative nausea and vomiting)    • Postmenopausal    • Seasonal allergies    • Sjogren's syndrome (HCC)     autoimmune disorder         Past Surgical History:   Procedure Laterality Date   •  SECTION      x2   • COLONOSCOPY     • COLONOSCOPY N/A 2021    Procedure: COLONOSCOPY;  Surgeon: Bc Sanchez MD;  Location: [facility] OR;  Service: Gastroenterology;  Laterality: N/A;  Diverticulosis and polyps   • CYSTOSCOPY URETEROSCOPY STONE MANIPULATION/EXTRACTION Left 10/14/2016    Procedure: LEFT URETEROSCOPY bilateral retrograde pyleogram LEFT STENT PLACEMENT.;  Surgeon: Julius Darling  MD;  Location: [facility];  Service:    • CYSTOSCOPY W/ URETERAL STENT PLACEMENT Right 8/3/2018    Procedure: CYSTOSCOPY URETERAL CATHETER/STENT INSERTION;  Surgeon: Farzad Cordova MD;  Location: [facility];  Service: Urology   • ENDOSCOPY N/A 4/10/2019    Procedure: ESOPHAGOGASTRODUODENOSCOPY;  Surgeon: Bc Sanchez MD;  Location: [facility] OR;  Service: Gastroenterology   • HYSTERECTOMY     • TOTAL KNEE ARTHROPLASTY Right 2/8/2022    Procedure: TOTAL KNEE ARTHROPLASTY AND ALL ASSOCIATED PROCEDURES;  Surgeon: Jovany Villanueva MD;  Location: [facility];  Service: Orthopedics;  Laterality: Right;   • UPPER GASTROINTESTINAL ENDOSCOPY     • URETEROSCOPY LASER LITHOTRIPSY WITH STENT INSERTION Left 10/26/2016    Procedure: LT URETEROSCOPY LASER LITHOTRIPSY ;  Surgeon: Julius Darling MD;  Location: [facility];  Service:                         PT Assessment/Plan     Row Name 03/17/22 1030          PT Assessment    Assessment Comments Pt tolerated treatment session well; continue to push extension ROM  -KM            PT Plan    PT Plan Comments Continue per POC  -KM           User Key  (r) = Recorded By, (t) = Taken By, (c) = Cosigned By    Initials Name Provider Type    Lina Sanches, PTA Physical Therapy Assistant                   OP Exercises     Row Name 03/17/22 1030             Subjective Comments    Subjective Comments Pt states her knee is sore but doing well  -KM              Exercise 1    Exercise Name 1 Heel Slides  -KM      Time 1 5 min  -KM              Exercise 2    Exercise Name 2 Wall Slides  -KM      Time 2 5 min  -KM              Exercise 3    Exercise Name 3 Bike  -KM              Exercise 4    Exercise Name 4 Manual Flexion  -KM      Reps 4 10  -KM      Time 4 10 sec hold each  -KM              Exercise 5    Exercise Name 5 HS Stretch  -KM      Reps 5 10  -KM      Time 5 10 sec hold each  -KM              Exercise 6    Exercise Name 6 Heel Prop  -KM      Time 6 5 min  -KM               Exercise 7    Exercise Name 7 Manual Extension  -KM      Reps 7 10  -KM      Time 7 10 sec hold each  -KM              Exercise 8    Exercise Name 8 QS (ankle)  -KM      Reps 8 25  -KM      Time 8 5 sec hold each  -KM              Exercise 9    Exercise Name 9 3-Way Hip  -KM      Reps 9 25 each  -KM      Time 9 1#  -KM              Exercise 10    Exercise Name 10 LAQ  -KM      Reps 10 25  -KM      Time 10 1#  -KM              Exercise 11    Exercise Name 11 TKE  -KM              Exercise 12    Exercise Name 12 Heel Raises  -KM              Exercise 13    Exercise Name 13 Mini Squats  -KM              Exercise 14    Exercise Name 14 FWD Step Ups - 6 inch step  -KM              Exercise 15    Exercise Name 15 Lateral Step Overs - 6 inch step  -KM            User Key  (r) = Recorded By, (t) = Taken By, (c) = Cosigned By    Initials Name Provider Type    Lina Sanches PTA Physical Therapy Assistant                                                Time Calculation:   Start Time: 1030  Stop Time: 1125  Time Calculation (min): 55 min  Therapy Charges for Today     Code Description Service Date Service Provider Modifiers Qty    [id number] HC PT THER PROC EA 15 MIN 3/17/2022 Lina Cage PTA GP 2                    Lina Cage PTA  3/17/2022      PRINCIPAL DISCHARGE DIAGNOSIS  Diagnosis: Status post transesophageal echocardiogram (J LUIS)  Assessment and Plan of Treatment: -you had a normal J LUIS with Dr. Barone  -continue to follow up with your Cardiologist as needed  -Continue your home medications

## 2022-03-21 ENCOUNTER — HOSPITAL ENCOUNTER (OUTPATIENT)
Dept: PHYSICAL THERAPY | Facility: HOSPITAL | Age: 77
Setting detail: THERAPIES SERIES
Discharge: HOME | End: 2022-03-21

## 2022-03-21 PROCEDURE — 97110 THERAPEUTIC EXERCISES: CPT

## 2022-03-21 NOTE — THERAPY TREATMENT NOTE
Outpatient Physical Therapy Ortho Treatment Note  FRANCIA Alfaro     Patient Name: Germaine Gonzalez  : 1945  MRN: [id number]  Today's Date: 3/21/2022      Visit Date: 2022    Visit Dx:  No diagnosis found.    Patient Active Problem List   Diagnosis   • Gastroesophageal reflux disease   • Hyperlipidemia   • Pyelonephritis   • Sjogren's syndrome with keratoconjunctivitis sicca (HCC)   • Mcgill's esophagus without dysplasia   • Hyperglycemia   • WALSH (nonalcoholic steatohepatitis)   • Pure hypercholesterolemia   • Knee pain   • Primary osteoarthritis of left knee   • Encounter for screening for malignant neoplasm of colon   • Personal history of colonic polyps   • Effusion of left shoulder joint   • Osteopenia   • Prediabetes   • Essential hypertension   • Primary osteoarthritis of right knee   • Knee instability, right   • S/P total knee arthroplasty        Past Medical History:   Diagnosis Date   • Arthritis     knees   • Mcgill esophagus    • Cancer (HCC)     cervical   • Cervical cancer (HCC)    • Colon polyp    • Eczema    • Elevated liver enzymes    • GERD (gastroesophageal reflux disease)    • H/O stress fracture    • Health care maintenance    • Hypercholesteremia    • Hypertension    • Kidney stones     sched ureteroscopy, lithotripsy   • Knee swelling    • Ocular migraine    • PONV (postoperative nausea and vomiting)    • Postmenopausal    • Seasonal allergies    • Sjogren's syndrome (HCC)     autoimmune disorder         Past Surgical History:   Procedure Laterality Date   •  SECTION      x2   • COLONOSCOPY     • COLONOSCOPY N/A 2021    Procedure: COLONOSCOPY;  Surgeon: Bc Sanchez MD;  Location: [facility] OR;  Service: Gastroenterology;  Laterality: N/A;  Diverticulosis and polyps   • CYSTOSCOPY URETEROSCOPY STONE MANIPULATION/EXTRACTION Left 10/14/2016    Procedure: LEFT URETEROSCOPY bilateral retrograde pyleogram LEFT STENT PLACEMENT.;  Surgeon: Julius Darling  MD;  Location: [facility];  Service:    • CYSTOSCOPY W/ URETERAL STENT PLACEMENT Right 8/3/2018    Procedure: CYSTOSCOPY URETERAL CATHETER/STENT INSERTION;  Surgeon: Farzad Cordova MD;  Location[facility];  Service: Urology   • ENDOSCOPY N/A 4/10/2019    Procedure: ESOPHAGOGASTRODUODENOSCOPY;  Surgeon: Bc Sanchez MD;  Location: [facility] OR;  Service: Gastroenterology   • HYSTERECTOMY     • TOTAL KNEE ARTHROPLASTY Right 2/8/2022    Procedure: TOTAL KNEE ARTHROPLASTY AND ALL ASSOCIATED PROCEDURES;  Surgeon: Jovany Villanueva MD;  Location: [facility];  Service: Orthopedics;  Laterality: Right;   • UPPER GASTROINTESTINAL ENDOSCOPY     • URETEROSCOPY LASER LITHOTRIPSY WITH STENT INSERTION Left 10/26/2016    Procedure: LT URETEROSCOPY LASER LITHOTRIPSY ;  Surgeon: Julius Darling MD;  Location: [facility];  Service:                         PT Assessment/Plan     Row Name 03/21/22 1000          PT Assessment    Assessment Comments Pt continues to tolerate progression of ther ex well. Tightness noted with extension  -KM            PT Plan    PT Plan Comments Continue per POC. Pt to see MD after next visit  -KM           User Key  (r) = Recorded By, (t) = Taken By, (c) = Cosigned By    Initials Name Provider Type    Lina Sanches, PTA Physical Therapy Assistant                   OP Exercises     Row Name 03/21/22 1000             Subjective Comments    Subjective Comments Pt states both of her knees are sore.  -KM              Exercise 1    Exercise Name 1 Heel Slides  -KM      Time 1 5 min  -KM              Exercise 2    Exercise Name 2 Wall Slides  -KM      Time 2 5 min  -KM              Exercise 3    Exercise Name 3 Bike  -KM              Exercise 4    Exercise Name 4 Manual Flexion  -KM      Reps 4 10  -KM      Time 4 10 sec hold each  -KM              Exercise 5    Exercise Name 5 HS Stretch  -KM      Reps 5 10  -KM      Time 5 10 sec hold each  -KM              Exercise 6    Exercise  Name 6 Heel Prop  -KM      Time 6 5 min  -KM      Additional Comments 2#  -KM              Exercise 7    Exercise Name 7 Manual Extension  -KM      Reps 7 10  -KM      Time 7 10 sec hold each  -KM              Exercise 8    Exercise Name 8 QS (ankle)  -KM      Reps 8 25  -KM      Time 8 5 sec hold each  -KM              Exercise 9    Exercise Name 9 3-Way Hip  -KM      Reps 9 25 each  -KM      Time 9 2#  -KM              Exercise 10    Exercise Name 10 LAQ  -KM      Reps 10 25  -KM      Time 10 2#  -KM              Exercise 11    Exercise Name 11 TKE  -KM              Exercise 12    Exercise Name 12 Heel Raises  -KM              Exercise 13    Exercise Name 13 Mini Squats  -KM              Exercise 14    Exercise Name 14 FWD Step Ups - 6 inch step  -KM              Exercise 15    Exercise Name 15 Lateral Step Overs - 6 inch step  -KM            User Key  (r) = Recorded By, (t) = Taken By, (c) = Cosigned By    Initials Name Provider Type    Lina Sanches PTA Physical Therapy Assistant                                                Time Calculation:   Start Time: 1000  Stop Time: 1100  Time Calculation (min): 60 min  Therapy Charges for Today     Code Description Service Date Service Provider Modifiers Qty    [id number] HC PT THER PROC EA 15 MIN 3/21/2022 Lina Cage PTA GP 2                    Lina Cage PTA  3/21/2022

## 2022-03-22 ENCOUNTER — OFFICE VISIT (OUTPATIENT)
Dept: INTERNAL MEDICINE | Facility: CLINIC | Age: 77
End: 2022-03-22

## 2022-03-22 VITALS
DIASTOLIC BLOOD PRESSURE: 68 MMHG | WEIGHT: 146 LBS | TEMPERATURE: 96.9 F | SYSTOLIC BLOOD PRESSURE: 110 MMHG | HEART RATE: 95 BPM | OXYGEN SATURATION: 100 % | HEIGHT: 65 IN | RESPIRATION RATE: 18 BRPM | BODY MASS INDEX: 24.32 KG/M2

## 2022-03-22 DIAGNOSIS — IMO0001 LUNG NODULE < 6CM ON CT: Primary | ICD-10-CM

## 2022-03-22 DIAGNOSIS — K21.00 GASTROESOPHAGEAL REFLUX DISEASE WITH ESOPHAGITIS, UNSPECIFIED WHETHER HEMORRHAGE: ICD-10-CM

## 2022-03-22 DIAGNOSIS — I10 ESSENTIAL HYPERTENSION: ICD-10-CM

## 2022-03-22 DIAGNOSIS — R63.4 UNINTENTIONAL WEIGHT LOSS: ICD-10-CM

## 2022-03-22 DIAGNOSIS — E78.2 MIXED HYPERLIPIDEMIA: ICD-10-CM

## 2022-03-22 PROCEDURE — 99215 OFFICE O/P EST HI 40 MIN: CPT | Performed by: INTERNAL MEDICINE

## 2022-03-22 RX ORDER — ESCITALOPRAM OXALATE 10 MG/1
5 TABLET ORAL EVERY MORNING
Qty: 45 TABLET | Refills: 3 | Status: SHIPPED | OUTPATIENT
Start: 2022-03-22

## 2022-03-22 RX ORDER — ROSUVASTATIN CALCIUM 20 MG/1
20 TABLET, COATED ORAL DAILY
Qty: 90 TABLET | Refills: 3 | Status: SHIPPED | OUTPATIENT
Start: 2022-03-22 | End: 2023-04-03

## 2022-03-22 RX ORDER — OXYCODONE HYDROCHLORIDE AND ACETAMINOPHEN 5; 325 MG/1; MG/1
TABLET ORAL
COMMUNITY
Start: 2022-03-07 | End: 2022-03-22

## 2022-03-22 RX ORDER — OMEPRAZOLE 40 MG/1
40 CAPSULE, DELAYED RELEASE ORAL 2 TIMES DAILY
Qty: 180 CAPSULE | Refills: 0 | Status: SHIPPED | OUTPATIENT
Start: 2022-03-22 | End: 2022-06-27

## 2022-03-22 RX ORDER — LISINOPRIL 10 MG/1
10 TABLET ORAL DAILY
Qty: 90 TABLET | Refills: 3 | Status: SHIPPED | OUTPATIENT
Start: 2022-03-22

## 2022-03-22 NOTE — PROGRESS NOTES
Germaine Gonzalez is a 76 y.o. female, who presents with a chief complaint of   Chief Complaint   Patient presents with   • Nausea     Has no appetite   • Hyperlipidemia           HPI   Pt here for follow up with her .  Weight up but pt barely eating.  Last time she was here we checked labs and mild ast elevation.  ast and bilirubin normal.  ggt mildly elevated.  amylase and lipase were normal.  BM's are a little more normal but still often yellow.  If she throws up it is yellow.  Ct abdomen last week showed some chronic inflammation of the ascending colon.  She has had c. Diff in the past and she reports that this is very different from that.      In September 2021 pt weighed 180lbs and weight currently in the 140's.  She has had chronic nausea, intermittent vomiting and diarrhea.  She just has no appetite and doesn't even want to look at food.   Initially pt was taking lots of nsaids for her knee.  nsaids stopped and ppi optimized and no help.  She has since had knee replacement.  No abd pain.   She is a former smoker with a 8 pack year history and she quit in 1990.3r      The following portions of the patient's history were reviewed and updated as appropriate: allergies, current medications, past family history, past medical history, past social history, past surgical history and problem list.    Allergies: Levaquin [levofloxacin] and Tetracycline    Review of Systems   Constitutional: Positive for unexpected weight change.   HENT: Negative.    Eyes: Negative.    Respiratory: Negative.    Cardiovascular: Negative.    Gastrointestinal: Positive for nausea.   Endocrine: Negative.    Genitourinary: Negative.    Musculoskeletal: Negative.    Skin: Negative.    Allergic/Immunologic: Negative.    Neurological: Negative.    Hematological: Negative.    Psychiatric/Behavioral: Negative.    All other systems reviewed and are negative.            Wt Readings from Last 3 Encounters:   03/22/22 66.2 kg (146 lb)    03/15/22 64.9 kg (143 lb)   02/23/22 71.9 kg (158 lb 9.6 oz)     Temp Readings from Last 3 Encounters:   03/22/22 96.9 °F (36.1 °C) (Tympanic)   02/23/22 97.8 °F (36.6 °C) (Temporal)   02/08/22 97.8 °F (36.6 °C) (Oral)     BP Readings from Last 3 Encounters:   03/22/22 110/68   03/15/22 135/70   02/23/22 126/72     Pulse Readings from Last 3 Encounters:   03/22/22 95   03/15/22 105   02/23/22 82     Body mass index is 24.3 kg/m².  SpO2 Readings from Last 3 Encounters:   03/22/22 100%   03/15/22 99%   02/23/22 99%          Physical Exam  Vitals and nursing note reviewed.   Constitutional:       General: She is not in acute distress.     Appearance: She is well-developed.   HENT:      Head: Normocephalic and atraumatic.      Right Ear: External ear normal.      Left Ear: External ear normal.      Nose: Nose normal.   Eyes:      Conjunctiva/sclera: Conjunctivae normal.      Pupils: Pupils are equal, round, and reactive to light.   Cardiovascular:      Rate and Rhythm: Normal rate and regular rhythm.      Heart sounds: Normal heart sounds.   Pulmonary:      Effort: Pulmonary effort is normal. No respiratory distress.      Breath sounds: Normal breath sounds. No wheezing.   Musculoskeletal:         General: Normal range of motion.      Cervical back: Normal range of motion and neck supple.      Comments: Normal gait   Skin:     General: Skin is warm and dry.   Neurological:      Mental Status: She is alert and oriented to person, place, and time.   Psychiatric:         Behavior: Behavior normal.         Thought Content: Thought content normal.         Judgment: Judgment normal.         Results for orders placed or performed in visit on 03/12/22   Comprehensive Metabolic Panel    Specimen: Blood   Result Value Ref Range    Glucose 104 (H) 65 - 99 mg/dL    BUN 10 8 - 27 mg/dL    Creatinine 0.87 0.57 - 1.00 mg/dL    EGFR Result 70 >59 mL/min/1.73    BUN/Creatinine Ratio 11 (L) 12 - 28    Sodium 141 134 - 144 mmol/L     Potassium 4.4 3.5 - 5.2 mmol/L    Chloride 100 96 - 106 mmol/L    Total CO2 19 (L) 20 - 29 mmol/L    Calcium 9.9 8.7 - 10.3 mg/dL    Total Protein 7.5 6.0 - 8.5 g/dL    Albumin 4.8 (H) 3.7 - 4.7 g/dL    Globulin 2.7 1.5 - 4.5 g/dL    A/G Ratio 1.8 1.2 - 2.2    Total Bilirubin 0.8 0.0 - 1.2 mg/dL    Alkaline Phosphatase 103 44 - 121 IU/L    AST (SGOT) 51 (H) 0 - 40 IU/L    ALT (SGPT) 27 0 - 32 IU/L   Lipid Panel With LDL / HDL Ratio    Specimen: Blood   Result Value Ref Range    Total Cholesterol 201 (H) 100 - 199 mg/dL    Triglycerides 146 0 - 149 mg/dL    HDL Cholesterol 49 >39 mg/dL    VLDL Cholesterol Phil 26 5 - 40 mg/dL    LDL Chol Calc (NIH) 126 (H) 0 - 99 mg/dL    LDL/HDL RATIO 2.6 0.0 - 3.2 ratio   Hemoglobin A1c    Specimen: Blood   Result Value Ref Range    Hemoglobin A1C 5.3 4.8 - 5.6 %   Gamma GT   Result Value Ref Range     (H) 0 - 60 IU/L   Amylase   Result Value Ref Range    Amylase 72 31 - 110 U/L   Lipase   Result Value Ref Range    Lipase 34 14 - 85 U/L   CBC & Differential    Specimen: Blood   Result Value Ref Range    WBC 4.9 3.4 - 10.8 x10E3/uL    RBC 4.36 3.77 - 5.28 x10E6/uL    Hemoglobin 14.5 11.1 - 15.9 g/dL    Hematocrit 42.7 34.0 - 46.6 %    MCV 98 (H) 79 - 97 fL    MCH 33.3 (H) 26.6 - 33.0 pg    MCHC 34.0 31.5 - 35.7 g/dL    RDW 12.9 11.7 - 15.4 %    Platelets 286 150 - 450 x10E3/uL    Neutrophil Rel % 59 Not Estab. %    Lymphocyte Rel % 27 Not Estab. %    Monocyte Rel % 11 Not Estab. %    Eosinophil Rel % 2 Not Estab. %    Basophil Rel % 1 Not Estab. %    Neutrophils Absolute 2.9 1.4 - 7.0 x10E3/uL    Lymphocytes Absolute 1.3 0.7 - 3.1 x10E3/uL    Monocytes Absolute 0.5 0.1 - 0.9 x10E3/uL    Eosinophils Absolute 0.1 0.0 - 0.4 x10E3/uL    Basophils Absolute 0.0 0.0 - 0.2 x10E3/uL    Immature Granulocyte Rel % 0 Not Estab. %    Immature Grans Absolute 0.0 0.0 - 0.1 x10E3/uL     Result Review :                  Assessment and Plan    Diagnoses and all orders for this visit:    1. Lung  nodule < 6mm on CT (Primary)  -     CT Chest With Contrast; Future    2. Essential hypertension  -     lisinopril (PRINIVIL,ZESTRIL) 10 MG tablet; Take 1 tablet by mouth Daily.  Dispense: 90 tablet; Refill: 3    3. Gastroesophageal reflux disease with esophagitis, unspecified whether hemorrhage  -     omeprazole (priLOSEC) 40 MG capsule; Take 1 capsule by mouth 2 (Two) Times a Day.  Dispense: 180 capsule; Refill: 0    4. Mixed hyperlipidemia  -     rosuvastatin (CRESTOR) 20 MG tablet; Take 1 tablet by mouth Daily.  Dispense: 90 tablet; Refill: 3    5. Unintentional weight loss  -     CT Chest With Contrast; Future    Other orders  -     escitalopram (LEXAPRO) 10 MG tablet; Take 0.5 tablets by mouth Every Morning.  Dispense: 45 tablet; Refill: 3     case discussed with dr. Sanchez.  His office will contact pt for scope.    I spent 40 minutes caring for Germaine on this date of service. This time includes time spent by me in the following activities:preparing for the visit, reviewing tests, obtaining and/or reviewing a separately obtained history, performing a medically appropriate examination and/or evaluation , counseling and educating the patient/family/caregiver, ordering medications, tests, or procedures, referring and communicating with other health care professionals , documenting information in the medical record and care coordination      Outpatient Medications Prior to Visit   Medication Sig Dispense Refill   • Biotin 5000 MCG tablet Take  by mouth Daily.     • calcium carbonate (OS-ULI) 600 MG tablet Take 600 mg by mouth 2 (Two) Times a Day With Meals.     • desoximetasone (TOPICORT) 0.05 % cream APPLY CREAM TOPICALLY TO AFFECTED AREA TWICE DAILY FOR ECZEMA FOR UP TO 14 DAYS     • estradiol (ESTRACE) 0.1 MG/GM vaginal cream      • glucosamine-chondroitin 500-400 MG capsule capsule Take 1 capsule by mouth 2 (Two) Times a Day With Meals.     • oxyCODONE-acetaminophen (PERCOCET) 5-325 MG per tablet 1-2 tablets  every 4-6 hours, prn moderate-severe pain 36 tablet 0   • Propylene Glycol (Systane Complete) 0.6 % solution Apply  to eye(s) as directed by provider.     • vitamin B-12 (CYANOCOBALAMIN) 1000 MCG tablet Take 5,000 mcg by mouth Daily.     • escitalopram (LEXAPRO) 10 MG tablet Take 0.5 tablets by mouth Every Morning. 45 tablet 3   • lisinopril (PRINIVIL,ZESTRIL) 10 MG tablet Take 1 tablet by mouth Daily. 90 tablet 3   • omeprazole (priLOSEC) 40 MG capsule Take 1 capsule by mouth 2 (Two) Times a Day. 180 capsule 0   • rosuvastatin (CRESTOR) 20 MG tablet Take 1 tablet by mouth Daily. 90 tablet 3   • oxyCODONE-acetaminophen (PERCOCET) 5-325 MG per tablet 1/2 a tablet 4 times a day as needed       No facility-administered medications prior to visit.     New Medications Ordered This Visit   Medications   • escitalopram (LEXAPRO) 10 MG tablet     Sig: Take 0.5 tablets by mouth Every Morning.     Dispense:  45 tablet     Refill:  3   • lisinopril (PRINIVIL,ZESTRIL) 10 MG tablet     Sig: Take 1 tablet by mouth Daily.     Dispense:  90 tablet     Refill:  3   • omeprazole (priLOSEC) 40 MG capsule     Sig: Take 1 capsule by mouth 2 (Two) Times a Day.     Dispense:  180 capsule     Refill:  0   • rosuvastatin (CRESTOR) 20 MG tablet     Sig: Take 1 tablet by mouth Daily.     Dispense:  90 tablet     Refill:  3     [unfilled]  Medications Discontinued During This Encounter   Medication Reason   • oxyCODONE-acetaminophen (PERCOCET) 5-325 MG per tablet *Re-Entry   • rosuvastatin (CRESTOR) 20 MG tablet Reorder   • omeprazole (priLOSEC) 40 MG capsule Reorder   • lisinopril (PRINIVIL,ZESTRIL) 10 MG tablet Reorder   • escitalopram (LEXAPRO) 10 MG tablet Reorder         Return in about 4 weeks (around 4/19/2022) for Recheck.    Patient was given instructions and counseling regarding her condition or for health maintenance advice. Please see specific information pulled into the AVS if appropriate.

## 2022-03-23 ENCOUNTER — TELEPHONE (OUTPATIENT)
Dept: GASTROENTEROLOGY | Facility: CLINIC | Age: 77
End: 2022-03-23

## 2022-03-23 ENCOUNTER — HOSPITAL ENCOUNTER (OUTPATIENT)
Dept: PHYSICAL THERAPY | Facility: HOSPITAL | Age: 77
Setting detail: THERAPIES SERIES
Discharge: HOME | End: 2022-03-23

## 2022-03-23 ENCOUNTER — OFFICE VISIT (OUTPATIENT)
Dept: ORTHOPEDIC SURGERY | Facility: CLINIC | Age: 77
End: 2022-03-23

## 2022-03-23 VITALS — HEIGHT: 65 IN | WEIGHT: 146 LBS | BODY MASS INDEX: 24.32 KG/M2

## 2022-03-23 DIAGNOSIS — Z96.651 STATUS POST TOTAL RIGHT KNEE REPLACEMENT: Primary | ICD-10-CM

## 2022-03-23 DIAGNOSIS — R63.4 WEIGHT LOSS: ICD-10-CM

## 2022-03-23 DIAGNOSIS — R13.19 ESOPHAGEAL DYSPHAGIA: Primary | ICD-10-CM

## 2022-03-23 PROCEDURE — 73562 X-RAY EXAM OF KNEE 3: CPT | Performed by: ORTHOPAEDIC SURGERY

## 2022-03-23 PROCEDURE — 97110 THERAPEUTIC EXERCISES: CPT

## 2022-03-23 PROCEDURE — 99024 POSTOP FOLLOW-UP VISIT: CPT | Performed by: ORTHOPAEDIC SURGERY

## 2022-03-23 RX ORDER — OXYCODONE HYDROCHLORIDE AND ACETAMINOPHEN 5; 325 MG/1; MG/1
1 TABLET ORAL EVERY 4 HOURS PRN
Qty: 42 TABLET | Refills: 0 | Status: SHIPPED | OUTPATIENT
Start: 2022-03-23 | End: 2022-04-22 | Stop reason: SDUPTHER

## 2022-03-23 RX ORDER — PREDNISONE 10 MG/1
TABLET ORAL
Qty: 39 TABLET | Refills: 0 | Status: SHIPPED | OUTPATIENT
Start: 2022-03-23 | End: 2022-04-05

## 2022-03-23 RX ORDER — CYCLOBENZAPRINE HCL 5 MG
5 TABLET ORAL 3 TIMES DAILY PRN
Qty: 45 TABLET | Refills: 0 | Status: SHIPPED | OUTPATIENT
Start: 2022-03-23 | End: 2022-04-05

## 2022-03-23 NOTE — TELEPHONE ENCOUNTER
SENT SECURE CHAT TO DR. VASQUES:    Checking on order for EGD.  Was told Dr. Boswell spoke with you.  Patient lost 25 pounds can not explain why.  Can't eat at times and diarrhea.  She states not reflux issues.  She does Mcgill's.  Recall for EGD 04/2022.  Order, please.

## 2022-03-23 NOTE — TELEPHONE ENCOUNTER
Spoke with patient.  Scheduled at [place] on 04/06/2022 arrive 2:45pm.      COVID test 04/04/2022 at 10am.  Need to self quarantine until after procedure.  She understands.

## 2022-03-23 NOTE — THERAPY TREATMENT NOTE
Outpatient Physical Therapy Ortho Treatment Note  FRANCIA Alfaro     Patient Name: Germaine Gonzalez  : 1945  MRN: [id number]  Today's Date: 3/23/2022      Visit Date: 2022    Visit Dx:  No diagnosis found.    Patient Active Problem List   Diagnosis   • Gastroesophageal reflux disease   • Hyperlipidemia   • Pyelonephritis   • Sjogren's syndrome with keratoconjunctivitis sicca (HCC)   • Mcgill's esophagus without dysplasia   • Hyperglycemia   • WALSH (nonalcoholic steatohepatitis)   • Pure hypercholesterolemia   • Knee pain   • Primary osteoarthritis of left knee   • Encounter for screening for malignant neoplasm of colon   • Personal history of colonic polyps   • Effusion of left shoulder joint   • Osteopenia   • Prediabetes   • Essential hypertension   • Primary osteoarthritis of right knee   • Knee instability, right   • S/P total knee arthroplasty        Past Medical History:   Diagnosis Date   • Arthritis     knees   • Mcgill esophagus    • Cancer (HCC)     cervical   • Cervical cancer (HCC)    • Colon polyp    • Eczema    • Elevated liver enzymes    • GERD (gastroesophageal reflux disease)    • H/O stress fracture    • Health care maintenance    • Hypercholesteremia    • Hypertension    • Kidney stones     sched ureteroscopy, lithotripsy   • Knee swelling    • Ocular migraine    • PONV (postoperative nausea and vomiting)    • Postmenopausal    • Seasonal allergies    • Sjogren's syndrome (HCC)     autoimmune disorder         Past Surgical History:   Procedure Laterality Date   •  SECTION      x2   • COLONOSCOPY     • COLONOSCOPY N/A 2021    Procedure: COLONOSCOPY;  Surgeon: Bc Sanchez MD;  Location: [facility] OR;  Service: Gastroenterology;  Laterality: N/A;  Diverticulosis and polyps   • CYSTOSCOPY URETEROSCOPY STONE MANIPULATION/EXTRACTION Left 10/14/2016    Procedure: LEFT URETEROSCOPY bilateral retrograde pyleogram LEFT STENT PLACEMENT.;  Surgeon: Julius Darling  MD;  Location: [facility] OR;  Service:    • CYSTOSCOPY W/ URETERAL STENT PLACEMENT Right 8/3/2018    Procedure: CYSTOSCOPY URETERAL CATHETER/STENT INSERTION;  Surgeon: Farzad Cordova MD;  Location: [facility] OR;  Service: Urology   • ENDOSCOPY N/A 4/10/2019    Procedure: ESOPHAGOGASTRODUODENOSCOPY;  Surgeon: Bc Sanchez MD;  Location: [facility] OR;  Service: Gastroenterology   • HYSTERECTOMY     • TOTAL KNEE ARTHROPLASTY Right 2/8/2022    Procedure: TOTAL KNEE ARTHROPLASTY AND ALL ASSOCIATED PROCEDURES;  Surgeon: Jovany Villanueva MD;  Location: [facility] OR;  Service: Orthopedics;  Laterality: Right;   • UPPER GASTROINTESTINAL ENDOSCOPY     • URETEROSCOPY LASER LITHOTRIPSY WITH STENT INSERTION Left 10/26/2016    Procedure: LT URETEROSCOPY LASER LITHOTRIPSY ;  Surgeon: Julius Darling MD;  Location: [facility];  Service:         PT Ortho     Row Name 03/23/22 0900       Subjective Comments    Subjective Comments Pt states her knee is doing well, states he has and MD appointment today.  -KM       Right Lower Ext    Rt Knee Extension/Flexion AROM 0-1-127 post stretch  -KM          User Key  (r) = Recorded By, (t) = Taken By, (c) = Cosigned By    Initials Name Provider Type    Lina Sanches PTA Physical Therapist Assistant                             PT Assessment/Plan     Row Name 03/23/22 0900          PT Assessment    Assessment Comments Pt is making good progress toward goals with ROM and function. Continue to push extension as tolerated  -KM            PT Plan    PT Plan Comments Continue POC per MD  -KM           User Key  (r) = Recorded By, (t) = Taken By, (c) = Cosigned By    Initials Name Provider Type    Lina Sanches PTA Physical Therapist Assistant                   OP Exercises     Row Name 03/23/22 0900             Subjective Comments    Subjective Comments Pt states her knee is doing well, states he has and MD appointment today.  -KM              Exercise 1    Exercise Name 1  Heel Slides  -KM      Time 1 5 min  -KM              Exercise 2    Exercise Name 2 Wall Slides  -KM      Time 2 5 min  -KM              Exercise 3    Exercise Name 3 Bike  -KM              Exercise 4    Exercise Name 4 Manual Flexion  -KM      Reps 4 10  -KM      Time 4 10 sec hold each  -KM              Exercise 5    Exercise Name 5 HS Stretch  -KM      Reps 5 10  -KM      Time 5 10 sec hold each  -KM              Exercise 6    Exercise Name 6 Heel Prop  -KM      Time 6 5 min  -KM      Additional Comments 2#  -KM              Exercise 7    Exercise Name 7 Manual Extension  -KM      Reps 7 10  -KM      Time 7 10 sec hold each  -KM              Exercise 8    Exercise Name 8 QS (ankle)  -KM      Reps 8 25  -KM      Time 8 5 sec hold each  -KM              Exercise 9    Exercise Name 9 3-Way Hip  -KM      Reps 9 25 each  -KM      Time 9 2#  -KM              Exercise 10    Exercise Name 10 LAQ  -KM      Reps 10 25  -KM      Time 10 2#  -KM              Exercise 11    Exercise Name 11 TKE  -KM      Reps 11 25  -KM      Time 11 Gold  -KM              Exercise 12    Exercise Name 12 Heel Raises  -KM      Reps 12 25  -KM              Exercise 13    Exercise Name 13 Mini Squats  -KM      Reps 13 25  -KM              Exercise 14    Exercise Name 14 FWD Step Ups - 6 inch step  -KM      Reps 14 25  -KM              Exercise 15    Exercise Name 15 Lateral Step Overs - 6 inch step  -KM      Reps 15 25  -KM            User Key  (r) = Recorded By, (t) = Taken By, (c) = Cosigned By    Initials Name Provider Type    Lina Sanches PTA Physical Therapist Assistant                                                Time Calculation:   Start Time: 0900  Stop Time: 1000  Time Calculation (min): 60 min  Therapy Charges for Today     Code Description Service Date Service Provider Modifiers Qty    35888859911 HC PT THER PROC EA 15 MIN 3/23/2022 Lina Cage PTA GP 2                    Lina Cage PTA  3/23/2022

## 2022-03-23 NOTE — TELEPHONE ENCOUNTER
PATIENT CALLED ABOUT SCHEDULING HER EGD.  DR. VEGA CALLED HER, TOLD HER [facility] WILL TO SCHEDULE.  DR. VEGA SPOKE WITH DR. VASQUES THE OTHER DAY.      LOST 25 POUNDS CAN'T EXPLAIN WHY.  CAN'T EAT AT TIME & DIARRHEA.    EXPLAINED WILL REACH OUT TO DR. VASQUES AND CALL HER BACK.

## 2022-03-23 NOTE — TELEPHONE ENCOUNTER
DR. VASQUES REPLY:    Double Please    Just EGD is Fine, Recent colon and her diarrhea is resolved

## 2022-03-24 DIAGNOSIS — Z01.818 OTHER SPECIFIED PRE-OPERATIVE EXAMINATION: Primary | ICD-10-CM

## 2022-03-24 PROBLEM — R13.19 ESOPHAGEAL DYSPHAGIA: Status: ACTIVE | Noted: 2022-03-24

## 2022-03-24 PROBLEM — R63.4 WEIGHT LOSS: Status: ACTIVE | Noted: 2022-03-24

## 2022-03-28 ENCOUNTER — HOSPITAL ENCOUNTER (OUTPATIENT)
Dept: CT IMAGING | Facility: HOSPITAL | Age: 77
Discharge: HOME | End: 2022-03-28
Admitting: INTERNAL MEDICINE

## 2022-03-28 DIAGNOSIS — IMO0001 LUNG NODULE < 6CM ON CT: ICD-10-CM

## 2022-03-28 DIAGNOSIS — R63.4 UNINTENTIONAL WEIGHT LOSS: ICD-10-CM

## 2022-03-28 PROCEDURE — 71260 CT THORAX DX C+: CPT

## 2022-03-28 PROCEDURE — 25010000002 IOPAMIDOL 61 % SOLUTION: Performed by: INTERNAL MEDICINE

## 2022-03-28 RX ADMIN — IOPAMIDOL 100 ML: 612 INJECTION, SOLUTION INTRAVENOUS at 11:42

## 2022-03-29 ENCOUNTER — HOSPITAL ENCOUNTER (OUTPATIENT)
Dept: PHYSICAL THERAPY | Facility: HOSPITAL | Age: 77
Setting detail: THERAPIES SERIES
Discharge: HOME | End: 2022-03-29

## 2022-03-29 DIAGNOSIS — Z96.651 STATUS POST TOTAL RIGHT KNEE REPLACEMENT: Primary | ICD-10-CM

## 2022-03-29 PROCEDURE — 97140 MANUAL THERAPY 1/> REGIONS: CPT

## 2022-03-29 PROCEDURE — 97110 THERAPEUTIC EXERCISES: CPT

## 2022-03-29 NOTE — THERAPY TREATMENT NOTE
Outpatient Physical Therapy Ortho Treatment Note  FRANCIA Alfaro     Patient Name: Germaine Gonzalez  : 1945  MRN: [id number]  Today's Date: 3/29/2022      Visit Date: 2022    Visit Dx:    ICD-10-CM ICD-9-CM   1. Status post total right knee replacement  Z96.651 V43.65       Patient Active Problem List   Diagnosis   • Gastroesophageal reflux disease   • Hyperlipidemia   • Pyelonephritis   • Sjogren's syndrome with keratoconjunctivitis sicca (HCC)   • Mcgill's esophagus without dysplasia   • Hyperglycemia   • WALSH (nonalcoholic steatohepatitis)   • Pure hypercholesterolemia   • Knee pain   • Primary osteoarthritis of left knee   • Encounter for screening for malignant neoplasm of colon   • Personal history of colonic polyps   • Effusion of left shoulder joint   • Osteopenia   • Prediabetes   • Essential hypertension   • Primary osteoarthritis of right knee   • Knee instability, right   • S/P total knee arthroplasty   • Esophageal dysphagia   • Weight loss        Past Medical History:   Diagnosis Date   • Arthritis     knees   • Mcgill esophagus    • Cancer (HCC)     cervical   • Cervical cancer (HCC)    • Colon polyp    • Eczema    • Elevated liver enzymes    • GERD (gastroesophageal reflux disease)    • H/O stress fracture    • Health care maintenance    • Hypercholesteremia    • Hypertension    • Kidney stones     sched ureteroscopy, lithotripsy   • Knee swelling    • Ocular migraine    • PONV (postoperative nausea and vomiting)    • Postmenopausal    • Seasonal allergies    • Sjogren's syndrome (HCC)     autoimmune disorder         Past Surgical History:   Procedure Laterality Date   •  SECTION      x2   • COLONOSCOPY     • COLONOSCOPY N/A 2021    Procedure: COLONOSCOPY;  Surgeon: Bc Sanchez MD;  Location: [facility] OR;  Service: Gastroenterology;  Laterality: N/A;  Diverticulosis and polyps   • CYSTOSCOPY URETEROSCOPY STONE MANIPULATION/EXTRACTION Left 10/14/2016     Procedure: LEFT URETEROSCOPY bilateral retrograde pyleogram LEFT STENT PLACEMENT.;  Surgeon: Julius Darling MD;  Location: [facility] OR;  Service:    • CYSTOSCOPY W/ URETERAL STENT PLACEMENT Right 8/3/2018    Procedure: CYSTOSCOPY URETERAL CATHETER/STENT INSERTION;  Surgeon: Farzad Cordova MD;  Location: [facility] OR;  Service: Urology   • ENDOSCOPY N/A 4/10/2019    Procedure: ESOPHAGOGASTRODUODENOSCOPY;  Surgeon: Bc Sanchez MD;  Location: [facility] OR;  Service: Gastroenterology   • HYSTERECTOMY     • TOTAL KNEE ARTHROPLASTY Right 2/8/2022    Procedure: TOTAL KNEE ARTHROPLASTY AND ALL ASSOCIATED PROCEDURES;  Surgeon: Jovany Villanueva MD;  Location: [facility] OR;  Service: Orthopedics;  Laterality: Right;   • UPPER GASTROINTESTINAL ENDOSCOPY     • URETEROSCOPY LASER LITHOTRIPSY WITH STENT INSERTION Left 10/26/2016    Procedure: LT URETEROSCOPY LASER LITHOTRIPSY ;  Surgeon: Julius Darling MD;  Location: [facility];  Service:         PT Ortho     Row Name 03/29/22 1000       Subjective Comments    Subjective Comments pt reports she had her follow up with ortho and he seemed pleased with her progress; pt reports she having normal soreness this morning  - states MD started her on prednisone  -          User Key  (r) = Recorded By, (t) = Taken By, (c) = Cosigned By    Initials Name Provider Type    Manish Kellogg PTA Physical Therapist Assistant                             PT Assessment/Plan     Row Name 03/29/22 1502          PT Assessment    Assessment Comments pt tolerates progression of reps well, no complaints of increased pain; no change in AROM measurements for extension  -            PT Plan    PT Plan Comments Cont per POC, progressing as tolerated  -           User Key  (r) = Recorded By, (t) = Taken By, (c) = Cosigned By    Initials Name Provider Type    Manish Kellogg PTA Physical Therapist Assistant                   OP Exercises     Row Name 03/29/22 1503 03/29/22  1000          Subjective Comments    Subjective Comments -- pt reports she had her follow up with ortho and he seemed pleased with her progress; pt reports she having normal soreness this morning  - states MD started her on prednisone  -            Total Minutes    52294 - PT Therapeutic Exercise Minutes 35  -MH --     05951 - PT Manual Therapy Minutes 15  -MH --            Exercise 1    Exercise Name 1 -- Heel Slides  -     Time 1 -- 5 min  -            Exercise 2    Exercise Name 2 -- Wall Slides  -     Time 2 -- 5 min  -            Exercise 3    Exercise Name 3 -- Bike  -            Exercise 4    Exercise Name 4 -- Manual Flexion - supine  -MH     Reps 4 -- 7  -     Time 4 -- 10 sec hold each  -     Additional Comments -- patella mobs and post tib mobs throughout  -            Exercise 5    Exercise Name 5 -- HS Stretch  -MH     Reps 5 -- 10  -     Time 5 -- 10 sec hold each  -            Exercise 6    Exercise Name 6 -- Heel Prop  -     Time 6 -- 5 min  -            Exercise 7    Exercise Name 7 -- Manual Extension  -     Reps 7 -- 10  -     Time 7 -- 10 sec hold each  -     Additional Comments -- post fem mobs throughout  -            Exercise 8    Exercise Name 8 -- QS (ankle)  -MH     Reps 8 -- 25  -     Time 8 -- 5 sec hold each  -            Exercise 9    Exercise Name 9 -- 3-Way Hip  -MH     Reps 9 -- 30 each  -     Time 9 -- 2#  -            Exercise 10    Exercise Name 10 -- LAQ  -MH     Reps 10 -- 30  -MH     Time 10 -- 2#  -            Exercise 11    Exercise Name 11 -- TKE  -MH     Reps 11 -- 30  -     Time 11 -- Gold  -            Exercise 12    Exercise Name 12 -- Heel Raises  -     Reps 12 -- 25  -            Exercise 13    Exercise Name 13 -- Mini Squats  -     Reps 13 -- 25  -            Exercise 14    Exercise Name 14 -- FWD Step Ups - 6 inch step  -MH     Reps 14 -- 25  -            Exercise 15    Exercise Name 15 -- Lateral Step Overs -  6 inch step  -     Reps 15 -- 25  -           User Key  (r) = Recorded By, (t) = Taken By, (c) = Cosigned By    Initials Name Provider Type    Manish Kellogg PTA Physical Therapist Assistant                         Manual Rx (last 36 hours)     Manual Treatments     Row Name 03/29/22 1503             Total Minutes    85331 - PT Manual Therapy Minutes 15  -MH            User Key  (r) = Recorded By, (t) = Taken By, (c) = Cosigned By    Initials Name Provider Type    Manish Kellogg PTA Physical Therapist Assistant                    Therapy Education  Given: HEP, Symptoms/condition management  Program: Reinforced  How Provided: Verbal, Demonstration  Provided to: Patient  Level of Understanding: Teach back education performed, Verbalized, Demonstrated              Time Calculation:   Start Time: 1000  Stop Time: 1103  Time Calculation (min): 63 min  Timed Charges  14749 - PT Therapeutic Exercise Minutes: 35  40109 - PT Manual Therapy Minutes: 15  Total Minutes  Timed Charges Total Minutes: 50   Total Minutes: 50  Therapy Charges for Today     Code Description Service Date Service Provider Modifiers Qty    31754663499 HC PT THER PROC EA 15 MIN 3/29/2022 Manish Fleming PTA GP 2    24996822877 HC PT MANUAL THERAPY EA 15 MIN 3/29/2022 Manish Fleming PTA GP 1                    Manish Fleming PTA  3/29/2022

## 2022-03-31 ENCOUNTER — HOSPITAL ENCOUNTER (OUTPATIENT)
Dept: PHYSICAL THERAPY | Facility: HOSPITAL | Age: 77
Setting detail: THERAPIES SERIES
Discharge: HOME | End: 2022-03-31

## 2022-03-31 ENCOUNTER — HOSPITAL ENCOUNTER (OUTPATIENT)
Dept: NUCLEAR MEDICINE | Facility: HOSPITAL | Age: 77
Discharge: HOME | End: 2022-03-31

## 2022-03-31 DIAGNOSIS — N13.30 HYDRONEPHROSIS, UNSPECIFIED HYDRONEPHROSIS TYPE: ICD-10-CM

## 2022-03-31 DIAGNOSIS — Z96.651 STATUS POST TOTAL RIGHT KNEE REPLACEMENT: Primary | ICD-10-CM

## 2022-03-31 PROCEDURE — 78708 K FLOW/FUNCT IMAGE W/DRUG: CPT

## 2022-03-31 PROCEDURE — 97110 THERAPEUTIC EXERCISES: CPT

## 2022-03-31 PROCEDURE — 25010000002 FUROSEMIDE PER 20 MG: Performed by: UROLOGY

## 2022-03-31 PROCEDURE — 0 TECHNETIUM MERTIATIDE: Performed by: UROLOGY

## 2022-03-31 PROCEDURE — A9562 TC99M MERTIATIDE: HCPCS | Performed by: UROLOGY

## 2022-03-31 RX ORDER — FUROSEMIDE 10 MG/ML
34.3 INJECTION INTRAMUSCULAR; INTRAVENOUS
Status: COMPLETED | OUTPATIENT
Start: 2022-03-31 | End: 2022-03-31

## 2022-03-31 RX ADMIN — TECHNESCAN TC 99M MERTIATIDE 1 DOSE: 1 INJECTION, POWDER, LYOPHILIZED, FOR SOLUTION INTRAVENOUS at 11:01

## 2022-03-31 RX ADMIN — FUROSEMIDE 34.3 MG: 10 INJECTION, SOLUTION INTRAMUSCULAR; INTRAVENOUS at 11:07

## 2022-04-04 ENCOUNTER — LAB (OUTPATIENT)
Dept: LAB | Facility: HOSPITAL | Age: 77
End: 2022-04-04

## 2022-04-04 DIAGNOSIS — Z01.818 OTHER SPECIFIED PRE-OPERATIVE EXAMINATION: ICD-10-CM

## 2022-04-04 LAB — SARS-COV-2 RNA PNL SPEC NAA+PROBE: NOT DETECTED

## 2022-04-04 PROCEDURE — 87635 SARS-COV-2 COVID-19 AMP PRB: CPT | Performed by: INTERNAL MEDICINE

## 2022-04-04 PROCEDURE — C9803 HOPD COVID-19 SPEC COLLECT: HCPCS

## 2022-04-05 ENCOUNTER — HOSPITAL ENCOUNTER (OUTPATIENT)
Dept: PHYSICAL THERAPY | Facility: HOSPITAL | Age: 77
Setting detail: THERAPIES SERIES
Discharge: HOME | End: 2022-04-05

## 2022-04-05 ENCOUNTER — ANESTHESIA EVENT (OUTPATIENT)
Dept: PERIOP | Facility: HOSPITAL | Age: 77
End: 2022-04-05

## 2022-04-05 DIAGNOSIS — Z96.651 STATUS POST TOTAL RIGHT KNEE REPLACEMENT: Primary | ICD-10-CM

## 2022-04-05 PROCEDURE — 97110 THERAPEUTIC EXERCISES: CPT

## 2022-04-05 NOTE — THERAPY TREATMENT NOTE
Outpatient Physical Therapy Ortho Treatment Note  FRANCIA Alfaro     Patient Name: Germaine Gonzalez  : 1945  MRN: [id number]  Today's Date: 2022      Visit Date: 2022    Visit Dx:    ICD-10-CM ICD-9-CM   1. Status post total right knee replacement  Z96.651 V43.65       Patient Active Problem List   Diagnosis   • Gastroesophageal reflux disease   • Hyperlipidemia   • Pyelonephritis   • Sjogren's syndrome with keratoconjunctivitis sicca (HCC)   • Mcgill's esophagus without dysplasia   • Hyperglycemia   • WALSH (nonalcoholic steatohepatitis)   • Pure hypercholesterolemia   • Knee pain   • Primary osteoarthritis of left knee   • Encounter for screening for malignant neoplasm of colon   • Personal history of colonic polyps   • Effusion of left shoulder joint   • Osteopenia   • Prediabetes   • Essential hypertension   • Primary osteoarthritis of right knee   • Knee instability, right   • S/P total knee arthroplasty   • Esophageal dysphagia   • Weight loss        Past Medical History:   Diagnosis Date   • Arthritis     knees   • Mcgill esophagus    • Cancer (HCC)     cervical   • Cervical cancer (HCC)    • Colon polyp    • Eczema    • Elevated liver enzymes    • GERD (gastroesophageal reflux disease)    • H/O stress fracture    • Health care maintenance    • Hypercholesteremia    • Hypertension    • Kidney stones     sched ureteroscopy, lithotripsy   • Knee swelling    • Ocular migraine    • PONV (postoperative nausea and vomiting)    • Postmenopausal    • Seasonal allergies    • Sjogren's syndrome (HCC)     autoimmune disorder         Past Surgical History:   Procedure Laterality Date   •  SECTION      x2   • COLONOSCOPY     • COLONOSCOPY N/A 2021    Procedure: COLONOSCOPY;  Surgeon: Bc Sanchez MD;  [place] [facility] [place] [facility];  Service: Gastroenterology;  Laterality: N/A;  Diverticulosis and polyps   • CYSTOSCOPY URETEROSCOPY STONE MANIPULATION/EXTRACTION Left 10/14/2016     Procedure: LEFT URETEROSCOPY bilateral retrograde pyleogram LEFT STENT PLACEMENT.;  Surgeon: Julius Darling MD;  Location: [facility] OR;  Service:    • CYSTOSCOPY W/ URETERAL STENT PLACEMENT Right 8/3/2018    Procedure: CYSTOSCOPY URETERAL CATHETER/STENT INSERTION;  Surgeon: Farzad Cordova MD;  Location: [facility] OR;  Service: Urology   • ENDOSCOPY N/A 4/10/2019    Procedure: ESOPHAGOGASTRODUODENOSCOPY;  Surgeon: Bc Sanchez MD;  Location: [facility] OR;  Service: Gastroenterology   • HYSTERECTOMY     • TOTAL KNEE ARTHROPLASTY Right 2/8/2022    Procedure: TOTAL KNEE ARTHROPLASTY AND ALL ASSOCIATED PROCEDURES;  Surgeon: Jovany Villanueva MD;  Location: [facility] OR;  Service: Orthopedics;  Laterality: Right;   • UPPER GASTROINTESTINAL ENDOSCOPY     • URETEROSCOPY LASER LITHOTRIPSY WITH STENT INSERTION Left 10/26/2016    Procedure: LT URETEROSCOPY LASER LITHOTRIPSY ;  Surgeon: Julius Darling MD;  Location: [facility];  Service:                         PT Assessment/Plan     Row Name 04/05/22 1000          PT Assessment    Assessment Comments Pt is progressing well with function and strength. pt demonstrates improved extension ROM  -KM            PT Plan    PT Plan Comments Assess next visit to decrease frequency to 1x/week.  -KM           User Key  (r) = Recorded By, (t) = Taken By, (c) = Cosigned By    Initials Name Provider Type    Lina Sanches, PTA Physical Therapist Assistant                   OP Exercises     Row Name 04/05/22 1000             Subjective Comments    Subjective Comments Pt states her knee is sore, but doing well overall.  -KM              Exercise 1    Exercise Name 1 Heel Slides  -KM      Time 1 5 min  -KM              Exercise 2    Exercise Name 2 Wall Slides  -KM              Exercise 3    Exercise Name 3 Bike  -KM              Exercise 4    Exercise Name 4 Manual Flexion - supine  -KM              Exercise 5    Exercise Name 5 HS Stretch  -KM      Reps 5 10  -KM       Time 5 10 sec hold each  -KM              Exercise 6    Exercise Name 6 Heel Prop  -KM      Time 6 5 min  -KM      Additional Comments 3#  -KM              Exercise 7    Exercise Name 7 Manual Extension  -KM      Reps 7 10  -KM      Time 7 10 sec hold each  -KM              Exercise 8    Exercise Name 8 QS (ankle)  -KM      Reps 8 25  -KM      Time 8 5 sec hold each  -KM              Exercise 9    Exercise Name 9 3-Way Hip  -KM      Reps 9 30 each  -KM      Time 9 2#  -KM              Exercise 10    Exercise Name 10 LAQ  -KM      Reps 10 30  -KM      Time 10 2#  -KM              Exercise 11    Exercise Name 11 TKE  -KM      Reps 11 30  -KM      Time 11 Gold  -KM              Exercise 12    Exercise Name 12 Heel Raises  -KM      Reps 12 25  -KM              Exercise 13    Exercise Name 13 Mini Squats  -KM      Reps 13 25  -KM              Exercise 14    Exercise Name 14 FWD Step Ups - 6 inch step  -KM      Reps 14 25  -KM              Exercise 15    Exercise Name 15 Lateral Step Overs - 6 inch step  -KM      Reps 15 25  -KM            User Key  (r) = Recorded By, (t) = Taken By, (c) = Cosigned By    Initials Name Provider Type    Lina Sanches PTA Physical Therapist Assistant                                                Time Calculation:   Start Time: 1000  Stop Time: 1040  Time Calculation (min): 40 min  Therapy Charges for Today     Code Description Service Date Service Provider Modifiers Qty    71146877298 HC PT THER PROC EA 15 MIN 4/5/2022 Lina Cage PTA GP 1                    Lina Cage PTA  4/5/2022

## 2022-04-06 ENCOUNTER — ANESTHESIA (OUTPATIENT)
Dept: PERIOP | Facility: HOSPITAL | Age: 77
End: 2022-04-06

## 2022-04-06 ENCOUNTER — HOSPITAL ENCOUNTER (OUTPATIENT)
Facility: HOSPITAL | Age: 77
Setting detail: HOSPITAL OUTPATIENT SURGERY
Discharge: HOME | End: 2022-04-06
Attending: INTERNAL MEDICINE | Admitting: INTERNAL MEDICINE

## 2022-04-06 VITALS
WEIGHT: 148.4 LBS | SYSTOLIC BLOOD PRESSURE: 125 MMHG | BODY MASS INDEX: 24.72 KG/M2 | TEMPERATURE: 98.2 F | HEIGHT: 65 IN | OXYGEN SATURATION: 100 % | RESPIRATION RATE: 16 BRPM | HEART RATE: 80 BPM | DIASTOLIC BLOOD PRESSURE: 68 MMHG

## 2022-04-06 DIAGNOSIS — R13.19 ESOPHAGEAL DYSPHAGIA: ICD-10-CM

## 2022-04-06 DIAGNOSIS — R63.4 WEIGHT LOSS: ICD-10-CM

## 2022-04-06 PROCEDURE — 88305 TISSUE EXAM BY PATHOLOGIST: CPT | Performed by: INTERNAL MEDICINE

## 2022-04-06 PROCEDURE — 43239 EGD BIOPSY SINGLE/MULTIPLE: CPT | Performed by: INTERNAL MEDICINE

## 2022-04-06 PROCEDURE — 25010000002 PROPOFOL 10 MG/ML EMULSION: Performed by: REGISTERED NURSE

## 2022-04-06 RX ORDER — SODIUM CHLORIDE 0.9 % (FLUSH) 0.9 %
10 SYRINGE (ML) INJECTION EVERY 12 HOURS SCHEDULED
Status: DISCONTINUED | OUTPATIENT
Start: 2022-04-06 | End: 2022-04-06 | Stop reason: HOSPADM

## 2022-04-06 RX ORDER — SODIUM CHLORIDE 0.9 % (FLUSH) 0.9 %
10 SYRINGE (ML) INJECTION AS NEEDED
Status: DISCONTINUED | OUTPATIENT
Start: 2022-04-06 | End: 2022-04-06 | Stop reason: HOSPADM

## 2022-04-06 RX ORDER — SODIUM CHLORIDE 9 MG/ML
40 INJECTION, SOLUTION INTRAVENOUS AS NEEDED
Status: DISCONTINUED | OUTPATIENT
Start: 2022-04-06 | End: 2022-04-06 | Stop reason: HOSPADM

## 2022-04-06 RX ORDER — SODIUM CHLORIDE, SODIUM LACTATE, POTASSIUM CHLORIDE, CALCIUM CHLORIDE 600; 310; 30; 20 MG/100ML; MG/100ML; MG/100ML; MG/100ML
100 INJECTION, SOLUTION INTRAVENOUS CONTINUOUS
Status: DISCONTINUED | OUTPATIENT
Start: 2022-04-06 | End: 2022-04-06 | Stop reason: HOSPADM

## 2022-04-06 RX ORDER — MAGNESIUM HYDROXIDE 1200 MG/15ML
LIQUID ORAL AS NEEDED
Status: DISCONTINUED | OUTPATIENT
Start: 2022-04-06 | End: 2022-04-06 | Stop reason: HOSPADM

## 2022-04-06 RX ORDER — PROPOFOL 10 MG/ML
VIAL (ML) INTRAVENOUS AS NEEDED
Status: DISCONTINUED | OUTPATIENT
Start: 2022-04-06 | End: 2022-04-06 | Stop reason: SURG

## 2022-04-06 RX ORDER — LIDOCAINE HYDROCHLORIDE 20 MG/ML
INJECTION, SOLUTION INFILTRATION; PERINEURAL AS NEEDED
Status: DISCONTINUED | OUTPATIENT
Start: 2022-04-06 | End: 2022-04-06 | Stop reason: SURG

## 2022-04-06 RX ORDER — LIDOCAINE HYDROCHLORIDE 10 MG/ML
0.5 INJECTION, SOLUTION EPIDURAL; INFILTRATION; INTRACAUDAL; PERINEURAL ONCE AS NEEDED
Status: DISCONTINUED | OUTPATIENT
Start: 2022-04-06 | End: 2022-04-06 | Stop reason: HOSPADM

## 2022-04-06 RX ORDER — SODIUM CHLORIDE, SODIUM LACTATE, POTASSIUM CHLORIDE, CALCIUM CHLORIDE 600; 310; 30; 20 MG/100ML; MG/100ML; MG/100ML; MG/100ML
9 INJECTION, SOLUTION INTRAVENOUS CONTINUOUS
Status: DISCONTINUED | OUTPATIENT
Start: 2022-04-06 | End: 2022-04-06 | Stop reason: HOSPADM

## 2022-04-06 RX ORDER — ONDANSETRON 2 MG/ML
4 INJECTION INTRAMUSCULAR; INTRAVENOUS ONCE AS NEEDED
Status: DISCONTINUED | OUTPATIENT
Start: 2022-04-06 | End: 2022-04-06 | Stop reason: HOSPADM

## 2022-04-06 RX ADMIN — LIDOCAINE HYDROCHLORIDE 50 MG: 20 INJECTION, SOLUTION INFILTRATION; PERINEURAL at 17:04

## 2022-04-06 RX ADMIN — PROPOFOL 50 MCG/KG/MIN: 10 INJECTION, EMULSION INTRAVENOUS at 17:05

## 2022-04-06 RX ADMIN — PROPOFOL 100 MG: 10 INJECTION, EMULSION INTRAVENOUS at 17:04

## 2022-04-06 RX ADMIN — SODIUM CHLORIDE, POTASSIUM CHLORIDE, SODIUM LACTATE AND CALCIUM CHLORIDE: 600; 310; 30; 20 INJECTION, SOLUTION INTRAVENOUS at 16:57

## 2022-04-06 NOTE — INTERVAL H&P NOTE
"Vital Signs  Ht 165.1 cm (65\")   LMP  (LMP Unknown)   BMI 24.30 kg/m²     H&P reviewed. The patient was examined and there are no changes to the H&P.        "

## 2022-04-06 NOTE — ANESTHESIA POSTPROCEDURE EVALUATION
Patient: Germaine Gonzalez    Procedure Summary     Date: 04/06/22 Room / Location:  [facility] /  [facility]    Anesthesia Start: 1659 Anesthesia Stop: 1729    Procedure: ESOPHAGOGASTRODUODENOSCOPY WITH BIOPSY (N/A Esophagus) Diagnosis:       Esophageal dysphagia      Weight loss      Mcgill's esophagus without dysplasia      Hiatal hernia      Gastritis      (Esophageal dysphagia [R13.19])      (Weight loss [R63.4])    Surgeons: Bc Sanchez MD Provider: Israel [facility] CRNA    Anesthesia Type: MAC ASA Status: 2          Anesthesia Type: MAC    Vitals  Vitals Value Taken Time   /73 04/06/22 1730   Temp 98.2 °F (36.8 °C) 04/06/22 1730   Pulse 89 04/06/22 1730   Resp 16 04/06/22 1730   SpO2 100 % 04/06/22 1730           Post Anesthesia Care and Evaluation    Patient location during evaluation: PHASE II  Patient participation: complete - patient participated  Level of consciousness: awake  Pain score: 0  Pain management: adequate  Airway patency: patent  Anesthetic complications: No anesthetic complications  PONV Status: none  Cardiovascular status: acceptable  Respiratory status: acceptable  Hydration status: acceptable

## 2022-04-06 NOTE — ANESTHESIA PREPROCEDURE EVALUATION
Anesthesia Evaluation     Patient summary reviewed and Nursing notes reviewed   no history of anesthetic complications:  NPO Solid Status: > 8 hours  NPO Liquid Status: > 8 hours           Airway   Mallampati: II  TM distance: <3 FB  Neck ROM: full  No difficulty expected  Dental - normal exam     Pulmonary - normal exam   (+) a smoker Former Abstained day of surgery,   (-) shortness of breath  Cardiovascular - normal exam  Exercise tolerance: good (4-7 METS)    ECG reviewed    (+) hypertension well controlled less than 2 medications, hyperlipidemia,   (-) angina      Neuro/Psych  (+) headaches,    GI/Hepatic/Renal/Endo    (+)  GERD well controlled,  hepatitis (WALSH), liver disease, renal disease stones,     Musculoskeletal         ROS comment: Right TKA 7 weeks ago  Abdominal    Substance History   (+) alcohol use,      OB/GYN negative ob/gyn ROS         Other   arthritis,    history of cancer remission                    Anesthesia Plan    ASA 2     MAC   total IV anesthesia  intravenous induction     Anesthetic plan, all risks, benefits, and alternatives have been provided, discussed and informed consent has been obtained with: patient.  Use of blood products discussed with patient  Consented to blood products.       CODE STATUS:

## 2022-04-06 NOTE — BRIEF OP NOTE
ESOPHAGOGASTRODUODENOSCOPY WITH BIOPSY  Progress Note    Germaine Gonzalez  4/6/2022    Pre-op Diagnosis:   Esophageal dysphagia [R13.19]  Weight loss [R63.4]       Post-Op Diagnosis Codes:     * Esophageal dysphagia [R13.19]     * Weight loss [R63.4]     * Mcgill's esophagus without dysplasia [K22.70]     * Hiatal hernia [K44.9]     * Gastritis [K29.70]    Procedure/CPT® Codes:        Procedure(s):  ESOPHAGOGASTRODUODENOSCOPY WITH BIOPSY    Surgeon(s):  Bc Sanchez MD    Anesthesia: Monitored Anesthesia Care    Staff:   Circulator: Marlin Fernandez RN  Scrub Person: Alaina Esquivel         Estimated Blood Loss: none    Urine Voided: * No values recorded between 4/6/2022  4:59 PM and 4/6/2022  5:21 PM *    Specimens:                Specimens     ID Source Type Tests Collected By Collected At Frozen?    A Small Intestine, Duodenum Tissue · TISSUE PATHOLOGY EXAM   Bc Sanchez MD 4/6/22 1712     Description: duodenal biopsy    This specimen was not marked as sent.    B Gastric, Antrum Tissue · TISSUE PATHOLOGY EXAM   Bc Sanchez MD 4/6/22 1714     Description: gastric biopsy    This specimen was not marked as sent.    C Esophagus, Distal Tissue · TISSUE PATHOLOGY EXAM   Bc Sanchez MD 4/6/22 1718     Description: distal esophagus    This specimen was not marked as sent.                Drains: * No LDAs found *    Findings: Normal Duodenum-Biopsy  Moderate Gastritis-Biopsy  SSBE-Biopsy        Complications: None          Bc Sanchez MD     Date: 4/6/2022  Time: 17:23 EDT

## 2022-04-07 ENCOUNTER — HOSPITAL ENCOUNTER (OUTPATIENT)
Dept: PHYSICAL THERAPY | Facility: HOSPITAL | Age: 77
Setting detail: THERAPIES SERIES
Discharge: HOME | End: 2022-04-07

## 2022-04-07 DIAGNOSIS — Z96.651 STATUS POST TOTAL RIGHT KNEE REPLACEMENT: Primary | ICD-10-CM

## 2022-04-07 PROCEDURE — 97110 THERAPEUTIC EXERCISES: CPT

## 2022-04-07 NOTE — THERAPY TREATMENT NOTE
Outpatient Physical Therapy Ortho Treatment Note  FRANCIA Alfaro     Patient Name: Germaine Gonzalez  : 1945  MRN: [id number]  Today's Date: 2022      Visit Date: 2022    Visit Dx:    ICD-10-CM ICD-9-CM   1. Status post total right knee replacement  Z96.651 V43.65       Patient Active Problem List   Diagnosis   • Gastroesophageal reflux disease   • Hyperlipidemia   • Pyelonephritis   • Sjogren's syndrome with keratoconjunctivitis sicca (HCC)   • Mcgill's esophagus without dysplasia   • Hyperglycemia   • WALSH (nonalcoholic steatohepatitis)   • Pure hypercholesterolemia   • Knee pain   • Primary osteoarthritis of left knee   • Encounter for screening for malignant neoplasm of colon   • Personal history of colonic polyps   • Effusion of left shoulder joint   • Osteopenia   • Prediabetes   • Essential hypertension   • Primary osteoarthritis of right knee   • Knee instability, right   • S/P total knee arthroplasty   • Esophageal dysphagia   • Weight loss        Past Medical History:   Diagnosis Date   • Arthritis     knees   • Mcgill esophagus    • Cancer (HCC)     cervical   • Cervical cancer (HCC)    • Colon polyp    • Eczema    • Elevated liver enzymes    • GERD (gastroesophageal reflux disease)    • H/O stress fracture    • Health care maintenance    • Hypercholesteremia    • Hypertension    • Kidney stones     sched ureteroscopy, lithotripsy   • Knee swelling    • Ocular migraine    • PONV (postoperative nausea and vomiting)    • Postmenopausal    • Seasonal allergies    • Sjogren's syndrome (HCC)     autoimmune disorder         Past Surgical History:   Procedure Laterality Date   •  SECTION      x2   • COLONOSCOPY     • COLONOSCOPY N/A 2021    Procedure: COLONOSCOPY;  Surgeon: Bc Sanchez MD;  [place]: [facility] [place] [facility];  Service: Gastroenterology;  Laterality: N/A;  Diverticulosis and polyps   • CYSTOSCOPY URETEROSCOPY STONE MANIPULATION/EXTRACTION Left 10/14/2016     Procedure: LEFT URETEROSCOPY bilateral retrograde pyleogram LEFT STENT PLACEMENT.;  Surgeon: Julius Darling MD;  Location: [facility];  Service:    • CYSTOSCOPY W/ URETERAL STENT PLACEMENT Right 8/3/2018    Procedure: CYSTOSCOPY URETERAL CATHETER/STENT INSERTION;  Surgeon: Farzad Cordova MD;  Location: [facility];  Service: Urology   • ENDOSCOPY N/A 4/10/2019    Procedure: ESOPHAGOGASTRODUODENOSCOPY;  Surgeon: Bc Sanchez MD;  Location: [facility];  Service: Gastroenterology   • ENDOSCOPY N/A 4/6/2022    Procedure: ESOPHAGOGASTRODUODENOSCOPY WITH BIOPSY;  Surgeon: Bc Sanchez MD;  Location: [facility];  Service: Gastroenterology;  Laterality: N/A;  gastritis, pulliam's   • HYSTERECTOMY     • TOTAL KNEE ARTHROPLASTY Right 2/8/2022    Procedure: TOTAL KNEE ARTHROPLASTY AND ALL ASSOCIATED PROCEDURES;  Surgeon: Jovany Villanueva MD;  Location: [facility];  Service: Orthopedics;  Laterality: Right;   • UPPER GASTROINTESTINAL ENDOSCOPY     • URETEROSCOPY LASER LITHOTRIPSY WITH STENT INSERTION Left 10/26/2016    Procedure: LT URETEROSCOPY LASER LITHOTRIPSY ;  Surgeon: Julius Darling MD;  Location: [facility];  Service:         PT Ortho     Row Name 04/07/22 1000       Subjective Comments    Subjective Comments Pt states her knee is doing well; reports no issues with daily activities.  -KM       Right Lower Ext    Rt Knee Extension/Flexion PROM 0-130 post stretch  -KM          User Key  (r) = Recorded By, (t) = Taken By, (c) = Cosigned By    Initials Name Provider Type    Lina Sanches, PTA Physical Therapist Assistant                             PT Assessment/Plan     Row Name 04/07/22 1000          PT Assessment    Assessment Comments Pt ambulates with improved gait and measures increased AROM. Good functional mobility noted  -KM            PT Plan    PT Plan Comments With pts improved status, plan to see pt 1x next week  -KM           User Key  (r) = Recorded By, (t) =  Taken By, (c) = Cosigned By    Initials Name Provider Type    Lina Sanches PTA Physical Therapist Assistant                   OP Exercises     Row Name 04/07/22 1000             Subjective Comments    Subjective Comments Pt states her knee is doing well; reports no issues with daily activities.  -KM              Exercise 1    Exercise Name 1 Heel Slides  -KM      Time 1 5 min  -KM              Exercise 2    Exercise Name 2 Wall Slides  -KM              Exercise 3    Exercise Name 3 Bike  -KM              Exercise 4    Exercise Name 4 Manual Flexion - supine  -KM              Exercise 5    Exercise Name 5 HS Stretch  -KM      Reps 5 10  -KM      Time 5 10 sec hold each  -KM              Exercise 6    Exercise Name 6 Heel Prop  -KM      Time 6 5 min  -KM              Exercise 7    Exercise Name 7 Manual Extension  -KM      Reps 7 10  -KM      Time 7 10 sec hold each  -KM              Exercise 8    Exercise Name 8 QS (ankle)  -KM      Reps 8 25  -KM      Time 8 5 sec hold each  -KM              Exercise 9    Exercise Name 9 3-Way Hip  -KM      Reps 9 25 each  -KM      Time 9 2#  -KM              Exercise 10    Exercise Name 10 LAQ  -KM      Reps 10 25  -KM      Time 10 2#  -KM              Exercise 11    Exercise Name 11 TKE  -KM      Reps 11 30  -KM      Time 11 Gold  -KM              Exercise 12    Exercise Name 12 Heel Raises  -KM      Reps 12 25  -KM              Exercise 13    Exercise Name 13 Mini Squats  -KM      Reps 13 25  -KM              Exercise 14    Exercise Name 14 FWD Step Ups - 6 inch step  -KM      Reps 14 25  -KM              Exercise 15    Exercise Name 15 Lateral Step Overs - 6 inch step  -KM      Reps 15 25  -KM            User Key  (r) = Recorded By, (t) = Taken By, (c) = Cosigned By    Initials Name Provider Type    Lina Sanches PTA Physical Therapist Assistant                                                Time Calculation:   Start Time: 1000  Stop Time: 1040  Time Calculation  (min): 40 min  Therapy Charges for Today     Code Description Service Date Service Provider Modifiers Qty    30559392953 HC PT THER PROC EA 15 MIN 4/7/2022 Lina Cage, PTA GP 1                    Lina Cage, BEATRIZ  4/7/2022

## 2022-04-08 LAB
LAB AP CASE REPORT: NORMAL
PATH REPORT.FINAL DX SPEC: NORMAL
PATH REPORT.GROSS SPEC: NORMAL

## 2022-04-12 ENCOUNTER — HOSPITAL ENCOUNTER (OUTPATIENT)
Dept: PHYSICAL THERAPY | Facility: HOSPITAL | Age: 77
Setting detail: THERAPIES SERIES
Discharge: HOME | End: 2022-04-12

## 2022-04-12 DIAGNOSIS — Z96.651 STATUS POST TOTAL RIGHT KNEE REPLACEMENT: Primary | ICD-10-CM

## 2022-04-12 PROCEDURE — 97110 THERAPEUTIC EXERCISES: CPT

## 2022-04-12 NOTE — THERAPY TREATMENT NOTE
Outpatient Physical Therapy Ortho Treatment Note  FRANCIA Alfaro     Patient Name: Germaine Gonzalez  : 1945  MRN: [id number]  Today's Date: 2022      Visit Date: 2022    Visit Dx:    ICD-10-CM ICD-9-CM   1. Status post total right knee replacement  Z96.651 V43.65       Patient Active Problem List   Diagnosis   • Gastroesophageal reflux disease   • Hyperlipidemia   • Pyelonephritis   • Sjogren's syndrome with keratoconjunctivitis sicca (HCC)   • Mcgill's esophagus without dysplasia   • Hyperglycemia   • WALSH (nonalcoholic steatohepatitis)   • Pure hypercholesterolemia   • Knee pain   • Primary osteoarthritis of left knee   • Encounter for screening for malignant neoplasm of colon   • Personal history of colonic polyps   • Effusion of left shoulder joint   • Osteopenia   • Prediabetes   • Essential hypertension   • Primary osteoarthritis of right knee   • Knee instability, right   • S/P total knee arthroplasty   • Esophageal dysphagia   • Weight loss        Past Medical History:   Diagnosis Date   • Arthritis     knees   • Mcgill esophagus    • Cancer (HCC)     cervical   • Cervical cancer (HCC)    • Colon polyp    • Eczema    • Elevated liver enzymes    • GERD (gastroesophageal reflux disease)    • H/O stress fracture    • Health care maintenance    • Hypercholesteremia    • Hypertension    • Kidney stones     sched ureteroscopy, lithotripsy   • Knee swelling    • Ocular migraine    • PONV (postoperative nausea and vomiting)    • Postmenopausal    • Seasonal allergies    • Sjogren's syndrome (HCC)     autoimmune disorder         Past Surgical History:   Procedure Laterality Date   •  SECTION      x2   • COLONOSCOPY     • COLONOSCOPY N/A 2021    Procedure: COLONOSCOPY;  Surgeon: Bc Sanchez MD;  [place] [facility] [place];  Service: Gastroenterology;  Laterality: N/A;  Diverticulosis and polyps   • CYSTOSCOPY URETEROSCOPY STONE MANIPULATION/EXTRACTION Left 10/14/2016     Procedure: LEFT URETEROSCOPY bilateral retrograde pyleogram LEFT STENT PLACEMENT.;  Surgeon: Julius Darling MD;  Location: [facility] OR;  Service:    • CYSTOSCOPY W/ URETERAL STENT PLACEMENT Right 8/3/2018    Procedure: CYSTOSCOPY URETERAL CATHETER/STENT INSERTION;  Surgeon: Farzad Cordova MD;  Location: [facility] OR;  Service: Urology   • ENDOSCOPY N/A 4/10/2019    Procedure: ESOPHAGOGASTRODUODENOSCOPY;  Surgeon: Bc Sanchez MD;  Location: [facility] OR;  Service: Gastroenterology   • ENDOSCOPY N/A 4/6/2022    Procedure: ESOPHAGOGASTRODUODENOSCOPY WITH BIOPSY;  Surgeon: Bc Sanchez MD;  Location: [facility] OR;  Service: Gastroenterology;  Laterality: N/A;  gastritis, pulliam's   • HYSTERECTOMY     • TOTAL KNEE ARTHROPLASTY Right 2/8/2022    Procedure: TOTAL KNEE ARTHROPLASTY AND ALL ASSOCIATED PROCEDURES;  Surgeon: Jovany Villanueva MD;  Location: [facility] OR;  Service: Orthopedics;  Laterality: Right;   • UPPER GASTROINTESTINAL ENDOSCOPY     • URETEROSCOPY LASER LITHOTRIPSY WITH STENT INSERTION Left 10/26/2016    Procedure: LT URETEROSCOPY LASER LITHOTRIPSY ;  Surgeon: Julius Darling MD;  Location: [facility];  Service:         PT Ortho     Row Name 04/12/22 1000       Right Lower Ext    Rt Knee Extension/Flexion PROM 0-130 post stretch  -KM          User Key  (r) = Recorded By, (t) = Taken By, (c) = Cosigned By    Initials Name Provider Type    Lina Sanches PTA Physical Therapist Assistant                             PT Assessment/Plan     Row Name 04/12/22 1000          PT Assessment    Assessment Comments Pt has achieved goals set by PT measuring 0-130 degrees post stretch and demonstrates improved strength and function.  -KM            PT Plan    PT Plan Comments With pts status,plan to transition to HEP.  -KM           User Key  (r) = Recorded By, (t) = Taken By, (c) = Cosigned By    Initials Name Provider Type    Lina Sanches PTA Physical Therapist Assistant                    OP Exercises     Row Name 04/12/22 1000             Subjective Comments    Subjective Comments Pt states her knee is doing well and was able to help move furniture.  -KM              Exercise 1    Exercise Name 1 Heel Slides  -KM      Time 1 5 min  -KM              Exercise 2    Exercise Name 2 Wall Slides  -KM              Exercise 3    Exercise Name 3 Bike  -KM              Exercise 4    Exercise Name 4 Manual Flexion - supine  -KM              Exercise 5    Exercise Name 5 HS Stretch  -KM      Reps 5 10  -KM      Time 5 10 sec hold each  -KM              Exercise 6    Exercise Name 6 Heel Prop  -KM      Time 6 5 min  -KM              Exercise 7    Exercise Name 7 Manual Extension  -KM      Reps 7 10  -KM      Time 7 10 sec hold each  -KM              Exercise 8    Exercise Name 8 QS (ankle)  -KM      Reps 8 25  -KM      Time 8 5 sec hold each  -KM              Exercise 9    Exercise Name 9 3-Way Hip  -KM      Reps 9 25 each  -KM      Time 9 2#  -KM              Exercise 10    Exercise Name 10 LAQ  -KM      Reps 10 25  -KM      Time 10 2#  -KM              Exercise 11    Exercise Name 11 TKE  -KM      Reps 11 30  -KM      Time 11 Gold  -KM              Exercise 12    Exercise Name 12 Heel Raises  -KM      Reps 12 25  -KM              Exercise 13    Exercise Name 13 Mini Squats  -KM      Reps 13 25  -KM              Exercise 14    Exercise Name 14 FWD Step Ups - 6 inch step  -KM      Reps 14 25  -KM              Exercise 15    Exercise Name 15 Lateral Step Overs - 6 inch step  -KM      Reps 15 25  -KM            User Key  (r) = Recorded By, (t) = Taken By, (c) = Cosigned By    Initials Name Provider Type    Lina Sanches, PTA Physical Therapist Assistant                              PT OP Goals     Row Name 04/12/22 1000          PT Short Term Goals    STG 1 Patient to demonstrate compliance with her initial HEP for flexibility, ROM and strengthening.  -KM     STG 1 Progress Met  -KM     STG 2  Patient to report right knee pain on VAS of 2-3/10 at worst with activity.  -KM     STG 2 Progress Ongoing  -KM     STG 3 Patient to demonstrate improved right knee and RLE strength to 4/5 in all planes.  -KM     STG 3 Progress Met  -KM     STG 4 Patient to demonstrate improved right knee ROM to 0-1-110 degrees.  -KM     STG 4 Progress Met  -KM     STG 5 Patient to ambulate short distances without the use of an AD and with an improved gait pattern.  -KM     STG 5 Progress Met  -KM            Long Term Goals    LTG 1 Patient to demonstrate compliance with her advanced HEP for flexibility, ROM and strengthening.  -KM     LTG 1 Progress Met  -KM     LTG 2 Patient to report right knee pain on VAS of 1-2/10 at worst with activity.  -KM     LTG 2 Progress Ongoing  -KM     LTG 3 Patient to demonstrate improved right knee and RLE strength to 4+/5 in all planes.  -KM     LTG 3 Progress Ongoing  -KM     LTG 4 Patient to demonstrate improved right knee ROM to 0-120 degrees.  -KM     LTG 4 Progress Met  -KM     LTG 5 Patient to ambulate community distances without an AD and with a normal gait pattern.  -KM     LTG 5 Progress Met  -KM     LTG 6 Patient to report overall improved function on LEFS to 55/80.  -KM     LTG 6 Progress Ongoing  -KM           User Key  (r) = Recorded By, (t) = Taken By, (c) = Cosigned By    Initials Name Provider Type    Lina Sanches PTA Physical Therapist Assistant                               Time Calculation:   Start Time: 1000  Stop Time: 1040  Time Calculation (min): 40 min  Therapy Charges for Today     Code Description Service Date Service Provider Modifiers Qty    [id number]  PT THER PROC EA 15 MIN 4/12/2022 Lina Cage PTA GP 1                    Lina Cage PTA  4/12/2022

## 2022-04-15 ENCOUNTER — TELEPHONE (OUTPATIENT)
Dept: GASTROENTEROLOGY | Facility: CLINIC | Age: 77
End: 2022-04-15

## 2022-04-15 NOTE — TELEPHONE ENCOUNTER
Scheduled 22 @ 2:15 [place] office.    ----- Message from Adri Villanueva RN sent at 2022 10:06 AM EDT -----  Regardin month follow up  Will need 2 month EGD follow up [place]

## 2022-04-22 ENCOUNTER — TELEPHONE (OUTPATIENT)
Dept: ORTHOPEDIC SURGERY | Facility: CLINIC | Age: 77
End: 2022-04-22

## 2022-04-22 DIAGNOSIS — Z96.651 STATUS POST TOTAL RIGHT KNEE REPLACEMENT: ICD-10-CM

## 2022-04-22 RX ORDER — OXYCODONE HYDROCHLORIDE AND ACETAMINOPHEN 5; 325 MG/1; MG/1
TABLET ORAL
Qty: 30 TABLET | Refills: 0 | Status: SHIPPED | OUTPATIENT
Start: 2022-04-22 | End: 2022-06-06

## 2022-04-22 NOTE — TELEPHONE ENCOUNTER
Patient called, she is still having some pain and warmth in right knee  Asking if she could get a refill of pain medicine, oxycodone 5-325 mg  She said she only takes 1/2 tab BID.  She has taken the cyclobenzaprine and prednisone. [facility] [place] .    Last refill 03.23.00     Status post total right knee replacement 02.08.22

## 2022-04-27 ENCOUNTER — TRANSCRIBE ORDERS (OUTPATIENT)
Dept: ADMINISTRATIVE | Facility: HOSPITAL | Age: 77
End: 2022-04-27

## 2022-04-27 DIAGNOSIS — N20.0 CALCULUS, RENAL: Primary | ICD-10-CM

## 2022-05-03 ENCOUNTER — OFFICE VISIT (OUTPATIENT)
Dept: ORTHOPEDIC SURGERY | Facility: CLINIC | Age: 77
End: 2022-05-03

## 2022-05-03 VITALS — WEIGHT: 148 LBS | HEIGHT: 65 IN | BODY MASS INDEX: 24.66 KG/M2

## 2022-05-03 DIAGNOSIS — Z96.651 STATUS POST TOTAL RIGHT KNEE REPLACEMENT: Primary | ICD-10-CM

## 2022-05-03 PROCEDURE — 99024 POSTOP FOLLOW-UP VISIT: CPT | Performed by: ORTHOPAEDIC SURGERY

## 2022-05-03 RX ORDER — CYCLOBENZAPRINE HCL 5 MG
5 TABLET ORAL 3 TIMES DAILY PRN
Qty: 45 TABLET | Refills: 0 | Status: SHIPPED | OUTPATIENT
Start: 2022-05-03 | End: 2022-05-20

## 2022-05-03 RX ORDER — PREDNISONE 10 MG/1
TABLET ORAL
Qty: 39 TABLET | Refills: 0 | Status: SHIPPED | OUTPATIENT
Start: 2022-05-03 | End: 2022-06-06

## 2022-05-03 NOTE — PROGRESS NOTES
CC: s/p right total knee arthroplasty, DOS 2/8/2022  Interval History: Germaine Gonzalez returns for 12 week status post right total knee arthroplasty.The patient is accompanied by an adult male who contributes in her medical history.      The patient is doing well today and is continuing exercising twice daily. She stays active with yard work and walking but her motion is slowed. She was given a prednisone taper in 03/2022 and Flexeril that provided improvement in her symptoms. Her pain is exacerbated with standing from a seated position. The patient has complaints of mild tightness in her hamstrings. She also uses Aspercreme with Lidocaine gel that seems to improve her pain as well. The patient is taking pain medication about half a tablet 3 to 4 times a day. She is able to tolerate anti inflammatory medication.     She is inquiring when we are able to move forward with the left knee. She has complaints of pain and crepitus in the left knee that is becoming more bothersome.         Physical Examination:     Right Knee-   Midline incision is well healed.   ROM 1 to 130 degrees   4+/5 on flexion   4/5 on extension     Effusion- Minimal   Posterior drawer- Good endpoint at 90 degrees   Stable opening on varus and valgus stress at 1 and 30     Log roll- No hip pain   [facility]- [facility] pain     Positive sensation light touch all distributions symmetric to contralateral side   Brisk cap refill all digits   2+ dorsalis pedis pulse        Assessment/Plan:    1. Discussed treatment options at length with patient at today's visit.  2. We will restart a prednisone taper and Flexeril and try to help her with her terminal extension still at this point in time, which has a little bit of tightness.  3. I described to her some weighted prone hangs as well as weighted straight leg raises to work on both quad strength as well as her terminal extension.   She will continue to try to wean down on her pain medication at this point in  time.  4. Recommended knee sleeve for activities.  5. I will follow up with her in 3 months with repeat x-rays of right knee or sooner if her left knee starts to give her more issues as she does have significant arthritis on that side as well.              Transcribed from ambient dictation for Jovany Villanueva MD by RUIZ RODRIGEZ.  05/03/22   17:05 EDT    Patient verbalized consent to the visit recording.

## 2022-05-20 RX ORDER — CYCLOBENZAPRINE HCL 5 MG
TABLET ORAL
Qty: 45 TABLET | Refills: 0 | Status: SHIPPED | OUTPATIENT
Start: 2022-05-20 | End: 2022-06-06

## 2022-05-20 NOTE — TELEPHONE ENCOUNTER
Rx Refill Note  Requested Prescriptions     Pending Prescriptions Disp Refills    cyclobenzaprine (FLEXERIL) 5 MG tablet [Pharmacy Med Name: Cyclobenzaprine HCl 5 MG Oral Tablet] 45 tablet 0     Sig: Take 1 tablet by mouth three times daily as needed for muscle spasm      Last office visit with prescribing clinician: 5/3/2022      Next office visit with prescribing clinician: 8/10/2022   Last Filled: 5.3.22      Assessment/Plan:     1. Discussed treatment options at length with patient at today's visit.  2. We will restart a prednisone taper and Flexeril and try to help her with her terminal extension still at this point in time, which has a little bit of tightness.  3. I described to her some weighted prone hangs as well as weighted straight leg raises to work on both quad strength as well as her terminal extension.   She will continue to try to wean down on her pain medication at this point in time.  4. Recommended knee sleeve for activities.  5. I will follow up with her in 3 months with repeat x-rays of right knee or sooner if her left knee starts to give her more issues as she does have significant arthritis on that side as well.    Date of Surgery: 2.8.22      Shelby Josue MA  05/20/22, 12:48 EDT    {TIP  Encounters:    {TIP  Please add Last Relevant Lab Date if appropriate:  {TIP  Is Refill Pharmacy correct?:

## 2022-05-31 ENCOUNTER — TRANSCRIBE ORDERS (OUTPATIENT)
Dept: ADMINISTRATIVE | Facility: HOSPITAL | Age: 77
End: 2022-05-31

## 2022-05-31 DIAGNOSIS — Z12.31 SCREENING MAMMOGRAM, ENCOUNTER FOR: Primary | ICD-10-CM

## 2022-06-06 ENCOUNTER — OFFICE VISIT (OUTPATIENT)
Dept: GASTROENTEROLOGY | Facility: CLINIC | Age: 77
End: 2022-06-06

## 2022-06-06 VITALS
HEIGHT: 65 IN | WEIGHT: 147.8 LBS | BODY MASS INDEX: 24.62 KG/M2 | DIASTOLIC BLOOD PRESSURE: 68 MMHG | SYSTOLIC BLOOD PRESSURE: 122 MMHG

## 2022-06-06 DIAGNOSIS — K21.00 GASTROESOPHAGEAL REFLUX DISEASE WITH ESOPHAGITIS, UNSPECIFIED WHETHER HEMORRHAGE: ICD-10-CM

## 2022-06-06 DIAGNOSIS — K22.70 BARRETT'S ESOPHAGUS WITHOUT DYSPLASIA: Primary | ICD-10-CM

## 2022-06-06 DIAGNOSIS — Z86.010 PERSONAL HISTORY OF COLONIC POLYPS: ICD-10-CM

## 2022-06-06 DIAGNOSIS — K75.81 NASH (NONALCOHOLIC STEATOHEPATITIS): ICD-10-CM

## 2022-06-06 PROCEDURE — 99213 OFFICE O/P EST LOW 20 MIN: CPT | Performed by: INTERNAL MEDICINE

## 2022-06-06 NOTE — PROGRESS NOTES
PATIENT INFORMATION  Germaine Gonzalez       - 1945    CHIEF COMPLAINT  Chief Complaint   Patient presents with   • Mcgill's esophagus   • WALSH       HISTORY OF PRESENT ILLNESS  Follow up from EGD for Dysphagia and has an appetite and energy is better and is on BId PPI    Her last colon was in  and only two small polyps and her new Mcgill's is SSBE and overall she is doingwell      REVIEWED PERTINENT RESULTS/ LABS  Lab Results   Component Value Date    CASEREPORT  2022     Surgical Pathology Report                         Case: [id number]                                  Authorizing Provider:  Bc Sanchez        Collected:           2022 05:12 PM                                 MD Rick                                                                   Ordering Location:     [facility]   Received:            2022 07:31 PM                                 OR                                                                           Pathologist:           Solitario Smith MD                                                          Specimens:   1) - Small Intestine, Duodenum, duodenal biopsy                                                     2) - Gastric, Antrum, gastric biopsy                                                                3) - Esophagus, Distal, distal esophagus                                                   FINALDX  2022     1. Duodenum, Biopsy:  Small bowel mucosa with             A. Normal intact villous surface.   B. No significant inflammation, no granulomas.   C: No viral inclusions or other organisms on routinely stained sections.    2. Stomach, Biopsy:  Oxyntic and antral type gastric mucosa with   A. Mild reactive/chemical gastropathy involving antral type mucosa.   B. No significant inflammation.   C. No metaplasia, dysplasia nor malignancy.   D. No H pylori-like organisms identified.    3. Esophagus, Distal, Biopsy:   Squamoglandular mucosa and submucosa with   A. Mcgill's esophagus.   B. No dysplasia nor malignancy.   C. No significant eosinophilia.   D. No viral inclusions or fungal organisms identified.    mec/clm         Lab Results   Component Value Date    HGB 14.5 03/15/2022    MCV 98 (H) 03/15/2022     03/15/2022    ALT 27 03/15/2022    AST 51 (H) 03/15/2022    HGBA1C 5.3 03/15/2022    INR 1.00 02/01/2022    TRIG 146 03/15/2022      No results found.    REVIEW OF SYSTEMS  Review of Systems   Constitutional: Negative for activity change, chills, fever and unexpected weight change.   HENT: Negative for congestion.    Eyes: Negative for visual disturbance.   Respiratory: Negative for shortness of breath.    Cardiovascular: Negative for chest pain and palpitations.   Gastrointestinal: Negative for abdominal pain and blood in stool.   Endocrine: Negative for cold intolerance and heat intolerance.   Genitourinary: Negative for hematuria.   Musculoskeletal: Negative for gait problem.   Skin: Negative for color change.   Allergic/Immunologic: Negative for immunocompromised state.   Neurological: Negative for weakness and light-headedness.   Hematological: Negative for adenopathy.   Psychiatric/Behavioral: Negative for sleep disturbance. The patient is not nervous/anxious.          ACTIVE PROBLEMS  Patient Active Problem List    Diagnosis    • Esophageal dysphagia [R13.19]    • Weight loss [R63.4]    • S/P total knee arthroplasty [Z96.659]    • Primary osteoarthritis of right knee [M17.11]    • Knee instability, right [M25.361]    • Osteopenia [M85.80]    • Prediabetes [R73.03]    • Essential hypertension [I10]    • Effusion of left shoulder joint [M25.412]    • Encounter for screening for malignant neoplasm of colon [Z12.11]    • Personal history of colonic polyps [Z86.010]    • Primary osteoarthritis of left knee [M17.12]    • WALSH (nonalcoholic steatohepatitis) [K75.81]    • Sjogren's syndrome with keratoconjunctivitis  sicca (HCC) [M35.01]    • Mcgill's esophagus without dysplasia [K22.70]    • Hyperglycemia [R73.9]    • Pyelonephritis [N12]    • Gastroesophageal reflux disease [K21.9]    • Hyperlipidemia [E78.5]    • Pure hypercholesterolemia [E78.00]    • Knee pain [M25.569]          PAST MEDICAL HISTORY  Past Medical History:   Diagnosis Date   • Arthritis     knees   • Mcgill esophagus    • Cancer (HCC)     cervical   • Cervical cancer (HCC)    • Colon polyp    • Eczema    • Elevated liver enzymes    • GERD (gastroesophageal reflux disease)    • H/O stress fracture    • Health care maintenance    • Hypercholesteremia    • Hypertension    • Kidney stones     sched ureteroscopy, lithotripsy   • Knee swelling    • Ocular migraine    • PONV (postoperative nausea and vomiting)    • Postmenopausal    • Seasonal allergies    • Sjogren's syndrome (HCC)     autoimmune disorder          SURGICAL HISTORY  Past Surgical History:   Procedure Laterality Date   •  SECTION      x2   • COLONOSCOPY     • COLONOSCOPY N/A 2021    Procedure: COLONOSCOPY;  Surgeon: Bc Sanchez MD;  Location: [facility];  Service: Gastroenterology;  Laterality: N/A;  Diverticulosis and polyps   • CYSTOSCOPY URETEROSCOPY STONE MANIPULATION/EXTRACTION Left 10/14/2016    Procedure: LEFT URETEROSCOPY bilateral retrograde pyleogram LEFT STENT PLACEMENT.;  Surgeon: Julius Darling MD;  Location: [facility];  Service:    • CYSTOSCOPY W/ URETERAL STENT PLACEMENT Right 8/3/2018    Procedure: CYSTOSCOPY URETERAL CATHETER/STENT INSERTION;  Surgeon: Farzad Cordova MD;  Location: [facility] OR;  Service: Urology   • ENDOSCOPY N/A 4/10/2019    Procedure: ESOPHAGOGASTRODUODENOSCOPY;  Surgeon: Bc Sanchez MD;  Location: [facility] OR;  Service: Gastroenterology   • ENDOSCOPY N/A 2022    Procedure: ESOPHAGOGASTRODUODENOSCOPY WITH BIOPSY;  Surgeon: Bc Sanchez MD;  Location: [facility] OR;  Service: Gastroenterology;   Laterality: N/A;  gastritis, pulliam's   • HYSTERECTOMY     • TOTAL KNEE ARTHROPLASTY Right 2022    Procedure: TOTAL KNEE ARTHROPLASTY AND ALL ASSOCIATED PROCEDURES;  Surgeon: Jovany Villanueva MD;  Location: [facility] OR;  Service: Orthopedics;  Laterality: Right;   • UPPER GASTROINTESTINAL ENDOSCOPY     • URETEROSCOPY LASER LITHOTRIPSY WITH STENT INSERTION Left 10/26/2016    Procedure: LT URETEROSCOPY LASER LITHOTRIPSY ;  Surgeon: Julius Darling MD;  Location: [facility] OR;  Service:          FAMILY HISTORY  Family History   Problem Relation Age of Onset   • Anxiety disorder Mother    • Bipolar disorder Mother    • Stroke Mother 83   • Thyroid disease Mother    • Depression Mother    • Heart attack Father 47   • Thyroid disease Brother    • Hypertension Brother    • Diabetes Brother 68   • Glaucoma Brother    • Depression Brother    • No Known Problems Daughter    • No Known Problems Son    • Leukemia Maternal Grandmother    • Diabetes Maternal Grandfather    • Heart attack Paternal Grandmother    • Heart attack Paternal Grandfather    • Breast cancer Neg Hx    • Colon cancer Neg Hx    • Colon polyps Neg Hx          SOCIAL HISTORY  Social History     Occupational History   • Not on file   Tobacco Use   • Smoking status: Former Smoker     Packs/day: 0.50     Years: 16.00     Pack years: 8.00     Types: Cigarettes     Start date: 3/22/1971     Quit date: 1990     Years since quittin.2   • Smokeless tobacco: Never Used   Vaping Use   • Vaping Use: Never used   Substance and Sexual Activity   • Alcohol use: Yes     Alcohol/week: 2.0 standard drinks     Types: 2 Glasses of wine per week     Comment: 2 glasses of wine / daily   • Drug use: No   • Sexual activity: Defer         CURRENT MEDICATIONS    Current Outpatient Medications:   •  Biotin 5000 MCG tablet, Take  by mouth Daily., Disp: , Rfl:   •  calcium carbonate (OS-ULI) 600 MG tablet, Take 600 mg by mouth 2 (Two) Times a Day With Meals., Disp: ,  "Rfl:   •  desoximetasone (TOPICORT) 0.05 % cream, APPLY CREAM TOPICALLY TO AFFECTED AREA TWICE DAILY FOR ECZEMA FOR UP TO 14 DAYS, Disp: , Rfl:   •  escitalopram (LEXAPRO) 10 MG tablet, Take 0.5 tablets by mouth Every Morning., Disp: 45 tablet, Rfl: 3  •  estradiol (ESTRACE) 0.1 MG/GM vaginal cream, , Disp: , Rfl:   •  glucosamine-chondroitin 500-400 MG capsule capsule, Take 1 capsule by mouth 2 (Two) Times a Day With Meals., Disp: , Rfl:   •  lisinopril (PRINIVIL,ZESTRIL) 10 MG tablet, Take 1 tablet by mouth Daily., Disp: 90 tablet, Rfl: 3  •  omeprazole (priLOSEC) 40 MG capsule, Take 1 capsule by mouth 2 (Two) Times a Day., Disp: 180 capsule, Rfl: 0  •  Propylene Glycol (Systane Complete) 0.6 % solution, Apply  to eye(s) as directed by provider., Disp: , Rfl:   •  rosuvastatin (CRESTOR) 20 MG tablet, Take 1 tablet by mouth Daily., Disp: 90 tablet, Rfl: 3  •  vitamin B-12 (CYANOCOBALAMIN) 1000 MCG tablet, Take 5,000 mcg by mouth Daily., Disp: , Rfl:     ALLERGIES  Levaquin [levofloxacin] and Tetracycline    VITALS  Vitals:    06/06/22 1425   BP: 122/68   BP Location: Left arm   Patient Position: Sitting   Cuff Size: Large Adult   Weight: 67 kg (147 lb 12.8 oz)   Height: 165.1 cm (65\")       PHYSICAL EXAM  Debilities/Disabilities Identified: None  Emotional Behavior: Appropriate  Wt Readings from Last 3 Encounters:   06/06/22 67 kg (147 lb 12.8 oz)   05/03/22 67.1 kg (148 lb)   04/06/22 67.3 kg (148 lb 6.4 oz)     Ht Readings from Last 1 Encounters:   06/06/22 165.1 cm (65\")     Body mass index is 24.6 kg/m².  Physical Exam  Constitutional:       Appearance: She is well-developed. She is not diaphoretic.   HENT:      Head: Normocephalic and atraumatic.   Eyes:      General: No scleral icterus.     Conjunctiva/sclera: Conjunctivae normal.      Pupils: Pupils are equal, round, and reactive to light.   Neck:      Thyroid: No thyromegaly.   Cardiovascular:      Rate and Rhythm: Normal rate and regular rhythm.      Heart " sounds: Normal heart sounds. No murmur heard.    No gallop.   Pulmonary:      Effort: Pulmonary effort is normal.      Breath sounds: Normal breath sounds. No wheezing or rales.   Abdominal:      General: Bowel sounds are normal. There is no distension or abdominal bruit.      Palpations: Abdomen is soft. There is no shifting dullness, fluid wave or mass.      Tenderness: There is no abdominal tenderness. There is no guarding. Negative signs include García's sign.      Hernia: There is no hernia in the ventral area.   Musculoskeletal:         General: Normal range of motion.      Cervical back: Normal range of motion and neck supple.   Lymphadenopathy:      Cervical: No cervical adenopathy.   Skin:     General: Skin is warm and dry.      Findings: No erythema or rash.   Neurological:      Mental Status: She is alert and oriented to person, place, and time.   Psychiatric:         Mood and Affect: Mood normal.         Behavior: Behavior normal.         CLINICAL DATA REVIEWED   reviewed previous lab results and integrated with today's visit, reviewed notes from other physicians and/or last GI encounter, reviewed previous endoscopy results and available photos, reviewed surgical pathology results from previous biopsies    ASSESSMENT  Diagnoses and all orders for this visit:    Mcgill's esophagus without dysplasia    WALSH (nonalcoholic steatohepatitis)    Gastroesophageal reflux disease with esophagitis, unspecified whether hemorrhage    Personal history of colonic polyps          PLAN    Return in about 1 year (around 6/6/2023).    I have discussed the above plan with the patient.  They verbalize understanding and are in agreement with the plan.  They have been advised to contact the office for any questions, concerns, or changes related to their health.

## 2022-06-15 ENCOUNTER — HOSPITAL ENCOUNTER (OUTPATIENT)
Dept: MAMMOGRAPHY | Facility: HOSPITAL | Age: 77
Discharge: HOME | End: 2022-06-15
Admitting: INTERNAL MEDICINE

## 2022-06-15 DIAGNOSIS — Z12.31 SCREENING MAMMOGRAM, ENCOUNTER FOR: ICD-10-CM

## 2022-06-15 PROCEDURE — 77067 SCR MAMMO BI INCL CAD: CPT

## 2022-06-15 PROCEDURE — 77063 BREAST TOMOSYNTHESIS BI: CPT

## 2022-06-22 ENCOUNTER — TELEPHONE (OUTPATIENT)
Dept: ORTHOPEDIC SURGERY | Facility: CLINIC | Age: 77
End: 2022-06-22

## 2022-06-22 NOTE — TELEPHONE ENCOUNTER
"Called and spoke per the office note from Dr. Villanueva \"I described to her some weighted prone hangs as well as weighted straight leg raises to work on both quad strength as well as her terminal extension.\"    Advised that she should continue those to help strengthen her leg and allow maximal motion to her TKA.     DX:s/p right total knee arthroplasty, DOS 2/8/2022      "

## 2022-06-22 NOTE — TELEPHONE ENCOUNTER
Caller: Germaine Gonzalez    Relationship to patient: Self    Best call back number: [phone]    Patient is needing: PATIENT CALLED  - STATED DR MARINA WANTED HER TO CONTINUE EXERCISES AFTER HER SURGERY (2.8.22) WANTED TO KNOW IF SHE STILL NEEDED TO CONTINUE THOSE. PLEASE  CALL AND ADVISE. STATED THERE ARE ABOUT 5 EXERCISES HE WANTED HER TO DO. THANK YOU!

## 2022-06-25 DIAGNOSIS — K21.00 GASTROESOPHAGEAL REFLUX DISEASE WITH ESOPHAGITIS, UNSPECIFIED WHETHER HEMORRHAGE: ICD-10-CM

## 2022-06-25 DIAGNOSIS — M17.11 PRIMARY OSTEOARTHRITIS OF RIGHT KNEE: ICD-10-CM

## 2022-06-25 NOTE — TELEPHONE ENCOUNTER
Rx Refill Note  Requested Prescriptions     Pending Prescriptions Disp Refills    omeprazole (priLOSEC) 40 MG capsule [Pharmacy Med Name: Omeprazole 40 MG Oral Capsule Delayed Release] 180 capsule 0     Sig: Take 1 capsule by mouth twice daily      Last office visit with prescribing clinician: 3/22/2022      Next office visit with prescribing clinician: Visit date not found            DAINN CRUZ MA  06/25/22, 12:03 EDT

## 2022-06-27 RX ORDER — TRAMADOL HYDROCHLORIDE 50 MG/1
TABLET ORAL
Qty: 40 TABLET | Refills: 0 | OUTPATIENT
Start: 2022-06-27

## 2022-06-27 RX ORDER — OMEPRAZOLE 40 MG/1
CAPSULE, DELAYED RELEASE ORAL
Qty: 180 CAPSULE | Refills: 0 | Status: SHIPPED | OUTPATIENT
Start: 2022-06-27 | End: 2022-09-20

## 2022-07-11 ENCOUNTER — OFFICE VISIT (OUTPATIENT)
Dept: INTERNAL MEDICINE | Facility: CLINIC | Age: 77
End: 2022-07-11

## 2022-07-11 VITALS
HEIGHT: 65 IN | DIASTOLIC BLOOD PRESSURE: 70 MMHG | TEMPERATURE: 97.7 F | BODY MASS INDEX: 23.99 KG/M2 | OXYGEN SATURATION: 98 % | WEIGHT: 144 LBS | HEART RATE: 90 BPM | SYSTOLIC BLOOD PRESSURE: 110 MMHG

## 2022-07-11 DIAGNOSIS — M17.11 PRIMARY OSTEOARTHRITIS OF RIGHT KNEE: Primary | ICD-10-CM

## 2022-07-11 DIAGNOSIS — E78.2 MIXED HYPERLIPIDEMIA: ICD-10-CM

## 2022-07-11 DIAGNOSIS — K21.00 GASTROESOPHAGEAL REFLUX DISEASE WITH ESOPHAGITIS, UNSPECIFIED WHETHER HEMORRHAGE: ICD-10-CM

## 2022-07-11 DIAGNOSIS — R73.03 PREDIABETES: ICD-10-CM

## 2022-07-11 DIAGNOSIS — I10 ESSENTIAL HYPERTENSION: ICD-10-CM

## 2022-07-11 PROCEDURE — 99214 OFFICE O/P EST MOD 30 MIN: CPT | Performed by: INTERNAL MEDICINE

## 2022-07-11 RX ORDER — TRAMADOL HYDROCHLORIDE 50 MG/1
50 TABLET ORAL 2 TIMES DAILY PRN
Qty: 60 TABLET | Refills: 2 | Status: SHIPPED | OUTPATIENT
Start: 2022-07-11 | End: 2022-10-12

## 2022-07-11 NOTE — PROGRESS NOTES
Germaine Gonzalez is a 77 y.o. female, who presents with a chief complaint of   Chief Complaint   Patient presents with   • Hypertension   • Hyperlipidemia     F/u           HPI   Pt here for f/u with her .     She is still losing some weight.  Her stomach is feeling better. She had a scope in April.  She had healed ulcers but no active ulceration.  Her Mcgill's esophagus is still there but no dysplasia.  CT chest, abd, and pelvis are neg for malignancy.      She is slowly increasing activity after her knee replacement.   She cannot take nsaids bc of her GERD/ulcerations.  She has been taking tylenol PM but is still having some pain.  Getting up and down is causing the most pain.  She is still doing PT exercises 2x a day at home.  Her right knee feels better and now she needs her left knee replaced as well.        The following portions of the patient's history were reviewed and updated as appropriate: allergies, current medications, past family history, past medical history, past social history, past surgical history and problem list.    Allergies: Levaquin [levofloxacin] and Tetracycline    Review of Systems   Constitutional: Negative.    HENT: Negative.    Eyes: Negative.    Respiratory: Negative.    Cardiovascular: Negative.    Gastrointestinal: Negative.    Endocrine: Negative.    Genitourinary: Negative.    Musculoskeletal: Negative.    Skin: Negative.    Allergic/Immunologic: Negative.    Neurological: Negative.    Hematological: Negative.    Psychiatric/Behavioral: Negative.    All other systems reviewed and are negative.            Wt Readings from Last 3 Encounters:   07/11/22 65.3 kg (144 lb)   06/06/22 67 kg (147 lb 12.8 oz)   05/03/22 67.1 kg (148 lb)     Temp Readings from Last 3 Encounters:   07/11/22 97.7 °F (36.5 °C)   04/06/22 98.2 °F (36.8 °C) (Infrared)   03/22/22 96.9 °F (36.1 °C) (Tympanic)     BP Readings from Last 3 Encounters:   07/11/22 110/70   06/06/22 122/68   04/06/22 125/68      Pulse Readings from Last 3 Encounters:   07/11/22 90   04/06/22 80   03/22/22 95     Body mass index is 23.96 kg/m².  SpO2 Readings from Last 3 Encounters:   07/11/22 98%   04/06/22 100%   03/22/22 100%          Physical Exam  Vitals and nursing note reviewed.   Constitutional:       General: She is not in acute distress.     Appearance: She is well-developed.   HENT:      Head: Normocephalic and atraumatic.      Right Ear: External ear normal.      Left Ear: External ear normal.      Nose: Nose normal.   Eyes:      Conjunctiva/sclera: Conjunctivae normal.      Pupils: Pupils are equal, round, and reactive to light.   Cardiovascular:      Rate and Rhythm: Normal rate and regular rhythm.      Heart sounds: Normal heart sounds.   Pulmonary:      Effort: Pulmonary effort is normal. No respiratory distress.      Breath sounds: Normal breath sounds. No wheezing.   Musculoskeletal:         General: Normal range of motion.      Cervical back: Normal range of motion and neck supple.      Comments: Normal gait   Skin:     General: Skin is warm and dry.   Neurological:      Mental Status: She is alert and oriented to person, place, and time.   Psychiatric:         Mood and Affect: Mood normal.         Behavior: Behavior normal.         Thought Content: Thought content normal.         Judgment: Judgment normal.         Results for orders placed or performed during the hospital encounter of 04/06/22   Tissue Pathology Exam    Specimen: A: Small Intestine, Duodenum; Tissue    B: Gastric, Antrum; Tissue    C: Esophagus, Distal; Tissue   Result Value Ref Range    Case Report       Surgical Pathology Report                         Case: [id number]                                  Authorizing Provider:  Bc Sanchez        Collected:           04/06/2022 05:12 PM                                 MD Rick                                                                   Ordering Location:     [facility]    Received:            04/06/2022 07:31 PM                                 OR                                                                           Pathologist:           Solitario Smith MD                                                          Specimens:   1) - Small Intestine, Duodenum, duodenal biopsy                                                     2) - Gastric, Antrum, gastric biopsy                                                                3) - Esophagus, Distal, distal esophagus                                                   Final Diagnosis       1. Duodenum, Biopsy:  Small bowel mucosa with             A. Normal intact villous surface.   B. No significant inflammation, no granulomas.   C: No viral inclusions or other organisms on routinely stained sections.    2. Stomach, Biopsy:  Oxyntic and antral type gastric mucosa with   A. Mild reactive/chemical gastropathy involving antral type mucosa.   B. No significant inflammation.   C. No metaplasia, dysplasia nor malignancy.   D. No H pylori-like organisms identified.    3. Esophagus, Distal, Biopsy:  Squamoglandular mucosa and submucosa with   A. Mcgill's esophagus.   B. No dysplasia nor malignancy.   C. No significant eosinophilia.   D. No viral inclusions or fungal organisms identified.    [facility]        Gross Description       1. Small Intestine, Duodenum.  The specimen is received in formalin labeled with the patient's name and further designated 'duodenal biopsy' are multiple small fragments of gray-tan tissue. The specimen is submitted for embedding as received.        2. Gastric, Antrum.  The specimen is received in formalin labeled with the patient's name and further designated 'gastric biopsy' are multiple small fragments of gray-tan tissue. The specimen is submitted for embedding as received.        3. Esophagus, Distal.  The specimen is received in formalin labeled with the patient's name and further designated 'distal esophagus  biopsy' are multiple small fragments of gray-tan tissue. The specimen is submitted for embedding as received.             Result Review :                  Assessment and Plan    Diagnoses and all orders for this visit:    1. Primary osteoarthritis of right knee (Primary)  -     traMADol (ULTRAM) 50 MG tablet; Take 1 tablet by mouth 2 (Two) Times a Day As Needed for Moderate Pain .  Dispense: 60 tablet; Refill: 2    2. Gastroesophageal reflux disease with esophagitis, unspecified whether hemorrhage - cont ppi  3. Essential hypertension  -     CBC & Differential; Future  -     Comprehensive Metabolic Panel; Future    4. Mixed hyperlipidemia  -     CBC & Differential; Future  -     Lipid Panel With LDL / HDL Ratio; Future    5. Prediabetes  -     CBC & Differential; Future  -     Comprehensive Metabolic Panel; Future  -     Hemoglobin A1c; Future  -     T4, Free; Future  -     TSH; Future  -     Lipid Panel With LDL / HDL Ratio; Future         BMI is within normal parameters. No other follow-up for BMI required.             Outpatient Medications Prior to Visit   Medication Sig Dispense Refill   • Biotin 5000 MCG tablet Take  by mouth Daily.     • calcium carbonate (OS-ULI) 600 MG tablet Take 600 mg by mouth 2 (Two) Times a Day With Meals.     • desoximetasone (TOPICORT) 0.05 % cream APPLY CREAM TOPICALLY TO AFFECTED AREA TWICE DAILY FOR ECZEMA FOR UP TO 14 DAYS     • escitalopram (LEXAPRO) 10 MG tablet Take 0.5 tablets by mouth Every Morning. 45 tablet 3   • lisinopril (PRINIVIL,ZESTRIL) 10 MG tablet Take 1 tablet by mouth Daily. 90 tablet 3   • omeprazole (priLOSEC) 40 MG capsule Take 1 capsule by mouth twice daily 180 capsule 0   • Propylene Glycol (Systane Complete) 0.6 % solution Apply  to eye(s) as directed by provider.     • rosuvastatin (CRESTOR) 20 MG tablet Take 1 tablet by mouth Daily. 90 tablet 3   • vitamin B-12 (CYANOCOBALAMIN) 1000 MCG tablet Take 5,000 mcg by mouth Daily.     • estradiol (ESTRACE) 0.1  MG/GM vaginal cream      • glucosamine-chondroitin 500-400 MG capsule capsule Take 1 capsule by mouth 2 (Two) Times a Day With Meals.       No facility-administered medications prior to visit.     New Medications Ordered This Visit   Medications   • traMADol (ULTRAM) 50 MG tablet     Sig: Take 1 tablet by mouth 2 (Two) Times a Day As Needed for Moderate Pain .     Dispense:  60 tablet     Refill:  2     [unfilled]  There are no discontinued medications.      Return in about 3 months (around 10/11/2022) for Recheck, labs.    Patient was given instructions and counseling regarding her condition or for health maintenance advice. Please see specific information pulled into the AVS if appropriate.

## 2022-07-18 ENCOUNTER — HOSPITAL ENCOUNTER (OUTPATIENT)
Dept: CT IMAGING | Facility: HOSPITAL | Age: 77
Discharge: HOME | End: 2022-07-18
Admitting: UROLOGY

## 2022-07-18 DIAGNOSIS — N20.0 CALCULUS, RENAL: ICD-10-CM

## 2022-07-18 LAB — CREAT BLDA-MCNC: 0.7 MG/DL (ref 0.6–1.3)

## 2022-07-18 PROCEDURE — 0 IOPAMIDOL PER 1 ML: Performed by: UROLOGY

## 2022-07-18 PROCEDURE — 82565 ASSAY OF CREATININE: CPT

## 2022-07-18 PROCEDURE — 74160 CT ABDOMEN W/CONTRAST: CPT

## 2022-07-18 RX ADMIN — IOPAMIDOL 100 ML: 755 INJECTION, SOLUTION INTRAVENOUS at 11:07

## 2022-07-26 ENCOUNTER — TRANSCRIBE ORDERS (OUTPATIENT)
Dept: ADMINISTRATIVE | Facility: HOSPITAL | Age: 77
End: 2022-07-26

## 2022-07-26 DIAGNOSIS — R31.21 ASYMPTOMATIC MICROSCOPIC HEMATURIA: Primary | ICD-10-CM

## 2022-08-10 ENCOUNTER — OFFICE VISIT (OUTPATIENT)
Dept: ORTHOPEDIC SURGERY | Facility: CLINIC | Age: 77
End: 2022-08-10

## 2022-08-10 VITALS — BODY MASS INDEX: 23.99 KG/M2 | HEIGHT: 65 IN | WEIGHT: 144 LBS

## 2022-08-10 DIAGNOSIS — Z96.651 STATUS POST TOTAL RIGHT KNEE REPLACEMENT: ICD-10-CM

## 2022-08-10 DIAGNOSIS — M17.12 PRIMARY OSTEOARTHRITIS OF LEFT KNEE: Primary | ICD-10-CM

## 2022-08-10 PROCEDURE — 99212 OFFICE O/P EST SF 10 MIN: CPT | Performed by: ORTHOPAEDIC SURGERY

## 2022-08-10 PROCEDURE — 73562 X-RAY EXAM OF KNEE 3: CPT | Performed by: ORTHOPAEDIC SURGERY

## 2022-08-10 NOTE — PROGRESS NOTES
Subjective:     Patient ID: Germaine Gonzalez is a 77 y.o. female.    Chief Complaint:  Follow-up status post right total knee arthroplasty, 2022    History of Present Illness  Germaine Gonzalez returns to clinic today for evaluation of right knee pain. The patient is accompanied by an adult male.    She states she is doing fairly well in regards to her knee. She does have some challenges with balance, but otherwise is progressing fairly well at this time. The patient denies any numbness or tingling. She has continued to do her exercises to work on motion and strength. She admits difficulty transitioning from a sitting to a standing position. She denies any difficulty descending stairs. She denies any difficulty ambulating.      Social History     Occupational History   • Not on file   Tobacco Use   • Smoking status: Former Smoker     Packs/day: 0.50     Years: 16.00     Pack years: 8.00     Types: Cigarettes     Start date: 3/22/1971     Quit date: 1990     Years since quittin.3   • Smokeless tobacco: Never Used   Vaping Use   • Vaping Use: Never used   Substance and Sexual Activity   • Alcohol use: Yes     Alcohol/week: 2.0 standard drinks     Types: 2 Glasses of wine per week     Comment: 2 glasses of wine / daily   • Drug use: No   • Sexual activity: Defer      Past Medical History:   Diagnosis Date   • Arthritis     knees   • Mcgill esophagus    • Cancer (HCC)     cervical   • Cervical cancer (HCC)    • Colon polyp    • Eczema    • Elevated liver enzymes    • GERD (gastroesophageal reflux disease)    • H/O stress fracture    • Health care maintenance    • Hypercholesteremia    • Hypertension    • Kidney stones     sched ureteroscopy, lithotripsy   • Knee swelling    • Ocular migraine    • PONV (postoperative nausea and vomiting)    • Postmenopausal    • Seasonal allergies    • Sjogren's syndrome (HCC)     autoimmune disorder      Past Surgical History:   Procedure Laterality Date   •  SECTION      x2    • COLONOSCOPY     • COLONOSCOPY N/A 9/8/2021    Procedure: COLONOSCOPY;  Surgeon: Bc Sanchez MD;  [facility];  Service: Gastroenterology;  Laterality: N/A;  Diverticulosis and polyps   • CYSTOSCOPY URETEROSCOPY STONE MANIPULATION/EXTRACTION Left 10/14/2016    Procedure: LEFT URETEROSCOPY bilateral retrograde pyleogram LEFT STENT PLACEMENT.;  Surgeon: Julius Darling MD;  Location: [facility] OR;  Service:    • CYSTOSCOPY W/ URETERAL STENT PLACEMENT Right 8/3/2018    Procedure: CYSTOSCOPY URETERAL CATHETER/STENT INSERTION;  Surgeon: Farzad Cordova MD;  Location: [facility] OR;  Service: Urology   • ENDOSCOPY N/A 4/10/2019    Procedure: ESOPHAGOGASTRODUODENOSCOPY;  Surgeon: Bc Sanchez MD;  Location: [facility] OR;  Service: Gastroenterology   • ENDOSCOPY N/A 4/6/2022    Procedure: ESOPHAGOGASTRODUODENOSCOPY WITH BIOPSY;  Surgeon: Bc Sanchez MD;  Location: [facility];  Service: Gastroenterology;  Laterality: N/A;  gastritis, pulliam's   • HYSTERECTOMY     • TOTAL KNEE ARTHROPLASTY Right 2/8/2022    Procedure: TOTAL KNEE ARTHROPLASTY AND ALL ASSOCIATED PROCEDURES;  Surgeon: Jovany Villanueva MD;  Location: [facility];  Service: Orthopedics;  Laterality: Right;   • UPPER GASTROINTESTINAL ENDOSCOPY     • URETEROSCOPY LASER LITHOTRIPSY WITH STENT INSERTION Left 10/26/2016    Procedure: LT URETEROSCOPY LASER LITHOTRIPSY ;  Surgeon: Julius Darling MD;  Location: [facility];  Service:        Family History   Problem Relation Age of Onset   • Anxiety disorder Mother    • Bipolar disorder Mother    • Stroke Mother 83   • Thyroid disease Mother    • Depression Mother    • Heart attack Father 47   • Thyroid disease Brother    • Hypertension Brother    • Diabetes Brother 68   • Glaucoma Brother    • Depression Brother    • No Known Problems Daughter    • No Known Problems Son    • Leukemia Maternal Grandmother    • Diabetes Maternal Grandfather    • Heart attack  "Paternal Grandmother    • Heart attack Paternal Grandfather    • Breast cancer Neg Hx    • Colon cancer Neg Hx    • Colon polyps Neg Hx          Review of Systems        Objective:  Vitals:    08/10/22 0924   Weight: 65.3 kg (144 lb)   Height: 165.1 cm (65\")         08/10/22  0924   Weight: 65.3 kg (144 lb)     Body mass index is 23.96 kg/m².  General: No acute distress.  Resp: normal respiratory effort  Skin: no rashes or wounds; normal turgor  Psych: mood and affect appropriate; recent and remote memory intact          Ortho Exam       Right knee  Midline incision is well-healed.  ROM 0 to 130 degrees  4+/5 on flexion  4+/5 on extension  Stable opening on varus and valgus stress at 0 and 30  Good endpoint and posterior drawer at 90 degrees.    Imaging:  Right Knee X-Ray  Indication: s/p total knee     AP, Lateral, and Bowers views    Findings:  Total knee arthroplasty components are in stable position with acceptable overall alignment, no evidence of periprosthetic fracture, loosening, osteolysis, or reactive bone formation.    Compared to prior postoperative x-rays    Assessment:        1. Primary osteoarthritis of left knee    2. Status post total right knee replacement           Plan:          1. Discussed treatment options at length with patient at today's visit.   2. The patient is doing well at this point in time. She will continue to make progress. She will continue with home exercise. She will work on strength and range of motion.  3. Follow up in 6 months for repeat x-rays of right knee.      Germaine Gonzalez was in agreement with plan and had all questions answered.     Orders:  Orders Placed This Encounter   Procedures   • XR Knee 3+ View With Bowers Right       Medications:  No orders of the defined types were placed in this encounter.      Followup:  Return in about 6 months (around 2/10/2023) for xrays needed at follow up.    Diagnoses and all orders for this visit:    1. Primary osteoarthritis of left " knee (Primary)  -     Cancel: XR Knee 3 View Left    2. Status post total right knee replacement  -     XR Knee 3+ View With [place] Right          Dictated utilizing Dragon dictation     Transcribed from ambient dictation for Jovany Villanueva MD by LAVERN SAWYER.  08/10/22   10:50 EDT    Patient verbalized consent to the visit recording.  I have personally performed the services described in this document as transcribed by the above individual, and it is both accurate and complete.  Jovany Villanueva MD  8/16/2022  17:39 EDT

## 2022-09-18 DIAGNOSIS — K21.00 GASTROESOPHAGEAL REFLUX DISEASE WITH ESOPHAGITIS, UNSPECIFIED WHETHER HEMORRHAGE: ICD-10-CM

## 2022-09-20 RX ORDER — OMEPRAZOLE 40 MG/1
CAPSULE, DELAYED RELEASE ORAL
Qty: 180 CAPSULE | Refills: 0 | Status: SHIPPED | OUTPATIENT
Start: 2022-09-20 | End: 2022-10-12 | Stop reason: SDUPTHER

## 2022-09-20 NOTE — TELEPHONE ENCOUNTER
Rx Refill Note  Requested Prescriptions     Pending Prescriptions Disp Refills    omeprazole (priLOSEC) 40 MG capsule [Pharmacy Med Name: Omeprazole 40 MG Oral Capsule Delayed Release] 180 capsule 0     Sig: Take 1 capsule by mouth twice daily      Last office visit with prescribing clinician: 7/11/2022      Next office visit with prescribing clinician: 10/12/2022            JANEL MONTANEZ MA  09/20/22, 17:44 EDT

## 2022-10-04 DIAGNOSIS — I10 ESSENTIAL HYPERTENSION: ICD-10-CM

## 2022-10-04 DIAGNOSIS — E78.2 MIXED HYPERLIPIDEMIA: ICD-10-CM

## 2022-10-04 DIAGNOSIS — R73.03 PREDIABETES: ICD-10-CM

## 2022-10-04 NOTE — TELEPHONE ENCOUNTER
SCHEDULE AT [place] ON 09/08/2021 AT 9:30AM - ARRIVE 8:30AM.  E-MAILED INSTRUCTIONS [email or web address].   Xolair Pregnancy And Lactation Text: This medication is Pregnancy Category B and is considered safe during pregnancy. This medication is excreted in breast milk.

## 2022-10-06 LAB
ALBUMIN SERPL-MCNC: 4.8 G/DL (ref 3.5–5.2)
ALBUMIN/GLOB SERPL: 2.5 G/DL
ALP SERPL-CCNC: 96 U/L (ref 39–117)
ALT SERPL-CCNC: 44 U/L (ref 1–33)
AST SERPL-CCNC: 50 U/L (ref 1–32)
BASOPHILS # BLD AUTO: 0.03 10*3/MM3 (ref 0–0.2)
BASOPHILS NFR BLD AUTO: 0.8 % (ref 0–1.5)
BILIRUB SERPL-MCNC: 0.6 MG/DL (ref 0–1.2)
BUN SERPL-MCNC: 11 MG/DL (ref 8–23)
BUN/CREAT SERPL: 13.4 (ref 7–25)
CALCIUM SERPL-MCNC: 9.7 MG/DL (ref 8.6–10.5)
CHLORIDE SERPL-SCNC: 107 MMOL/L (ref 98–107)
CHOLEST SERPL-MCNC: 213 MG/DL (ref 0–200)
CO2 SERPL-SCNC: 27 MMOL/L (ref 22–29)
CREAT SERPL-MCNC: 0.82 MG/DL (ref 0.57–1)
EGFRCR SERPLBLD CKD-EPI 2021: 74.7 ML/MIN/1.73
EOSINOPHIL # BLD AUTO: 0.13 10*3/MM3 (ref 0–0.4)
EOSINOPHIL NFR BLD AUTO: 3.6 % (ref 0.3–6.2)
ERYTHROCYTE [DISTWIDTH] IN BLOOD BY AUTOMATED COUNT: 12.1 % (ref 12.3–15.4)
GLOBULIN SER CALC-MCNC: 1.9 GM/DL
GLUCOSE SERPL-MCNC: 97 MG/DL (ref 65–99)
HBA1C MFR BLD: 5.2 % (ref 4.8–5.6)
HCT VFR BLD AUTO: 40.7 % (ref 34–46.6)
HDLC SERPL-MCNC: 64 MG/DL (ref 40–60)
HGB BLD-MCNC: 13.5 G/DL (ref 12–15.9)
IMM GRANULOCYTES # BLD AUTO: 0.01 10*3/MM3 (ref 0–0.05)
IMM GRANULOCYTES NFR BLD AUTO: 0.3 % (ref 0–0.5)
LDLC SERPL CALC-MCNC: 122 MG/DL (ref 0–100)
LDLC/HDLC SERPL: 1.85 {RATIO}
LYMPHOCYTES # BLD AUTO: 0.97 10*3/MM3 (ref 0.7–3.1)
LYMPHOCYTES NFR BLD AUTO: 26.6 % (ref 19.6–45.3)
MCH RBC QN AUTO: 33.6 PG (ref 26.6–33)
MCHC RBC AUTO-ENTMCNC: 33.2 G/DL (ref 31.5–35.7)
MCV RBC AUTO: 101.2 FL (ref 79–97)
MONOCYTES # BLD AUTO: 0.49 10*3/MM3 (ref 0.1–0.9)
MONOCYTES NFR BLD AUTO: 13.5 % (ref 5–12)
NEUTROPHILS # BLD AUTO: 2.01 10*3/MM3 (ref 1.7–7)
NEUTROPHILS NFR BLD AUTO: 55.2 % (ref 42.7–76)
NRBC BLD AUTO-RTO: 0 /100 WBC (ref 0–0.2)
PLATELET # BLD AUTO: 208 10*3/MM3 (ref 140–450)
POTASSIUM SERPL-SCNC: 4.6 MMOL/L (ref 3.5–5.2)
PROT SERPL-MCNC: 6.7 G/DL (ref 6–8.5)
RBC # BLD AUTO: 4.02 10*6/MM3 (ref 3.77–5.28)
SODIUM SERPL-SCNC: 144 MMOL/L (ref 136–145)
T4 FREE SERPL-MCNC: 0.92 NG/DL (ref 0.93–1.7)
TRIGL SERPL-MCNC: 153 MG/DL (ref 0–150)
TSH SERPL DL<=0.005 MIU/L-ACNC: 1.92 UIU/ML (ref 0.27–4.2)
VLDLC SERPL CALC-MCNC: 27 MG/DL (ref 5–40)
WBC # BLD AUTO: 3.64 10*3/MM3 (ref 3.4–10.8)

## 2022-10-07 ENCOUNTER — DOCUMENTATION (OUTPATIENT)
Dept: PHARMACY | Facility: TELEHEALTH | Age: 77
End: 2022-10-07

## 2022-10-12 ENCOUNTER — OFFICE VISIT (OUTPATIENT)
Dept: INTERNAL MEDICINE | Facility: CLINIC | Age: 77
End: 2022-10-12

## 2022-10-12 VITALS
OXYGEN SATURATION: 99 % | TEMPERATURE: 98 F | HEART RATE: 72 BPM | WEIGHT: 149.6 LBS | HEIGHT: 65 IN | SYSTOLIC BLOOD PRESSURE: 140 MMHG | DIASTOLIC BLOOD PRESSURE: 72 MMHG | BODY MASS INDEX: 24.93 KG/M2

## 2022-10-12 DIAGNOSIS — I10 ESSENTIAL HYPERTENSION: ICD-10-CM

## 2022-10-12 DIAGNOSIS — Z00.00 MEDICARE ANNUAL WELLNESS VISIT, SUBSEQUENT: Primary | ICD-10-CM

## 2022-10-12 DIAGNOSIS — K75.81 NASH (NONALCOHOLIC STEATOHEPATITIS): ICD-10-CM

## 2022-10-12 DIAGNOSIS — Z00.00 HEALTHCARE MAINTENANCE: ICD-10-CM

## 2022-10-12 DIAGNOSIS — K21.00 GASTROESOPHAGEAL REFLUX DISEASE WITH ESOPHAGITIS, UNSPECIFIED WHETHER HEMORRHAGE: ICD-10-CM

## 2022-10-12 DIAGNOSIS — R73.03 PREDIABETES: ICD-10-CM

## 2022-10-12 DIAGNOSIS — Z96.651 STATUS POST TOTAL RIGHT KNEE REPLACEMENT: ICD-10-CM

## 2022-10-12 DIAGNOSIS — E78.2 MIXED HYPERLIPIDEMIA: ICD-10-CM

## 2022-10-12 PROCEDURE — 96160 PT-FOCUSED HLTH RISK ASSMT: CPT | Performed by: INTERNAL MEDICINE

## 2022-10-12 PROCEDURE — 99214 OFFICE O/P EST MOD 30 MIN: CPT | Performed by: INTERNAL MEDICINE

## 2022-10-12 PROCEDURE — 1170F FXNL STATUS ASSESSED: CPT | Performed by: INTERNAL MEDICINE

## 2022-10-12 PROCEDURE — G0439 PPPS, SUBSEQ VISIT: HCPCS | Performed by: INTERNAL MEDICINE

## 2022-10-12 PROCEDURE — 99397 PER PM REEVAL EST PAT 65+ YR: CPT | Performed by: INTERNAL MEDICINE

## 2022-10-12 PROCEDURE — 1160F RVW MEDS BY RX/DR IN RCRD: CPT | Performed by: INTERNAL MEDICINE

## 2022-10-12 RX ORDER — OMEPRAZOLE 40 MG/1
40 CAPSULE, DELAYED RELEASE ORAL 2 TIMES DAILY
Qty: 180 CAPSULE | Refills: 3 | Status: SHIPPED | OUTPATIENT
Start: 2022-10-12

## 2022-10-12 NOTE — PROGRESS NOTES
The ABCs of the Annual Wellness Visit  Subsequent Medicare Wellness Visit    Chief Complaint   Patient presents with   • Hypertension     3 month follow up   • Hyperlipidemia   • Diabetes      Subjective    History of Present Illness:  Germaine Gonzalez is a 77 y.o. female who presents for a Subsequent Medicare Wellness Visit.    The following portions of the patient's history were reviewed and   updated as appropriate: allergies, current medications, past family history, past medical history, past social history, past surgical history and problem list.    Compared to one year ago, the patient feels her physical   health is the same.    Compared to one year ago, the patient feels her mental   health is the same.    Recent Hospitalizations:  She was not admitted to the hospital during the last year.       Current Medical Providers:  Patient Care Team:  Roberta Boswell MD as PCP - General (Internal Medicine & Pediatrics)  LauraBc romero MD as Consulting Physician (Gastroenterology)    Outpatient Medications Prior to Visit   Medication Sig Dispense Refill   • Biotin 5000 MCG tablet Take  by mouth Daily.     • calcium carbonate (OS-ULI) 600 MG tablet Take 600 mg by mouth 2 (Two) Times a Day With Meals.     • desoximetasone (TOPICORT) 0.05 % cream APPLY CREAM TOPICALLY TO AFFECTED AREA TWICE DAILY FOR ECZEMA FOR UP TO 14 DAYS     • escitalopram (LEXAPRO) 10 MG tablet Take 0.5 tablets by mouth Every Morning. 45 tablet 3   • estradiol (ESTRACE) 0.1 MG/GM vaginal cream      • glucosamine-chondroitin 500-400 MG capsule capsule Take 1 capsule by mouth 2 (Two) Times a Day With Meals.     • lisinopril (PRINIVIL,ZESTRIL) 10 MG tablet Take 1 tablet by mouth Daily. 90 tablet 3   • Propylene Glycol (Systane Complete) 0.6 % solution Apply  to eye(s) as directed by provider.     • rosuvastatin (CRESTOR) 20 MG tablet Take 1 tablet by mouth Daily. 90 tablet 3   • vitamin B-12 (CYANOCOBALAMIN) 1000 MCG tablet Take 5,000  mcg by mouth Daily.     • omeprazole (priLOSEC) 40 MG capsule Take 1 capsule by mouth twice daily 180 capsule 0   • traMADol (ULTRAM) 50 MG tablet Take 1 tablet by mouth 2 (Two) Times a Day As Needed for Moderate Pain . 60 tablet 2     No facility-administered medications prior to visit.       No opioid medication identified on active medication list. I have reviewed chart for other potential  high risk medication/s and harmful drug interactions in the elderly.          Aspirin is not on active medication list.  Aspirin use is not indicated based on review of current medical condition/s. Risk of harm outweighs potential benefits.  .    Patient Active Problem List   Diagnosis   • Gastroesophageal reflux disease   • Hyperlipidemia   • Pyelonephritis   • Sjogren's syndrome with keratoconjunctivitis sicca (HCC)   • Mcgill's esophagus without dysplasia   • Hyperglycemia   • WALSH (nonalcoholic steatohepatitis)   • Pure hypercholesterolemia   • Knee pain   • Primary osteoarthritis of left knee   • Encounter for screening for malignant neoplasm of colon   • Personal history of colonic polyps   • Effusion of left shoulder joint   • Osteopenia   • Prediabetes   • Essential hypertension   • Primary osteoarthritis of right knee   • Knee instability, right   • S/P total knee arthroplasty   • Esophageal dysphagia   • Weight loss     Advance Care Planning  Advance Directive is not on file.  ACP discussion was held with the patient during this visit. Patient has an advance directive (not in EMR), copy requested.    Review of Systems   Constitutional: Negative.    HENT: Negative.    Eyes: Negative.    Respiratory: Negative.    Cardiovascular: Negative.    Gastrointestinal: Negative.    Endocrine: Negative.    Musculoskeletal: Negative.    Skin: Negative.    Allergic/Immunologic: Negative.    Neurological: Negative.    Hematological: Negative.    Psychiatric/Behavioral: Negative.    All other systems reviewed and are negative.      "  Objective    Vitals:    10/12/22 1102   BP: 140/72   BP Location: Left arm   Patient Position: Sitting   Cuff Size: Adult   Pulse: 72   Temp: 98 °F (36.7 °C)   SpO2: 99%   Weight: 67.9 kg (149 lb 9.6 oz)   Height: 165.1 cm (65\")     Estimated body mass index is 24.89 kg/m² as calculated from the following:    Height as of this encounter: 165.1 cm (65\").    Weight as of this encounter: 67.9 kg (149 lb 9.6 oz).    BMI is within normal parameters. No other follow-up for BMI required.      Does the patient have evidence of cognitive impairment? No    Physical Exam  Vitals and nursing note reviewed.   Constitutional:       General: She is not in acute distress.     Appearance: She is well-developed.   HENT:      Head: Normocephalic and atraumatic.      Right Ear: External ear normal.      Left Ear: External ear normal.      Nose: Nose normal.   Eyes:      Conjunctiva/sclera: Conjunctivae normal.      Pupils: Pupils are equal, round, and reactive to light.   Cardiovascular:      Rate and Rhythm: Normal rate and regular rhythm.      Heart sounds: Normal heart sounds.   Pulmonary:      Effort: Pulmonary effort is normal. No respiratory distress.      Breath sounds: Normal breath sounds. No wheezing.   Musculoskeletal:         General: Normal range of motion.      Cervical back: Normal range of motion and neck supple.      Comments: Normal gait   Skin:     General: Skin is warm and dry.   Neurological:      Mental Status: She is alert and oriented to person, place, and time.   Psychiatric:         Behavior: Behavior normal.         Thought Content: Thought content normal.         Judgment: Judgment normal.       Lab Results   Component Value Date    CHLPL 213 (H) 10/05/2022    TRIG 153 (H) 10/05/2022    HDL 64 (H) 10/05/2022     (H) 10/05/2022    VLDL 27 10/05/2022    HGBA1C 5.20 10/05/2022            HEALTH RISK ASSESSMENT    Smoking Status:  Social History     Tobacco Use   Smoking Status Former   • Packs/day: " 0.50   • Years: 16.00   • Pack years: 8.00   • Types: Cigarettes   • Start date: 3/22/1971   • Quit date: 1990   • Years since quittin.5   Smokeless Tobacco Never     Alcohol Consumption:  Social History     Substance and Sexual Activity   Alcohol Use Yes   • Alcohol/week: 2.0 standard drinks   • Types: 2 Glasses of wine per week    Comment: 2 glasses of wine / daily     Fall Risk Screen:    JEFFADI Fall Risk Assessment was completed, and patient is at MODERATE risk for falls. Assessment completed on:10/12/2022    Depression Screening:  PHQ-2/PHQ-9 Depression Screening 10/12/2022   Retired PHQ-9 Total Score -   Retired Total Score -   Little Interest or Pleasure in Doing Things 0-->not at all   Feeling Down, Depressed or Hopeless 0-->not at all   PHQ-9: Brief Depression Severity Measure Score 0       Health Habits and Functional and Cognitive Screening:  Functional & Cognitive Status 2021   Do you have difficulty preparing food and eating? No   Do you have difficulty bathing yourself, getting dressed or grooming yourself? No   Do you have difficulty using the toilet? No   Do you have difficulty moving around from place to place? No   Do you have trouble with steps or getting out of a bed or a chair? No   Current Diet Well Balanced Diet   Dental Exam Up to date   Eye Exam Up to date   Exercise (times per week) 3 times per week   Current Exercises Include Walking   Current Exercise Activities Include -   Do you need help using the phone?  No   Are you deaf or do you have serious difficulty hearing?  No   Do you need help with transportation? No   Do you need help shopping? No   Do you need help preparing meals?  No   Do you need help with housework?  No   Do you need help with laundry? No   Do you need help taking your medications? No   Do you need help managing money? No   Do you ever drive or ride in a car without wearing a seat belt? No   Have you felt unusual stress, anger or loneliness in the last  month? No   Who do you live with? Spouse   If you need help, do you have trouble finding someone available to you? No   Have you been bothered in the last four weeks by sexual problems? No   Do you have difficulty concentrating, remembering or making decisions? No       Age-appropriate Screening Schedule:  Refer to the list below for future screening recommendations based on patient's age, sex and/or medical conditions. Orders for these recommended tests are listed in the plan section. The patient has been provided with a written plan.    Health Maintenance   Topic Date Due   • INFLUENZA VACCINE  08/01/2022   • DXA SCAN  09/30/2023   • LIPID PANEL  10/05/2023   • MAMMOGRAM  06/15/2024   • TDAP/TD VACCINES (2 - Td or Tdap) 01/01/2025   • ZOSTER VACCINE  Completed              Assessment & Plan   CMS Preventative Services Quick Reference  Risk Factors Identified During Encounter  Chronic Pain   Fall Risk-High or Moderate  Immunizations Discussed/Encouraged (specific Immunizations; Influenza and COVID19  Polypharmacy  The above risks/problems have been discussed with the patient.  Follow up actions/plans if indicated are seen below in the Assessment/Plan Section.  Pertinent information has been shared with the patient in the After Visit Summary.    Diagnoses and all orders for this visit:    1. Medicare annual wellness visit, subsequent (Primary)    2. Mixed hyperlipidemia    3. Prediabetes    4. Essential hypertension    5. Gastroesophageal reflux disease with esophagitis, unspecified whether hemorrhage  -     omeprazole (priLOSEC) 40 MG capsule; Take 1 capsule by mouth 2 (Two) Times a Day.  Dispense: 180 capsule; Refill: 3    6. Status post total right knee replacement    7. Healthcare maintenance - Discussed preventive health care issues including healthy diet, exercise, cancer screening, immunizations, and preventive care.  Hm tab updated.  Encouraged seat belt use.  No texting while driving.           Follow Up:    Return in about 6 months (around 4/12/2023) for Recheck, labs.     An After Visit Summary and PPPS were made available to the patient.

## 2022-10-12 NOTE — PATIENT INSTRUCTIONS
Medicare Wellness  Personal Prevention Plan of Service     Date of Office Visit:    Encounter Provider:  Roberta Boswell MD  Place of Service:  [facility]  Patient Name: Germaine Gonzalez  :  1945    As part of the Medicare Wellness portion of your visit today, we are providing you with this personalized preventive plan of services (PPPS). This plan is based upon recommendations of the [place] Preventive Services Task Force (USPSTF) and the Advisory Committee on Immunization Practices (ACIP).    This lists the preventive care services that should be considered, and provides dates of when you are due. Items listed as completed are up-to-date and do not require any further intervention.    Health Maintenance   Topic Date Due    GASTROSCOPY (EGD)  04/10/2022    COVID-19 Vaccine (4 - Booster for Moderna series) 2022    INFLUENZA VACCINE  2022    DXA SCAN  2023    LIPID PANEL  10/05/2023    ANNUAL WELLNESS VISIT  10/12/2023    MAMMOGRAM  06/15/2024    TDAP/TD VACCINES (2 - Td or Tdap) 2025    COLORECTAL CANCER SCREENING  2026    HEPATITIS C SCREENING  Completed    Pneumococcal Vaccine 65+  Completed    ZOSTER VACCINE  Completed       No orders of the defined types were placed in this encounter.      No follow-ups on file.

## 2022-10-12 NOTE — PROGRESS NOTES
"      Germaine Gonzalez is a 77 y.o. female, who presents with a chief complaint of   Chief Complaint   Patient presents with   • Hypertension     3 month follow up   • Hyperlipidemia   • Diabetes           HPI   Pt here for f/u    She has had lots of sinus drainage    Pt's weight has leveled out.  She is trying to eat on a regular basis.     WALSH - mild chronic elevation of LFT\"S    Slightly low FT4 - ft4 0.01 below normal.  Weight stable  No constipation, dry skin, or hair changes.     HLD - on crestor.  High hdl and ldl.      The following portions of the patient's history were reviewed and updated as appropriate: allergies, current medications, past family history, past medical history, past social history, past surgical history and problem list.    Allergies: Levaquin [levofloxacin] and Tetracycline    Review of Systems   Constitutional: Negative.    HENT: Negative.    Eyes: Negative.    Respiratory: Negative.    Cardiovascular: Negative.    Gastrointestinal: Negative.    Endocrine: Negative.    Genitourinary: Negative.    Musculoskeletal: Negative.    Skin: Negative.    Allergic/Immunologic: Negative.    Neurological: Negative.    Hematological: Negative.    Psychiatric/Behavioral: Negative.    All other systems reviewed and are negative.            Wt Readings from Last 3 Encounters:   10/12/22 67.9 kg (149 lb 9.6 oz)   08/10/22 65.3 kg (144 lb)   07/11/22 65.3 kg (144 lb)     Temp Readings from Last 3 Encounters:   10/12/22 98 °F (36.7 °C)   07/11/22 97.7 °F (36.5 °C)   04/06/22 98.2 °F (36.8 °C) (Infrared)     BP Readings from Last 3 Encounters:   10/12/22 140/72   07/11/22 110/70   06/06/22 122/68     Pulse Readings from Last 3 Encounters:   10/12/22 72   07/11/22 90   04/06/22 80     Body mass index is 24.89 kg/m².  SpO2 Readings from Last 3 Encounters:   10/12/22 99%   07/11/22 98%   04/06/22 100%          Physical Exam  Vitals and nursing note reviewed.   Constitutional:       General: She is not in acute " distress.     Appearance: She is well-developed.   HENT:      Head: Normocephalic and atraumatic.      Right Ear: External ear normal.      Left Ear: External ear normal.      Nose: Nose normal.   Eyes:      Conjunctiva/sclera: Conjunctivae normal.      Pupils: Pupils are equal, round, and reactive to light.   Cardiovascular:      Rate and Rhythm: Normal rate and regular rhythm.      Heart sounds: Normal heart sounds.   Pulmonary:      Effort: Pulmonary effort is normal. No respiratory distress.      Breath sounds: Normal breath sounds. No wheezing.   Musculoskeletal:         General: Normal range of motion.      Cervical back: Normal range of motion and neck supple.      Comments: Normal gait   Skin:     General: Skin is warm and dry.   Neurological:      Mental Status: She is alert and oriented to person, place, and time.   Psychiatric:         Behavior: Behavior normal.         Thought Content: Thought content normal.         Judgment: Judgment normal.         Results for orders placed or performed in visit on 10/04/22   Lipid Panel With LDL / HDL Ratio    Specimen: Blood   Result Value Ref Range    Total Cholesterol 213 (H) 0 - 200 mg/dL    Triglycerides 153 (H) 0 - 150 mg/dL    HDL Cholesterol 64 (H) 40 - 60 mg/dL    VLDL Cholesterol Phil 27 5 - 40 mg/dL    LDL Chol Calc (NIH) 122 (H) 0 - 100 mg/dL    LDL/HDL RATIO 1.85    TSH    Specimen: Blood   Result Value Ref Range    TSH 1.920 0.270 - 4.200 uIU/mL   T4, Free    Specimen: Blood   Result Value Ref Range    Free T4 0.92 (L) 0.93 - 1.70 ng/dL   Comprehensive Metabolic Panel    Specimen: Blood   Result Value Ref Range    Glucose 97 65 - 99 mg/dL    BUN 11 8 - 23 mg/dL    Creatinine 0.82 0.57 - 1.00 mg/dL    EGFR Result 74.7 >60.0 mL/min/1.73    BUN/Creatinine Ratio 13.4 7.0 - 25.0    Sodium 144 136 - 145 mmol/L    Potassium 4.6 3.5 - 5.2 mmol/L    Chloride 107 98 - 107 mmol/L    Total CO2 27.0 22.0 - 29.0 mmol/L    Calcium 9.7 8.6 - 10.5 mg/dL    Total Protein  6.7 6.0 - 8.5 g/dL    Albumin 4.80 3.50 - 5.20 g/dL    Globulin 1.9 gm/dL    A/G Ratio 2.5 g/dL    Total Bilirubin 0.6 0.0 - 1.2 mg/dL    Alkaline Phosphatase 96 39 - 117 U/L    AST (SGOT) 50 (H) 1 - 32 U/L    ALT (SGPT) 44 (H) 1 - 33 U/L   Hemoglobin A1c    Specimen: Blood   Result Value Ref Range    Hemoglobin A1C 5.20 4.80 - 5.60 %   CBC & Differential    Specimen: Blood   Result Value Ref Range    WBC 3.64 3.40 - 10.80 10*3/mm3    RBC 4.02 3.77 - 5.28 10*6/mm3    Hemoglobin 13.5 12.0 - 15.9 g/dL    Hematocrit 40.7 34.0 - 46.6 %    .2 (H) 79.0 - 97.0 fL    MCH 33.6 (H) 26.6 - 33.0 pg    MCHC 33.2 31.5 - 35.7 g/dL    RDW 12.1 (L) 12.3 - 15.4 %    Platelets 208 140 - 450 10*3/mm3    Neutrophil Rel % 55.2 42.7 - 76.0 %    Lymphocyte Rel % 26.6 19.6 - 45.3 %    Monocyte Rel % 13.5 (H) 5.0 - 12.0 %    Eosinophil Rel % 3.6 0.3 - 6.2 %    Basophil Rel % 0.8 0.0 - 1.5 %    Neutrophils Absolute 2.01 1.70 - 7.00 10*3/mm3    Lymphocytes Absolute 0.97 0.70 - 3.10 10*3/mm3    Monocytes Absolute 0.49 0.10 - 0.90 10*3/mm3    Eosinophils Absolute 0.13 0.00 - 0.40 10*3/mm3    Basophils Absolute 0.03 0.00 - 0.20 10*3/mm3    Immature Granulocyte Rel % 0.3 0.0 - 0.5 %    Immature Grans Absolute 0.01 0.00 - 0.05 10*3/mm3    nRBC 0.0 0.0 - 0.2 /100 WBC     Result Review :                  Assessment and Plan    Diagnoses and all orders for this visit:    1. Medicare annual wellness visit, subsequent (Primary)    2. Mixed hyperlipidemia - cholesterol up a little.  Cont high dose crestor    3. Prediabetes - a1c back in normal range with weight loss    4. Essential hypertension - bp up today.  Cont current meds and start home monitoring    5. Gastroesophageal reflux disease with esophagitis, unspecified whether hemorrhage  -     omeprazole (priLOSEC) 40 MG capsule; Take 1 capsule by mouth 2 (Two) Times a Day.  Dispense: 180 capsule; Refill: 3    6. Status post total right knee replacement - slowly improving    7. Healthcare  maintenance    8. WALSH (nonalcoholic steatohepatitis) - lft's up just a little.           BMI is within normal parameters. No other follow-up for BMI required.             Outpatient Medications Prior to Visit   Medication Sig Dispense Refill   • Biotin 5000 MCG tablet Take  by mouth Daily.     • calcium carbonate (OS-ULI) 600 MG tablet Take 600 mg by mouth 2 (Two) Times a Day With Meals.     • desoximetasone (TOPICORT) 0.05 % cream APPLY CREAM TOPICALLY TO AFFECTED AREA TWICE DAILY FOR ECZEMA FOR UP TO 14 DAYS     • escitalopram (LEXAPRO) 10 MG tablet Take 0.5 tablets by mouth Every Morning. 45 tablet 3   • estradiol (ESTRACE) 0.1 MG/GM vaginal cream      • glucosamine-chondroitin 500-400 MG capsule capsule Take 1 capsule by mouth 2 (Two) Times a Day With Meals.     • lisinopril (PRINIVIL,ZESTRIL) 10 MG tablet Take 1 tablet by mouth Daily. 90 tablet 3   • Propylene Glycol (Systane Complete) 0.6 % solution Apply  to eye(s) as directed by provider.     • rosuvastatin (CRESTOR) 20 MG tablet Take 1 tablet by mouth Daily. 90 tablet 3   • vitamin B-12 (CYANOCOBALAMIN) 1000 MCG tablet Take 5,000 mcg by mouth Daily.     • omeprazole (priLOSEC) 40 MG capsule Take 1 capsule by mouth twice daily 180 capsule 0   • traMADol (ULTRAM) 50 MG tablet Take 1 tablet by mouth 2 (Two) Times a Day As Needed for Moderate Pain . 60 tablet 2     No facility-administered medications prior to visit.     New Medications Ordered This Visit   Medications   • omeprazole (priLOSEC) 40 MG capsule     Sig: Take 1 capsule by mouth 2 (Two) Times a Day.     Dispense:  180 capsule     Refill:  3     [unfilled]  Medications Discontinued During This Encounter   Medication Reason   • omeprazole (priLOSEC) 40 MG capsule Reorder   • traMADol (ULTRAM) 50 MG tablet *Therapy completed         Return in about 6 months (around 4/12/2023) for Recheck, labs.    Patient was given instructions and counseling regarding her condition or for health maintenance  advice. Please see specific information pulled into the AVS if appropriate.

## 2022-11-28 ENCOUNTER — APPOINTMENT (OUTPATIENT)
Dept: CT IMAGING | Facility: HOSPITAL | Age: 77
End: 2022-11-28

## 2022-11-28 ENCOUNTER — APPOINTMENT (OUTPATIENT)
Dept: GENERAL RADIOLOGY | Facility: HOSPITAL | Age: 77
End: 2022-11-28

## 2022-11-28 ENCOUNTER — HOSPITAL ENCOUNTER (EMERGENCY)
Facility: HOSPITAL | Age: 77
Discharge: HOME | End: 2022-11-29
Attending: EMERGENCY MEDICINE | Admitting: EMERGENCY MEDICINE

## 2022-11-28 DIAGNOSIS — I67.2 CEREBRAL ATHEROSCLEROSIS: ICD-10-CM

## 2022-11-28 DIAGNOSIS — F10.920 ALCOHOLIC INTOXICATION WITHOUT COMPLICATION: Primary | ICD-10-CM

## 2022-11-28 LAB — GLUCOSE BLDC GLUCOMTR-MCNC: 88 MG/DL (ref 70–130)

## 2022-11-28 PROCEDURE — 70496 CT ANGIOGRAPHY HEAD: CPT

## 2022-11-28 PROCEDURE — 82962 GLUCOSE BLOOD TEST: CPT

## 2022-11-28 PROCEDURE — 70450 CT HEAD/BRAIN W/O DYE: CPT

## 2022-11-28 PROCEDURE — 71045 X-RAY EXAM CHEST 1 VIEW: CPT

## 2022-11-28 PROCEDURE — 99284 EMERGENCY DEPT VISIT MOD MDM: CPT

## 2022-11-28 PROCEDURE — 93005 ELECTROCARDIOGRAM TRACING: CPT | Performed by: EMERGENCY MEDICINE

## 2022-11-28 PROCEDURE — 0042T HC CT CEREBRAL PERFUSION W/WO CONTRAST: CPT

## 2022-11-28 PROCEDURE — 36415 COLL VENOUS BLD VENIPUNCTURE: CPT

## 2022-11-28 PROCEDURE — 70498 CT ANGIOGRAPHY NECK: CPT

## 2022-11-28 RX ORDER — SODIUM CHLORIDE 0.9 % (FLUSH) 0.9 %
10 SYRINGE (ML) INJECTION AS NEEDED
Status: DISCONTINUED | OUTPATIENT
Start: 2022-11-28 | End: 2022-11-29 | Stop reason: HOSPADM

## 2022-11-29 VITALS
OXYGEN SATURATION: 99 % | HEIGHT: 65 IN | DIASTOLIC BLOOD PRESSURE: 65 MMHG | SYSTOLIC BLOOD PRESSURE: 117 MMHG | WEIGHT: 151.5 LBS | RESPIRATION RATE: 20 BRPM | HEART RATE: 68 BPM | BODY MASS INDEX: 25.24 KG/M2 | TEMPERATURE: 97.6 F

## 2022-11-29 LAB
ALBUMIN SERPL-MCNC: 4.3 G/DL (ref 3.5–5.2)
ALBUMIN/GLOB SERPL: 1.3 G/DL
ALP SERPL-CCNC: 115 U/L (ref 39–117)
ALT SERPL W P-5'-P-CCNC: 36 U/L (ref 1–33)
ANION GAP SERPL CALCULATED.3IONS-SCNC: 16.8 MMOL/L (ref 5–15)
APTT PPP: 31.4 SECONDS (ref 24.3–38.1)
AST SERPL-CCNC: 42 U/L (ref 1–32)
BASOPHILS # BLD AUTO: 0.03 10*3/MM3 (ref 0–0.2)
BASOPHILS NFR BLD AUTO: 0.5 % (ref 0–1.5)
BILIRUB SERPL-MCNC: 0.3 MG/DL (ref 0–1.2)
BUN SERPL-MCNC: 15 MG/DL (ref 8–23)
BUN/CREAT SERPL: 18.1 (ref 7–25)
CALCIUM SPEC-SCNC: 10 MG/DL (ref 8.6–10.5)
CHLORIDE SERPL-SCNC: 101 MMOL/L (ref 98–107)
CO2 SERPL-SCNC: 22.2 MMOL/L (ref 22–29)
CREAT SERPL-MCNC: 0.83 MG/DL (ref 0.57–1)
DEPRECATED RDW RBC AUTO: 41.7 FL (ref 37–54)
EGFRCR SERPLBLD CKD-EPI 2021: 73.6 ML/MIN/1.73
EOSINOPHIL # BLD AUTO: 0.12 10*3/MM3 (ref 0–0.4)
EOSINOPHIL NFR BLD AUTO: 1.9 % (ref 0.3–6.2)
ERYTHROCYTE [DISTWIDTH] IN BLOOD BY AUTOMATED COUNT: 11.4 % (ref 12.3–15.4)
ETHANOL BLD-MCNC: 252 MG/DL (ref 0–10)
ETHANOL UR QL: 0.25 %
GLOBULIN UR ELPH-MCNC: 3.2 GM/DL
GLUCOSE SERPL-MCNC: 101 MG/DL (ref 65–99)
HCT VFR BLD AUTO: 41.2 % (ref 34–46.6)
HGB BLD-MCNC: 13.6 G/DL (ref 12–15.9)
HOLD SPECIMEN: NORMAL
IMM GRANULOCYTES # BLD AUTO: 0.05 10*3/MM3 (ref 0–0.05)
IMM GRANULOCYTES NFR BLD AUTO: 0.8 % (ref 0–0.5)
INR PPP: 0.96 (ref 0.9–1.1)
LYMPHOCYTES # BLD AUTO: 1.9 10*3/MM3 (ref 0.7–3.1)
LYMPHOCYTES NFR BLD AUTO: 30.3 % (ref 19.6–45.3)
MCH RBC QN AUTO: 32.4 PG (ref 26.6–33)
MCHC RBC AUTO-ENTMCNC: 33 G/DL (ref 31.5–35.7)
MCV RBC AUTO: 98.1 FL (ref 79–97)
MONOCYTES # BLD AUTO: 0.73 10*3/MM3 (ref 0.1–0.9)
MONOCYTES NFR BLD AUTO: 11.6 % (ref 5–12)
NEUTROPHILS NFR BLD AUTO: 3.45 10*3/MM3 (ref 1.7–7)
NEUTROPHILS NFR BLD AUTO: 54.9 % (ref 42.7–76)
PLATELET # BLD AUTO: 269 10*3/MM3 (ref 140–450)
PMV BLD AUTO: 9.8 FL (ref 6–12)
POTASSIUM SERPL-SCNC: 3.4 MMOL/L (ref 3.5–5.2)
PROT SERPL-MCNC: 7.5 G/DL (ref 6–8.5)
PROTHROMBIN TIME: 12.9 SECONDS (ref 12.1–15)
QT INTERVAL: 440 MS
RBC # BLD AUTO: 4.2 10*6/MM3 (ref 3.77–5.28)
SODIUM SERPL-SCNC: 140 MMOL/L (ref 136–145)
TROPONIN T SERPL-MCNC: <0.01 NG/ML (ref 0–0.03)
WBC NRBC COR # BLD: 6.28 10*3/MM3 (ref 3.4–10.8)
WHOLE BLOOD HOLD COAG: NORMAL
WHOLE BLOOD HOLD SPECIMEN: NORMAL

## 2022-11-29 PROCEDURE — 80053 COMPREHEN METABOLIC PANEL: CPT | Performed by: EMERGENCY MEDICINE

## 2022-11-29 PROCEDURE — 85730 THROMBOPLASTIN TIME PARTIAL: CPT | Performed by: EMERGENCY MEDICINE

## 2022-11-29 PROCEDURE — 93010 ELECTROCARDIOGRAM REPORT: CPT | Performed by: INTERNAL MEDICINE

## 2022-11-29 PROCEDURE — 85610 PROTHROMBIN TIME: CPT | Performed by: EMERGENCY MEDICINE

## 2022-11-29 PROCEDURE — 0 IOPAMIDOL PER 1 ML: Performed by: EMERGENCY MEDICINE

## 2022-11-29 PROCEDURE — 85025 COMPLETE CBC W/AUTO DIFF WBC: CPT | Performed by: EMERGENCY MEDICINE

## 2022-11-29 PROCEDURE — 82077 ASSAY SPEC XCP UR&BREATH IA: CPT | Performed by: EMERGENCY MEDICINE

## 2022-11-29 PROCEDURE — 84484 ASSAY OF TROPONIN QUANT: CPT | Performed by: EMERGENCY MEDICINE

## 2022-11-29 PROCEDURE — 36415 COLL VENOUS BLD VENIPUNCTURE: CPT

## 2022-11-29 RX ADMIN — IOPAMIDOL 50 ML: 755 INJECTION, SOLUTION INTRAVENOUS at 01:19

## 2022-11-29 RX ADMIN — IOPAMIDOL 50 ML: 755 INJECTION, SOLUTION INTRAVENOUS at 01:06

## 2022-12-02 ENCOUNTER — OFFICE VISIT (OUTPATIENT)
Dept: INTERNAL MEDICINE | Facility: CLINIC | Age: 77
End: 2022-12-02

## 2022-12-02 VITALS
DIASTOLIC BLOOD PRESSURE: 62 MMHG | WEIGHT: 147.6 LBS | OXYGEN SATURATION: 98 % | TEMPERATURE: 97.5 F | RESPIRATION RATE: 18 BRPM | HEIGHT: 65 IN | BODY MASS INDEX: 24.59 KG/M2 | SYSTOLIC BLOOD PRESSURE: 104 MMHG | HEART RATE: 84 BPM

## 2022-12-02 DIAGNOSIS — I66.29 OCCLUSION AND STENOSIS OF UNSPECIFIED POSTERIOR CEREBRAL ARTERY: Primary | ICD-10-CM

## 2022-12-02 DIAGNOSIS — I10 ESSENTIAL HYPERTENSION: ICD-10-CM

## 2022-12-02 DIAGNOSIS — E78.2 MIXED HYPERLIPIDEMIA: ICD-10-CM

## 2022-12-02 PROCEDURE — 99214 OFFICE O/P EST MOD 30 MIN: CPT | Performed by: INTERNAL MEDICINE

## 2022-12-02 NOTE — PROGRESS NOTES
Germaine Gonzalez is a 77 y.o. female, who presents with a chief complaint of   Chief Complaint   Patient presents with   • Hospital Follow Up Visit     Had a fall            HPI   Pt here for ED follow up.  Family was worried pt had a stroke.  Pt fell and hit her hip.  CT head neg.  Pt had had some alcohol to drink but felt fine until later.  CTA showed stenosis in artery.      htn - well controlled    hld - on statin.      The following portions of the patient's history were reviewed and updated as appropriate: allergies, current medications, past family history, past medical history, past social history, past surgical history and problem list.    Allergies: Levaquin [levofloxacin] and Tetracycline    Review of Systems   Constitutional: Negative.    HENT: Negative.    Eyes: Negative.    Respiratory: Negative.    Cardiovascular: Negative.    Gastrointestinal: Negative.    Endocrine: Negative.    Genitourinary: Negative.    Musculoskeletal: Negative.    Skin: Negative.    Allergic/Immunologic: Negative.    Neurological: Negative.    Hematological: Negative.    Psychiatric/Behavioral: Negative.    All other systems reviewed and are negative.            Wt Readings from Last 3 Encounters:   12/02/22 67 kg (147 lb 9.6 oz)   11/28/22 68.7 kg (151 lb 8 oz)   10/12/22 67.9 kg (149 lb 9.6 oz)     Temp Readings from Last 3 Encounters:   12/02/22 97.5 °F (36.4 °C) (Infrared)   11/28/22 97.6 °F (36.4 °C) (Oral)   10/12/22 98 °F (36.7 °C)     BP Readings from Last 3 Encounters:   12/02/22 104/62   11/29/22 117/65   10/12/22 140/72     Pulse Readings from Last 3 Encounters:   12/02/22 84   11/29/22 68   10/12/22 72     Body mass index is 24.56 kg/m².  SpO2 Readings from Last 3 Encounters:   12/02/22 98%   11/29/22 99%   10/12/22 99%          Physical Exam  Vitals and nursing note reviewed.   Constitutional:       General: She is not in acute distress.     Appearance: She is well-developed.   HENT:      Head: Normocephalic and  atraumatic.      Right Ear: External ear normal.      Left Ear: External ear normal.      Nose: Nose normal.   Eyes:      Conjunctiva/sclera: Conjunctivae normal.      Pupils: Pupils are equal, round, and reactive to light.   Cardiovascular:      Rate and Rhythm: Normal rate and regular rhythm.      Heart sounds: Normal heart sounds.   Pulmonary:      Effort: Pulmonary effort is normal. No respiratory distress.      Breath sounds: Normal breath sounds. No wheezing.   Musculoskeletal:         General: Normal range of motion.      Cervical back: Normal range of motion and neck supple.      Comments: Normal gait   Skin:     General: Skin is warm and dry.   Neurological:      Mental Status: She is alert and oriented to person, place, and time.   Psychiatric:         Behavior: Behavior normal.         Thought Content: Thought content normal.         Judgment: Judgment normal.         Results for orders placed or performed during the hospital encounter of 11/28/22   Comprehensive Metabolic Panel    Specimen: Blood   Result Value Ref Range    Glucose 101 (H) 65 - 99 mg/dL    BUN 15 8 - 23 mg/dL    Creatinine 0.83 0.57 - 1.00 mg/dL    Sodium 140 136 - 145 mmol/L    Potassium 3.4 (L) 3.5 - 5.2 mmol/L    Chloride 101 98 - 107 mmol/L    CO2 22.2 22.0 - 29.0 mmol/L    Calcium 10.0 8.6 - 10.5 mg/dL    Total Protein 7.5 6.0 - 8.5 g/dL    Albumin 4.30 3.50 - 5.20 g/dL    ALT (SGPT) 36 (H) 1 - 33 U/L    AST (SGOT) 42 (H) 1 - 32 U/L    Alkaline Phosphatase 115 39 - 117 U/L    Total Bilirubin 0.3 0.0 - 1.2 mg/dL    Globulin 3.2 gm/dL    A/G Ratio 1.3 g/dL    BUN/Creatinine Ratio 18.1 7.0 - 25.0    Anion Gap 16.8 (H) 5.0 - 15.0 mmol/L    eGFR 73.6 >60.0 mL/min/1.73   Protime-INR    Specimen: Blood   Result Value Ref Range    Protime 12.9 12.1 - 15.0 Seconds    INR 0.96 0.90 - 1.10   aPTT    Specimen: Blood   Result Value Ref Range    PTT 31.4 24.3 - 38.1 seconds   Troponin    Specimen: Blood   Result Value Ref Range    Troponin T  <0.010 0.000 - 0.030 ng/mL   CBC Auto Differential    Specimen: Blood   Result Value Ref Range    WBC 6.28 3.40 - 10.80 10*3/mm3    RBC 4.20 3.77 - 5.28 10*6/mm3    Hemoglobin 13.6 12.0 - 15.9 g/dL    Hematocrit 41.2 34.0 - 46.6 %    MCV 98.1 (H) 79.0 - 97.0 fL    MCH 32.4 26.6 - 33.0 pg    MCHC 33.0 31.5 - 35.7 g/dL    RDW 11.4 (L) 12.3 - 15.4 %    RDW-SD 41.7 37.0 - 54.0 fl    MPV 9.8 6.0 - 12.0 fL    Platelets 269 140 - 450 10*3/mm3    Neutrophil % 54.9 42.7 - 76.0 %    Lymphocyte % 30.3 19.6 - 45.3 %    Monocyte % 11.6 5.0 - 12.0 %    Eosinophil % 1.9 0.3 - 6.2 %    Basophil % 0.5 0.0 - 1.5 %    Immature Grans % 0.8 (H) 0.0 - 0.5 %    Neutrophils, Absolute 3.45 1.70 - 7.00 10*3/mm3    Lymphocytes, Absolute 1.90 0.70 - 3.10 10*3/mm3    Monocytes, Absolute 0.73 0.10 - 0.90 10*3/mm3    Eosinophils, Absolute 0.12 0.00 - 0.40 10*3/mm3    Basophils, Absolute 0.03 0.00 - 0.20 10*3/mm3    Immature Grans, Absolute 0.05 0.00 - 0.05 10*3/mm3   Ethanol    Specimen: Blood   Result Value Ref Range    Ethanol 252 (H) 0 - 10 mg/dL    Ethanol % 0.252 %   POC Glucose Once    Specimen: Blood   Result Value Ref Range    Glucose 88 70 - 130 mg/dL   ECG 12 Lead Stroke Evaluation   Result Value Ref Range    QT Interval 440 ms   Green Top (Gel)   Result Value Ref Range    Extra Tube Hold for add-ons.    Lavender Top   Result Value Ref Range    Extra Tube hold for add-on    Light Blue Top   Result Value Ref Range    Extra Tube Hold for add-ons.      Result Review :       Data reviewed: Radiologic studies multiple ct/cta's of head/neck and Recent hospitalization notes ed notes           Assessment and Plan    Diagnoses and all orders for this visit:    1. 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 Mixed hyperlipidemia - cont statin    3. Essential hypertension - well controlled.  Cont lisinopril     avoid EtOH  consumption    BMI is within normal parameters. No other follow-up for BMI required.             Outpatient Medications Prior to Visit   Medication Sig Dispense Refill   • Biotin 5000 MCG tablet Take  by mouth Daily.     • desoximetasone (TOPICORT) 0.05 % cream APPLY CREAM TOPICALLY TO AFFECTED AREA TWICE DAILY FOR ECZEMA FOR UP TO 14 DAYS     • escitalopram (LEXAPRO) 10 MG tablet Take 0.5 tablets by mouth Every Morning. 45 tablet 3   • estradiol (ESTRACE) 0.1 MG/GM vaginal cream      • lisinopril (PRINIVIL,ZESTRIL) 10 MG tablet Take 1 tablet by mouth Daily. 90 tablet 3   • omeprazole (priLOSEC) 40 MG capsule Take 1 capsule by mouth 2 (Two) Times a Day. 180 capsule 3   • Propylene Glycol (Systane Complete) 0.6 % solution Apply  to eye(s) as directed by provider.     • rosuvastatin (CRESTOR) 20 MG tablet Take 1 tablet by mouth Daily. 90 tablet 3   • vitamin B-12 (CYANOCOBALAMIN) 1000 MCG tablet Take 5,000 mcg by mouth Daily.     • calcium carbonate (OS-ULI) 600 MG tablet Take 600 mg by mouth 2 (Two) Times a Day With Meals.     • glucosamine-chondroitin 500-400 MG capsule capsule Take 1 capsule by mouth 2 (Two) Times a Day With Meals.       No facility-administered medications prior to visit.     No orders of the defined types were placed in this encounter.    [unfilled]  There are no discontinued medications.      No follow-ups on file.    Patient was given instructions and counseling regarding her condition or for health maintenance advice. Please see specific information pulled into the AVS if appropriate.

## 2023-01-16 ENCOUNTER — OFFICE VISIT (OUTPATIENT)
Dept: NEUROSURGERY | Facility: CLINIC | Age: 78
End: 2023-01-16
Payer: MEDICARE

## 2023-01-16 VITALS
BODY MASS INDEX: 25.83 KG/M2 | DIASTOLIC BLOOD PRESSURE: 72 MMHG | HEART RATE: 95 BPM | TEMPERATURE: 97.3 F | OXYGEN SATURATION: 99 % | SYSTOLIC BLOOD PRESSURE: 138 MMHG | RESPIRATION RATE: 16 BRPM | HEIGHT: 65 IN | WEIGHT: 155 LBS

## 2023-01-16 DIAGNOSIS — I65.23 CAROTID STENOSIS, ASYMPTOMATIC, BILATERAL: ICD-10-CM

## 2023-01-16 DIAGNOSIS — I67.9 INTRACRANIAL VASCULAR STENOSIS: Primary | ICD-10-CM

## 2023-01-16 PROCEDURE — 99204 OFFICE O/P NEW MOD 45 MIN: CPT | Performed by: NEUROLOGICAL SURGERY

## 2023-01-16 NOTE — PROGRESS NOTES
Subjective   History of Present Illness: Germaine Gonzalez is a 77 y.o. female is being seen for consultation today at the request of Roberta Boswell* for bilateral carotid artery stenosis and severe right PCA stenosis.  CTA head/neck done on 22/   She is doing well today.  No new complaints.  Denies any changes in the frequency or severity of her headaches.  Denies any changes in vision.  Denies any strokelike episodes.  Denies any changes in strength or sensation.  She reports that she was seen in the hospital on 2022 after an episode of talking in her sleep and confusion in the middle of the night.  Her  reports that she had 2 to 3 glasses of bourbon, which was much more than usual.  She got up out of the bed to go to the bathroom and fell to the floor and had difficulty getting up.  She does not remember these events.  They both deny any other similar episodes.      History of Present Illness    The following portions of the patient's history were reviewed and updated as appropriate: allergies, past family history, past medical history, past social history, past surgical history and problem list.    Past Medical History:   Diagnosis Date   • Arthritis     knees   • Mcgill esophagus    • Cancer (HCC)     cervical   • Cervical cancer (HCC)    • Colon polyp    • Eczema    • Elevated liver enzymes    • GERD (gastroesophageal reflux disease)    • H/O stress fracture    • Health care maintenance    • Hypercholesteremia    • Hypertension    • Kidney stones     sched ureteroscopy, lithotripsy   • Knee swelling    • Ocular migraine    • PONV (postoperative nausea and vomiting)    • Postmenopausal    • Seasonal allergies    • Sjogren's syndrome (HCC)     autoimmune disorder         Past Surgical History:   Procedure Laterality Date   •  SECTION      x2   • COLONOSCOPY     • COLONOSCOPY N/A 2021    Procedure: COLONOSCOPY;  Surgeon: Bc Sanchez MD;  [place] [facility] [place]   Service: Gastroenterology;  Laterality: N/A;  Diverticulosis and polyps   • CYSTOSCOPY URETEROSCOPY STONE MANIPULATION/EXTRACTION Left 10/14/2016    Procedure: LEFT URETEROSCOPY bilateral retrograde pyleogram LEFT STENT PLACEMENT.;  Surgeon: Julius Darling MD;  Location: [facility];  Service:    • CYSTOSCOPY W/ URETERAL STENT PLACEMENT Right 8/3/2018    Procedure: CYSTOSCOPY URETERAL CATHETER/STENT INSERTION;  Surgeon: Farzad Cordova MD;  Location: [facility] OR;  Service: Urology   • ENDOSCOPY N/A 4/10/2019    Procedure: ESOPHAGOGASTRODUODENOSCOPY;  Surgeon: Bc Sanchez MD;  Location: [facility] OR;  Service: Gastroenterology   • ENDOSCOPY N/A 4/6/2022    Procedure: ESOPHAGOGASTRODUODENOSCOPY WITH BIOPSY;  Surgeon: Bc Sanchez MD;  Location: [facility] OR;  Service: Gastroenterology;  Laterality: N/A;  gastritis, pulliam's   • HYSTERECTOMY     • TOTAL KNEE ARTHROPLASTY Right 2/8/2022    Procedure: TOTAL KNEE ARTHROPLASTY AND ALL ASSOCIATED PROCEDURES;  Surgeon: Jovany Villanueva MD;  Location: [facility];  Service: Orthopedics;  Laterality: Right;   • UPPER GASTROINTESTINAL ENDOSCOPY     • URETEROSCOPY LASER LITHOTRIPSY WITH STENT INSERTION Left 10/26/2016    Procedure: LT URETEROSCOPY LASER LITHOTRIPSY ;  Surgeon: Julius Darling MD;  Location: [facility];  Service:           Current Outpatient Medications:   •  Biotin 5000 MCG tablet, Take  by mouth Daily., Disp: , Rfl:   •  calcium carbonate (OS-ULI) 600 MG tablet, Take 600 mg by mouth 2 (Two) Times a Day With Meals., Disp: , Rfl:   •  desoximetasone (TOPICORT) 0.05 % cream, APPLY CREAM TOPICALLY TO AFFECTED AREA TWICE DAILY FOR ECZEMA FOR UP TO 14 DAYS, Disp: , Rfl:   •  escitalopram (LEXAPRO) 10 MG tablet, Take 0.5 tablets by mouth Every Morning., Disp: 45 tablet, Rfl: 3  •  estradiol (ESTRACE) 0.1 MG/GM vaginal cream, , Disp: , Rfl:   •  lisinopril (PRINIVIL,ZESTRIL) 10 MG tablet, Take 1 tablet by mouth Daily., Disp: 90  tablet, Rfl: 3  •  omeprazole (priLOSEC) 40 MG capsule, Take 1 capsule by mouth 2 (Two) Times a Day., Disp: 180 capsule, Rfl: 3  •  rosuvastatin (CRESTOR) 20 MG tablet, Take 1 tablet by mouth Daily., Disp: 90 tablet, Rfl: 3  •  vitamin B-12 (CYANOCOBALAMIN) 1000 MCG tablet, Take 5,000 mcg by mouth Daily., Disp: , Rfl:   •  glucosamine-chondroitin 500-400 MG capsule capsule, Take 1 capsule by mouth 2 (Two) Times a Day With Meals., Disp: , Rfl:   •  Propylene Glycol (Systane Complete) 0.6 % solution, Apply  to eye(s) as directed by provider., Disp: , Rfl:      Allergies   Allergen Reactions   • Levaquin [Levofloxacin] Nausea And Vomiting   • Tetracycline Nausea And Vomiting        Social History     Socioeconomic History   • Marital status:    Tobacco Use   • Smoking status: Former     Packs/day: 0.50     Years: 16.00     Pack years: 8.00     Types: Cigarettes     Start date: 3/22/1971     Quit date: 1990     Years since quittin.8   • Smokeless tobacco: Never   Vaping Use   • Vaping Use: Never used   Substance and Sexual Activity   • Alcohol use: Yes     Alcohol/week: 2.0 standard drinks     Types: 2 Glasses of wine per week     Comment: 2 glasses of wine / daily   • Drug use: No   • Sexual activity: Defer        Family History   Problem Relation Age of Onset   • Anxiety disorder Mother    • Bipolar disorder Mother    • Stroke Mother 83   • Thyroid disease Mother    • Depression Mother    • Heart attack Father 47   • Thyroid disease Brother    • Hypertension Brother    • Diabetes Brother 68   • Glaucoma Brother    • Depression Brother    • No Known Problems Daughter    • No Known Problems Son    • Leukemia Maternal Grandmother    • Diabetes Maternal Grandfather    • Heart attack Paternal Grandmother    • Heart attack Paternal Grandfather    • Breast cancer Neg Hx    • Colon cancer Neg Hx    • Colon polyps Neg Hx         Review of Systems   Constitutional: Negative for chills and fever.   HENT: Negative  "for ear pain and tinnitus.    Eyes: Negative for pain and visual disturbance.   Respiratory: Negative for cough and shortness of breath.    Cardiovascular: Negative for chest pain and palpitations.   Gastrointestinal: Negative for nausea and vomiting.   Genitourinary: Negative for difficulty urinating and enuresis.   Musculoskeletal: Positive for gait problem (unsteady (knee surgery)). Negative for neck pain.   Skin: Positive for rash (eczema).   Neurological: Positive for dizziness (sometimes). Negative for seizures, syncope, speech difficulty, light-headedness, numbness and headaches.   Psychiatric/Behavioral: Negative for confusion and decreased concentration.       Objective     Vitals:    01/16/23 1305   BP: 138/72   Pulse: 95   Resp: 16   Temp: 97.3 °F (36.3 °C)   SpO2: 99%   Weight: 70.3 kg (155 lb)   Height: 165.1 cm (65\")     Body mass index is 25.79 kg/m².      Physical Exam  Neurologic Exam  Awake, alert, oriented x3  Pupils equal round reactive to light  Extraocular muscles intact  No visual field cuts on confrontation  Face symmetric  Speech is fluent and clear  No pronator drift  Motor exam  Bilateral deltoids 5/5, bilateral biceps 5/5, bilateral triceps 5/5, bilateral wrist extension 5/5 bilateral hand  5/5  Bilateral hip flexion 5/5, bilateral knee extension 5/5, bilateral DF/PF 5/5  Steady normal gait  Able to detect  light touch in all 4 extremities        Assessment & Plan   Independent Review of Radiographic Studies:      I personally reviewed the images from the following studies.  CTA head and neck from November 29, 2022  The CTA head and neck were reviewed  There is mild to moderate atherosclerotic disease and narrowing of the bilateral carotid arteries.  The left cervical ICA is approximated to 50 to 60% at the right cervical ICA approximated to 40%.  The image quality of the CTA head is poor however there appears to be moderate to severe stenosis of the right PCA    Medical Decision " Makin-year-old female with bilateral cervical carotid stenosis and suspected right PCA stenosis  -The right PCA stenosis appears severe on the CTA imaging, however this vessel is poorly visualized.  It is also possible that she has a fetal type PCA.  I have recommended that she follow-up in 4 to 6 weeks with an MRI/MRA to evaluate for any recent strokes and further evaluate for stenosis.  The bilateral carotid stenosis appears to be moderate.  I believe she is asymptomatic at this time.  She does not remember the event that led to her imaging.  Her  reports that she had 2-3 drinks of bourbon and was talking in her sleep.  He is concerned that she was confused.  She got up to go to the bathroom fell to the floor he had trouble getting her up.  She has no neurologic deficits and has had no events since that time.   -We discussed the risks and benefits of a diagnostic cerebral angiogram.  If there is concern for severe stenosis on the follow-up MRI/MRA images we will consider diagnostic angiogram    Diagnoses and all orders for this visit:    1. Intracranial vascular stenosis (Primary)  -     MRI Angiogram Head Without Contrast; Future  -     MRI Angiogram Neck Without Contrast; Future    2. Carotid stenosis, asymptomatic, bilateral  -     MRI Angiogram Head Without Contrast; Future  -     MRI Angiogram Neck Without Contrast; Future      Return in about 6 weeks (around 2023).  I spent 45 minutes reviewing the medical record, CTA images, discussing stroke prevention, discussing intracranial vascular stenosis, discussing carotid stenosis, discussing the risks and benefits of a diagnostic angiogram.

## 2023-01-20 ENCOUNTER — PATIENT ROUNDING (BHMG ONLY) (OUTPATIENT)
Dept: NEUROSURGERY | Facility: CLINIC | Age: 78
End: 2023-01-20
Payer: MEDICARE

## 2023-01-30 ENCOUNTER — TELEPHONE (OUTPATIENT)
Dept: ORTHOPEDIC SURGERY | Facility: CLINIC | Age: 78
End: 2023-01-30
Payer: MEDICARE

## 2023-01-30 NOTE — TELEPHONE ENCOUNTER
Caller: SAVITA REAVES    Relationship to patient: SELF    Best call back number: [phone]    Patient is needing: PATIENT HAD A FALL LAST WEEK ON HER LEFT KNEE AND IS EXPERIENCING SOME PAIN.  PATIENT WANTED TO KNOW IF SHE SHOULD SCHEDULE SOONER FOR BOTH. PLEASE CALL THE PATIENT BACK TO DISCUSS.

## 2023-02-02 ENCOUNTER — OFFICE VISIT (OUTPATIENT)
Dept: ORTHOPEDIC SURGERY | Facility: CLINIC | Age: 78
End: 2023-02-02
Payer: MEDICARE

## 2023-02-02 VITALS — WEIGHT: 155 LBS | HEIGHT: 65 IN | BODY MASS INDEX: 25.83 KG/M2

## 2023-02-02 DIAGNOSIS — M17.12 PRIMARY OSTEOARTHRITIS OF LEFT KNEE: Primary | ICD-10-CM

## 2023-02-02 PROCEDURE — 73562 X-RAY EXAM OF KNEE 3: CPT | Performed by: NURSE PRACTITIONER

## 2023-02-02 PROCEDURE — 99213 OFFICE O/P EST LOW 20 MIN: CPT | Performed by: NURSE PRACTITIONER

## 2023-02-02 PROCEDURE — 20610 DRAIN/INJ JOINT/BURSA W/O US: CPT | Performed by: NURSE PRACTITIONER

## 2023-02-02 RX ORDER — TRIAMCINOLONE ACETONIDE 40 MG/ML
80 INJECTION, SUSPENSION INTRA-ARTICULAR; INTRAMUSCULAR
Status: COMPLETED | OUTPATIENT
Start: 2023-02-02 | End: 2023-02-02

## 2023-02-02 RX ORDER — LIDOCAINE HYDROCHLORIDE 10 MG/ML
8 INJECTION, SOLUTION EPIDURAL; INFILTRATION; INTRACAUDAL; PERINEURAL
Status: COMPLETED | OUTPATIENT
Start: 2023-02-02 | End: 2023-02-02

## 2023-02-02 RX ADMIN — LIDOCAINE HYDROCHLORIDE 8 ML: 10 INJECTION, SOLUTION EPIDURAL; INFILTRATION; INTRACAUDAL; PERINEURAL at 15:31

## 2023-02-02 RX ADMIN — TRIAMCINOLONE ACETONIDE 80 MG: 40 INJECTION, SUSPENSION INTRA-ARTICULAR; INTRAMUSCULAR at 15:31

## 2023-02-02 NOTE — PROGRESS NOTES
Subjective:     Patient ID: Germaine Gonzalez is a 77 y.o. female.    Chief Complaint:  DJD left knee, acute onset of pain  History of Present Illness  Germaine Gonzalez returns to clinic with  for follow-up of her left knee.  She fell approximately 10 days ago after she tripped over her dog at home striking the anterior aspect of the knee.  She began experiencing discomfort she is able to flex extend the knee able to walk comfortably but is experiencing some tightness.  She is continued with elliptical at the gym as well as other strengthening exercises including abduction and abduction exercises for the lower extremity tolerating very well.  Denies that the knee is locking, catching or giving way.  Denies any recent x-ray, MRI, CT.  Denies any recent corticosteroid injections at the knee.  Denies any other concerns present.    Social History     Occupational History   • Not on file   Tobacco Use   • Smoking status: Former     Packs/day: 0.50     Years: 16.00     Pack years: 8.00     Types: Cigarettes     Start date: 3/22/1971     Quit date: 1990     Years since quittin.8   • Smokeless tobacco: Never   Vaping Use   • Vaping Use: Never used   Substance and Sexual Activity   • Alcohol use: Yes     Alcohol/week: 2.0 standard drinks     Types: 2 Glasses of wine per week     Comment: 2 glasses of wine / daily   • Drug use: No   • Sexual activity: Defer      Past Medical History:   Diagnosis Date   • Arthritis     knees   • Mcgill esophagus    • Cancer (HCC)     cervical   • Cervical cancer (HCC)    • Colon polyp    • Eczema    • Elevated liver enzymes    • GERD (gastroesophageal reflux disease)    • H/O stress fracture    • Health care maintenance    • Hypercholesteremia    • Hypertension    • Kidney stones     sched ureteroscopy, lithotripsy   • Knee swelling    • Ocular migraine    • PONV (postoperative nausea and vomiting)    • Postmenopausal    • Seasonal allergies    • Sjogren's syndrome (HCC)     autoimmune  disorder      Past Surgical History:   Procedure Laterality Date   •  SECTION      x2   • COLONOSCOPY     • COLONOSCOPY N/A 2021    Procedure: COLONOSCOPY;  Surgeon: cB Sanchez MD;  Location: [facility];  Service: Gastroenterology;  Laterality: N/A;  Diverticulosis and polyps   • CYSTOSCOPY URETEROSCOPY STONE MANIPULATION/EXTRACTION Left 10/14/2016    Procedure: LEFT URETEROSCOPY bilateral retrograde pyleogram LEFT STENT PLACEMENT.;  Surgeon: Julius Darling MD;  Location: [facility];  Service:    • CYSTOSCOPY W/ URETERAL STENT PLACEMENT Right 8/3/2018    Procedure: CYSTOSCOPY URETERAL CATHETER/STENT INSERTION;  Surgeon: Farzad Cordova MD;  Location: [facility] OR;  Service: Urology   • ENDOSCOPY N/A 4/10/2019    Procedure: ESOPHAGOGASTRODUODENOSCOPY;  Surgeon: Bc Sanchez MD;  Location: [facility];  Service: Gastroenterology   • ENDOSCOPY N/A 2022    Procedure: ESOPHAGOGASTRODUODENOSCOPY WITH BIOPSY;  Surgeon: Bc Sanchez MD;  Location: [facility];  Service: Gastroenterology;  Laterality: N/A;  gastritis, pulliam's   • HYSTERECTOMY     • TOTAL KNEE ARTHROPLASTY Right 2022    Procedure: TOTAL KNEE ARTHROPLASTY AND ALL ASSOCIATED PROCEDURES;  Surgeon: Jovany Villanueva MD;  Location: [facility];  Service: Orthopedics;  Laterality: Right;   • UPPER GASTROINTESTINAL ENDOSCOPY     • URETEROSCOPY LASER LITHOTRIPSY WITH STENT INSERTION Left 10/26/2016    Procedure: LT URETEROSCOPY LASER LITHOTRIPSY ;  Surgeon: Julius Darling MD;  Location: [facility];  Service:        Family History   Problem Relation Age of Onset   • Anxiety disorder Mother    • Bipolar disorder Mother    • Stroke Mother 83   • Thyroid disease Mother    • Depression Mother    • Heart attack Father 47   • Thyroid disease Brother    • Hypertension Brother    • Diabetes Brother 68   • Glaucoma Brother    • Depression Brother    • No Known Problems Daughter    • No Known  "Problems Son    • Leukemia Maternal Grandmother    • Diabetes Maternal Grandfather    • Heart attack Paternal Grandmother    • Heart attack Paternal Grandfather    • Breast cancer Neg Hx    • Colon cancer Neg Hx    • Colon polyps Neg Hx                Objective:  Physical Exam      General: No acute distress.  Eyes: conjunctiva clear; pupils equally round and reactive  ENT: external ears and nose atraumatic; oropharynx clear  CV: no peripheral edema  Resp: normal respiratory effort  Skin: no rashes or wounds; normal turgor  Psych: mood and affect appropriate; recent and remote memory intact    Vitals:    02/02/23 1442   Weight: 70.3 kg (155 lb)   Height: 165.1 cm (65\")         02/02/23  1442   Weight: 70.3 kg (155 lb)     Body mass index is 25.79 kg/m².      Left Knee Exam     Tenderness   The patient is experiencing tenderness in the patella.    Range of Motion   Extension: 0   Flexion: 120     Tests   Varus: negative Valgus: negative  Lachman:  Anterior - 1+    Posterior - negative  Patellar apprehension: positive    Other   Erythema: absent  Sensation: normal  Pulse: present  Swelling: moderate  Effusion: effusion present    Comments:  Positive active patellar compression test  Positive crepitus throughout arc of motion, greatest tenderness lateral patellar facet  Quad strength 5 out of 5             Imaging:  Left Knee X-Ray  Indication: Pain    AP, Lateral, and [place] views    Findings:  Moderate to advanced tricompartmental osteoarthritis with reactive osteophytes in all 3 compartments, bone-on-bone articulation laterally patellofemoral   No bony lesion  Normal soft tissues  Normal joint spaces    prior studies were available for comparison.    Assessment:        1. Primary osteoarthritis of left knee           Plan:    Large Joint Arthrocentesis: L knee  Date/Time: 2/2/2023 3:31 PM  Consent given by: patient  Site marked: site marked  Timeout: Immediately prior to procedure a time out was called to verify " the correct patient, procedure, equipment, support staff and site/side marked as required   Supporting Documentation  Indications: pain   Procedure Details  Location: knee - L knee  Preparation: Patient was prepped and draped in the usual sterile fashion  Needle size: 22 G  Approach: superior  Medications administered: 80 mg triamcinolone acetonide 40 MG/ML; 8 mL lidocaine PF 1% 1 %  Patient tolerance: patient tolerated the procedure well with no immediate complications          1. Discussed plan of care with patient and spouse.  Do not see any evidence of acute fracture discussed treatment options including total knee arthroplasty versus corticosteroid injection.  At this time she is continue to do extremely well with her exercise regimen until the acute onset after fall.  At this time wishes to hold off on surgical discussion wish to proceed with corticosteroid injection left knee.  Discussed application of ice at injection site this evening.  Discussed if she is not improving gladly see her back in clinic.  All questions answered.  Orders:  Orders Placed This Encounter   Procedures   • XR Knee 3+ View With [place] Left     No orders of the defined types were placed in this encounter.          Dragon Dictation utilized.

## 2023-02-09 ENCOUNTER — OFFICE VISIT (OUTPATIENT)
Dept: ORTHOPEDIC SURGERY | Facility: CLINIC | Age: 78
End: 2023-02-09
Payer: MEDICARE

## 2023-02-09 VITALS — HEIGHT: 65 IN | BODY MASS INDEX: 24.99 KG/M2 | WEIGHT: 150 LBS

## 2023-02-09 DIAGNOSIS — Z96.651 STATUS POST TOTAL RIGHT KNEE REPLACEMENT: ICD-10-CM

## 2023-02-09 DIAGNOSIS — M17.12 PRIMARY OSTEOARTHRITIS OF LEFT KNEE: Primary | ICD-10-CM

## 2023-02-09 PROCEDURE — 73562 X-RAY EXAM OF KNEE 3: CPT | Performed by: ORTHOPAEDIC SURGERY

## 2023-02-09 PROCEDURE — 99212 OFFICE O/P EST SF 10 MIN: CPT | Performed by: ORTHOPAEDIC SURGERY

## 2023-02-09 ASSESSMENT — KOOS JR
KOOS JR SCORE: 79.914
KOOS JR SCORE: 3

## 2023-02-09 NOTE — PROGRESS NOTES
Subjective:     Patient ID: Germaine Gonzalez is a 77 y.o. female.    Chief Complaint:  Follow-up status post right total knee arthroplasty-2022    History of Present Illness  Germaine Gonzalez returns to the clinic for evaluation of status post right total knee arthroplasty. The patient is accompanied by an adult male who contributes in her medical history on 2023. The patient is doing well overall and notes improvement of symptoms. She reports mild numbness on the medial aspect of her right knee. She denies any fevers, chills, or sweats. No issues with giving way, locking, or catching. No problems with her incisions. She denies any swelling. She is doing very well with her left knee. She just had an injection last week and it has improved significantly.       Social History     Occupational History   • Not on file   Tobacco Use   • Smoking status: Former     Packs/day: 0.50     Years: 16.00     Pack years: 8.00     Types: Cigarettes     Start date: 3/22/1971     Quit date: 1990     Years since quittin.9     Passive exposure: Never   • Smokeless tobacco: Never   Vaping Use   • Vaping Use: Never used   Substance and Sexual Activity   • Alcohol use: Yes     Alcohol/week: 2.0 standard drinks     Types: 2 Glasses of wine per week     Comment: 2 glasses of wine / daily   • Drug use: No   • Sexual activity: Defer      Past Medical History:   Diagnosis Date   • Arthritis     knees   • Mcgill esophagus    • Cancer (HCC)     cervical   • Cervical cancer (HCC)    • Colon polyp    • Eczema    • Elevated liver enzymes    • GERD (gastroesophageal reflux disease)    • H/O stress fracture    • Health care maintenance    • Hypercholesteremia    • Hypertension    • Kidney stones     sched ureteroscopy, lithotripsy   • Knee swelling    • Ocular migraine    • PONV (postoperative nausea and vomiting)    • Postmenopausal    • Seasonal allergies    • Sjogren's syndrome (HCC)     autoimmune disorder      Past Surgical History:    Procedure Laterality Date   •  SECTION      x2   • COLONOSCOPY     • COLONOSCOPY N/A 2021    Procedure: COLONOSCOPY;  Surgeon: Bc Sanchez MD;  Location: [facility] OR;  Service: Gastroenterology;  Laterality: N/A;  Diverticulosis and polyps   • CYSTOSCOPY URETEROSCOPY STONE MANIPULATION/EXTRACTION Left 10/14/2016    Procedure: LEFT URETEROSCOPY bilateral retrograde pyleogram LEFT STENT PLACEMENT.;  Surgeon: Julius Darling MD;  Location: [facility];  Service:    • CYSTOSCOPY W/ URETERAL STENT PLACEMENT Right 8/3/2018    Procedure: CYSTOSCOPY URETERAL CATHETER/STENT INSERTION;  Surgeon: Farzad Cordova MD;  Location: [facility] OR;  Service: Urology   • ENDOSCOPY N/A 4/10/2019    Procedure: ESOPHAGOGASTRODUODENOSCOPY;  Surgeon: Bc Sanchez MD;  Location: [facility] OR;  Service: Gastroenterology   • ENDOSCOPY N/A 2022    Procedure: ESOPHAGOGASTRODUODENOSCOPY WITH BIOPSY;  Surgeon: Bc Sanchez MD;  Location: [facility];  Service: Gastroenterology;  Laterality: N/A;  gastritis, pulliam's   • HYSTERECTOMY     • TOTAL KNEE ARTHROPLASTY Right 2022    Procedure: TOTAL KNEE ARTHROPLASTY AND ALL ASSOCIATED PROCEDURES;  Surgeon: Jovany Villanueva MD;  Location: [facility];  Service: Orthopedics;  Laterality: Right;   • UPPER GASTROINTESTINAL ENDOSCOPY     • URETEROSCOPY LASER LITHOTRIPSY WITH STENT INSERTION Left 10/26/2016    Procedure: LT URETEROSCOPY LASER LITHOTRIPSY ;  Surgeon: Julius Darling MD;  Location: [facility];  Service:        Family History   Problem Relation Age of Onset   • Anxiety disorder Mother    • Bipolar disorder Mother    • Stroke Mother 83   • Thyroid disease Mother    • Depression Mother    • Heart attack Father 47   • Thyroid disease Brother    • Hypertension Brother    • Diabetes Brother 68   • Glaucoma Brother    • Depression Brother    • No Known Problems Daughter    • No Known Problems Son    • Leukemia Maternal  "Grandmother    • Diabetes Maternal Grandfather    • Heart attack Paternal Grandmother    • Heart attack Paternal Grandfather    • Breast cancer Neg Hx    • Colon cancer Neg Hx    • Colon polyps Neg Hx          Review of Systems        Objective:  Vitals:    02/09/23 1023   Weight: 68 kg (150 lb)   Height: 165.1 cm (65\")         02/09/23  1023   Weight: 68 kg (150 lb)     Body mass index is 24.96 kg/m².  General: No acute distress.  Resp: normal respiratory effort.   Skin: no rashes or wounds; normal turgor.   Psych: mood and affect appropriate; recent and remote memory intact.           Ortho Exam       Right Knee:     Midline incision well healed.   No signs of erythema or fluctuance.   Active range of motion is 0 to 140 degrees.   Flexion strength- 5/5.   Extension strength- 5/5.   Effusion- None at this time.   Posterior drawer- Good endpoint at 90 degrees.   Stable opening on varus and valgus stress at 0 and 30 degrees.     Imaging:  Right Knee X-Ray  Indication: s/p total knee     AP, Lateral, and [place] views    Findings:  Total knee arthroplasty components are in stable position with acceptable overall alignment, no evidence of periprosthetic fracture, loosening, osteolysis, or reactive bone formation.    Compared to prior postoperative x-rays    Assessment:        1. Primary osteoarthritis of left knee    2. Status post total right knee replacement           Plan:        1. I discussed treatment options at length with patient at today's visit.  2. The patient is doing very well at this point. She is to continue with hip, core, and quad strengthening exercises.  3. I will follow up in 1 year with repeat x-rays right knee.        Germaine Gonzalez was in agreement with plan and had all questions answered.     Orders:  Orders Placed This Encounter   Procedures   • XR Knee 3 View Right       Medications:  No orders of the defined types were placed in this encounter.      Followup:  Return in about 1 year (around " 2/9/2024) for xrays needed at follow up.    Diagnoses and all orders for this visit:    1. Primary osteoarthritis of left knee (Primary)    2. Status post total right knee replacement  -     XR Knee 3 View Right          Transcribed from ambient dictation for Jovany Villanueva MD by Yolanda Harris.  02/09/23   12:53 EST

## 2023-02-20 ENCOUNTER — HOSPITAL ENCOUNTER (OUTPATIENT)
Dept: MRI IMAGING | Facility: HOSPITAL | Age: 78
Discharge: HOME | End: 2023-02-20
Payer: MEDICARE

## 2023-02-20 DIAGNOSIS — I65.23 CAROTID STENOSIS, ASYMPTOMATIC, BILATERAL: ICD-10-CM

## 2023-02-20 DIAGNOSIS — I67.9 INTRACRANIAL VASCULAR STENOSIS: ICD-10-CM

## 2023-02-20 LAB — CREAT BLDA-MCNC: 1 MG/DL (ref 0.6–1.3)

## 2023-02-20 PROCEDURE — 0 GADOBENATE DIMEGLUMINE 529 MG/ML SOLUTION: Performed by: NEUROLOGICAL SURGERY

## 2023-02-20 PROCEDURE — 82565 ASSAY OF CREATININE: CPT

## 2023-02-20 PROCEDURE — 70549 MR ANGIOGRAPH NECK W/O&W/DYE: CPT

## 2023-02-20 PROCEDURE — A9577 INJ MULTIHANCE: HCPCS | Performed by: NEUROLOGICAL SURGERY

## 2023-02-20 PROCEDURE — 70544 MR ANGIOGRAPHY HEAD W/O DYE: CPT

## 2023-02-20 RX ADMIN — GADOBENATE DIMEGLUMINE 13 ML: 529 INJECTION, SOLUTION INTRAVENOUS at 10:56

## 2023-02-24 NOTE — PROGRESS NOTES
Subjective   History of Present Illness: Germaine Gonzalez is a 77 y.o. female is here today for follow-up on bilateral carotid artery stenosis and possible PCA. MRA head/neck 23.  She is doing well today.  No new complaints.  Denies any changes in the frequency or severity of her headaches.  Denies any changes in vision.  Denies any strokelike episodes.  Denies any changes in strength or sensation.      History of Present Illness    The following portions of the patient's history were reviewed and updated as appropriate: allergies, past family history, past medical history, past social history, past surgical history and problem list.    Past Medical History:   Diagnosis Date   • Arthritis     knees   • Mcgill esophagus    • Cancer (HCC)     cervical   • Cervical cancer (HCC)    • Colon polyp    • Eczema    • Elevated liver enzymes    • GERD (gastroesophageal reflux disease)    • H/O stress fracture    • Health care maintenance    • Hypercholesteremia    • Hypertension    • Kidney stones     sched ureteroscopy, lithotripsy   • Knee swelling    • Ocular migraine    • PONV (postoperative nausea and vomiting)    • Postmenopausal    • Seasonal allergies    • Sjogren's syndrome (HCC)     autoimmune disorder         Past Surgical History:   Procedure Laterality Date   •  SECTION      x2   • COLONOSCOPY     • COLONOSCOPY N/A 2021    Procedure: COLONOSCOPY;  Surgeon: Bc Sanchez MD;  Location: [facility] OR;  Service: Gastroenterology;  Laterality: N/A;  Diverticulosis and polyps   • CYSTOSCOPY URETEROSCOPY STONE MANIPULATION/EXTRACTION Left 10/14/2016    Procedure: LEFT URETEROSCOPY bilateral retrograde pyleogram LEFT STENT PLACEMENT.;  Surgeon: Julius Darling MD;  Location: [facility] OR;  Service:    • CYSTOSCOPY W/ URETERAL STENT PLACEMENT Right 8/3/2018    Procedure: CYSTOSCOPY URETERAL CATHETER/STENT INSERTION;  Surgeon: Farzad Cordova MD;  Location: [facility] OR;  Service: Urology   •  ENDOSCOPY N/A 4/10/2019    Procedure: ESOPHAGOGASTRODUODENOSCOPY;  Surgeon: Bc Sanchez MD;  Location:  [facility] OR;  Service: Gastroenterology   • ENDOSCOPY N/A 4/6/2022    Procedure: ESOPHAGOGASTRODUODENOSCOPY WITH BIOPSY;  Surgeon: Bc Sanchez MD;  Location: [facility] OR;  Service: Gastroenterology;  Laterality: N/A;  gastritis, pulliam's   • HYSTERECTOMY     • TOTAL KNEE ARTHROPLASTY Right 2/8/2022    Procedure: TOTAL KNEE ARTHROPLASTY AND ALL ASSOCIATED PROCEDURES;  Surgeon: Jovany Villanueva MD;  Location: [facility] OR;  Service: Orthopedics;  Laterality: Right;   • UPPER GASTROINTESTINAL ENDOSCOPY     • URETEROSCOPY LASER LITHOTRIPSY WITH STENT INSERTION Left 10/26/2016    Procedure: LT URETEROSCOPY LASER LITHOTRIPSY ;  Surgeon: Julius Darling MD;  Location: [facility] OR;  Service:           Current Outpatient Medications:   •  Biotin 5000 MCG tablet, Take  by mouth Daily., Disp: , Rfl:   •  calcium carbonate (OS-ULI) 600 MG tablet, Take 600 mg by mouth 2 (Two) Times a Day With Meals., Disp: , Rfl:   •  desoximetasone (TOPICORT) 0.05 % cream, APPLY CREAM TOPICALLY TO AFFECTED AREA TWICE DAILY FOR ECZEMA FOR UP TO 14 DAYS, Disp: , Rfl:   •  escitalopram (LEXAPRO) 10 MG tablet, Take 0.5 tablets by mouth Every Morning., Disp: 45 tablet, Rfl: 3  •  estradiol (ESTRACE) 0.1 MG/GM vaginal cream, , Disp: , Rfl:   •  glucosamine-chondroitin 500-400 MG capsule capsule, Take 1 capsule by mouth 2 (Two) Times a Day With Meals., Disp: , Rfl:   •  lisinopril (PRINIVIL,ZESTRIL) 10 MG tablet, Take 1 tablet by mouth Daily., Disp: 90 tablet, Rfl: 3  •  omeprazole (priLOSEC) 40 MG capsule, Take 1 capsule by mouth 2 (Two) Times a Day., Disp: 180 capsule, Rfl: 3  •  Propylene Glycol (Systane Complete) 0.6 % solution, Apply  to eye(s) as directed by provider., Disp: , Rfl:   •  rosuvastatin (CRESTOR) 20 MG tablet, Take 1 tablet by mouth Daily., Disp: 90 tablet, Rfl: 3  •  vitamin B-12 (CYANOCOBALAMIN)  "1000 MCG tablet, Take 5,000 mcg by mouth Daily., Disp: , Rfl:      Allergies   Allergen Reactions   • Levaquin [Levofloxacin] Nausea And Vomiting   • Tetracycline Nausea And Vomiting        Social History     Socioeconomic History   • Marital status:    Tobacco Use   • Smoking status: Former     Packs/day: 0.50     Years: 16.00     Pack years: 8.00     Types: Cigarettes     Start date: 3/22/1971     Quit date: 1990     Years since quittin.9     Passive exposure: Never   • Smokeless tobacco: Never   Vaping Use   • Vaping Use: Never used   Substance and Sexual Activity   • Alcohol use: Yes     Alcohol/week: 2.0 standard drinks     Types: 2 Glasses of wine per week     Comment: 2 glasses of wine / daily   • Drug use: No   • Sexual activity: Defer        Family History   Problem Relation Age of Onset   • Anxiety disorder Mother    • Bipolar disorder Mother    • Stroke Mother 83   • Thyroid disease Mother    • Depression Mother    • Heart attack Father 47   • Thyroid disease Brother    • Hypertension Brother    • Diabetes Brother 68   • Glaucoma Brother    • Depression Brother    • No Known Problems Daughter    • No Known Problems Son    • Leukemia Maternal Grandmother    • Diabetes Maternal Grandfather    • Heart attack Paternal Grandmother    • Heart attack Paternal Grandfather    • Breast cancer Neg Hx    • Colon cancer Neg Hx    • Colon polyps Neg Hx         Review of Systems   Eyes: Negative for visual disturbance.   Musculoskeletal: Positive for neck stiffness. Negative for neck pain.   Neurological: Positive for dizziness and light-headedness. Negative for numbness and headaches.       Objective     Vitals:    23 1434   BP: 120/70   Pulse: 87   Temp: 97.1 °F (36.2 °C)   SpO2: 99%   Weight: 70.9 kg (156 lb 6.4 oz)   Height: 165.1 cm (65\")     Body mass index is 26.03 kg/m².      Physical Exam  Neurologic Exam  Awake, alert, oriented x3  Pupils equal round reactive to light  Extraocular muscles " intact  Face symmetric  Speech is fluent and clear  No pronator drift  Motor exam  Bilateral deltoids 5/5, bilateral biceps 5/5, bilateral triceps 5/5, bilateral wrist extension 5/5 bilateral hand  5/5  Bilateral hip flexion 5/5, bilateral knee extension 5/5, bilateral DF/PF 5/5  No clonus  No Betina's reflex  Steady normal gait  Able to detect  light touch in all 4 extremities  No dysmetria on finger-nose testing        Assessment & Plan   Independent Review of Radiographic Studies:      I personally reviewed the images from the following studies.  MRA head and neck from 2023  No evidence of infarct.  Fetal type PCA of the right PCA.  No evidence of severe carotid or vertebral artery stenosis    Medical Decision Makin-year-old female with suspected right PCA stenosis  -The follow-up MRA images are stable when compared to the findings from the CTA from 2022  -She denies any recent strokelike episodes.  She is doing well today without any new complaints.  I do not believe she needs to undergo a diagnostic cerebral angiogram at this time.  I believe the small right PCA is a incidental finding and most likely an anatomical variant.  I have asked her to call or come back with any new symptoms or strokelike episodes and we would consider proceeding with a diagnostic angiogram    Diagnoses and all orders for this visit:    1. Intracranial vascular stenosis (Primary)      Return if symptoms worsen or fail to improve.

## 2023-02-27 ENCOUNTER — OFFICE VISIT (OUTPATIENT)
Dept: NEUROSURGERY | Facility: CLINIC | Age: 78
End: 2023-02-27
Payer: MEDICARE

## 2023-02-27 VITALS
OXYGEN SATURATION: 99 % | TEMPERATURE: 97.1 F | BODY MASS INDEX: 26.06 KG/M2 | HEART RATE: 87 BPM | DIASTOLIC BLOOD PRESSURE: 70 MMHG | WEIGHT: 156.4 LBS | HEIGHT: 65 IN | SYSTOLIC BLOOD PRESSURE: 120 MMHG

## 2023-02-27 DIAGNOSIS — I67.9 INTRACRANIAL VASCULAR STENOSIS: Primary | ICD-10-CM

## 2023-02-27 PROCEDURE — 99214 OFFICE O/P EST MOD 30 MIN: CPT | Performed by: NEUROLOGICAL SURGERY

## 2023-04-01 DIAGNOSIS — E78.2 MIXED HYPERLIPIDEMIA: ICD-10-CM

## 2023-04-03 RX ORDER — ROSUVASTATIN CALCIUM 20 MG/1
TABLET, COATED ORAL
Qty: 90 TABLET | Refills: 0 | Status: SHIPPED | OUTPATIENT
Start: 2023-04-03 | End: 2023-04-21 | Stop reason: SDUPTHER

## 2023-04-03 NOTE — TELEPHONE ENCOUNTER
PATIENT IS COMPLETELY OUT OF THIS MEDICATION, SHE NEEDS THIS CALLED IN ASAP TO [id number] - [place], [place] - [address] [phone]  - [phone] FX

## 2023-04-06 ENCOUNTER — TELEPHONE (OUTPATIENT)
Dept: INTERNAL MEDICINE | Facility: CLINIC | Age: 78
End: 2023-04-06
Payer: MEDICARE

## 2023-04-06 DIAGNOSIS — E78.2 MIXED HYPERLIPIDEMIA: Primary | ICD-10-CM

## 2023-04-06 DIAGNOSIS — I10 ESSENTIAL HYPERTENSION: ICD-10-CM

## 2023-04-06 DIAGNOSIS — R73.03 PREDIABETES: ICD-10-CM

## 2023-04-11 LAB
ALBUMIN SERPL-MCNC: 4.7 G/DL (ref 3.5–5.2)
ALBUMIN/GLOB SERPL: 2.2 G/DL
ALP SERPL-CCNC: 92 U/L (ref 39–117)
ALT SERPL-CCNC: 30 U/L (ref 1–33)
AST SERPL-CCNC: 27 U/L (ref 1–32)
BASOPHILS # BLD AUTO: 0.02 10*3/MM3 (ref 0–0.2)
BASOPHILS NFR BLD AUTO: 0.4 % (ref 0–1.5)
BILIRUB SERPL-MCNC: 0.7 MG/DL (ref 0–1.2)
BUN SERPL-MCNC: 18 MG/DL (ref 8–23)
BUN/CREAT SERPL: 21.2 (ref 7–25)
CALCIUM SERPL-MCNC: 10.3 MG/DL (ref 8.6–10.5)
CHLORIDE SERPL-SCNC: 101 MMOL/L (ref 98–107)
CHOLEST SERPL-MCNC: 214 MG/DL (ref 0–200)
CO2 SERPL-SCNC: 24.9 MMOL/L (ref 22–29)
CREAT SERPL-MCNC: 0.85 MG/DL (ref 0.57–1)
EGFRCR SERPLBLD CKD-EPI 2021: 71.1 ML/MIN/1.73
EOSINOPHIL # BLD AUTO: 0.17 10*3/MM3 (ref 0–0.4)
EOSINOPHIL NFR BLD AUTO: 3.6 % (ref 0.3–6.2)
ERYTHROCYTE [DISTWIDTH] IN BLOOD BY AUTOMATED COUNT: 12.6 % (ref 12.3–15.4)
GLOBULIN SER CALC-MCNC: 2.1 GM/DL
GLUCOSE SERPL-MCNC: 99 MG/DL (ref 65–99)
HBA1C MFR BLD: 5.5 % (ref 4.8–5.6)
HCT VFR BLD AUTO: 40.6 % (ref 34–46.6)
HDLC SERPL-MCNC: 67 MG/DL (ref 40–60)
HGB BLD-MCNC: 13.8 G/DL (ref 12–15.9)
IMM GRANULOCYTES # BLD AUTO: 0.02 10*3/MM3 (ref 0–0.05)
IMM GRANULOCYTES NFR BLD AUTO: 0.4 % (ref 0–0.5)
LDLC SERPL CALC-MCNC: 131 MG/DL (ref 0–100)
LDLC/HDLC SERPL: 1.93 {RATIO}
LYMPHOCYTES # BLD AUTO: 1.06 10*3/MM3 (ref 0.7–3.1)
LYMPHOCYTES NFR BLD AUTO: 22.3 % (ref 19.6–45.3)
MCH RBC QN AUTO: 33.5 PG (ref 26.6–33)
MCHC RBC AUTO-ENTMCNC: 34 G/DL (ref 31.5–35.7)
MCV RBC AUTO: 98.5 FL (ref 79–97)
MONOCYTES # BLD AUTO: 0.57 10*3/MM3 (ref 0.1–0.9)
MONOCYTES NFR BLD AUTO: 12 % (ref 5–12)
NEUTROPHILS # BLD AUTO: 2.91 10*3/MM3 (ref 1.7–7)
NEUTROPHILS NFR BLD AUTO: 61.3 % (ref 42.7–76)
NRBC BLD AUTO-RTO: 0 /100 WBC (ref 0–0.2)
PLATELET # BLD AUTO: 219 10*3/MM3 (ref 140–450)
POTASSIUM SERPL-SCNC: 4.8 MMOL/L (ref 3.5–5.2)
PROT SERPL-MCNC: 6.8 G/DL (ref 6–8.5)
RBC # BLD AUTO: 4.12 10*6/MM3 (ref 3.77–5.28)
SODIUM SERPL-SCNC: 137 MMOL/L (ref 136–145)
T4 FREE SERPL-MCNC: 1.24 NG/DL (ref 0.93–1.7)
TRIGL SERPL-MCNC: 88 MG/DL (ref 0–150)
TSH SERPL DL<=0.005 MIU/L-ACNC: 2.12 UIU/ML (ref 0.27–4.2)
VLDLC SERPL CALC-MCNC: 16 MG/DL (ref 5–40)
WBC # BLD AUTO: 4.75 10*3/MM3 (ref 3.4–10.8)

## 2023-04-21 ENCOUNTER — OFFICE VISIT (OUTPATIENT)
Dept: INTERNAL MEDICINE | Facility: CLINIC | Age: 78
End: 2023-04-21
Payer: MEDICARE

## 2023-04-21 VITALS
OXYGEN SATURATION: 98 % | TEMPERATURE: 98 F | HEART RATE: 80 BPM | DIASTOLIC BLOOD PRESSURE: 70 MMHG | BODY MASS INDEX: 25.96 KG/M2 | WEIGHT: 155.8 LBS | HEIGHT: 65 IN | SYSTOLIC BLOOD PRESSURE: 124 MMHG

## 2023-04-21 DIAGNOSIS — K21.00 GASTROESOPHAGEAL REFLUX DISEASE WITH ESOPHAGITIS, UNSPECIFIED WHETHER HEMORRHAGE: ICD-10-CM

## 2023-04-21 DIAGNOSIS — E78.2 MIXED HYPERLIPIDEMIA: Primary | ICD-10-CM

## 2023-04-21 DIAGNOSIS — I10 ESSENTIAL HYPERTENSION: ICD-10-CM

## 2023-04-21 DIAGNOSIS — R73.03 PREDIABETES: ICD-10-CM

## 2023-04-21 DIAGNOSIS — K75.81 NASH (NONALCOHOLIC STEATOHEPATITIS): ICD-10-CM

## 2023-04-21 PROCEDURE — 3078F DIAST BP <80 MM HG: CPT | Performed by: INTERNAL MEDICINE

## 2023-04-21 PROCEDURE — 99214 OFFICE O/P EST MOD 30 MIN: CPT | Performed by: INTERNAL MEDICINE

## 2023-04-21 PROCEDURE — 3074F SYST BP LT 130 MM HG: CPT | Performed by: INTERNAL MEDICINE

## 2023-04-21 RX ORDER — ESCITALOPRAM OXALATE 10 MG/1
5 TABLET ORAL EVERY MORNING
Qty: 45 TABLET | Refills: 3 | Status: SHIPPED | OUTPATIENT
Start: 2023-04-21

## 2023-04-21 RX ORDER — ROSUVASTATIN CALCIUM 20 MG/1
20 TABLET, COATED ORAL DAILY
Qty: 90 TABLET | Refills: 3 | Status: SHIPPED | OUTPATIENT
Start: 2023-04-21

## 2023-04-21 RX ORDER — LISINOPRIL 10 MG/1
10 TABLET ORAL DAILY
Qty: 90 TABLET | Refills: 3 | Status: SHIPPED | OUTPATIENT
Start: 2023-04-21

## 2023-04-21 NOTE — PROGRESS NOTES
Germaine Gonzalez is a 78 y.o. female, who presents with a chief complaint of   Chief Complaint   Patient presents with   • Hypertension   • Hyperlipidemia     6 mo f/u           HPI   Pt here for f/u    WALSH - mild chronic elevation of LFT'S     Slightly low FT4 - ft4 0.01 below normal.  Weight stable  No constipation, dry skin, or hair changes.      HLD - on crestor.  High hdl and ldl.     Hyperglycemia - glucose back to normal.     The following portions of the patient's history were reviewed and updated as appropriate: allergies, current medications, past family history, past medical history, past social history, past surgical history and problem list.    Allergies: Levaquin [levofloxacin] and Tetracycline    Review of Systems   Constitutional: Negative.    HENT: Negative.    Eyes: Negative.    Respiratory: Negative.    Cardiovascular: Negative.    Gastrointestinal: Negative.    Endocrine: Negative.    Genitourinary: Negative.    Musculoskeletal: Negative.    Skin: Negative.    Allergic/Immunologic: Negative.    Neurological: Negative.    Hematological: Negative.    Psychiatric/Behavioral: Negative.    All other systems reviewed and are negative.            Wt Readings from Last 3 Encounters:   04/21/23 70.7 kg (155 lb 12.8 oz)   02/27/23 70.9 kg (156 lb 6.4 oz)   02/09/23 68 kg (150 lb)     Temp Readings from Last 3 Encounters:   04/21/23 98 °F (36.7 °C) (Infrared)   02/27/23 97.1 °F (36.2 °C)   01/16/23 97.3 °F (36.3 °C)     BP Readings from Last 3 Encounters:   04/21/23 124/70   02/27/23 120/70   01/16/23 138/72     Pulse Readings from Last 3 Encounters:   04/21/23 80   02/27/23 87   01/16/23 95     Body mass index is 25.93 kg/m².  SpO2 Readings from Last 3 Encounters:   04/21/23 98%   02/27/23 99%   01/16/23 99%          Physical Exam  Vitals and nursing note reviewed.   Constitutional:       General: She is not in acute distress.     Appearance: She is well-developed.   HENT:      Head: Normocephalic and  atraumatic.      Right Ear: External ear normal.      Left Ear: External ear normal.      Nose: Nose normal.   Eyes:      Conjunctiva/sclera: Conjunctivae normal.      Pupils: Pupils are equal, round, and reactive to light.   Cardiovascular:      Rate and Rhythm: Normal rate and regular rhythm.      Heart sounds: Normal heart sounds.   Pulmonary:      Effort: Pulmonary effort is normal. No respiratory distress.      Breath sounds: Normal breath sounds. No wheezing.   Musculoskeletal:         General: Normal range of motion.      Cervical back: Normal range of motion and neck supple.      Comments: Normal gait   Skin:     General: Skin is warm and dry.   Neurological:      General: No focal deficit present.      Mental Status: She is alert and oriented to person, place, and time.   Psychiatric:         Behavior: Behavior normal.         Thought Content: Thought content normal.         Judgment: Judgment normal.         Results for orders placed or performed in visit on 04/06/23   Comprehensive Metabolic Panel    Specimen: Blood   Result Value Ref Range    Glucose 99 65 - 99 mg/dL    BUN 18 8 - 23 mg/dL    Creatinine 0.85 0.57 - 1.00 mg/dL    EGFR Result 71.1 >60.0 mL/min/1.73    BUN/Creatinine Ratio 21.2 7.0 - 25.0    Sodium 137 136 - 145 mmol/L    Potassium 4.8 3.5 - 5.2 mmol/L    Chloride 101 98 - 107 mmol/L    Total CO2 24.9 22.0 - 29.0 mmol/L    Calcium 10.3 8.6 - 10.5 mg/dL    Total Protein 6.8 6.0 - 8.5 g/dL    Albumin 4.7 3.5 - 5.2 g/dL    Globulin 2.1 gm/dL    A/G Ratio 2.2 g/dL    Total Bilirubin 0.7 0.0 - 1.2 mg/dL    Alkaline Phosphatase 92 39 - 117 U/L    AST (SGOT) 27 1 - 32 U/L    ALT (SGPT) 30 1 - 33 U/L   Hemoglobin A1c    Specimen: Blood   Result Value Ref Range    Hemoglobin A1C 5.50 4.80 - 5.60 %   Lipid Panel With LDL / HDL Ratio    Specimen: Blood   Result Value Ref Range    Total Cholesterol 214 (H) 0 - 200 mg/dL    Triglycerides 88 0 - 150 mg/dL    HDL Cholesterol 67 (H) 40 - 60 mg/dL    VLDL  Cholesterol Phil 16 5 - 40 mg/dL    LDL Chol Calc ([facility]) 131 (H) 0 - 100 mg/dL    LDL/HDL RATIO 1.93    TSH    Specimen: Blood   Result Value Ref Range    TSH 2.120 0.270 - 4.200 uIU/mL   T4, Free    Specimen: Blood   Result Value Ref Range    Free T4 1.24 0.93 - 1.70 ng/dL   CBC & Differential    Specimen: Blood   Result Value Ref Range    WBC 4.75 3.40 - 10.80 10*3/mm3    RBC 4.12 3.77 - 5.28 10*6/mm3    Hemoglobin 13.8 12.0 - 15.9 g/dL    Hematocrit 40.6 34.0 - 46.6 %    MCV 98.5 (H) 79.0 - 97.0 fL    MCH 33.5 (H) 26.6 - 33.0 pg    MCHC 34.0 31.5 - 35.7 g/dL    RDW 12.6 12.3 - 15.4 %    Platelets 219 140 - 450 10*3/mm3    Neutrophil Rel % 61.3 42.7 - 76.0 %    Lymphocyte Rel % 22.3 19.6 - 45.3 %    Monocyte Rel % 12.0 5.0 - 12.0 %    Eosinophil Rel % 3.6 0.3 - 6.2 %    Basophil Rel % 0.4 0.0 - 1.5 %    Neutrophils Absolute 2.91 1.70 - 7.00 10*3/mm3    Lymphocytes Absolute 1.06 0.70 - 3.10 10*3/mm3    Monocytes Absolute 0.57 0.10 - 0.90 10*3/mm3    Eosinophils Absolute 0.17 0.00 - 0.40 10*3/mm3    Basophils Absolute 0.02 0.00 - 0.20 10*3/mm3    Immature Granulocyte Rel % 0.4 0.0 - 0.5 %    Immature Grans Absolute 0.02 0.00 - 0.05 10*3/mm3    nRBC 0.0 0.0 - 0.2 /100 WBC     Result Review :                  Assessment and Plan    Diagnoses and all orders for this visit:    1. Mixed hyperlipidemia (Primary)  -     rosuvastatin (CRESTOR) 20 MG tablet; Take 1 tablet by mouth Daily.  Dispense: 90 tablet; Refill: 3  -     Comprehensive Metabolic Panel; Future  -     Lipid Panel With LDL / HDL Ratio; Future    2. Essential hypertension  -     lisinopril (PRINIVIL,ZESTRIL) 10 MG tablet; Take 1 tablet by mouth Daily.  Dispense: 90 tablet; Refill: 3  -     CBC & Differential; Future  -     Comprehensive Metabolic Panel; Future  -     Hemoglobin A1c; Future  -     Lipid Panel With LDL / HDL Ratio; Future  -     TSH; Future  -     T4, Free; Future    3. Prediabetes  -     CBC & Differential; Future  -     Comprehensive Metabolic  Panel; Future  -     Hemoglobin A1c; Future  -     Lipid Panel With LDL / HDL Ratio; Future  -     TSH; Future  -     T4, Free; Future    4. WALSH (nonalcoholic steatohepatitis)  -     Comprehensive Metabolic Panel; Future    5. Gastroesophageal reflux disease with esophagitis, unspecified whether hemorrhage    Other orders  -     escitalopram (LEXAPRO) 10 MG tablet; Take 0.5 tablets by mouth Every Morning.  Dispense: 45 tablet; Refill: 3         BMI is >= 25 and <30. (Overweight) The following options were offered after discussion;: exercise counseling/recommendations and nutrition counseling/recommendations             Outpatient Medications Prior to Visit   Medication Sig Dispense Refill   • Biotin 5000 MCG tablet Take  by mouth Daily.     • calcium carbonate (OS-ULI) 600 MG tablet Take 1 tablet by mouth 2 (Two) Times a Day With Meals.     • desoximetasone (TOPICORT) 0.05 % cream APPLY CREAM TOPICALLY TO AFFECTED AREA TWICE DAILY FOR ECZEMA FOR UP TO 14 DAYS     • estradiol (ESTRACE) 0.1 MG/GM vaginal cream      • omeprazole (priLOSEC) 40 MG capsule Take 1 capsule by mouth 2 (Two) Times a Day. 180 capsule 3   • Propylene Glycol (Systane Complete) 0.6 % solution Apply  to eye(s) as directed by provider.     • vitamin B-12 (CYANOCOBALAMIN) 1000 MCG tablet Take 5 tablets by mouth Daily.     • escitalopram (LEXAPRO) 10 MG tablet Take 0.5 tablets by mouth Every Morning. 45 tablet 3   • lisinopril (PRINIVIL,ZESTRIL) 10 MG tablet Take 1 tablet by mouth Daily. 90 tablet 3   • rosuvastatin (CRESTOR) 20 MG tablet Take 1 tablet by mouth once daily 90 tablet 0   • glucosamine-chondroitin 500-400 MG capsule capsule Take 1 capsule by mouth 2 (Two) Times a Day With Meals. (Patient not taking: Reported on 4/21/2023)       No facility-administered medications prior to visit.     New Medications Ordered This Visit   Medications   • lisinopril (PRINIVIL,ZESTRIL) 10 MG tablet     Sig: Take 1 tablet by mouth Daily.     Dispense:  90  tablet     Refill:  3   • rosuvastatin (CRESTOR) 20 MG tablet     Sig: Take 1 tablet by mouth Daily.     Dispense:  90 tablet     Refill:  3   • escitalopram (LEXAPRO) 10 MG tablet     Sig: Take 0.5 tablets by mouth Every Morning.     Dispense:  45 tablet     Refill:  3     [unfilled]  Medications Discontinued During This Encounter   Medication Reason   • escitalopram (LEXAPRO) 10 MG tablet Reorder   • lisinopril (PRINIVIL,ZESTRIL) 10 MG tablet Reorder   • rosuvastatin (CRESTOR) 20 MG tablet Reorder         No follow-ups on file.    Patient was given instructions and counseling regarding her condition or for health maintenance advice. Please see specific information pulled into the AVS if appropriate.

## 2023-05-25 ENCOUNTER — TRANSCRIBE ORDERS (OUTPATIENT)
Dept: ADMINISTRATIVE | Facility: HOSPITAL | Age: 78
End: 2023-05-25

## 2023-05-25 DIAGNOSIS — Z12.31 VISIT FOR SCREENING MAMMOGRAM: Primary | ICD-10-CM

## 2023-06-08 ENCOUNTER — OFFICE VISIT (OUTPATIENT)
Dept: GASTROENTEROLOGY | Facility: CLINIC | Age: 78
End: 2023-06-08
Payer: MEDICARE

## 2023-06-08 VITALS
WEIGHT: 161.2 LBS | DIASTOLIC BLOOD PRESSURE: 80 MMHG | SYSTOLIC BLOOD PRESSURE: 142 MMHG | BODY MASS INDEX: 26.86 KG/M2 | HEIGHT: 65 IN

## 2023-06-08 DIAGNOSIS — Z86.010 PERSONAL HISTORY OF COLONIC POLYPS: ICD-10-CM

## 2023-06-08 DIAGNOSIS — K75.81 NASH (NONALCOHOLIC STEATOHEPATITIS): ICD-10-CM

## 2023-06-08 DIAGNOSIS — K22.70 BARRETT'S ESOPHAGUS WITHOUT DYSPLASIA: Primary | ICD-10-CM

## 2023-06-08 PROCEDURE — 3077F SYST BP >= 140 MM HG: CPT | Performed by: INTERNAL MEDICINE

## 2023-06-08 PROCEDURE — 3079F DIAST BP 80-89 MM HG: CPT | Performed by: INTERNAL MEDICINE

## 2023-06-08 PROCEDURE — 99213 OFFICE O/P EST LOW 20 MIN: CPT | Performed by: INTERNAL MEDICINE

## 2023-06-08 PROCEDURE — 1159F MED LIST DOCD IN RCRD: CPT | Performed by: INTERNAL MEDICINE

## 2023-06-08 PROCEDURE — 1160F RVW MEDS BY RX/DR IN RCRD: CPT | Performed by: INTERNAL MEDICINE

## 2023-06-08 NOTE — PROGRESS NOTES
PATIENT INFORMATION  Germaine Gonzalez       - 1945    CHIEF COMPLAINT  Chief Complaint   Patient presents with    Mcgill's esophagus    Non-alcoholic steatohepatitis    Heartburn       HISTORY OF PRESENT ILLNESS  Here for a follow up of her GERD and polyps and WALSH but  overall is doing well had a spell assoc with a few drinks but the MRI picked up a congenital malformation          REVIEWED PERTINENT RESULTS/ LABS  Lab Results   Component Value Date    CASEREPORT  2022     Surgical Pathology Report                         Case: [id number]                                  Authorizing Provider:  Bc Sanchez        Collected:           2022 05:12 PM                                 MD Rick                                                                   Ordering Location:     [facility]   Received:            2022 07:31 PM                                 OR                                                                           Pathologist:           Solitario Smith MD                                                          Specimens:   1) - Small Intestine, Duodenum, duodenal biopsy                                                     2) - Gastric, Antrum, gastric biopsy                                                                3) - Esophagus, Distal, distal esophagus                                                   FINALDX  2022     1. Duodenum, Biopsy:  Small bowel mucosa with             A. Normal intact villous surface.   B. No significant inflammation, no granulomas.   C: No viral inclusions or other organisms on routinely stained sections.    2. Stomach, Biopsy:  Oxyntic and antral type gastric mucosa with   A. Mild reactive/chemical gastropathy involving antral type mucosa.   B. No significant inflammation.   C. No metaplasia, dysplasia nor malignancy.   D. No H pylori-like organisms identified.    3. Esophagus, Distal, Biopsy:  Squamoglandular  mucosa and submucosa with   A. Mcgill's esophagus.   B. No dysplasia nor malignancy.   C. No significant eosinophilia.   D. No viral inclusions or fungal organisms identified.    mec/clm         Lab Results   Component Value Date    HGB 13.8 04/10/2023    MCV 98.5 (H) 04/10/2023     04/10/2023    ALT 30 04/10/2023    AST 27 04/10/2023    HGBA1C 5.50 04/10/2023    INR 0.96 11/29/2022    TRIG 88 04/10/2023      No results found.    REVIEW OF SYSTEMS  Review of Systems   Constitutional:  Negative for activity change, chills, fever and unexpected weight change.   HENT:  Negative for congestion.    Eyes:  Negative for visual disturbance.   Respiratory:  Negative for shortness of breath.    Cardiovascular:  Negative for chest pain and palpitations.   Gastrointestinal:  Negative for abdominal pain and blood in stool.   Endocrine: Negative for cold intolerance and heat intolerance.   Genitourinary:  Negative for hematuria.   Musculoskeletal:  Negative for gait problem.   Skin:  Negative for color change.   Allergic/Immunologic: Negative for immunocompromised state.   Neurological:  Negative for weakness and light-headedness.   Hematological:  Negative for adenopathy.   Psychiatric/Behavioral:  Negative for sleep disturbance. The patient is not nervous/anxious.        ACTIVE PROBLEMS  Patient Active Problem List    Diagnosis     Esophageal dysphagia [R13.19]     Weight loss [R63.4]     S/P total knee arthroplasty [Z96.659]     Primary osteoarthritis of right knee [M17.11]     Knee instability, right [M25.361]     Osteopenia [M85.80]     Prediabetes [R73.03]     Essential hypertension [I10]     Effusion of left shoulder joint [M25.412]     Encounter for screening for malignant neoplasm of colon [Z12.11]     Personal history of colonic polyps [Z86.010]     Primary osteoarthritis of left knee [M17.12]     WALSH (nonalcoholic steatohepatitis) [K75.81]     Sjogren's syndrome with keratoconjunctivitis sicca [M35.01]      Pulliam's esophagus without dysplasia [K22.70]     Hyperglycemia [R73.9]     Pyelonephritis [N12]     Gastroesophageal reflux disease [K21.9]     Hyperlipidemia [E78.5]     Pure hypercholesterolemia [E78.00]     Knee pain [M25.569]          PAST MEDICAL HISTORY  Past Medical History:   Diagnosis Date    Arthritis     knees    Pulliam esophagus     Cancer     cervical    Cervical cancer     Colon polyp     Eczema     Elevated liver enzymes     GERD (gastroesophageal reflux disease)     H/O stress fracture     Health care maintenance     Hypercholesteremia     Hypertension     Kidney stones     sched ureteroscopy, lithotripsy    Knee swelling     Ocular migraine     PONV (postoperative nausea and vomiting)     Postmenopausal     Seasonal allergies     Sjogren's syndrome     autoimmune disorder          SURGICAL HISTORY  Past Surgical History:   Procedure Laterality Date     SECTION      x2    COLONOSCOPY      COLONOSCOPY N/A 2021    Procedure: COLONOSCOPY;  Surgeon: Bc Sanchez MD;  [place] [facility] [place]  Service: Gastroenterology;  Laterality: N/A;  Diverticulosis and polyps    CYSTOSCOPY URETEROSCOPY STONE MANIPULATION/EXTRACTION Left 10/14/2016    Procedure: LEFT URETEROSCOPY bilateral retrograde pyleogram LEFT STENT PLACEMENT.;  Surgeon: Julius Darling MD;  [place] [facility] [place];  Service:     CYSTOSCOPY W/ URETERAL STENT PLACEMENT Right 8/3/2018    Procedure: CYSTOSCOPY URETERAL CATHETER/STENT INSERTION;  Surgeon: Farzad Cordova MD;  Location: [facility] [place];  Service: Urology    ENDOSCOPY N/A 4/10/2019    Procedure: ESOPHAGOGASTRODUODENOSCOPY;  Surgeon: Bc Sanchez MD;  Location: [facility] [place];  Service: Gastroenterology    ENDOSCOPY N/A 2022    Procedure: ESOPHAGOGASTRODUODENOSCOPY WITH BIOPSY;  Surgeon: Bc Sanchez MD;  Location: [facility] [place];  Service: Gastroenterology;  Laterality: N/A;  gastritis, pulliam's    HYSTERECTOMY      TOTAL KNEE  ARTHROPLASTY Right 2022    Procedure: TOTAL KNEE ARTHROPLASTY AND ALL ASSOCIATED PROCEDURES;  Surgeon: Jovany Villanueva MD;  Location:  [facility] OR;  Service: Orthopedics;  Laterality: Right;    UPPER GASTROINTESTINAL ENDOSCOPY      URETEROSCOPY LASER LITHOTRIPSY WITH STENT INSERTION Left 10/26/2016    Procedure: LT URETEROSCOPY LASER LITHOTRIPSY ;  Surgeon: Julius Darling MD;  Location: [facility] MAIN OR;  Service:          FAMILY HISTORY  Family History   Problem Relation Age of Onset    Anxiety disorder Mother     Bipolar disorder Mother     Stroke Mother 83    Thyroid disease Mother     Depression Mother     Heart attack Father 47    Thyroid disease Brother     Hypertension Brother     Diabetes Brother 68    Glaucoma Brother     Depression Brother     No Known Problems Daughter     No Known Problems Son     Leukemia Maternal Grandmother     Diabetes Maternal Grandfather     Heart attack Paternal Grandmother     Heart attack Paternal Grandfather     Breast cancer Neg Hx     Colon cancer Neg Hx     Colon polyps Neg Hx          SOCIAL HISTORY  Social History     Occupational History    Not on file   Tobacco Use    Smoking status: Former     Packs/day: 0.50     Years: 16.00     Pack years: 8.00     Types: Cigarettes     Start date: 3/22/1971     Quit date: 1990     Years since quittin.2     Passive exposure: Never    Smokeless tobacco: Never   Vaping Use    Vaping Use: Never used   Substance and Sexual Activity    Alcohol use: Yes     Alcohol/week: 2.0 standard drinks     Types: 2 Glasses of wine per week     Comment: 2 glasses of wine / daily    Drug use: No    Sexual activity: Defer         CURRENT MEDICATIONS    Current Outpatient Medications:     Biotin 5000 MCG tablet, Take  by mouth Daily., Disp: , Rfl:     calcium carbonate (OS-ULI) 600 MG tablet, Take 1 tablet by mouth 2 (Two) Times a Day With Meals., Disp: , Rfl:     desoximetasone (TOPICORT) 0.05 % cream, APPLY CREAM TOPICALLY TO AFFECTED AREA  "TWICE DAILY FOR ECZEMA FOR UP TO 14 DAYS, Disp: , Rfl:     escitalopram (LEXAPRO) 10 MG tablet, Take 0.5 tablets by mouth Every Morning., Disp: 45 tablet, Rfl: 3    estradiol (ESTRACE) 0.1 MG/GM vaginal cream, , Disp: , Rfl:     lisinopril (PRINIVIL,ZESTRIL) 10 MG tablet, Take 1 tablet by mouth Daily., Disp: 90 tablet, Rfl: 3    omeprazole (priLOSEC) 40 MG capsule, Take 1 capsule by mouth 2 (Two) Times a Day., Disp: 180 capsule, Rfl: 3    Propylene Glycol (Systane Complete) 0.6 % solution, Apply  to eye(s) as directed by provider., Disp: , Rfl:     rosuvastatin (CRESTOR) 20 MG tablet, Take 1 tablet by mouth Daily., Disp: 90 tablet, Rfl: 3    vitamin B-12 (CYANOCOBALAMIN) 1000 MCG tablet, Take 5 tablets by mouth Daily., Disp: , Rfl:     ALLERGIES  Levaquin [levofloxacin] and Tetracycline    VITALS  Vitals:    06/08/23 0931   BP: 142/80   BP Location: Left arm   Patient Position: Sitting   Cuff Size: Adult   Weight: 73.1 kg (161 lb 3.2 oz)   Height: 165.1 cm (65\")       PHYSICAL EXAM  Debilities/Disabilities Identified: None  Emotional Behavior: Appropriate  Wt Readings from Last 3 Encounters:   06/08/23 73.1 kg (161 lb 3.2 oz)   04/21/23 70.7 kg (155 lb 12.8 oz)   02/27/23 70.9 kg (156 lb 6.4 oz)     Ht Readings from Last 1 Encounters:   06/08/23 165.1 cm (65\")     Body mass index is 26.83 kg/m².  Physical Exam  Constitutional:       Appearance: She is well-developed. She is not diaphoretic.   HENT:      Head: Normocephalic and atraumatic.   Eyes:      General: No scleral icterus.     Conjunctiva/sclera: Conjunctivae normal.      Pupils: Pupils are equal, round, and reactive to light.   Neck:      Thyroid: No thyromegaly.   Cardiovascular:      Rate and Rhythm: Normal rate and regular rhythm.      Heart sounds: Normal heart sounds. No murmur heard.    No gallop.   Pulmonary:      Effort: Pulmonary effort is normal.      Breath sounds: Normal breath sounds. No wheezing or rales.   Abdominal:      General: Bowel sounds " are normal. There is no distension or abdominal bruit.      Palpations: Abdomen is soft. There is no shifting dullness, fluid wave or mass.      Tenderness: There is no abdominal tenderness. There is no guarding. Negative signs include García's sign.      Hernia: There is no hernia in the ventral area.   Musculoskeletal:         General: Normal range of motion.      Cervical back: Normal range of motion and neck supple.   Lymphadenopathy:      Cervical: No cervical adenopathy.   Skin:     General: Skin is warm and dry.      Findings: No erythema or rash.   Neurological:      Mental Status: She is alert and oriented to person, place, and time.   Psychiatric:         Mood and Affect: Mood normal.         Behavior: Behavior normal.       CLINICAL DATA REVIEWED   reviewed previous lab results and integrated with today's visit, reviewed notes from other physicians and/or last GI encounter, reviewed previous endoscopy results and available photos, reviewed surgical pathology results from previous biopsies    ASSESSMENT  Diagnoses and all orders for this visit:    Mcgill's esophagus without dysplasia    WALSH (nonalcoholic steatohepatitis)    Personal history of colonic polyps          PLAN  Her scopes can be together in 2027  Return in about 6 months (around 12/8/2023).    I have discussed the above plan with the patient.  They verbalize understanding and are in agreement with the plan.  They have been advised to contact the office for any questions, concerns, or changes related to their health.

## 2023-09-21 ENCOUNTER — HOSPITAL ENCOUNTER (OUTPATIENT)
Dept: CARDIOLOGY | Facility: HOSPITAL | Age: 78
Discharge: HOME | End: 2023-09-21
Payer: MEDICARE

## 2023-09-21 ENCOUNTER — LAB (OUTPATIENT)
Dept: LAB | Facility: HOSPITAL | Age: 78
End: 2023-09-21

## 2023-09-21 ENCOUNTER — TRANSCRIBE ORDERS (OUTPATIENT)
Dept: ADMINISTRATIVE | Facility: HOSPITAL | Age: 78
End: 2023-09-21

## 2023-09-21 DIAGNOSIS — H02.032 SENILE ENTROPION OF RIGHT LOWER EYELID: Primary | ICD-10-CM

## 2023-09-21 DIAGNOSIS — H02.032 SENILE ENTROPION OF RIGHT LOWER EYELID: ICD-10-CM

## 2023-09-21 LAB
ANION GAP SERPL CALCULATED.3IONS-SCNC: 14 MMOL/L (ref 5–15)
BUN SERPL-MCNC: 15 MG/DL (ref 8–23)
BUN/CREAT SERPL: 17.4 (ref 7–25)
CALCIUM SPEC-SCNC: 9.5 MG/DL (ref 8.6–10.5)
CHLORIDE SERPL-SCNC: 103 MMOL/L (ref 98–107)
CO2 SERPL-SCNC: 21 MMOL/L (ref 22–29)
CREAT SERPL-MCNC: 0.86 MG/DL (ref 0.57–1)
EGFRCR SERPLBLD CKD-EPI 2021: 70.1 ML/MIN/1.73
GLUCOSE SERPL-MCNC: 97 MG/DL (ref 65–99)
POTASSIUM SERPL-SCNC: 4.2 MMOL/L (ref 3.5–5.2)
SODIUM SERPL-SCNC: 138 MMOL/L (ref 136–145)

## 2023-09-21 PROCEDURE — 36415 COLL VENOUS BLD VENIPUNCTURE: CPT

## 2023-09-21 PROCEDURE — 93005 ELECTROCARDIOGRAM TRACING: CPT | Performed by: OPHTHALMOLOGY

## 2023-09-21 PROCEDURE — 80048 BASIC METABOLIC PNL TOTAL CA: CPT

## 2023-09-22 LAB
QT INTERVAL: 412 MS
QTC INTERVAL: 448 MS

## 2023-10-20 ENCOUNTER — TELEPHONE (OUTPATIENT)
Dept: INTERNAL MEDICINE | Facility: CLINIC | Age: 78
End: 2023-10-20

## 2023-10-20 NOTE — TELEPHONE ENCOUNTER
Caller: Germaine Gonzalez    Relationship to patient: Self    Best call back number: [phone]     Chief complaint: SCHEDULE LABS FOR NEW APPOINTMENT    Type of visit: LABS    Requested date: PATIENT HAS A NEW SUBSEQUENT MEDICARE WELLNESS VISIT  SCHEDULED FOR 12/13/23    If rescheduling, when is the original appointment:  01/03/24    Additional notes:PLEASE RESCHEDULE LABS FOR PATIENT AS HER APPOINTMENT HAS CHANGED.

## 2023-11-30 NOTE — TELEPHONE ENCOUNTER
Patient in having some warm/hot feeling to touch right knee, not red or feverish.  She is still has some swelling but not any more than usual.  Total knee 02.08.22  She is icing and elevating.  Just want to make sure this is normal  Also, would like a refill on pain med.    
198.4

## 2023-12-13 ENCOUNTER — OFFICE VISIT (OUTPATIENT)
Dept: INTERNAL MEDICINE | Facility: CLINIC | Age: 78
End: 2023-12-13
Payer: MEDICARE

## 2023-12-13 VITALS
WEIGHT: 161.4 LBS | DIASTOLIC BLOOD PRESSURE: 84 MMHG | TEMPERATURE: 97.5 F | OXYGEN SATURATION: 98 % | HEART RATE: 76 BPM | HEIGHT: 65 IN | SYSTOLIC BLOOD PRESSURE: 152 MMHG | BODY MASS INDEX: 26.89 KG/M2

## 2023-12-13 DIAGNOSIS — R73.03 PREDIABETES: ICD-10-CM

## 2023-12-13 DIAGNOSIS — I10 ESSENTIAL HYPERTENSION: ICD-10-CM

## 2023-12-13 DIAGNOSIS — K21.00 GASTROESOPHAGEAL REFLUX DISEASE WITH ESOPHAGITIS, UNSPECIFIED WHETHER HEMORRHAGE: ICD-10-CM

## 2023-12-13 DIAGNOSIS — Z00.00 MEDICARE ANNUAL WELLNESS VISIT, SUBSEQUENT: Primary | ICD-10-CM

## 2023-12-13 DIAGNOSIS — E78.2 MIXED HYPERLIPIDEMIA: ICD-10-CM

## 2023-12-13 DIAGNOSIS — F41.9 ANXIETY: ICD-10-CM

## 2023-12-13 RX ORDER — ESCITALOPRAM OXALATE 5 MG/1
5 TABLET ORAL EVERY MORNING
Qty: 90 TABLET | Refills: 3 | Status: SHIPPED | OUTPATIENT
Start: 2023-12-13

## 2023-12-13 NOTE — PROGRESS NOTES
The ABCs of the Annual Wellness Visit  Subsequent Medicare Wellness Visit    Subjective    Germaine Gonzalez is a 78 y.o. female who presents for a Subsequent Medicare Wellness Visit.    The following portions of the patient's history were reviewed and   updated as appropriate: allergies, current medications, past family history, past medical history, past social history, past surgical history, and problem list.    Compared to one year ago, the patient feels her physical   health is the same.    Compared to one year ago, the patient feels her mental   health is the same.    Recent Hospitalizations:  She was not admitted to the hospital during the last year.       Current Medical Providers:  Patient Care Team:  Roberta Boswell MD as PCP - General (Internal Medicine & Pediatrics)  Laura, Bc Cabrera MD as Consulting Physician (Gastroenterology)    Outpatient Medications Prior to Visit   Medication Sig Dispense Refill    Biotin 5000 MCG tablet Take  by mouth Daily.      calcium carbonate (OS-ULI) 600 MG tablet Take 1 tablet by mouth 2 (Two) Times a Day With Meals.      desoximetasone (TOPICORT) 0.05 % cream APPLY CREAM TOPICALLY TO AFFECTED AREA TWICE DAILY FOR ECZEMA FOR UP TO 14 DAYS      estradiol (ESTRACE) 0.1 MG/GM vaginal cream       Estrogens Conjugated (Premarin) 0.625 MG/GM vaginal cream Insert 0.5 applicatorsful every day by vaginal route.      lisinopril (PRINIVIL,ZESTRIL) 10 MG tablet Take 1 tablet by mouth Daily. 90 tablet 3    omeprazole (priLOSEC) 40 MG capsule Take 1 capsule by mouth 2 (Two) Times a Day. 180 capsule 3    rosuvastatin (CRESTOR) 20 MG tablet Take 1 tablet by mouth Daily. 90 tablet 3    vitamin B-12 (CYANOCOBALAMIN) 1000 MCG tablet Take 5 tablets by mouth Daily.      escitalopram (LEXAPRO) 10 MG tablet Take 0.5 tablets by mouth Every Morning. 45 tablet 3    EPINEPHrine (EPIPEN) 0.3 MG/0.3ML solution auto-injector injection       fluticasone (FLONASE) 50 MCG/ACT nasal spray 2  sprays by Alternating Nares route Daily. (Patient not taking: Reported on 12/13/2023)      cycloSPORINE (RESTASIS) 0.05 % ophthalmic emulsion Instill 1 drop twice a day by ophthalmic route.      ertapenem (INVanz) 1 g injection       ezetimibe (ZETIA) 10 MG tablet Take 1 tablet every day by oral route. (Patient not taking: Reported on 12/13/2023)      oxyCODONE-acetaminophen (PERCOCET) 5-325 MG per tablet       Propylene Glycol (Systane Complete) 0.6 % solution Apply  to eye(s) as directed by provider. (Patient not taking: Reported on 12/13/2023)      tobramycin-dexamethasone (TOBRADEX) 0.3-0.1 % ophthalmic suspension INSTILL 1 DROP INTO RIGHT EYE 4 TIMES DAILY FOR 5 DAYS       No facility-administered medications prior to visit.       No opioid medication identified on active medication list. I have reviewed chart for other potential  high risk medication/s and harmful drug interactions in the elderly.        Aspirin is not on active medication list.  Aspirin use is not indicated based on review of current medical condition/s. Risk of harm outweighs potential benefits.  .    Patient Active Problem List   Diagnosis    Gastroesophageal reflux disease    Hyperlipidemia    Pyelonephritis    Sjogren's syndrome with keratoconjunctivitis sicca    Mcgill's esophagus without dysplasia    Hyperglycemia    WALSH (nonalcoholic steatohepatitis)    Pure hypercholesterolemia    Knee pain    Primary osteoarthritis of left knee    Encounter for screening for malignant neoplasm of colon    Personal history of colonic polyps    Effusion of left shoulder joint    Osteopenia    Prediabetes    Essential hypertension    Primary osteoarthritis of right knee    Knee instability, right    S/P total knee arthroplasty    Esophageal dysphagia    Weight loss     Advance Care Planning   Advance Care Planning     Advance Directive is not on file.  ACP discussion was held with the patient during this visit. Patient has an advance directive (not in  "EMR), copy requested.     Objective    Vitals:    23 1005   BP: 152/84   BP Location: Left arm   Patient Position: Sitting   Cuff Size: Adult   Pulse: 76   Temp: 97.5 °F (36.4 °C)   TempSrc: Infrared   SpO2: 98%   Weight: 73.2 kg (161 lb 6.4 oz)   Height: 165.1 cm (65\")     Estimated body mass index is 26.86 kg/m² as calculated from the following:    Height as of this encounter: 165.1 cm (65\").    Weight as of this encounter: 73.2 kg (161 lb 6.4 oz).           Does the patient have evidence of cognitive impairment? No    Lab Results   Component Value Date    CHLPL 222 (H) 2023    TRIG 104 2023    HDL 68 (H) 2023     (H) 2023    VLDL 18 2023    HGBA1C 5.60 2023        HEALTH RISK ASSESSMENT    Smoking Status:  Social History     Tobacco Use   Smoking Status Former    Packs/day: 0.50    Years: 16.00    Additional pack years: 0.00    Total pack years: 8.00    Types: Cigarettes    Start date: 3/22/1971    Quit date: 1990    Years since quittin.7    Passive exposure: Never   Smokeless Tobacco Never     Alcohol Consumption:  Social History     Substance and Sexual Activity   Alcohol Use Yes    Alcohol/week: 2.0 standard drinks of alcohol    Types: 2 Glasses of wine per week    Comment: 2 glasses of wine / daily     Fall Risk Screen:    JEREMY Fall Risk Assessment was completed, and patient is at LOW risk for falls.Assessment completed on:2023    Depression Screenin/13/2023    10:11 AM   PHQ-2/PHQ-9 Depression Screening   Little Interest or Pleasure in Doing Things 0-->not at all   Feeling Down, Depressed or Hopeless 0-->not at all   PHQ-9: Brief Depression Severity Measure Score 0       Health Habits and Functional and Cognitive Screenin/13/2023    10:09 AM   Functional & Cognitive Status   Do you have difficulty preparing food and eating? No   Do you have difficulty bathing yourself, getting dressed or grooming yourself? No   Do you have " difficulty using the toilet? No   Do you have difficulty moving around from place to place? No   Do you have trouble with steps or getting out of a bed or a chair? No   Current Diet Well Balanced Diet   Dental Exam Up to date   Eye Exam Up to date   Exercise (times per week) 4 times per week        Exercise Comment [employer]   Do you need help using the phone?  No   Are you deaf or do you have serious difficulty hearing?  No   Do you need help to go to places out of walking distance? No   Do you need help shopping? No   Do you need help preparing meals?  No   Do you need help with housework?  No   Do you need help with laundry? No   Do you need help taking your medications? No   Do you need help managing money? No   Do you ever drive or ride in a car without wearing a seat belt? No   Have you felt unusual stress, anger or loneliness in the last month? No   Who do you live with? Spouse   If you need help, do you have trouble finding someone available to you? No   Do you have difficulty concentrating, remembering or making decisions? No       Age-appropriate Screening Schedule:  Refer to the list below for future screening recommendations based on patient's age, sex and/or medical conditions. Orders for these recommended tests are listed in the plan section. The patient has been provided with a written plan.    Health Maintenance   Topic Date Due    DXA SCAN  09/30/2023    ANNUAL WELLNESS VISIT  10/12/2023    BMI FOLLOWUP  04/21/2024    LIPID PANEL  12/08/2024    TDAP/TD VACCINES (2 - Td or Tdap) 01/01/2025    GASTROSCOPY (EGD)  04/06/2025    COLORECTAL CANCER SCREENING  09/08/2026    HEPATITIS C SCREENING  Completed    COVID-19 Vaccine  Completed    INFLUENZA VACCINE  Completed    Pneumococcal Vaccine 65+  Completed    ZOSTER VACCINE  Completed                  CMS Preventative Services Quick Reference  Risk Factors Identified During Encounter  Chronic Pain: Natural history and expected course discussed.  "Questions answered.  Fall Risk-High or Moderate: Discussed Fall Prevention in the home  Immunizations Discussed/Encouraged: Shingrix and COVID19  Polypharmacy: Medication List reviewed and Medication changes were made  The above risks/problems have been discussed with the patient.  Pertinent information has been shared with the patient in the After Visit Summary.  An After Visit Summary and PPPS were made available to the patient.    Follow Up:   Next Medicare Wellness visit to be scheduled in 1 year.       Additional E&M Note during same encounter follows:  Patient has multiple medical problems which are significant and separately identifiable that require additional work above and beyond the Medicare Wellness Visit.      Chief Complaint  Medicare Wellness-subsequent    Subjective        HPI  Germaine Gonzalez is also being seen today for     Pt's stomach feels better.  She is eating better and weight up a little.  On omeprazole    WALSH - mild chronic elevation of LFT'S     Abnormal TFT's - normal on repeat. Weight stable  No constipation, dry skin, or hair changes.      HLD - on crestor.  High hdl and ldl.     Impaired fasting glucose - fasting glucose high with A1c 5.6     Htn - bp up in office today.  Pt on  lisinopril 10mg..     Review of Systems   Constitutional: Negative.    HENT: Negative.     Eyes: Negative.    Respiratory: Negative.     Cardiovascular: Negative.    Gastrointestinal: Negative.    Endocrine: Negative.    Musculoskeletal: Negative.    Skin: Negative.    Allergic/Immunologic: Negative.    Neurological: Negative.    Hematological: Negative.    Psychiatric/Behavioral: Negative.     All other systems reviewed and are negative.      Objective   Vital Signs:  /84 (BP Location: Left arm, Patient Position: Sitting, Cuff Size: Adult)   Pulse 76   Temp 97.5 °F (36.4 °C) (Infrared)   Ht 165.1 cm (65\")   Wt 73.2 kg (161 lb 6.4 oz)   SpO2 98%   BMI 26.86 kg/m²   Wt Readings from Last 3 Encounters: "   12/13/23 73.2 kg (161 lb 6.4 oz)   07/09/23 70.3 kg (155 lb)   06/08/23 73.1 kg (161 lb 3.2 oz)     Temp Readings from Last 3 Encounters:   12/13/23 97.5 °F (36.4 °C) (Infrared)   07/09/23 98.1 °F (36.7 °C) (Infrared)   04/21/23 98 °F (36.7 °C) (Infrared)     BP Readings from Last 3 Encounters:   12/13/23 152/84   07/09/23 115/72   06/08/23 142/80     Pulse Readings from Last 3 Encounters:   12/13/23 76   07/09/23 71   04/21/23 80       Physical Exam  Vitals and nursing note reviewed.   Constitutional:       General: She is not in acute distress.     Appearance: She is well-developed.   HENT:      Head: Normocephalic and atraumatic.      Right Ear: External ear normal.      Left Ear: External ear normal.      Nose: Nose normal.   Eyes:      Conjunctiva/sclera: Conjunctivae normal.      Pupils: Pupils are equal, round, and reactive to light.   Cardiovascular:      Rate and Rhythm: Normal rate and regular rhythm.      Heart sounds: Normal heart sounds.   Pulmonary:      Effort: Pulmonary effort is normal. No respiratory distress.      Breath sounds: Normal breath sounds. No wheezing.   Musculoskeletal:         General: Normal range of motion.      Cervical back: Normal range of motion and neck supple.      Comments: Normal gait   Skin:     General: Skin is warm and dry.   Neurological:      Mental Status: She is alert and oriented to person, place, and time.   Psychiatric:         Behavior: Behavior normal.         Thought Content: Thought content normal.         Judgment: Judgment normal.          The following data was reviewed by: Roberta Boswell MD on 12/13/2023:  Common labs          7/17/2023    10:58 9/21/2023    10:07 12/8/2023    09:41   Common Labs   Glucose  97  101    BUN  15  16    Creatinine 0.90  0.86  0.83    Sodium  138  140    Potassium  4.2  4.5    Chloride  103  104    Calcium  9.5  9.8    Total Protein   6.9    Albumin   4.7    Total Bilirubin   0.8    Alkaline Phosphatase   76    AST (SGOT)    34    ALT (SGPT)   33    WBC   4.36    Hemoglobin   13.5    Hematocrit   39.7    Platelets   183    Total Cholesterol   222    Triglycerides   104    HDL Cholesterol   68    LDL Cholesterol    136    Hemoglobin A1C   5.60                 Assessment and Plan   Diagnoses and all orders for this visit:    1. Medicare annual wellness visit, subsequent (Primary)    2. Essential hypertension  -     CBC & Differential; Future  -     Comprehensive Metabolic Panel; Future  -     Hemoglobin A1c; Future  -     Lipid Panel With LDL / HDL Ratio; Future  -     T4, Free; Future  -     TSH; Future    3. Prediabetes  -     CBC & Differential; Future  -     Comprehensive Metabolic Panel; Future  -     Hemoglobin A1c; Future  -     Lipid Panel With LDL / HDL Ratio; Future  -     T4, Free; Future  -     TSH; Future    4. Mixed hyperlipidemia  -     Comprehensive Metabolic Panel; Future  -     Lipid Panel With LDL / HDL Ratio; Future    5. Gastroesophageal reflux disease with esophagitis, unspecified whether hemorrhage    6. Anxiety  -     escitalopram (LEXAPRO) 5 MG tablet; Take 1 tablet by mouth Every Morning.  Dispense: 90 tablet; Refill: 3             Follow Up   Return in about 6 months (around 6/13/2024) for Recheck, labs.  Patient was given instructions and counseling regarding her condition or for health maintenance advice. Please see specific information pulled into the AVS if appropriate.

## 2023-12-18 ENCOUNTER — OFFICE VISIT (OUTPATIENT)
Dept: ORTHOPEDIC SURGERY | Facility: CLINIC | Age: 78
End: 2023-12-18
Payer: MEDICARE

## 2023-12-18 VITALS — HEIGHT: 65 IN | WEIGHT: 161 LBS | BODY MASS INDEX: 26.82 KG/M2

## 2023-12-18 DIAGNOSIS — M17.12 PRIMARY OSTEOARTHRITIS OF LEFT KNEE: Primary | ICD-10-CM

## 2023-12-18 RX ORDER — TRIAMCINOLONE ACETONIDE 40 MG/ML
80 INJECTION, SUSPENSION INTRA-ARTICULAR; INTRAMUSCULAR
Status: COMPLETED | OUTPATIENT
Start: 2023-12-18 | End: 2023-12-18

## 2023-12-18 RX ORDER — LIDOCAINE HYDROCHLORIDE 10 MG/ML
8 INJECTION, SOLUTION EPIDURAL; INFILTRATION; INTRACAUDAL; PERINEURAL
Status: COMPLETED | OUTPATIENT
Start: 2023-12-18 | End: 2023-12-18

## 2023-12-18 RX ADMIN — TRIAMCINOLONE ACETONIDE 80 MG: 40 INJECTION, SUSPENSION INTRA-ARTICULAR; INTRAMUSCULAR at 14:36

## 2023-12-18 RX ADMIN — LIDOCAINE HYDROCHLORIDE 8 ML: 10 INJECTION, SOLUTION EPIDURAL; INFILTRATION; INTRACAUDAL; PERINEURAL at 14:36

## 2023-12-18 NOTE — PROGRESS NOTES
Large Joint Arthrocentesis: L knee  Date/Time: 2023 2:36 PM  Consent given by: patient  Site marked: site marked  Timeout: Immediately prior to procedure a time out was called to verify the correct patient, procedure, equipment, support staff and site/side marked as required   Supporting Documentation  Indications: pain   Procedure Details  Location: knee - L knee  Preparation: Patient was prepped and draped in the usual sterile fashion  Needle size: 22 G  Approach: anterolateral  Medications administered: 8 mL lidocaine PF 1% 1 %; 80 mg triamcinolone acetonide 40 MG/ML  Patient tolerance: patient tolerated the procedure well with no immediate complications      Subjective:     Patient ID: Germaine Gonzalez is a 78 y.o. female.    Chief Complaint:  Follow-up left knee DJD  Germaine Gonzalez Returns to clinic for follow-up of her left knee she was last seen 2023 received significant symptom relief with prior corticosteroid injection which she did receive back in 2023.  Returns today with approximately month return of slight pain along the medial compartment increased pain noted transitional activities from seated standing attempting to walk ambulating long distances standing for extended periods of time.  Otherwise is doing quite well she does continue to experience some swelling at the left knee.  Prior total knee arthroplasty right knee she is experiencing some discomfort as she is relying more heavily on the right knee.  Denies any other concerns present.     Social History     Occupational History    Not on file   Tobacco Use    Smoking status: Former     Packs/day: 0.50     Years: 16.00     Additional pack years: 0.00     Total pack years: 8.00     Types: Cigarettes     Start date: 3/22/1971     Quit date: 1990     Years since quittin.7     Passive exposure: Never    Smokeless tobacco: Never   Vaping Use    Vaping Use: Never used   Substance and Sexual Activity    Alcohol use: Yes      Alcohol/week: 2.0 standard drinks of alcohol     Types: 2 Glasses of wine per week     Comment: 2 glasses of wine / daily    Drug use: No    Sexual activity: Defer      Past Medical History:   Diagnosis Date    Arthritis     knees    Pulliam esophagus     Cancer     cervical    Cervical cancer     Colon polyp     Eczema     Elevated liver enzymes     GERD (gastroesophageal reflux disease)     H/O stress fracture     Health care maintenance     Hypercholesteremia     Hypertension     Kidney stones     sched ureteroscopy, lithotripsy    Knee swelling     Ocular migraine     PONV (postoperative nausea and vomiting)     Postmenopausal     Seasonal allergies     Sjogren's syndrome     autoimmune disorder      Past Surgical History:   Procedure Laterality Date     SECTION      x2    COLONOSCOPY      COLONOSCOPY N/A 2021    Procedure: COLONOSCOPY;  Surgeon: Bc Sanchez MD;  Location: [facility] OR;  Service: Gastroenterology;  Laterality: N/A;  Diverticulosis and polyps    CYSTOSCOPY URETEROSCOPY STONE MANIPULATION/EXTRACTION Left 10/14/2016    Procedure: LEFT URETEROSCOPY bilateral retrograde pyleogram LEFT STENT PLACEMENT.;  Surgeon: Julius Darling MD;  Location: [facility];  Service:     CYSTOSCOPY W/ URETERAL STENT PLACEMENT Right 8/3/2018    Procedure: CYSTOSCOPY URETERAL CATHETER/STENT INSERTION;  Surgeon: Farzad Cordova MD;  Location: [facility] OR;  Service: Urology    ENDOSCOPY N/A 4/10/2019    Procedure: ESOPHAGOGASTRODUODENOSCOPY;  Surgeon: Bc Sanchez MD;  Location: [facility] OR;  Service: Gastroenterology    ENDOSCOPY N/A 2022    Procedure: ESOPHAGOGASTRODUODENOSCOPY WITH BIOPSY;  Surgeon: Bc Sanchez MD;  Location: [facility] OR;  Service: Gastroenterology;  Laterality: N/A;  gastritis, pulliam's    HYSTERECTOMY      TOTAL KNEE ARTHROPLASTY Right 2022    Procedure: TOTAL KNEE ARTHROPLASTY AND ALL ASSOCIATED PROCEDURES;  Surgeon: Rich  "Jovany RENTERIA MD;  Location: [facility] OR;  Service: Orthopedics;  Laterality: Right;    UPPER GASTROINTESTINAL ENDOSCOPY      URETEROSCOPY LASER LITHOTRIPSY WITH STENT INSERTION Left 10/26/2016    Procedure: LT URETEROSCOPY LASER LITHOTRIPSY ;  Surgeon: Julius Darling MD;  Location: [facility] OR;  Service:        Family History   Problem Relation Age of Onset    Anxiety disorder Mother     Bipolar disorder Mother     Stroke Mother 83    Thyroid disease Mother     Depression Mother     Heart attack Father 47    Thyroid disease Brother     Hypertension Brother     Diabetes Brother 68    Glaucoma Brother     Depression Brother     No Known Problems Daughter     No Known Problems Son     Leukemia Maternal Grandmother     Diabetes Maternal Grandfather     Heart attack Paternal Grandmother     Heart attack Paternal Grandfather     Breast cancer Neg Hx     Colon cancer Neg Hx     Colon polyps Neg Hx                Objective:  Physical Exam  General: No acute distress.  Eyes: conjunctiva clear; pupils equally round and reactive  ENT: external ears and nose atraumatic; oropharynx clear  CV: no peripheral edema  Resp: normal respiratory effort  Skin: no rashes or wounds; normal turgor  Psych: mood and affect appropriate; recent and remote memory intact    Vitals:    12/18/23 1431   Weight: 73 kg (161 lb)   Height: 165.1 cm (65\")         12/18/23  1431   Weight: 73 kg (161 lb)     Body mass index is 26.79 kg/m².      Left Knee Exam     Comments:  Left knee examined  Positive sensation light touch distributions left lower extremity  Knee range of motion 0 to 125 degrees  Stable to varus and valgus stress at 0 degrees and 30 degrees  1+ anterior Lachman  Negative anterior posterior drawer exam  Positive tenderness along the medial compartment, patellofemoral  Positive active patellar compression test  Positive crepitus throughout arc of motion  Moderate swelling, minimal effusion                 Assessment:        1. Primary " osteoarthritis of left knee           Plan:  1.  Discussed plan of care with patient.  We did discuss proceeding with repeat corticosteroid injection as this has provided her with significant symptom improvement in the past.  I do recommend location of ice injection site this evening may take 3 to 7 days before she begins to experience any significant symptom improvement.  Encouraged to call if symptoms return I will gladly see her back in clinic.  All questions answered.  Orders:  Orders Placed This Encounter   Procedures    Large Joint Arthrocentesis: L knee     No orders of the defined types were placed in this encounter.        Cole dictation utilized

## 2023-12-21 ENCOUNTER — OFFICE VISIT (OUTPATIENT)
Dept: GASTROENTEROLOGY | Facility: CLINIC | Age: 78
End: 2023-12-21
Payer: MEDICARE

## 2023-12-21 VITALS
BODY MASS INDEX: 26.79 KG/M2 | DIASTOLIC BLOOD PRESSURE: 80 MMHG | WEIGHT: 160.8 LBS | HEIGHT: 65 IN | SYSTOLIC BLOOD PRESSURE: 130 MMHG

## 2023-12-21 DIAGNOSIS — K75.81 NASH (NONALCOHOLIC STEATOHEPATITIS): Primary | ICD-10-CM

## 2023-12-21 DIAGNOSIS — Z86.010 PERSONAL HISTORY OF COLONIC POLYPS: ICD-10-CM

## 2023-12-21 DIAGNOSIS — K22.70 BARRETT'S ESOPHAGUS WITHOUT DYSPLASIA: ICD-10-CM

## 2023-12-21 PROCEDURE — 3079F DIAST BP 80-89 MM HG: CPT | Performed by: INTERNAL MEDICINE

## 2023-12-21 PROCEDURE — 99213 OFFICE O/P EST LOW 20 MIN: CPT | Performed by: INTERNAL MEDICINE

## 2023-12-21 PROCEDURE — 3075F SYST BP GE 130 - 139MM HG: CPT | Performed by: INTERNAL MEDICINE

## 2023-12-21 NOTE — PROGRESS NOTES
PATIENT INFORMATION  Germaine Gonzalez       - 1945    CHIEF COMPLAINT  Chief Complaint   Patient presents with    Mcgill's esophagus    Non-alcoholic steatohepatitis       HISTORY OF PRESENT ILLNESS  No recent medical issues    Is working out at [place] and increasing her weights to stabilize and prevent her other knee    Her weight is stable and her AST is insignificant ( 34)    Her Mcgill's and her Colon polyps are astable        REVIEWED PERTINENT RESULTS/ LABS  Lab Results   Component Value Date    CASEREPORT  2022     Surgical Pathology Report                         Case: [id number]                                  Authorizing Provider:  Bc Sanchez        Collected:           2022 05:12 PM                                 MD Rick                                                                   Ordering Location:     [facility]   Received:            2022 07:31 PM                                 OR                                                                           Pathologist:           Solitario Smith MD                                                          Specimens:   1) - Small Intestine, Duodenum, duodenal biopsy                                                     2) - Gastric, Antrum, gastric biopsy                                                                3) - Esophagus, Distal, distal esophagus                                                   FINALDX  2022     1. Duodenum, Biopsy:  Small bowel mucosa with             A. Normal intact villous surface.   B. No significant inflammation, no granulomas.   C: No viral inclusions or other organisms on routinely stained sections.    2. Stomach, Biopsy:  Oxyntic and antral type gastric mucosa with   A. Mild reactive/chemical gastropathy involving antral type mucosa.   B. No significant inflammation.   C. No metaplasia, dysplasia nor malignancy.   D. No H pylori-like organisms  identified.    3. Esophagus, Distal, Biopsy:  Squamoglandular mucosa and submucosa with   A. Mcgill's esophagus.   B. No dysplasia nor malignancy.   C. No significant eosinophilia.   D. No viral inclusions or fungal organisms identified.    [facility] [place]/clm         Lab Results   Component Value Date    HGB 13.5 12/08/2023    MCV 97.3 (H) 12/08/2023     12/08/2023    ALT 33 12/08/2023    AST 34 (H) 12/08/2023    HGBA1C 5.60 12/08/2023    INR 0.96 11/29/2022    TRIG 104 12/08/2023      No results found.    REVIEW OF SYSTEMS  Review of Systems   Constitutional:  Negative for activity change, chills, fever and unexpected weight change.   HENT:  Negative for congestion.    Eyes:  Negative for visual disturbance.   Respiratory:  Negative for shortness of breath.    Cardiovascular:  Negative for chest pain and palpitations.   Gastrointestinal:  Positive for diarrhea. Negative for abdominal pain and blood in stool.   Endocrine: Negative for cold intolerance and heat intolerance.   Genitourinary:  Negative for hematuria.   Musculoskeletal:  Negative for gait problem.   Skin:  Negative for color change.   Allergic/Immunologic: Negative for immunocompromised state.   Neurological:  Negative for weakness and light-headedness.   Hematological:  Negative for adenopathy.   Psychiatric/Behavioral:  Negative for sleep disturbance. The patient is not nervous/anxious.          ACTIVE PROBLEMS  Patient Active Problem List    Diagnosis     Esophageal dysphagia [R13.19]     Weight loss [R63.4]     S/P total knee arthroplasty [Z96.659]     Primary osteoarthritis of right knee [M17.11]     Knee instability, right [M25.361]     Osteopenia [M85.80]     Prediabetes [R73.03]     Essential hypertension [I10]     Effusion of left shoulder joint [M25.412]     Encounter for screening for malignant neoplasm of colon [Z12.11]     Personal history of colonic polyps [Z86.010]     Primary osteoarthritis of left knee [M17.12]     WALSH (nonalcoholic  steatohepatitis) [K75.81]     Sjogren's syndrome with keratoconjunctivitis sicca [M35.01]     Mcgill's esophagus without dysplasia [K22.70]     Hyperglycemia [R73.9]     Pyelonephritis [N12]     Gastroesophageal reflux disease [K21.9]     Hyperlipidemia [E78.5]     Pure hypercholesterolemia [E78.00]     Knee pain [M25.569]          PAST MEDICAL HISTORY  Past Medical History:   Diagnosis Date    Arthritis     knees    Mcgill esophagus     Cancer     cervical    Cervical cancer     Colon polyp     Eczema     Elevated liver enzymes     GERD (gastroesophageal reflux disease)     H/O stress fracture     Health care maintenance     Hypercholesteremia     Hypertension     Kidney stones     sched ureteroscopy, lithotripsy    Knee swelling     Ocular migraine     PONV (postoperative nausea and vomiting)     Postmenopausal     Seasonal allergies     Sjogren's syndrome     autoimmune disorder          SURGICAL HISTORY  Past Surgical History:   Procedure Laterality Date     SECTION      x2    COLONOSCOPY      COLONOSCOPY N/A 2021    Procedure: COLONOSCOPY;  Surgeon: Bc Sanchez MD;  [facility]: [facility] OR;  Service: Gastroenterology;  Laterality: N/A;  Diverticulosis and polyps    CYSTOSCOPY URETEROSCOPY STONE MANIPULATION/EXTRACTION Left 10/14/2016    Procedure: LEFT URETEROSCOPY bilateral retrograde pyleogram LEFT STENT PLACEMENT.;  Surgeon: Julius Darling MD;  Location: [facility];  Service:     CYSTOSCOPY W/ URETERAL STENT PLACEMENT Right 8/3/2018    Procedure: CYSTOSCOPY URETERAL CATHETER/STENT INSERTION;  Surgeon: Farzad Cordova MD;  Location: [facility] OR;  Service: Urology    ENDOSCOPY N/A 4/10/2019    Procedure: ESOPHAGOGASTRODUODENOSCOPY;  Surgeon: Bc Sanchez MD;  Location: [facility] OR;  Service: Gastroenterology    ENDOSCOPY N/A 2022    Procedure: ESOPHAGOGASTRODUODENOSCOPY WITH BIOPSY;  Surgeon: Bc Sanchez MD;  Location: [facility] OR;  Service:  Gastroenterology;  Laterality: N/A;  gastritis, pulliam's    HYSTERECTOMY      TOTAL KNEE ARTHROPLASTY Right 2022    Procedure: TOTAL KNEE ARTHROPLASTY AND ALL ASSOCIATED PROCEDURES;  Surgeon: Jovany Villanueva MD;  Location: [facility] OR;  Service: Orthopedics;  Laterality: Right;    UPPER GASTROINTESTINAL ENDOSCOPY      URETEROSCOPY LASER LITHOTRIPSY WITH STENT INSERTION Left 10/26/2016    Procedure: LT URETEROSCOPY LASER LITHOTRIPSY ;  Surgeon: Julius Darling MD;  Location: [facility] MAIN OR;  Service:          FAMILY HISTORY  Family History   Problem Relation Age of Onset    Anxiety disorder Mother     Bipolar disorder Mother     Stroke Mother 83    Thyroid disease Mother     Depression Mother     Heart attack Father 47    Thyroid disease Brother     Hypertension Brother     Diabetes Brother 68    Glaucoma Brother     Depression Brother     No Known Problems Daughter     No Known Problems Son     Leukemia Maternal Grandmother     Diabetes Maternal Grandfather     Heart attack Paternal Grandmother     Heart attack Paternal Grandfather     Breast cancer Neg Hx     Colon cancer Neg Hx     Colon polyps Neg Hx          SOCIAL HISTORY  Social History     Occupational History    Not on file   Tobacco Use    Smoking status: Former     Packs/day: 0.50     Years: 16.00     Additional pack years: 0.00     Total pack years: 8.00     Types: Cigarettes     Start date: 3/22/1971     Quit date: 1990     Years since quittin.7     Passive exposure: Never    Smokeless tobacco: Never   Vaping Use    Vaping Use: Never used   Substance and Sexual Activity    Alcohol use: Yes     Alcohol/week: 2.0 standard drinks of alcohol     Types: 2 Glasses of wine per week     Comment: 2 glasses of wine / daily    Drug use: No    Sexual activity: Defer         CURRENT MEDICATIONS    Current Outpatient Medications:     Biotin 5000 MCG tablet, Take  by mouth Daily., Disp: , Rfl:     calcium carbonate (OS-ULI) 600 MG tablet, Take 1  "tablet by mouth 2 (Two) Times a Day With Meals., Disp: , Rfl:     desoximetasone (TOPICORT) 0.05 % cream, APPLY CREAM TOPICALLY TO AFFECTED AREA TWICE DAILY FOR ECZEMA FOR UP TO 14 DAYS, Disp: , Rfl:     EPINEPHrine (EPIPEN) 0.3 MG/0.3ML solution auto-injector injection, , Disp: , Rfl:     escitalopram (LEXAPRO) 5 MG tablet, Take 1 tablet by mouth Every Morning., Disp: 90 tablet, Rfl: 3    Estrogens Conjugated (Premarin) 0.625 MG/GM vaginal cream, Insert 0.5 applicatorsful every day by vaginal route., Disp: , Rfl:     lisinopril (PRINIVIL,ZESTRIL) 10 MG tablet, Take 1 tablet by mouth Daily., Disp: 90 tablet, Rfl: 3    omeprazole (priLOSEC) 40 MG capsule, Take 1 capsule by mouth 2 (Two) Times a Day., Disp: 180 capsule, Rfl: 3    rosuvastatin (CRESTOR) 20 MG tablet, Take 1 tablet by mouth Daily., Disp: 90 tablet, Rfl: 3    vitamin B-12 (CYANOCOBALAMIN) 1000 MCG tablet, Take 5 tablets by mouth Daily., Disp: , Rfl:     ALLERGIES  Levaquin [levofloxacin] and Tetracycline    VITALS  Vitals:    12/21/23 0949   BP: 130/80   BP Location: Left arm   Patient Position: Sitting   Cuff Size: Adult   Weight: 72.9 kg (160 lb 12.8 oz)   Height: 165.1 cm (65\")       PHYSICAL EXAM  Debilities/Disabilities Identified: None  Emotional Behavior: Appropriate  Wt Readings from Last 3 Encounters:   12/21/23 72.9 kg (160 lb 12.8 oz)   12/18/23 73 kg (161 lb)   12/13/23 73.2 kg (161 lb 6.4 oz)     Ht Readings from Last 1 Encounters:   12/21/23 165.1 cm (65\")     Body mass index is 26.76 kg/m².  Physical Exam  Constitutional:       Appearance: She is well-developed. She is not diaphoretic.   HENT:      Head: Normocephalic and atraumatic.   Eyes:      General: No scleral icterus.     Conjunctiva/sclera: Conjunctivae normal.      Pupils: Pupils are equal, round, and reactive to light.   Neck:      Thyroid: No thyromegaly.   Cardiovascular:      Rate and Rhythm: Normal rate and regular rhythm.      Heart sounds: Normal heart sounds. No murmur " heard.     No gallop.   Pulmonary:      Effort: Pulmonary effort is normal.      Breath sounds: Normal breath sounds. No wheezing or rales.   Abdominal:      General: Bowel sounds are normal. There is no distension or abdominal bruit.      Palpations: Abdomen is soft. There is no shifting dullness, fluid wave or mass.      Tenderness: There is no abdominal tenderness. There is no guarding. Negative signs include García's sign.      Hernia: There is no hernia in the ventral area.   Musculoskeletal:         General: Normal range of motion.      Cervical back: Normal range of motion and neck supple.   Lymphadenopathy:      Cervical: No cervical adenopathy.   Skin:     General: Skin is warm and dry.      Findings: No erythema or rash.   Neurological:      Mental Status: She is alert and oriented to person, place, and time.   Psychiatric:         Mood and Affect: Mood normal.         Behavior: Behavior normal.         CLINICAL DATA REVIEWED   reviewed previous lab results and integrated with today's visit, reviewed notes from other physicians and/or last GI encounter, reviewed previous endoscopy results and available photos, reviewed surgical pathology results from previous biopsies    ASSESSMENT  Diagnoses and all orders for this visit:    WALSH (nonalcoholic steatohepatitis)    Mcgill's esophagus without dysplasia    Personal history of colonic polyps          PLAN  Recall for both in 4/2027  Return in about 6 months (around 6/21/2024).    I have discussed the above plan with the patient.  They verbalize understanding and are in agreement with the plan.  They have been advised to contact the office for any questions, concerns, or changes related to their health.

## 2023-12-26 DIAGNOSIS — I10 ESSENTIAL HYPERTENSION: ICD-10-CM

## 2023-12-26 DIAGNOSIS — R73.03 PREDIABETES: ICD-10-CM

## 2023-12-26 DIAGNOSIS — E78.2 MIXED HYPERLIPIDEMIA: ICD-10-CM

## 2024-01-01 DIAGNOSIS — K21.00 GASTROESOPHAGEAL REFLUX DISEASE WITH ESOPHAGITIS, UNSPECIFIED WHETHER HEMORRHAGE: ICD-10-CM

## 2024-01-02 RX ORDER — OMEPRAZOLE 40 MG/1
40 CAPSULE, DELAYED RELEASE ORAL 2 TIMES DAILY
Qty: 180 CAPSULE | Refills: 0 | Status: SHIPPED | OUTPATIENT
Start: 2024-01-02

## 2024-01-02 NOTE — TELEPHONE ENCOUNTER
Rx Refill Note  Requested Prescriptions     Pending Prescriptions Disp Refills    omeprazole (priLOSEC) 40 MG capsule [Pharmacy Med Name: Omeprazole 40 MG Oral Capsule Delayed Release] 180 capsule 0     Sig: Take 1 capsule by mouth twice daily      Last office visit with prescribing clinician: 12/13/2023   Last telemedicine visit with prescribing clinician: Visit date not found   Next office visit with prescribing clinician: 6/14/2024                         Would you like a call back once the refill request has been completed: [] Yes [] No    If the office needs to give you a call back, can they leave a voicemail: [] Yes [] No    Andrew Oneill, PCT  01/02/24, 15:27 EST

## 2024-02-12 ENCOUNTER — OFFICE VISIT (OUTPATIENT)
Dept: ORTHOPEDIC SURGERY | Facility: CLINIC | Age: 79
End: 2024-02-12
Payer: MEDICARE

## 2024-02-12 VITALS — BODY MASS INDEX: 26.86 KG/M2 | WEIGHT: 161.2 LBS | HEIGHT: 65 IN

## 2024-02-12 DIAGNOSIS — Z96.651 STATUS POST TOTAL RIGHT KNEE REPLACEMENT: Primary | ICD-10-CM

## 2024-02-12 PROCEDURE — 1159F MED LIST DOCD IN RCRD: CPT | Performed by: ORTHOPAEDIC SURGERY

## 2024-02-12 PROCEDURE — 1160F RVW MEDS BY RX/DR IN RCRD: CPT | Performed by: ORTHOPAEDIC SURGERY

## 2024-02-12 PROCEDURE — 99212 OFFICE O/P EST SF 10 MIN: CPT | Performed by: ORTHOPAEDIC SURGERY

## 2024-02-12 PROCEDURE — 73562 X-RAY EXAM OF KNEE 3: CPT | Performed by: ORTHOPAEDIC SURGERY

## 2024-03-10 DIAGNOSIS — K21.00 GASTROESOPHAGEAL REFLUX DISEASE WITH ESOPHAGITIS, UNSPECIFIED WHETHER HEMORRHAGE: ICD-10-CM

## 2024-03-14 RX ORDER — OMEPRAZOLE 40 MG/1
40 CAPSULE, DELAYED RELEASE ORAL 2 TIMES DAILY
Qty: 180 CAPSULE | Refills: 0 | Status: SHIPPED | OUTPATIENT
Start: 2024-03-14

## 2024-03-14 NOTE — TELEPHONE ENCOUNTER
Rx Refill Note  Requested Prescriptions     Pending Prescriptions Disp Refills    omeprazole (priLOSEC) 40 MG capsule [Pharmacy Med Name: Omeprazole 40 MG Oral Capsule Delayed Release] 180 capsule 0     Sig: Take 1 capsule by mouth twice daily      Last office visit with prescribing clinician: 12/13/2023   Last telemedicine visit with prescribing clinician: Visit date not found   Next office visit with prescribing clinician: 6/14/2024                         Would you like a call back once the refill request has been completed: [] Yes [] No    If the office needs to give you a call back, can they leave a voicemail: [] Yes [] No    Samantha Batres, KETAN  03/14/24, 09:40 EDT

## 2024-05-29 ENCOUNTER — TRANSCRIBE ORDERS (OUTPATIENT)
Dept: ADMINISTRATIVE | Facility: HOSPITAL | Age: 79
End: 2024-05-29
Payer: MEDICARE

## 2024-05-29 DIAGNOSIS — Z12.31 SCREENING MAMMOGRAM FOR BREAST CANCER: Primary | ICD-10-CM

## 2024-06-07 LAB
ALBUMIN SERPL-MCNC: 4.5 G/DL (ref 3.5–5.2)
ALBUMIN/GLOB SERPL: 2 G/DL
ALP SERPL-CCNC: 79 U/L (ref 39–117)
ALT SERPL-CCNC: 24 U/L (ref 1–33)
AST SERPL-CCNC: 28 U/L (ref 1–32)
BASOPHILS # BLD AUTO: 0.02 10*3/MM3 (ref 0–0.2)
BASOPHILS NFR BLD AUTO: 0.5 % (ref 0–1.5)
BILIRUB SERPL-MCNC: 0.7 MG/DL (ref 0–1.2)
BUN SERPL-MCNC: 13 MG/DL (ref 8–23)
BUN/CREAT SERPL: 13.3 (ref 7–25)
CALCIUM SERPL-MCNC: 9.6 MG/DL (ref 8.6–10.5)
CHLORIDE SERPL-SCNC: 104 MMOL/L (ref 98–107)
CHOLEST SERPL-MCNC: 205 MG/DL (ref 0–200)
CO2 SERPL-SCNC: 24.2 MMOL/L (ref 22–29)
CREAT SERPL-MCNC: 0.98 MG/DL (ref 0.57–1)
EGFRCR SERPLBLD CKD-EPI 2021: 59.6 ML/MIN/1.73
EOSINOPHIL # BLD AUTO: 0.07 10*3/MM3 (ref 0–0.4)
EOSINOPHIL NFR BLD AUTO: 1.6 % (ref 0.3–6.2)
ERYTHROCYTE [DISTWIDTH] IN BLOOD BY AUTOMATED COUNT: 11.8 % (ref 12.3–15.4)
GLOBULIN SER CALC-MCNC: 2.3 GM/DL
GLUCOSE SERPL-MCNC: 95 MG/DL (ref 65–99)
HBA1C MFR BLD: 5.5 % (ref 4.8–5.6)
HCT VFR BLD AUTO: 40.9 % (ref 34–46.6)
HDLC SERPL-MCNC: 65 MG/DL (ref 40–60)
HGB BLD-MCNC: 14 G/DL (ref 12–15.9)
IMM GRANULOCYTES # BLD AUTO: 0.01 10*3/MM3 (ref 0–0.05)
IMM GRANULOCYTES NFR BLD AUTO: 0.2 % (ref 0–0.5)
LDLC SERPL CALC-MCNC: 117 MG/DL (ref 0–100)
LDLC/HDLC SERPL: 1.74 {RATIO}
LYMPHOCYTES # BLD AUTO: 0.77 10*3/MM3 (ref 0.7–3.1)
LYMPHOCYTES NFR BLD AUTO: 17.8 % (ref 19.6–45.3)
MCH RBC QN AUTO: 33.9 PG (ref 26.6–33)
MCHC RBC AUTO-ENTMCNC: 34.2 G/DL (ref 31.5–35.7)
MCV RBC AUTO: 99 FL (ref 79–97)
MONOCYTES # BLD AUTO: 0.56 10*3/MM3 (ref 0.1–0.9)
MONOCYTES NFR BLD AUTO: 13 % (ref 5–12)
NEUTROPHILS # BLD AUTO: 2.89 10*3/MM3 (ref 1.7–7)
NEUTROPHILS NFR BLD AUTO: 66.9 % (ref 42.7–76)
NRBC BLD AUTO-RTO: 0 /100 WBC (ref 0–0.2)
PLATELET # BLD AUTO: 199 10*3/MM3 (ref 140–450)
POTASSIUM SERPL-SCNC: 5 MMOL/L (ref 3.5–5.2)
PROT SERPL-MCNC: 6.8 G/DL (ref 6–8.5)
RBC # BLD AUTO: 4.13 10*6/MM3 (ref 3.77–5.28)
SODIUM SERPL-SCNC: 141 MMOL/L (ref 136–145)
T4 FREE SERPL-MCNC: 0.96 NG/DL (ref 0.92–1.68)
TRIGL SERPL-MCNC: 134 MG/DL (ref 0–150)
TSH SERPL DL<=0.005 MIU/L-ACNC: 1.88 UIU/ML (ref 0.27–4.2)
VLDLC SERPL CALC-MCNC: 23 MG/DL (ref 5–40)
WBC # BLD AUTO: 4.32 10*3/MM3 (ref 3.4–10.8)

## 2024-06-10 DIAGNOSIS — K21.00 GASTROESOPHAGEAL REFLUX DISEASE WITH ESOPHAGITIS, UNSPECIFIED WHETHER HEMORRHAGE: ICD-10-CM

## 2024-06-10 RX ORDER — OMEPRAZOLE 40 MG/1
40 CAPSULE, DELAYED RELEASE ORAL 2 TIMES DAILY
Qty: 180 CAPSULE | Refills: 0 | Status: SHIPPED | OUTPATIENT
Start: 2024-06-10

## 2024-06-10 NOTE — TELEPHONE ENCOUNTER
Rx Refill Note  Requested Prescriptions     Pending Prescriptions Disp Refills    omeprazole (priLOSEC) 40 MG capsule [Pharmacy Med Name: Omeprazole 40 MG Oral Capsule Delayed Release] 180 capsule 0     Sig: Take 1 capsule by mouth twice daily      Last office visit with prescribing clinician: 12/13/2023   Last telemedicine visit with prescribing clinician: Visit date not found   Next office visit with prescribing clinician: 6/14/2024                         Would you like a call back once the refill request has been completed: [] Yes [] No    If the office needs to give you a call back, can they leave a voicemail: [] Yes [] No    Samantha Batres, KETAN  06/10/24, 14:32 EDT

## 2024-06-14 ENCOUNTER — OFFICE VISIT (OUTPATIENT)
Dept: INTERNAL MEDICINE | Facility: CLINIC | Age: 79
End: 2024-06-14
Payer: MEDICARE

## 2024-06-14 VITALS
WEIGHT: 160.2 LBS | OXYGEN SATURATION: 99 % | TEMPERATURE: 97.8 F | HEART RATE: 76 BPM | DIASTOLIC BLOOD PRESSURE: 76 MMHG | BODY MASS INDEX: 26.69 KG/M2 | SYSTOLIC BLOOD PRESSURE: 130 MMHG | HEIGHT: 65 IN

## 2024-06-14 DIAGNOSIS — E78.2 MIXED HYPERLIPIDEMIA: ICD-10-CM

## 2024-06-14 DIAGNOSIS — I10 ESSENTIAL HYPERTENSION: Primary | ICD-10-CM

## 2024-06-14 DIAGNOSIS — F41.9 ANXIETY: ICD-10-CM

## 2024-06-14 DIAGNOSIS — Z78.0 POST-MENOPAUSAL: ICD-10-CM

## 2024-06-14 DIAGNOSIS — K21.00 GASTROESOPHAGEAL REFLUX DISEASE WITH ESOPHAGITIS, UNSPECIFIED WHETHER HEMORRHAGE: ICD-10-CM

## 2024-06-14 PROCEDURE — 3078F DIAST BP <80 MM HG: CPT | Performed by: INTERNAL MEDICINE

## 2024-06-14 PROCEDURE — 1126F AMNT PAIN NOTED NONE PRSNT: CPT | Performed by: INTERNAL MEDICINE

## 2024-06-14 PROCEDURE — 99214 OFFICE O/P EST MOD 30 MIN: CPT | Performed by: INTERNAL MEDICINE

## 2024-06-14 PROCEDURE — 3075F SYST BP GE 130 - 139MM HG: CPT | Performed by: INTERNAL MEDICINE

## 2024-06-14 PROCEDURE — G2211 COMPLEX E/M VISIT ADD ON: HCPCS | Performed by: INTERNAL MEDICINE

## 2024-06-14 RX ORDER — ESTRADIOL 0.1 MG/G
CREAM VAGINAL
COMMUNITY
Start: 2024-05-26

## 2024-06-14 NOTE — PROGRESS NOTES
"Chief Complaint  Hypertension; Hyperlipidemia; Prediabetes; Gastroesophageal reflux disease with esophagitis, unspecifi; and Anxiety    Subjective        Germaine Gonzalez presents to [facility]  Hypertension  Associated symptoms include anxiety.   Hyperlipidemia    Anxiety    HTN - currently on Lisinopril 10 mg daily. She does not check her BP at home. No chest pain, dizziness, headaches, or lightheadedness.    HLD - currently on Rosuvastatin 20 mg daily. No issues with muscle cramps or pain.    GERD - no issues with reflux. Currently on Prilosec.    Anxiety - Taking lexapro 5 mg daily. Anxiety symptoms seem to be well controlled on this.    She asked about a memory supplement. States she is sometimes forgetful of words in conversation occasionally. No issues with forgetting directions or names of familiar people. She stays active by going to [employer] every other day.    Objective   Vital Signs:  /76 (BP Location: Left arm, Patient Position: Sitting, Cuff Size: Adult)   Pulse 76   Temp 97.8 °F (36.6 °C) (Infrared)   Ht 165.1 cm (65\")   Wt 72.7 kg (160 lb 3.2 oz)   SpO2 99%   BMI 26.66 kg/m²   Estimated body mass index is 26.66 kg/m² as calculated from the following:    Height as of this encounter: 165.1 cm (65\").    Weight as of this encounter: 72.7 kg (160 lb 3.2 oz).       BMI is >= 25 and <30. (Overweight) The following options were offered after discussion;: exercise counseling/recommendations and nutrition counseling/recommendations      Physical Exam  Vitals and nursing note reviewed.   Constitutional:       General: She is not in acute distress.     Appearance: Normal appearance. She is normal weight.   HENT:      Head: Normocephalic and atraumatic.      Right Ear: Tympanic membrane, ear canal and external ear normal.      Left Ear: Tympanic membrane, ear canal and external ear normal.      Nose: Nose normal.      Mouth/Throat:      Mouth: Mucous membranes are " moist.      Pharynx: No oropharyngeal exudate.   Eyes:      Pupils: Pupils are equal, round, and reactive to light.   Cardiovascular:      Rate and Rhythm: Normal rate and regular rhythm.      Pulses: Normal pulses.      Heart sounds: Normal heart sounds. No murmur heard.     No friction rub. No gallop.   Pulmonary:      Effort: Pulmonary effort is normal. No respiratory distress.      Breath sounds: Normal breath sounds. No wheezing or rales.   Abdominal:      General: Abdomen is flat. Bowel sounds are normal. There is no distension.      Palpations: Abdomen is soft.      Tenderness: There is no abdominal tenderness.   Musculoskeletal:         General: Normal range of motion.      Cervical back: Normal range of motion.   Lymphadenopathy:      Cervical: No cervical adenopathy.   Skin:     General: Skin is warm.      Capillary Refill: Capillary refill takes less than 2 seconds.   Neurological:      General: No focal deficit present.      Mental Status: She is alert and oriented to person, place, and time. Mental status is at baseline.   Psychiatric:         Mood and Affect: Mood normal.         Behavior: Behavior normal.         Thought Content: Thought content normal.         Judgment: Judgment normal.        Result Review :    The following data was reviewed by: Minesh Tellez, Medical Student on 06/14/2024:  Common labs          9/21/2023    10:07 12/8/2023    09:41 6/7/2024    09:01   Common Labs   Glucose 97  101  95    BUN 15  16  13    Creatinine 0.86  0.83  0.98    Sodium 138  140  141    Potassium 4.2  4.5  5.0    Chloride 103  104  104    Calcium 9.5  9.8  9.6    Total Protein  6.9  6.8    Albumin  4.7  4.5    Total Bilirubin  0.8  0.7    Alkaline Phosphatase  76  79    AST (SGOT)  34  28    ALT (SGPT)  33  24    WBC  4.36  4.32    Hemoglobin  13.5  14.0    Hematocrit  39.7  40.9    Platelets  183  199    Total Cholesterol  222  205    Triglycerides  104  134    HDL Cholesterol  68  65    LDL Cholesterol   136   117    Hemoglobin A1C  5.60  5.50                   Assessment and Plan     Diagnoses and all orders for this visit:    1. Essential hypertension (Primary)    2. Mixed hyperlipidemia    3. Anxiety    4. Gastroesophageal reflux disease with esophagitis, unspecified whether hemorrhage    1 - HTN: well controlled on Lisinopril. Continue same    2 - HLD: Total cholesterol and LDL high. Discussed risks/benefits of increasing statin vs staying on same dose and patient would like to stay on the same dose for now.    3 - Anxiety - stable on current dose. Continue same.    4 - GERD - stable on current dose of prilosec. Continue.    Discussed risks/benefits of obtaining DEXA scan. Last DEXA in 2021 and was in osteopenia range. Patient states she gets good exercise and would like to defer for now.         Follow Up     Return in about 6 months (around 12/14/2024) for Medicare Wellness.  Patient was given instructions and counseling regarding her condition or for health maintenance advice. Please see specific information pulled into the AVS if appropriate.     Minesh Tellez MD  Internal Medicine/Pediatrics, PGY-3    I have seen and examined the patient independently.  I have reviewed the resident's findings as noted above and added to the note where appropriate.  Agree with above.    Roberta Boswell MD  Attending Physician  Internal Medicine and Pediatrics

## 2024-06-17 ENCOUNTER — OFFICE VISIT (OUTPATIENT)
Dept: INTERNAL MEDICINE | Facility: CLINIC | Age: 79
End: 2024-06-17
Payer: MEDICARE

## 2024-06-17 ENCOUNTER — HOSPITAL ENCOUNTER (OUTPATIENT)
Dept: CT IMAGING | Facility: HOSPITAL | Age: 79
Discharge: HOME | End: 2024-06-17
Admitting: INTERNAL MEDICINE
Payer: MEDICARE

## 2024-06-17 VITALS
HEIGHT: 65 IN | TEMPERATURE: 98.4 F | HEART RATE: 94 BPM | OXYGEN SATURATION: 98 % | SYSTOLIC BLOOD PRESSURE: 130 MMHG | DIASTOLIC BLOOD PRESSURE: 76 MMHG | WEIGHT: 160.6 LBS | BODY MASS INDEX: 26.76 KG/M2

## 2024-06-17 DIAGNOSIS — M35.00 SJOGREN'S SYNDROME WITHOUT EXTRAGLANDULAR INVOLVEMENT: Primary | ICD-10-CM

## 2024-06-17 DIAGNOSIS — K11.21 PAROTITIS, ACUTE: ICD-10-CM

## 2024-06-17 DIAGNOSIS — R22.1 NECK MASS: ICD-10-CM

## 2024-06-17 PROCEDURE — 3078F DIAST BP <80 MM HG: CPT | Performed by: INTERNAL MEDICINE

## 2024-06-17 PROCEDURE — 25510000001 IOPAMIDOL 61 % SOLUTION: Performed by: INTERNAL MEDICINE

## 2024-06-17 PROCEDURE — G2212 PROLONG OUTPT/OFFICE VIS: HCPCS | Performed by: INTERNAL MEDICINE

## 2024-06-17 PROCEDURE — 99215 OFFICE O/P EST HI 40 MIN: CPT | Performed by: INTERNAL MEDICINE

## 2024-06-17 PROCEDURE — 3075F SYST BP GE 130 - 139MM HG: CPT | Performed by: INTERNAL MEDICINE

## 2024-06-17 PROCEDURE — 70491 CT SOFT TISSUE NECK W/DYE: CPT

## 2024-06-17 PROCEDURE — 1126F AMNT PAIN NOTED NONE PRSNT: CPT | Performed by: INTERNAL MEDICINE

## 2024-06-17 RX ADMIN — IOPAMIDOL 100 ML: 612 INJECTION, SOLUTION INTRAVENOUS at 13:28

## 2024-06-17 NOTE — PROGRESS NOTES
Germaine Gonzalez is a 79 y.o. female, who presents with a chief complaint of   Chief Complaint   Patient presents with    Facial Swelling     Left side of face/neck started swelling yesterday. Painful and itching. Some redness.            HPI   Pt here bc of acute swelling in her right parotid area. Sx began yesterday.  She does have a tooth on the left side that had a crown that came off over a year ago.  No fever.  The area is swollen and tender.  She can swallow fine.  Pt says the other side is now a little as well.  Pt does have a hx sjogrens.        The following portions of the patient's history were reviewed and updated as appropriate: allergies, current medications, past family history, past medical history, past social history, past surgical history and problem list.    Allergies: Levaquin [levofloxacin] and Tetracycline    Review of Systems   Constitutional: Negative.    HENT: Negative.     Eyes: Negative.    Respiratory: Negative.     Cardiovascular: Negative.    Gastrointestinal: Negative.    Endocrine: Negative.    Genitourinary: Negative.    Musculoskeletal: Negative.    Skin: Negative.    Allergic/Immunologic: Negative.    Neurological: Negative.    Hematological: Negative.    Psychiatric/Behavioral: Negative.     All other systems reviewed and are negative.            Wt Readings from Last 3 Encounters:   06/17/24 72.8 kg (160 lb 9.6 oz)   06/14/24 72.7 kg (160 lb 3.2 oz)   02/12/24 73.1 kg (161 lb 3.2 oz)     Temp Readings from Last 3 Encounters:   06/17/24 98.4 °F (36.9 °C) (Infrared)   06/14/24 97.8 °F (36.6 °C) (Infrared)   12/13/23 97.5 °F (36.4 °C) (Infrared)     BP Readings from Last 3 Encounters:   06/17/24 130/76   06/14/24 130/76   12/21/23 130/80     Pulse Readings from Last 3 Encounters:   06/17/24 94   06/14/24 76   12/13/23 76     Body mass index is 26.73 kg/m².  SpO2 Readings from Last 3 Encounters:   06/17/24 98%   06/14/24 99%   12/13/23 98%          Physical Exam  Vitals and  nursing note reviewed.   Constitutional:       General: She is not in acute distress.     Appearance: She is well-developed.   HENT:      Head: Normocephalic and atraumatic.      Right Ear: External ear normal.      Left Ear: External ear normal.      Nose: Nose normal.   Eyes:      Conjunctiva/sclera: Conjunctivae normal.      Pupils: Pupils are equal, round, and reactive to light.   Neck:        Comments: Large palpable mass with increased warmth and surrounding erethema below.    Cardiovascular:      Rate and Rhythm: Normal rate and regular rhythm.      Heart sounds: Normal heart sounds.   Pulmonary:      Effort: Pulmonary effort is normal. No respiratory distress.      Breath sounds: Normal breath sounds. No wheezing.   Musculoskeletal:         General: Normal range of motion.      Cervical back: Normal range of motion and neck supple.      Comments: Normal gait   Skin:     General: Skin is warm and dry.   Neurological:      Mental Status: She is alert and oriented to person, place, and time.   Psychiatric:         Behavior: Behavior normal.         Thought Content: Thought content normal.         Judgment: Judgment normal.       Results for orders placed or performed in visit on 12/26/23   TSH    Specimen: Blood   Result Value Ref Range    TSH 1.880 0.270 - 4.200 uIU/mL   T4, Free    Specimen: Blood   Result Value Ref Range    Free T4 0.96 0.92 - 1.68 ng/dL   Lipid Panel With LDL / HDL Ratio    Specimen: Blood   Result Value Ref Range    Total Cholesterol 205 (H) 0 - 200 mg/dL    Triglycerides 134 0 - 150 mg/dL    HDL Cholesterol 65 (H) 40 - 60 mg/dL    VLDL Cholesterol Phil 23 5 - 40 mg/dL    LDL Chol Calc (NIH) 117 (H) 0 - 100 mg/dL    LDL/HDL RATIO 1.74    Hemoglobin A1c    Specimen: Blood   Result Value Ref Range    Hemoglobin A1C 5.50 4.80 - 5.60 %   Comprehensive Metabolic Panel    Specimen: Blood   Result Value Ref Range    Glucose 95 65 - 99 mg/dL    BUN 13 8 - 23 mg/dL    Creatinine 0.98 0.57 - 1.00  mg/dL    EGFR Result 59.6 (L) >60.0 mL/min/1.73    BUN/Creatinine Ratio 13.3 7.0 - 25.0    Sodium 141 136 - 145 mmol/L    Potassium 5.0 3.5 - 5.2 mmol/L    Chloride 104 98 - 107 mmol/L    Total CO2 24.2 22.0 - 29.0 mmol/L    Calcium 9.6 8.6 - 10.5 mg/dL    Total Protein 6.8 6.0 - 8.5 g/dL    Albumin 4.5 3.5 - 5.2 g/dL    Globulin 2.3 gm/dL    A/G Ratio 2.0 g/dL    Total Bilirubin 0.7 0.0 - 1.2 mg/dL    Alkaline Phosphatase 79 39 - 117 U/L    AST (SGOT) 28 1 - 32 U/L    ALT (SGPT) 24 1 - 33 U/L   CBC & Differential    Specimen: Blood   Result Value Ref Range    WBC 4.32 3.40 - 10.80 10*3/mm3    RBC 4.13 3.77 - 5.28 10*6/mm3    Hemoglobin 14.0 12.0 - 15.9 g/dL    Hematocrit 40.9 34.0 - 46.6 %    MCV 99.0 (H) 79.0 - 97.0 fL    MCH 33.9 (H) 26.6 - 33.0 pg    MCHC 34.2 31.5 - 35.7 g/dL    RDW 11.8 (L) 12.3 - 15.4 %    Platelets 199 140 - 450 10*3/mm3    Neutrophil Rel % 66.9 42.7 - 76.0 %    Lymphocyte Rel % 17.8 (L) 19.6 - 45.3 %    Monocyte Rel % 13.0 (H) 5.0 - 12.0 %    Eosinophil Rel % 1.6 0.3 - 6.2 %    Basophil Rel % 0.5 0.0 - 1.5 %    Neutrophils Absolute 2.89 1.70 - 7.00 10*3/mm3    Lymphocytes Absolute 0.77 0.70 - 3.10 10*3/mm3    Monocytes Absolute 0.56 0.10 - 0.90 10*3/mm3    Eosinophils Absolute 0.07 0.00 - 0.40 10*3/mm3    Basophils Absolute 0.02 0.00 - 0.20 10*3/mm3    Immature Granulocyte Rel % 0.2 0.0 - 0.5 %    Immature Grans Absolute 0.01 0.00 - 0.05 10*3/mm3    nRBC 0.0 0.0 - 0.2 /100 WBC     Result Review :              CT SOFT TISSUE NECK W CONTRAST     Date of Exam: 6/17/2024 1:37 PM EDT     Indication: acute swelling of face, hx sjogrens, differeintial parotitis, abscess.     Comparison: CT head 11/28/2022, CT angiogram of the head carotid 11/29/2022.     Technique: Axial CT images were obtained of the neck after the uneventful intravenous administration of iodinated contrast.  Sagittal and coronal reconstructions were performed.  Automated exposure control and iterative reconstruction methods were  used.        FINDINGS:  There is prominence and chronic change associated with the parotid glands bilaterally likely related to the patient's history of Sjogren syndrome. Similar findings are noted on the prior CTs where there was partial inclusion of the parotid glands within   the field-of-view for the imaging.     There is evidence for superimposed edema/inflammation involving the left parotid gland with increased heterogeneous enhancement. The findings suggest inflammatory changes. There is also edema and inflammation associated with the left submandibular gland.   Edema is seen within the overlying soft tissues as well in these regions. These findings indicate inflammation and may suggest changes of acute to subacute exacerbation of the patient's Sjogren syndrome.      There is mild edema associated with the right submandibular gland as well with mild edema in the adjacent soft tissues. The findings suggest developing involvement of the right submandibular gland as well.     No associated abnormal fluid collections are observed. There is no evidence for sialolith. No significant lymphadenopathy is seen throughout the neck soft tissues bilaterally.     There are no definitive inflammatory changes involving the right parotid gland. The soft tissues of the nasopharynx, oropharynx, and larynx are symmetric and unremarkable. The epiglottis is within normal limits. The airway is patent. No significant   lymphadenopathy is seen throughout the neck soft tissues bilaterally. The thyroid gland is unremarkable. Moderate atherosclerosis is noted. No acute osseous abnormalities are seen. The paranasal sinuses and mastoid air cells remain clear. The lung apices   are clear.     IMPRESSION:  1.Evidence for acute to subacute inflammation associated with the left parotid gland and left submandibular gland. There is also evidence for mild or early signs of involvement involving the right submandibular gland. The findings suggest  exacerbation of   the patient's known Sjogren syndrome. Superimposed infection is not entirely excluded. There is no evidence for abnormal fluid collection or abscess. No sialolith is identified.           Electronically Signed: David Trinh MD    6/17/2024 1:57 PM EDT    Workstation ID: [id number]      Assessment and Plan    Diagnoses and all orders for this visit:    1. Sjogren's syndrome without extraglandular involvement (Primary)  -     CT soft tissue neck w contrast    2. Parotitis, acute  -     CT soft tissue neck w contrast    3. Neck mass  -     CT soft tissue neck w contrast       Case discussed with radiology to make sure CT neck is sufficient to get facial swelling and Dr. Doron Samaniego with ENT.  Pt needs admission and IV abx.   Dr. Samaniego does not see pts at [facility].  I also called the [facility] hospitalist on call at [facility].  They do not have consistent inpatient ENT coverage.  Discussed with Steve the NP on call at [facility] which does have full inpatient ENT coverage.  He will accept patient for admission.  I called the access center and helped arrange direct admission for the patient.    BMI is >= 25 and <30. (Overweight) The following options were offered after discussion;: exercise counseling/recommendations and nutrition counseling/recommendations       I spent 80 minutes caring for Germaine on this date of service. This time includes time spent by me in the following activities:preparing for the visit, reviewing tests, obtaining and/or reviewing a separately obtained history, performing a medically appropriate examination and/or evaluation , counseling and educating the patient/family/caregiver, ordering medications, tests, or procedures, referring and communicating with other health care professionals , documenting information in the medical record, independently interpreting results and communicating that information with the patient/family/caregiver, care coordination, and calls to multiple  facilities and physicians as above.       Outpatient Medications Prior to Visit   Medication Sig Dispense Refill    Biotin 5000 MCG tablet Take  by mouth Daily.      calcium carbonate (OS-ULI) 600 MG tablet Take 1 tablet by mouth 2 (Two) Times a Day With Meals.      desoximetasone (TOPICORT) 0.05 % cream APPLY CREAM TOPICALLY TO AFFECTED AREA TWICE DAILY FOR ECZEMA FOR UP TO 14 DAYS      EPINEPHrine (EPIPEN) 0.3 MG/0.3ML solution auto-injector injection       escitalopram (LEXAPRO) 5 MG tablet Take 1 tablet by mouth Every Morning. 90 tablet 3    estradiol (ESTRACE) 0.1 MG/GM vaginal cream APPLY ONE INCH RIBBON OF CREAM INTRAVAGINALLY THREE TIMES PER WEEK AT BEDTIME      lisinopril (PRINIVIL,ZESTRIL) 10 MG tablet Take 1 tablet by mouth Daily. 90 tablet 3    omeprazole (priLOSEC) 40 MG capsule Take 1 capsule by mouth twice daily 180 capsule 0    rosuvastatin (CRESTOR) 20 MG tablet Take 1 tablet by mouth Daily. 90 tablet 3    vitamin B-12 (CYANOCOBALAMIN) 1000 MCG tablet Take 5 tablets by mouth Daily.       No facility-administered medications prior to visit.     No orders of the defined types were placed in this encounter.    [unfilled]  There are no discontinued medications.      No follow-ups on file.    Patient was given instructions and counseling regarding her condition or for health maintenance advice. Please see specific information pulled into the AVS if appropriate.

## 2024-06-21 DIAGNOSIS — I10 ESSENTIAL HYPERTENSION: ICD-10-CM

## 2024-06-21 RX ORDER — LISINOPRIL 10 MG/1
10 TABLET ORAL DAILY
Qty: 90 TABLET | Refills: 0 | Status: SHIPPED | OUTPATIENT
Start: 2024-06-21

## 2024-06-27 ENCOUNTER — OFFICE VISIT (OUTPATIENT)
Dept: GASTROENTEROLOGY | Facility: CLINIC | Age: 79
End: 2024-06-27
Payer: MEDICARE

## 2024-06-27 VITALS
SYSTOLIC BLOOD PRESSURE: 130 MMHG | BODY MASS INDEX: 26.96 KG/M2 | WEIGHT: 161.8 LBS | DIASTOLIC BLOOD PRESSURE: 72 MMHG | HEIGHT: 65 IN

## 2024-06-27 DIAGNOSIS — K22.70 BARRETT'S ESOPHAGUS WITHOUT DYSPLASIA: Primary | ICD-10-CM

## 2024-06-27 DIAGNOSIS — Z86.010 PERSONAL HISTORY OF COLONIC POLYPS: ICD-10-CM

## 2024-06-27 DIAGNOSIS — K75.81 NASH (NONALCOHOLIC STEATOHEPATITIS): ICD-10-CM

## 2024-06-27 PROCEDURE — 3078F DIAST BP <80 MM HG: CPT | Performed by: INTERNAL MEDICINE

## 2024-06-27 PROCEDURE — 3075F SYST BP GE 130 - 139MM HG: CPT | Performed by: INTERNAL MEDICINE

## 2024-06-27 PROCEDURE — 1159F MED LIST DOCD IN RCRD: CPT | Performed by: INTERNAL MEDICINE

## 2024-06-27 PROCEDURE — 1160F RVW MEDS BY RX/DR IN RCRD: CPT | Performed by: INTERNAL MEDICINE

## 2024-06-27 PROCEDURE — 99213 OFFICE O/P EST LOW 20 MIN: CPT | Performed by: INTERNAL MEDICINE

## 2024-06-27 NOTE — PROGRESS NOTES
PATIENT INFORMATION  Germaine Gonzalez       - 1945    CHIEF COMPLAINT  Chief Complaint   Patient presents with    Non-alcoholic steatohepatitis    Mcgill's esophagus       HISTORY OF PRESENT ILLNESS  Recent parotiditis  and was drained manually and then on abx with complete resolution    Reflux is well controlled and mild diarrhea on Abx but has resolved.          REVIEWED PERTINENT RESULTS/ LABS  Lab Results   Component Value Date    CASEREPORT  2022     Surgical Pathology Report                         Case: [id number]                                  Authorizing Provider:  Bc Sanchez        Collected:           2022 05:12 PM                                 MD Rick                                                                   Ordering Location:     [facility]   Received:            2022 07:31 PM                                 OR                                                                           Pathologist:           Solitario Smith MD                                                          Specimens:   1) - Small Intestine, Duodenum, duodenal biopsy                                                     2) - Gastric, Antrum, gastric biopsy                                                                3) - Esophagus, Distal, distal esophagus                                                   FINALDX  2022     1. Duodenum, Biopsy:  Small bowel mucosa with             A. Normal intact villous surface.   B. No significant inflammation, no granulomas.   C: No viral inclusions or other organisms on routinely stained sections.    2. Stomach, Biopsy:  Oxyntic and antral type gastric mucosa with   A. Mild reactive/chemical gastropathy involving antral type mucosa.   B. No significant inflammation.   C. No metaplasia, dysplasia nor malignancy.   D. No H pylori-like organisms identified.    3. Esophagus, Distal, Biopsy:  Squamoglandular mucosa and submucosa  with   A. Mcgill's esophagus.   B. No dysplasia nor malignancy.   C. No significant eosinophilia.   D. No viral inclusions or fungal organisms identified.    mec/clm         Lab Results   Component Value Date    HGB 12.9 06/17/2024    MCV 95.0 06/17/2024     06/17/2024    ALT 24 06/07/2024    AST 28 06/07/2024    HGBA1C 5.50 06/07/2024    INR 0.96 11/29/2022    TRIG 134 06/07/2024      CT soft tissue neck w contrast    Result Date: 6/17/2024  Narrative: CT SOFT TISSUE NECK W CONTRAST Date of Exam: 6/17/2024 1:37 PM EDT Indication: acute swelling of face, hx sjogrens, differeintial parotitis, abscess. Comparison: CT head 11/28/2022, CT angiogram of the head carotid 11/29/2022. Technique: Axial CT images were obtained of the neck after the uneventful intravenous administration of iodinated contrast.  Sagittal and coronal reconstructions were performed.  Automated exposure control and iterative reconstruction methods were used. FINDINGS: There is prominence and chronic change associated with the parotid glands bilaterally likely related to the patient's history of Sjogren syndrome. Similar findings are noted on the prior CTs where there was partial inclusion of the parotid glands within the field-of-view for the imaging. There is evidence for superimposed edema/inflammation involving the left parotid gland with increased heterogeneous enhancement. The findings suggest inflammatory changes. There is also edema and inflammation associated with the left submandibular gland.  Edema is seen within the overlying soft tissues as well in these regions. These findings indicate inflammation and may suggest changes of acute to subacute exacerbation of the patient's Sjogren syndrome. There is mild edema associated with the right submandibular gland as well with mild edema in the adjacent soft tissues. The findings suggest developing involvement of the right submandibular gland as well. No associated abnormal fluid  collections are observed. There is no evidence for sialolith. No significant lymphadenopathy is seen throughout the neck soft tissues bilaterally. There are no definitive inflammatory changes involving the right parotid gland. The soft tissues of the nasopharynx, oropharynx, and larynx are symmetric and unremarkable. The epiglottis is within normal limits. The airway is patent. No significant lymphadenopathy is seen throughout the neck soft tissues bilaterally. The thyroid gland is unremarkable. Moderate atherosclerosis is noted. No acute osseous abnormalities are seen. The paranasal sinuses and mastoid air cells remain clear. The lung apices  are clear.     Impression: 1.Evidence for acute to subacute inflammation associated with the left parotid gland and left submandibular gland. There is also evidence for mild or early signs of involvement involving the right submandibular gland. The findings suggest exacerbation of  the patient's known Sjogren syndrome. Superimposed infection is not entirely excluded. There is no evidence for abnormal fluid collection or abscess. No sialolith is identified. Electronically Signed: David Trinh MD  6/17/2024 1:57 PM EDT  Workstation ID: [id number]     REVIEW OF SYSTEMS  Review of Systems   Constitutional:  Negative for activity change, chills, fever and unexpected weight change.   HENT:  Negative for congestion.    Eyes:  Negative for visual disturbance.   Respiratory:  Negative for shortness of breath.    Cardiovascular:  Negative for chest pain and palpitations.   Gastrointestinal:  Negative for abdominal pain and blood in stool.   Endocrine: Negative for cold intolerance and heat intolerance.   Genitourinary:  Negative for hematuria.   Musculoskeletal:  Negative for gait problem.   Skin:  Negative for color change.   Allergic/Immunologic: Negative for immunocompromised state.   Neurological:  Negative for weakness and light-headedness.   Hematological:  Negative for  adenopathy.   Psychiatric/Behavioral:  Negative for sleep disturbance. The patient is not nervous/anxious.          ACTIVE PROBLEMS  Patient Active Problem List    Diagnosis     Esophageal dysphagia [R13.19]     Weight loss [R63.4]     S/P total knee arthroplasty [Z96.659]     Primary osteoarthritis of right knee [M17.11]     Knee instability, right [M25.361]     Osteopenia [M85.80]     Prediabetes [R73.03]     Essential hypertension [I10]     Effusion of left shoulder joint [M25.412]     Encounter for screening for malignant neoplasm of colon [Z12.11]     Personal history of colonic polyps [Z86.010]     Primary osteoarthritis of left knee [M17.12]     WALSH (nonalcoholic steatohepatitis) [K75.81]     Sjogren's syndrome with keratoconjunctivitis sicca [M35.01]     Mcgill's esophagus without dysplasia [K22.70]     Hyperglycemia [R73.9]     Pyelonephritis [N12]     Gastroesophageal reflux disease [K21.9]     Hyperlipidemia [E78.5]     Pure hypercholesterolemia [E78.00]     Knee pain [M25.569]          PAST MEDICAL HISTORY  Past Medical History:   Diagnosis Date    Arthritis     knees    Mcglil esophagus     Cancer     cervical    Cervical cancer     Colon polyp     Eczema     Elevated liver enzymes     GERD (gastroesophageal reflux disease)     H/O stress fracture     Health care maintenance     Hypercholesteremia     Hypertension     Kidney stones     sched ureteroscopy, lithotripsy    Knee swelling     Ocular migraine     PONV (postoperative nausea and vomiting)     Postmenopausal     Seasonal allergies     Sjogren's syndrome     autoimmune disorder          SURGICAL HISTORY  Past Surgical History:   Procedure Laterality Date     SECTION      x2    COLONOSCOPY      COLONOSCOPY N/A 2021    Procedure: COLONOSCOPY;  Surgeon: Bc Sanchez MD;  Location: [facility] MAIN OR;  Service: Gastroenterology;  Laterality: N/A;  Diverticulosis and polyps    CYSTOSCOPY URETEROSCOPY STONE  MANIPULATION/EXTRACTION Left 10/14/2016    Procedure: LEFT URETEROSCOPY bilateral retrograde pyleogram LEFT STENT PLACEMENT.;  Surgeon: Julius Darling MD;  Location: [facility] OR;  Service:     CYSTOSCOPY W/ URETERAL STENT PLACEMENT Right 8/3/2018    Procedure: CYSTOSCOPY URETERAL CATHETER/STENT INSERTION;  Surgeon: Farzad Cordova MD;  Location: [facility] OR;  Service: Urology    ENDOSCOPY N/A 4/10/2019    Procedure: ESOPHAGOGASTRODUODENOSCOPY;  Surgeon: Bc Sanchez MD;  Location: [facility] OR;  Service: Gastroenterology    ENDOSCOPY N/A 4/6/2022    Procedure: ESOPHAGOGASTRODUODENOSCOPY WITH BIOPSY;  Surgeon: Bc Sanchez MD;  Location: [facility] OR;  Service: Gastroenterology;  Laterality: N/A;  gastritis, pulliam's    HYSTERECTOMY      TOTAL KNEE ARTHROPLASTY Right 2/8/2022    Procedure: TOTAL KNEE ARTHROPLASTY AND ALL ASSOCIATED PROCEDURES;  Surgeon: Jovany Villanueva MD;  Location: [facility] OR;  Service: Orthopedics;  Laterality: Right;    UPPER GASTROINTESTINAL ENDOSCOPY      URETEROSCOPY LASER LITHOTRIPSY WITH STENT INSERTION Left 10/26/2016    Procedure: LT URETEROSCOPY LASER LITHOTRIPSY ;  Surgeon: Julius Darling MD;  Location: [facility];  Service:          FAMILY HISTORY  Family History   Problem Relation Age of Onset    Anxiety disorder Mother     Bipolar disorder Mother     Stroke Mother 83    Thyroid disease Mother     Depression Mother     Heart attack Father 47    Thyroid disease Brother     Hypertension Brother     Diabetes Brother 68    Glaucoma Brother     Depression Brother     No Known Problems Daughter     No Known Problems Son     Leukemia Maternal Grandmother     Diabetes Maternal Grandfather     Heart attack Paternal Grandmother     Heart attack Paternal Grandfather     Breast cancer Neg Hx     Colon cancer Neg Hx     Colon polyps Neg Hx          SOCIAL HISTORY  Social History     Occupational History    Not on file   Tobacco Use    Smoking status: Former  "    Current packs/day: 0.00     Average packs/day: 0.5 packs/day for 19.0 years (9.5 ttl pk-yrs)     Types: Cigarettes     Start date: 3/22/1971     Quit date: 1990     Years since quittin.2     Passive exposure: Never    Smokeless tobacco: Never   Vaping Use    Vaping status: Never Used   Substance and Sexual Activity    Alcohol use: Yes     Alcohol/week: 2.0 standard drinks of alcohol     Types: 2 Glasses of wine per week     Comment: 2 glasses of wine / daily    Drug use: No    Sexual activity: Defer         CURRENT MEDICATIONS    Current Outpatient Medications:     Biotin 5000 MCG tablet, Take  by mouth Daily., Disp: , Rfl:     calcium carbonate (OS-ULI) 600 MG tablet, Take 1 tablet by mouth 2 (Two) Times a Day With Meals., Disp: , Rfl:     desoximetasone (TOPICORT) 0.05 % cream, APPLY CREAM TOPICALLY TO AFFECTED AREA TWICE DAILY FOR ECZEMA FOR UP TO 14 DAYS, Disp: , Rfl:     EPINEPHrine (EPIPEN) 0.3 MG/0.3ML solution auto-injector injection, , Disp: , Rfl:     escitalopram (LEXAPRO) 5 MG tablet, Take 1 tablet by mouth Every Morning., Disp: 90 tablet, Rfl: 3    estradiol (ESTRACE) 0.1 MG/GM vaginal cream, APPLY ONE INCH RIBBON OF CREAM INTRAVAGINALLY THREE TIMES PER WEEK AT BEDTIME, Disp: , Rfl:     lisinopril (PRINIVIL,ZESTRIL) 10 MG tablet, Take 1 tablet by mouth once daily, Disp: 90 tablet, Rfl: 0    omeprazole (priLOSEC) 40 MG capsule, Take 1 capsule by mouth twice daily, Disp: 180 capsule, Rfl: 0    rosuvastatin (CRESTOR) 20 MG tablet, Take 1 tablet by mouth Daily., Disp: 90 tablet, Rfl: 3    vitamin B-12 (CYANOCOBALAMIN) 1000 MCG tablet, Take 5 tablets by mouth Daily., Disp: , Rfl:     ALLERGIES  Levaquin [levofloxacin] and Tetracycline    VITALS  Vitals:    24 0933   BP: 130/72   BP Location: Left arm   Patient Position: Sitting   Cuff Size: Adult   Weight: 73.4 kg (161 lb 12.8 oz)   Height: 165.1 cm (65\")       PHYSICAL EXAM  Debilities/Disabilities Identified: None  Emotional Behavior: " "Appropriate  Wt Readings from Last 3 Encounters:   06/27/24 73.4 kg (161 lb 12.8 oz)   06/17/24 72.8 kg (160 lb 9.6 oz)   06/14/24 72.7 kg (160 lb 3.2 oz)     Ht Readings from Last 1 Encounters:   06/27/24 165.1 cm (65\")     Body mass index is 26.92 kg/m².  Physical Exam  Constitutional:       Appearance: She is well-developed. She is not diaphoretic.   HENT:      Head: Normocephalic and atraumatic.   Eyes:      General: No scleral icterus.     Conjunctiva/sclera: Conjunctivae normal.      Pupils: Pupils are equal, round, and reactive to light.   Neck:      Thyroid: No thyromegaly.   Cardiovascular:      Rate and Rhythm: Normal rate and regular rhythm.      Heart sounds: Normal heart sounds. No murmur heard.     No gallop.   Pulmonary:      Effort: Pulmonary effort is normal.      Breath sounds: Normal breath sounds. No wheezing or rales.   Abdominal:      General: Bowel sounds are normal. There is no distension or abdominal bruit.      Palpations: Abdomen is soft. There is no shifting dullness, fluid wave or mass.      Tenderness: There is no abdominal tenderness. There is no guarding. Negative signs include García's sign.      Hernia: There is no hernia in the ventral area.   Musculoskeletal:         General: Normal range of motion.      Cervical back: Normal range of motion and neck supple.   Lymphadenopathy:      Cervical: No cervical adenopathy.   Skin:     General: Skin is warm and dry.      Findings: No erythema or rash.   Neurological:      Mental Status: She is alert and oriented to person, place, and time.   Psychiatric:         Mood and Affect: Mood normal.         Behavior: Behavior normal.         CLINICAL DATA REVIEWED   reviewed previous lab results and integrated with today's visit, reviewed notes from other physicians and/or last GI encounter, reviewed previous endoscopy results and available photos, reviewed surgical pathology results from previous biopsies    ASSESSMENT  Diagnoses and all orders " for this visit:    Mcgill's esophagus without dysplasia    Personal history of colonic polyps    WALSH (nonalcoholic steatohepatitis)          PLAN  Recall colon 9/2028 and EGD 4/2027- would consider doing both in 2027    Return in about 1 year (around 6/27/2025).    I have discussed the above plan with the patient.  They verbalize understanding and are in agreement with the plan.  They have been advised to contact the office for any questions, concerns, or changes related to their health.

## 2024-07-21 DIAGNOSIS — E78.2 MIXED HYPERLIPIDEMIA: ICD-10-CM

## 2024-07-24 ENCOUNTER — HOSPITAL ENCOUNTER (OUTPATIENT)
Dept: MAMMOGRAPHY | Facility: HOSPITAL | Age: 79
Discharge: HOME | End: 2024-07-24
Payer: MEDICARE

## 2024-07-24 ENCOUNTER — APPOINTMENT (OUTPATIENT)
Dept: BONE DENSITY | Facility: HOSPITAL | Age: 79
End: 2024-07-24
Payer: MEDICARE

## 2024-07-24 DIAGNOSIS — Z12.31 SCREENING MAMMOGRAM FOR BREAST CANCER: ICD-10-CM

## 2024-07-24 DIAGNOSIS — Z78.0 POST-MENOPAUSAL: ICD-10-CM

## 2024-07-24 PROCEDURE — 77080 DXA BONE DENSITY AXIAL: CPT

## 2024-07-24 PROCEDURE — 77067 SCR MAMMO BI INCL CAD: CPT

## 2024-07-24 PROCEDURE — 77063 BREAST TOMOSYNTHESIS BI: CPT

## 2024-07-24 PROCEDURE — 77067 SCR MAMMO BI INCL CAD: CPT | Performed by: RADIOLOGY

## 2024-07-24 PROCEDURE — 77063 BREAST TOMOSYNTHESIS BI: CPT | Performed by: RADIOLOGY

## 2024-07-26 RX ORDER — ROSUVASTATIN CALCIUM 20 MG/1
20 TABLET, COATED ORAL DAILY
Qty: 90 TABLET | Refills: 3 | Status: SHIPPED | OUTPATIENT
Start: 2024-07-26

## 2024-09-14 DIAGNOSIS — I10 ESSENTIAL HYPERTENSION: ICD-10-CM

## 2024-09-18 RX ORDER — LISINOPRIL 10 MG/1
10 TABLET ORAL DAILY
Qty: 90 TABLET | Refills: 0 | Status: SHIPPED | OUTPATIENT
Start: 2024-09-18

## 2024-09-28 DIAGNOSIS — K21.00 GASTROESOPHAGEAL REFLUX DISEASE WITH ESOPHAGITIS, UNSPECIFIED WHETHER HEMORRHAGE: ICD-10-CM

## 2024-09-30 RX ORDER — OMEPRAZOLE 40 MG/1
40 CAPSULE, DELAYED RELEASE ORAL 2 TIMES DAILY
Qty: 180 CAPSULE | Refills: 0 | Status: SHIPPED | OUTPATIENT
Start: 2024-09-30

## 2024-09-30 NOTE — TELEPHONE ENCOUNTER
Rx Refill Note  Requested Prescriptions     Pending Prescriptions Disp Refills    omeprazole (priLOSEC) 40 MG capsule [Pharmacy Med Name: Omeprazole 40 MG Oral Capsule Delayed Release] 180 capsule 0     Sig: Take 1 capsule by mouth twice daily      Last office visit with prescribing clinician: 6/17/2024   Last telemedicine visit with prescribing clinician: Visit date not found   Next office visit with prescribing clinician: 12/23/2024                         Would you like a call back once the refill request has been completed: [] Yes [] No    If the office needs to give you a call back, can they leave a voicemail: [] Yes [] No    Brianda Huertas MA  09/30/24, 11:27 EDT

## 2024-11-19 ENCOUNTER — OFFICE VISIT (OUTPATIENT)
Dept: ORTHOPEDIC SURGERY | Facility: CLINIC | Age: 79
End: 2024-11-19
Payer: MEDICARE

## 2024-11-19 VITALS — BODY MASS INDEX: 26.66 KG/M2 | HEIGHT: 65 IN | WEIGHT: 160 LBS

## 2024-11-19 DIAGNOSIS — M17.12 PRIMARY OSTEOARTHRITIS OF LEFT KNEE: Primary | ICD-10-CM

## 2024-11-19 PROCEDURE — 20610 DRAIN/INJ JOINT/BURSA W/O US: CPT | Performed by: NURSE PRACTITIONER

## 2024-11-19 PROCEDURE — 73562 X-RAY EXAM OF KNEE 3: CPT | Performed by: NURSE PRACTITIONER

## 2024-11-19 RX ORDER — LIDOCAINE HYDROCHLORIDE 10 MG/ML
8 INJECTION, SOLUTION EPIDURAL; INFILTRATION; INTRACAUDAL; PERINEURAL
Status: COMPLETED | OUTPATIENT
Start: 2024-11-19 | End: 2024-11-19

## 2024-11-19 RX ORDER — TRIAMCINOLONE ACETONIDE 40 MG/ML
80 INJECTION, SUSPENSION INTRA-ARTICULAR; INTRAMUSCULAR
Status: COMPLETED | OUTPATIENT
Start: 2024-11-19 | End: 2024-11-19

## 2024-11-19 RX ADMIN — TRIAMCINOLONE ACETONIDE 80 MG: 40 INJECTION, SUSPENSION INTRA-ARTICULAR; INTRAMUSCULAR at 10:23

## 2024-11-19 RX ADMIN — LIDOCAINE HYDROCHLORIDE 8 ML: 10 INJECTION, SOLUTION EPIDURAL; INFILTRATION; INTRACAUDAL; PERINEURAL at 10:23

## 2024-11-19 NOTE — PROGRESS NOTES
Subjective:     Patient ID: Germaine Gonzalez is a 79 y.o. female.    Chief Complaint:  Follow-up DJD left knee  History of Present Illness  History of Present Illness  The patient returns to clinic today for follow-up of her left knee.    She received a steroid injection in 2023, which significantly improved her condition until two weeks ago when she experienced difficulty standing up after attempting to descend to the floor. She denies experiencing any locking, catching, or instability of the knee. Her right knee, previously undergone a total knee arthroplasty, is functioning well. She experiences maximum tenderness in the anterior aspect of her knee and some swelling, but is otherwise in good health. She has no other concerns at present.       Social History     Occupational History    Not on file   Tobacco Use    Smoking status: Former     Current packs/day: 0.00     Average packs/day: 0.5 packs/day for 19.0 years (9.5 ttl pk-yrs)     Types: Cigarettes     Start date: 3/22/1971     Quit date: 1990     Years since quittin.6     Passive exposure: Never    Smokeless tobacco: Never   Vaping Use    Vaping status: Never Used   Substance and Sexual Activity    Alcohol use: Yes     Alcohol/week: 2.0 standard drinks of alcohol     Types: 2 Glasses of wine per week     Comment: 2 glasses of wine / daily    Drug use: No    Sexual activity: Defer      Past Medical History:   Diagnosis Date    Arthritis     knees    Mcgill esophagus     Cancer     cervical    Cervical cancer     Colon polyp     Eczema     Elevated liver enzymes     GERD (gastroesophageal reflux disease)     H/O stress fracture     Health care maintenance     Hypercholesteremia     Hypertension     Kidney stones     sched ureteroscopy, lithotripsy    Knee swelling     Ocular migraine     PONV (postoperative nausea and vomiting)     Postmenopausal     Seasonal allergies     Sjogren's syndrome     autoimmune disorder      Past Surgical History:    Procedure Laterality Date     SECTION      x2    COLONOSCOPY      COLONOSCOPY N/A 2021    Procedure: COLONOSCOPY;  Surgeon: Bc Sanchez MD;  Location: [facility] OR;  Service: Gastroenterology;  Laterality: N/A;  Diverticulosis and polyps    CYSTOSCOPY URETEROSCOPY STONE MANIPULATION/EXTRACTION Left 10/14/2016    Procedure: LEFT URETEROSCOPY bilateral retrograde pyleogram LEFT STENT PLACEMENT.;  Surgeon: Julius Darling MD;  Location: [facility] OR;  Service:     CYSTOSCOPY W/ URETERAL STENT PLACEMENT Right 8/3/2018    Procedure: CYSTOSCOPY URETERAL CATHETER/STENT INSERTION;  Surgeon: Farzad Cordova MD;  Location: [facility] OR;  Service: Urology    ENDOSCOPY N/A 4/10/2019    Procedure: ESOPHAGOGASTRODUODENOSCOPY;  Surgeon: Bc Sanchez MD;  Location: [facility] OR;  Service: Gastroenterology    ENDOSCOPY N/A 2022    Procedure: ESOPHAGOGASTRODUODENOSCOPY WITH BIOPSY;  Surgeon: Bc Sanchez MD;  Location: [facility] OR;  Service: Gastroenterology;  Laterality: N/A;  gastritis, pulliam's    HYSTERECTOMY      TOTAL KNEE ARTHROPLASTY Right 2022    Procedure: TOTAL KNEE ARTHROPLASTY AND ALL ASSOCIATED PROCEDURES;  Surgeon: Jovany Villanueva MD;  Location: [facility] OR;  Service: Orthopedics;  Laterality: Right;    UPPER GASTROINTESTINAL ENDOSCOPY      URETEROSCOPY LASER LITHOTRIPSY WITH STENT INSERTION Left 10/26/2016    Procedure: LT URETEROSCOPY LASER LITHOTRIPSY ;  Surgeon: Julius Darling MD;  Location: [facility];  Service:        Family History   Problem Relation Age of Onset    Anxiety disorder Mother     Bipolar disorder Mother     Stroke Mother 83    Thyroid disease Mother     Depression Mother     Heart attack Father 47    Thyroid disease Brother     Hypertension Brother     Diabetes Brother 68    Glaucoma Brother     Depression Brother     No Known Problems Daughter     No Known Problems Son     Leukemia Maternal Grandmother     Diabetes  "Maternal Grandfather     Heart attack Paternal Grandmother     Heart attack Paternal Grandfather     Breast cancer Neg Hx     Colon cancer Neg Hx     Colon polyps Neg Hx                Objective:  Physical Exam  General: No acute distress.  Eyes: conjunctiva clear; pupils equally round and reactive  ENT: external ears and nose atraumatic; oropharynx clear  CV: no peripheral edema  Resp: normal respiratory effort  Skin: no rashes or wounds; normal turgor  Psych: mood and affect appropriate; recent and remote memory intact    Vitals:    11/19/24 0950   Weight: 72.6 kg (160 lb)   Height: 165.1 cm (65\")         11/19/24  0950   Weight: 72.6 kg (160 lb)     Body mass index is 26.63 kg/m².      Ortho Exam     Physical Exam  Left knee examined  Knee range of motion 0 to 125 degrees  Stable varus valgus stress at 0 degrees and 30 degrees  Moderate to severe swelling, minimally positive effusion  Positive active patellar compression test  Positive crepitus throughout arc of motion  Maximal tenderness present along the anterior aspect of the knee  Positive sensation light touch all distributions left lower extremity    Imaging:  Left Knee X-Ray  Indication: Pain    AP, Lateral, and Percy views    Findings:  No fracture  No bony lesion  Normal soft tissues  Advanced narrowing patellofemoral compartment with bone-on-bone articulation, moderate narrowing along the medial compartment with reactive osteophytes in all 3 compartments    prior studies were available for comparison.    Assessment:        1. Primary osteoarthritis of left knee         Assessment & Plan  1. Left knee pain.  She has experienced increased difficulty getting up from the floor in the last 2 weeks. There is maximal tenderness in the anterior aspect of the knee and some swelling. X-ray imaging was reviewed. A corticosteroid injection is recommended to see if it offers significant symptom resolution. Application of ice is recommended this evening. She should " continue exercise and activity as tolerated. If there is no improvement, viscosupplementation injection will be considered. All questions were answered.    Follow-up  Return to clinic if no improvement.    Plan:    Large Joint Arthrocentesis: L knee  Date/Time: 11/19/2024 10:23 AM  Consent given by: patient  Site marked: site marked  Timeout: Immediately prior to procedure a time out was called to verify the correct patient, procedure, equipment, support staff and site/side marked as required   Supporting Documentation  Indications: pain   Procedure Details  Location: knee - L knee  Preparation: Patient was prepped and draped in the usual sterile fashion  Needle size: 22 G  Approach: anterolateral  Medications administered: 8 mL lidocaine PF 1% 1 %; 80 mg triamcinolone acetonide 40 MG/ML  Patient tolerance: patient tolerated the procedure well with no immediate complications          Orders:  Orders Placed This Encounter   Procedures    Large Joint Arthrocentesis: L knee    XR Knee 3+ View With Roselawn Left     No orders of the defined types were placed in this encounter.          Dragon dictation utilized          Patient or patient representative verbalized consent for the use of Ambient Listening during the visit with  JESSICA Whelan for chart documentation. 11/19/2024  17:06 EST

## 2024-11-27 DIAGNOSIS — F41.9 ANXIETY: ICD-10-CM

## 2024-11-27 RX ORDER — ESCITALOPRAM OXALATE 5 MG/1
5 TABLET ORAL EVERY MORNING
Qty: 90 TABLET | Refills: 3 | Status: SHIPPED | OUTPATIENT
Start: 2024-11-27

## 2024-12-11 ENCOUNTER — TELEPHONE (OUTPATIENT)
Dept: INTERNAL MEDICINE | Facility: CLINIC | Age: 79
End: 2024-12-11
Payer: MEDICARE

## 2024-12-11 DIAGNOSIS — I10 ESSENTIAL HYPERTENSION: Primary | ICD-10-CM

## 2024-12-11 DIAGNOSIS — E78.2 MIXED HYPERLIPIDEMIA: ICD-10-CM

## 2024-12-11 DIAGNOSIS — R73.03 PREDIABETES: ICD-10-CM

## 2024-12-17 DIAGNOSIS — I10 ESSENTIAL HYPERTENSION: ICD-10-CM

## 2024-12-17 LAB
ALBUMIN SERPL-MCNC: 4.4 G/DL (ref 3.5–5.2)
ALBUMIN/GLOB SERPL: 1.7 G/DL
ALP SERPL-CCNC: 87 U/L (ref 39–117)
ALT SERPL-CCNC: 31 U/L (ref 1–33)
AST SERPL-CCNC: 35 U/L (ref 1–32)
BASOPHILS # BLD AUTO: 0.03 10*3/MM3 (ref 0–0.2)
BASOPHILS NFR BLD AUTO: 0.8 % (ref 0–1.5)
BILIRUB SERPL-MCNC: 0.7 MG/DL (ref 0–1.2)
BUN SERPL-MCNC: 14 MG/DL (ref 8–23)
BUN/CREAT SERPL: 14.1 (ref 7–25)
CALCIUM SERPL-MCNC: 9.9 MG/DL (ref 8.6–10.5)
CHLORIDE SERPL-SCNC: 104 MMOL/L (ref 98–107)
CHOLEST SERPL-MCNC: 221 MG/DL (ref 0–200)
CO2 SERPL-SCNC: 26.4 MMOL/L (ref 22–29)
CREAT SERPL-MCNC: 0.99 MG/DL (ref 0.57–1)
EGFRCR SERPLBLD CKD-EPI 2021: 58.8 ML/MIN/1.73
EOSINOPHIL # BLD AUTO: 0.09 10*3/MM3 (ref 0–0.4)
EOSINOPHIL NFR BLD AUTO: 2.4 % (ref 0.3–6.2)
ERYTHROCYTE [DISTWIDTH] IN BLOOD BY AUTOMATED COUNT: 12.5 % (ref 12.3–15.4)
GLOBULIN SER CALC-MCNC: 2.6 GM/DL
GLUCOSE SERPL-MCNC: 88 MG/DL (ref 65–99)
HBA1C MFR BLD: 5.4 % (ref 4.8–5.6)
HCT VFR BLD AUTO: 42.6 % (ref 34–46.6)
HDLC SERPL-MCNC: 72 MG/DL (ref 40–60)
HGB BLD-MCNC: 14.4 G/DL (ref 12–15.9)
IMM GRANULOCYTES # BLD AUTO: 0.01 10*3/MM3 (ref 0–0.05)
IMM GRANULOCYTES NFR BLD AUTO: 0.3 % (ref 0–0.5)
LDLC SERPL CALC-MCNC: 128 MG/DL (ref 0–100)
LDLC/HDLC SERPL: 1.73 {RATIO}
LYMPHOCYTES # BLD AUTO: 1.35 10*3/MM3 (ref 0.7–3.1)
LYMPHOCYTES NFR BLD AUTO: 35.7 % (ref 19.6–45.3)
MCH RBC QN AUTO: 34.4 PG (ref 26.6–33)
MCHC RBC AUTO-ENTMCNC: 33.8 G/DL (ref 31.5–35.7)
MCV RBC AUTO: 101.7 FL (ref 79–97)
MONOCYTES # BLD AUTO: 0.43 10*3/MM3 (ref 0.1–0.9)
MONOCYTES NFR BLD AUTO: 11.4 % (ref 5–12)
NEUTROPHILS # BLD AUTO: 1.87 10*3/MM3 (ref 1.7–7)
NEUTROPHILS NFR BLD AUTO: 49.4 % (ref 42.7–76)
NRBC BLD AUTO-RTO: 0 /100 WBC (ref 0–0.2)
PLATELET # BLD AUTO: 198 10*3/MM3 (ref 140–450)
POTASSIUM SERPL-SCNC: 4.5 MMOL/L (ref 3.5–5.2)
PROT SERPL-MCNC: 7 G/DL (ref 6–8.5)
RBC # BLD AUTO: 4.19 10*6/MM3 (ref 3.77–5.28)
SODIUM SERPL-SCNC: 140 MMOL/L (ref 136–145)
TRIGL SERPL-MCNC: 123 MG/DL (ref 0–150)
VLDLC SERPL CALC-MCNC: 21 MG/DL (ref 5–40)
WBC # BLD AUTO: 3.78 10*3/MM3 (ref 3.4–10.8)

## 2024-12-19 RX ORDER — LISINOPRIL 10 MG/1
10 TABLET ORAL DAILY
Qty: 90 TABLET | Refills: 1 | Status: SHIPPED | OUTPATIENT
Start: 2024-12-19

## 2024-12-19 NOTE — TELEPHONE ENCOUNTER
Rx Refill Note  Requested Prescriptions     Pending Prescriptions Disp Refills    lisinopril (PRINIVIL,ZESTRIL) 10 MG tablet [Pharmacy Med Name: Lisinopril 10 MG Oral Tablet] 90 tablet 1     Sig: Take 1 tablet by mouth once daily      Last office visit with prescribing clinician: 6/17/2024   Last telemedicine visit with prescribing clinician: Visit date not found   Next office visit with prescribing clinician: 12/23/2024                         Would you like a call back once the refill request has been completed: [] Yes [] No    If the office needs to give you a call back, can they leave a voicemail: [] Yes [] No    oJlene Ellison MA  12/19/24, 10:10 EST

## 2024-12-23 ENCOUNTER — OFFICE VISIT (OUTPATIENT)
Dept: INTERNAL MEDICINE | Facility: CLINIC | Age: 79
End: 2024-12-23
Payer: MEDICARE

## 2024-12-23 VITALS
WEIGHT: 157 LBS | OXYGEN SATURATION: 98 % | HEART RATE: 74 BPM | TEMPERATURE: 96.4 F | DIASTOLIC BLOOD PRESSURE: 70 MMHG | SYSTOLIC BLOOD PRESSURE: 124 MMHG | BODY MASS INDEX: 26.13 KG/M2

## 2024-12-23 DIAGNOSIS — R73.03 PREDIABETES: ICD-10-CM

## 2024-12-23 DIAGNOSIS — R26.89 POOR BALANCE: ICD-10-CM

## 2024-12-23 DIAGNOSIS — M35.00 SJOGREN'S SYNDROME WITHOUT EXTRAGLANDULAR INVOLVEMENT: ICD-10-CM

## 2024-12-23 DIAGNOSIS — F41.9 ANXIETY: ICD-10-CM

## 2024-12-23 DIAGNOSIS — E78.2 MIXED HYPERLIPIDEMIA: ICD-10-CM

## 2024-12-23 DIAGNOSIS — K21.00 GASTROESOPHAGEAL REFLUX DISEASE WITH ESOPHAGITIS, UNSPECIFIED WHETHER HEMORRHAGE: ICD-10-CM

## 2024-12-23 DIAGNOSIS — I10 ESSENTIAL HYPERTENSION: ICD-10-CM

## 2024-12-23 DIAGNOSIS — Z00.00 MEDICARE ANNUAL WELLNESS VISIT, SUBSEQUENT: Primary | ICD-10-CM

## 2024-12-23 PROCEDURE — 96160 PT-FOCUSED HLTH RISK ASSMT: CPT | Performed by: INTERNAL MEDICINE

## 2024-12-23 PROCEDURE — 3074F SYST BP LT 130 MM HG: CPT | Performed by: INTERNAL MEDICINE

## 2024-12-23 PROCEDURE — 99214 OFFICE O/P EST MOD 30 MIN: CPT | Performed by: INTERNAL MEDICINE

## 2024-12-23 PROCEDURE — 1126F AMNT PAIN NOTED NONE PRSNT: CPT | Performed by: INTERNAL MEDICINE

## 2024-12-23 PROCEDURE — G0439 PPPS, SUBSEQ VISIT: HCPCS | Performed by: INTERNAL MEDICINE

## 2024-12-23 PROCEDURE — 3078F DIAST BP <80 MM HG: CPT | Performed by: INTERNAL MEDICINE

## 2024-12-23 PROCEDURE — 1170F FXNL STATUS ASSESSED: CPT | Performed by: INTERNAL MEDICINE

## 2024-12-23 NOTE — ASSESSMENT & PLAN NOTE
Orders:    CBC & Differential; Future    Comprehensive Metabolic Panel; Future    Hemoglobin A1c; Future    Lipid Panel With LDL / HDL Ratio; Future    TSH; Future    T4, Free; Future

## 2024-12-23 NOTE — PROGRESS NOTES
Subjective   The ABCs of the Annual Wellness Visit  Medicare Wellness Visit      Germaine Gonzalez is a 79 y.o. patient who presents for a Medicare Wellness Visit.    The following portions of the patient's history were reviewed and   updated as appropriate: allergies, current medications, past family history, past medical history, past social history, past surgical history, and problem list.    Compared to one year ago, the patient's physical   health is the same.  Compared to one year ago, the patient's mental   health is the same.    Recent Hospitalizations:  She was not admitted to the hospital during the last year.     Current Medical Providers:  Patient Care Team:  Roberta Boswell MD as PCP - General (Internal Medicine & Pediatrics)  Bc Sanchez MD as Consulting Physician (Gastroenterology)  Christy Lindsay APRN as Nurse Practitioner (Orthopedic Surgery)  Lan Wong MD as Consulting Physician (Dermatology)  Jessica Medina OD (Optometry)  Jovany Villanueva MD as Surgeon (Orthopedic Surgery)    Outpatient Medications Prior to Visit   Medication Sig Dispense Refill    Biotin 5000 MCG tablet Take  by mouth Daily.      calcium carbonate (OS-ULI) 600 MG tablet Take 1 tablet by mouth 2 (Two) Times a Day With Meals.      desoximetasone (TOPICORT) 0.05 % cream APPLY CREAM TOPICALLY TO AFFECTED AREA TWICE DAILY FOR ECZEMA FOR UP TO 14 DAYS      escitalopram (LEXAPRO) 5 MG tablet TAKE 1 TABLET BY MOUTH ONCE DAILY IN THE MORNING 90 tablet 3    estradiol (ESTRACE) 0.1 MG/GM vaginal cream APPLY ONE INCH RIBBON OF CREAM INTRAVAGINALLY THREE TIMES PER WEEK AT BEDTIME      lisinopril (PRINIVIL,ZESTRIL) 10 MG tablet Take 1 tablet by mouth once daily 90 tablet 1    omeprazole (priLOSEC) 40 MG capsule Take 1 capsule by mouth twice daily 180 capsule 0    rosuvastatin (CRESTOR) 20 MG tablet Take 1 tablet by mouth Daily. 90 tablet 3    vitamin B-12 (CYANOCOBALAMIN) 1000 MCG tablet Take 5 tablets by  "mouth Daily.      clindamycin 1 % gel APPLY TO THE FACE TWICE DAILY AS NEEDED (Patient not taking: Reported on 12/23/2024)      EPINEPHrine (EPIPEN) 0.3 MG/0.3ML solution auto-injector injection  (Patient not taking: Reported on 12/23/2024)       No facility-administered medications prior to visit.     No opioid medication identified on active medication list. I have reviewed chart for other potential  high risk medication/s and harmful drug interactions in the elderly.      Aspirin is not on active medication list.  Aspirin use is not indicated based on review of current medical condition/s. Risk of harm outweighs potential benefits.  .    Patient Active Problem List   Diagnosis    Gastroesophageal reflux disease    Hyperlipidemia    Pyelonephritis    Sjogren's syndrome with keratoconjunctivitis sicca    Mcgill's esophagus without dysplasia    Hyperglycemia    WALSH (nonalcoholic steatohepatitis)    Pure hypercholesterolemia    Knee pain    Primary osteoarthritis of left knee    Encounter for screening for malignant neoplasm of colon    Personal history of colonic polyps    Effusion of left shoulder joint    Osteopenia    Prediabetes    Essential hypertension    Primary osteoarthritis of right knee    Knee instability, right    S/P total knee arthroplasty    Esophageal dysphagia    Weight loss     Advance Care Planning Advance Directive is not on file.  ACP discussion was held with the patient during this visit. Patient has an advance directive (not in EMR), copy requested.            Objective   Vitals:    12/23/24 0956   BP: 124/70   BP Location: Left arm   Patient Position: Sitting   Cuff Size: Adult   Pulse: 74   Temp: 96.4 °F (35.8 °C)   TempSrc: Infrared   SpO2: 98%   Weight: 71.2 kg (157 lb)   PainSc: 0-No pain       Estimated body mass index is 26.13 kg/m² as calculated from the following:    Height as of 11/19/24: 165.1 cm (65\").    Weight as of this encounter: 71.2 kg (157 lb).                Does the " patient have evidence of cognitive impairment? No  Lab Results   Component Value Date    CHLPL 221 (H) 2024    TRIG 123 2024    HDL 72 (H) 2024     (H) 2024    VLDL 21 2024    HGBA1C 5.40 2024                                                                                                Health  Risk Assessment    Smoking Status:  Social History     Tobacco Use   Smoking Status Former    Current packs/day: 0.00    Average packs/day: 0.5 packs/day for 19.0 years (9.5 ttl pk-yrs)    Types: Cigarettes    Start date: 3/22/1971    Quit date: 1990    Years since quittin.7    Passive exposure: Never   Smokeless Tobacco Never     Alcohol Consumption:  Social History     Substance and Sexual Activity   Alcohol Use Yes    Alcohol/week: 2.0 standard drinks of alcohol    Types: 2 Glasses of wine per week    Comment: 2 glasses of wine / daily       Fall Risk Screen  JEFFADI Fall Risk Assessment was completed, and patient is at MODERATE risk for falls. Assessment completed on:2024    Depression Screening   Little interest or pleasure in doing things? Not at all   Feeling down, depressed, or hopeless? Not at all   PHQ-2 Total Score 0      Health Habits and Functional and Cognitive Screenin/23/2024     9:58 AM   Functional & Cognitive Status   Do you have difficulty preparing food and eating? No   Do you have difficulty bathing yourself, getting dressed or grooming yourself? No   Do you have difficulty using the toilet? No   Do you have difficulty moving around from place to place? No   Do you have trouble with steps or getting out of a bed or a chair? No   Current Diet Well Balanced Diet   Dental Exam Up to date   Eye Exam Up to date   Exercise (times per week) 3 times per week   Current Exercises Include Stationary Bicycling/Spin Class;Weightlifting   Do you need help using the phone?  No   Are you deaf or do you have serious difficulty hearing?  No   Do you need  help to go to places out of walking distance? No   Do you need help shopping? No   Do you need help preparing meals?  No   Do you need help with housework?  No   Do you need help with laundry? No   Do you need help taking your medications? No   Do you need help managing money? No   Do you ever drive or ride in a car without wearing a seat belt? No   Have you felt unusual stress, anger or loneliness in the last month? No   Who do you live with? Spouse   If you need help, do you have trouble finding someone available to you? No   Have you been bothered in the last four weeks by sexual problems? No   Do you have difficulty concentrating, remembering or making decisions? Yes           Age-appropriate Screening Schedule:  Refer to the list below for future screening recommendations based on patient's age, sex and/or medical conditions. Orders for these recommended tests are listed in the plan section. The patient has been provided with a written plan.    Health Maintenance List  Health Maintenance   Topic Date Due    TDAP/TD VACCINES (2 - Td or Tdap) 01/01/2025    GASTROSCOPY (EGD)  04/06/2025    LIPID PANEL  12/17/2025    ANNUAL WELLNESS VISIT  12/23/2025    BMI FOLLOWUP  12/23/2025    DXA SCAN  07/24/2026    COLORECTAL CANCER SCREENING  09/08/2026    HEPATITIS C SCREENING  Completed    COVID-19 Vaccine  Completed    RSV Vaccine - Adults  Completed    INFLUENZA VACCINE  Completed    Pneumococcal Vaccine 65+  Completed    ZOSTER VACCINE  Completed    MAMMOGRAM  Discontinued                                                                                                                                                CMS Preventative Services Quick Reference  Risk Factors Identified During Encounter  Fall Risk-High or Moderate: Discussed Fall Prevention in the home and Referral Placed for Physical Therapy  Immunizations Discussed/Encouraged: Tdap  Polypharmacy: Medication List reviewed and Medications are appropriate for  patient    The above risks/problems have been discussed with the patient.  Pertinent information has been shared with the patient in the After Visit Summary.  An After Visit Summary and PPPS were made available to the patient.    Follow Up:   Next Medicare Wellness visit to be scheduled in 1 year.         Additional E&M Note during same encounter follows:  Patient has additional, significant, and separately identifiable condition(s)/problem(s) that require work above and beyond the Medicare Wellness Visit     Chief Complaint  Medicare Wellness-subsequent    Subjective   HPI  Germaine is also being seen today for additional medical problem/s.    She feels off balance when she is walking.  No sx with position changes.  She has arthritis in her knee and recently had it injected by ortho.  She occ has nausea but not regularly.    HTN - currently on Lisinopril 10 mg daily. She does not check her BP at home. No chest pain, dizziness, headaches, or lightheadedness.     HLD - currently on Rosuvastatin 20 mg daily. No issues with muscle cramps or pain.     GERD - no issues with reflux. Currently on Prilosec.     Anxiety - Taking lexapro 5 mg daily. Anxiety symptoms seem to be well controlled on this.    She stays active by going to [employer] every other day.     Review of Systems   Constitutional: Negative.    HENT: Negative.     Eyes: Negative.    Respiratory: Negative.     Cardiovascular: Negative.    Gastrointestinal: Negative.    Endocrine: Negative.    Musculoskeletal: Negative.    Skin: Negative.    Allergic/Immunologic: Negative.    Neurological: Negative.    Hematological: Negative.    Psychiatric/Behavioral: Negative.     All other systems reviewed and are negative.         Objective   Vital Signs:  /70 (BP Location: Left arm, Patient Position: Sitting, Cuff Size: Adult)   Pulse 74   Temp 96.4 °F (35.8 °C) (Infrared)   Wt 71.2 kg (157 lb)   SpO2 98%   BMI 26.13 kg/m²   Physical Exam  Vitals and nursing  note reviewed.   Constitutional:       General: She is not in acute distress.     Appearance: She is well-developed.   HENT:      Head: Normocephalic and atraumatic.      Right Ear: External ear normal.      Left Ear: External ear normal.      Nose: Nose normal.   Eyes:      Conjunctiva/sclera: Conjunctivae normal.      Pupils: Pupils are equal, round, and reactive to light.   Cardiovascular:      Rate and Rhythm: Normal rate and regular rhythm.      Heart sounds: Normal heart sounds.   Pulmonary:      Effort: Pulmonary effort is normal. No respiratory distress.      Breath sounds: Normal breath sounds. No wheezing.   Musculoskeletal:         General: Normal range of motion.      Cervical back: Normal range of motion and neck supple.      Comments: Normal gait   Skin:     General: Skin is warm and dry.   Neurological:      General: No focal deficit present.      Mental Status: She is alert and oriented to person, place, and time.   Psychiatric:         Mood and Affect: Mood normal.         Behavior: Behavior normal.         Thought Content: Thought content normal.         Judgment: Judgment normal.         The following data was reviewed by: Roberta Boswell MD on 12/23/2024:    Common labs          6/7/2024    09:01 6/17/2024    22:59 12/17/2024    08:27   Common Labs   Glucose 95   88    BUN 13   14    Creatinine 0.98   0.99    Sodium 141   140    Potassium 5.0   4.5    Chloride 104   104    Calcium 9.6   9.9    Total Protein 6.8   7.0    Albumin 4.5   4.4    Total Bilirubin 0.7   0.7    Alkaline Phosphatase 79   87    AST (SGOT) 28   35    ALT (SGPT) 24   31    WBC 4.32  9.67     3.78    Hemoglobin 14.0  12.9     14.4    Hematocrit 40.9  35.9     42.6    Platelets 199  207     198    Total Cholesterol 205   221    Triglycerides 134   123    HDL Cholesterol 65   72    LDL Cholesterol  117   128    Hemoglobin A1C 5.50   5.40       Details          This result is from an external source.                      Assessment and Plan     Diagnoses and all orders for this visit:    1. Medicare annual wellness visit, subsequent (Primary)    2. Poor balance - increased fall risk  -     Ambulatory Referral to Physical Therapy for Evaluation & Treatment    3. Essential hypertension - cont lisinopril  -     CBC & Differential; Future  -     Comprehensive Metabolic Panel; Future  -     Hemoglobin A1c; Future  -     Lipid Panel With LDL / HDL Ratio; Future  -     TSH; Future  -     T4, Free; Future    4. Mixed hyperlipidemia - cont crestor  -     Comprehensive Metabolic Panel; Future  -     Lipid Panel With LDL / HDL Ratio; Future    5. Gastroesophageal reflux disease with esophagitis, unspecified whether hemorrhage - cont PPI    6. Sjogren's syndrome without extraglandular involvement    7. Prediabetes  -     CBC & Differential; Future  -     Comprehensive Metabolic Panel; Future  -     Hemoglobin A1c; Future  -     Lipid Panel With LDL / HDL Ratio; Future  -     TSH; Future  -     T4, Free; Future    8. Anxiety - cont lexapro            Poor balance    Orders:    Ambulatory Referral to Physical Therapy for Evaluation & Treatment    Medicare annual wellness visit, subsequent         Essential hypertension      Orders:    CBC & Differential; Future    Comprehensive Metabolic Panel; Future    Hemoglobin A1c; Future    Lipid Panel With LDL / HDL Ratio; Future    TSH; Future    T4, Free; Future    Mixed hyperlipidemia       Orders:    Comprehensive Metabolic Panel; Future    Lipid Panel With LDL / HDL Ratio; Future    Gastroesophageal reflux disease with esophagitis, unspecified whether hemorrhage         Sjogren's syndrome without extraglandular involvement         Prediabetes    Orders:    CBC & Differential; Future    Comprehensive Metabolic Panel; Future    Hemoglobin A1c; Future    Lipid Panel With LDL / HDL Ratio; Future    TSH; Future    T4, Free; Future    Anxiety                 Follow Up   No follow-ups on file.  Patient was  given instructions and counseling regarding her condition or for health maintenance advice. Please see specific information pulled into the AVS if appropriate.

## 2024-12-26 DIAGNOSIS — K21.00 GASTROESOPHAGEAL REFLUX DISEASE WITH ESOPHAGITIS, UNSPECIFIED WHETHER HEMORRHAGE: ICD-10-CM

## 2024-12-26 RX ORDER — OMEPRAZOLE 40 MG/1
40 CAPSULE, DELAYED RELEASE ORAL 2 TIMES DAILY
Qty: 180 CAPSULE | Refills: 0 | Status: SHIPPED | OUTPATIENT
Start: 2024-12-26

## 2024-12-26 NOTE — TELEPHONE ENCOUNTER
Rx Refill Note  Requested Prescriptions     Pending Prescriptions Disp Refills    omeprazole (priLOSEC) 40 MG capsule [Pharmacy Med Name: Omeprazole 40 MG Oral Capsule Delayed Release] 180 capsule 0     Sig: Take 1 capsule by mouth twice daily      Last office visit with prescribing clinician: 12/23/2024   Last telemedicine visit with prescribing clinician: Visit date not found   Next office visit with prescribing clinician: 6/30/2025                         Would you like a call back once the refill request has been completed: [] Yes [] No    If the office needs to give you a call back, can they leave a voicemail: [] Yes [] No    Jolene Ellison MA  12/26/24, 10:42 EST

## 2025-01-15 ENCOUNTER — HOSPITAL ENCOUNTER (OUTPATIENT)
Dept: PHYSICAL THERAPY | Facility: HOSPITAL | Age: 80
Setting detail: THERAPIES SERIES
Discharge: HOME | End: 2025-01-15
Payer: MEDICARE

## 2025-01-15 DIAGNOSIS — R26.89 POOR BALANCE: Primary | ICD-10-CM

## 2025-01-15 PROCEDURE — 97161 PT EVAL LOW COMPLEX 20 MIN: CPT

## 2025-01-15 NOTE — THERAPY EVALUATION
.Outpatient Physical Therapy Neuro Initial Evaluation   [place]     Patient Name: Germaine Gonzalez  : 1945  MRN: [id number]  Today's Date: 1/15/2025      Visit Date: 01/15/2025    Patient Active Problem List   Diagnosis    Gastroesophageal reflux disease    Hyperlipidemia    Pyelonephritis    Sjogren's syndrome with keratoconjunctivitis sicca    Mcgill's esophagus without dysplasia    Hyperglycemia    WALSH (nonalcoholic steatohepatitis)    Pure hypercholesterolemia    Knee pain    Primary osteoarthritis of left knee    Encounter for screening for malignant neoplasm of colon    Personal history of colonic polyps    Effusion of left shoulder joint    Osteopenia    Prediabetes    Essential hypertension    Primary osteoarthritis of right knee    Knee instability, right    S/P total knee arthroplasty    Esophageal dysphagia    Weight loss        Past Medical History:   Diagnosis Date    Arthritis     knees    Mcgill esophagus     Cancer     cervical    Cervical cancer     Colon polyp     Eczema     Elevated liver enzymes     GERD (gastroesophageal reflux disease)     H/O stress fracture     Health care maintenance     Hypercholesteremia     Hypertension     Kidney stones     sched ureteroscopy, lithotripsy    Knee swelling     Ocular migraine     PONV (postoperative nausea and vomiting)     Postmenopausal     Seasonal allergies     Sjogren's syndrome     autoimmune disorder         Past Surgical History:   Procedure Laterality Date     SECTION      x2    COLONOSCOPY      COLONOSCOPY N/A 2021    Procedure: COLONOSCOPY;  Surgeon: Bc Sanchez MD;  Location: [facility] OR;  Service: Gastroenterology;  Laterality: N/A;  Diverticulosis and polyps    CYSTOSCOPY URETEROSCOPY STONE MANIPULATION/EXTRACTION Left 10/14/2016    Procedure: LEFT URETEROSCOPY bilateral retrograde pyleogram LEFT STENT PLACEMENT.;  Surgeon: Julius Darling MD;  Location: [facility] OR;  Service:     CYSTOSCOPY W/  URETERAL STENT PLACEMENT Right 8/3/2018    Procedure: CYSTOSCOPY URETERAL CATHETER/STENT INSERTION;  Surgeon: Farzad Cordova MD;  Location: [facility] OR;  Service: Urology    ENDOSCOPY N/A 4/10/2019    Procedure: ESOPHAGOGASTRODUODENOSCOPY;  Surgeon: Bc Sanchez MD;  Location: [facility] OR;  Service: Gastroenterology    ENDOSCOPY N/A 4/6/2022    Procedure: ESOPHAGOGASTRODUODENOSCOPY WITH BIOPSY;  Surgeon: Bc Sanchez MD;  Location: [facility] OR;  Service: Gastroenterology;  Laterality: N/A;  gastritis, pulliam's    HYSTERECTOMY      TOTAL KNEE ARTHROPLASTY Right 2/8/2022    Procedure: TOTAL KNEE ARTHROPLASTY AND ALL ASSOCIATED PROCEDURES;  Surgeon: Jovany Villanueva MD;  Location: [facility] OR;  Service: Orthopedics;  Laterality: Right;    UPPER GASTROINTESTINAL ENDOSCOPY      URETEROSCOPY LASER LITHOTRIPSY WITH STENT INSERTION Left 10/26/2016    Procedure: LT URETEROSCOPY LASER LITHOTRIPSY ;  Surgeon: Julius Darling MD;  Location: [facility];  Service:          Visit Dx:     ICD-10-CM ICD-9-CM   1. Poor balance  R26.89 781.99        Patient History       Row Name 01/15/25 1100             History    Chief Complaint Balance Problems;Difficulty Walking;Falls/history of falls  -JV      Date Current Problem(s) Began 12/23/24  -JV      Brief Description of Current Complaint This pt is referred by PCP for PT eval and tx of poor balance.  -JV      Patient/Caregiver Goals Return to prior level of function;Improve mobility  -JV      Current Tobacco Use no  -JV      Patient's Rating of General Health Good  -JV      Hand Dominance right-handed  -JV      Occupation/sports/leisure activities sewing  -JV      Patient seeing anyone else for problem(s)? Ortho  -JV      Related/Recent Hospitalizations No  -JV      Are you or can you be pregnant No  -JV         Fall Risk Assessment    Any falls in the past year: Yes  -JV      Number of falls reported in the last 12 months 3  -JV      Factors that  "contributed to the fall: Lost balance;Uneven surface  -JV      Does patient have a fear of falling Yes (comment)  -JV         Services    Are you currently receiving Home Health services No  -JV      Do you plan to receive Home Health services in the near future No  -JV         Daily Activities    Primary Language English  -JV      Are you able to read Yes  -JV      Are you able to write Yes  -JV      How does patient learn best? Reading  -JV      Teaching needs identified Home Exercise Program  -JV      Patient is concerned about/has problems with Walking  -JV      Does patient have problems with the following? Anxiety  -JV      Barriers to learning None  -JV      Pt Participated in POC and Goals Yes  -JV         Safety    Are you being hurt, hit, or frightened by anyone at home or in your life? No  -JV      Are you being neglected by a caregiver No  -JV                User Key  (r) = Recorded By, (t) = Taken By, (c) = Cosigned By      Initials Name Provider Type    JV Thania Beverly, PT Physical Therapist                         PT Neuro       Row Name 01/15/25 1100             Subjective    Subjective Comments Pt reports feeling \"unsteady\" when walking, annia on uneven surfaces.  -JV         Precautions and Contraindications    Precautions/Limitations fall precautions  -JV         Subjective Pain    Able to rate subjective pain? yes  -JV      Pre-Treatment Pain Level 0  -JV      Post-Treatment Pain Level 0  -JV         Home Living    Current Living Arrangements home  -JV      Home Accessibility stairs within home  -JV         Vision-Basic Assessment    Current Vision Wears contacts  -JV         Cognition    Overall Cognitive Status WFL  -JV      Orientation Level Oriented X4  -JV         Sensation    Light Touch No apparent deficits  -JV         General ROM    GENERAL ROM COMMENTS Satya LE ROM is WFL's  -JV         MMT (Manual Muscle Testing)    General MMT Comments Satya LE's 4+/5 throughout  -JV         Bed Mobility "    Comment, (Bed Mobility) NT  -JV         Transfers    Comment, (Transfers) Independent  -JV         Gait/Stairs (Locomotion)    Swain Level (Gait) independent  -JV      Distance in Feet (Gait) 500  -JV      Swain Level (Stairs) not tested  -JV         Balance Skills Training    Balance Comments See MAY  -JV                User Key  (r) = Recorded By, (t) = Taken By, (c) = Cosigned By      Initials Name Provider Type    Thania Lozoya PT Physical Therapist                            Therapy Education  Education Details: balance progression  Given: HEP  Program: New  How Provided: Verbal, Demonstration, Written  Provided to: Patient, Caregiver  Level of Understanding: Teach back education performed, Verbalized, Demonstrated     PT OP Goals       Row Name 01/15/25 1100          PT Short Term Goals    STG Date to Achieve 01/29/25  -JV     STG 1 Pt will be I with HEP  -JV        Long Term Goals    LTG Date to Achieve 02/14/25  -JV     LTG 1 Improve MAY score by 1-2 points  -JV        Time Calculation    PT Goal Re-Cert Due Date 02/14/25  -JV               User Key  (r) = Recorded By, (t) = Taken By, (c) = Cosigned By      Initials Name Provider Type    Thania Lozoya, PT Physical Therapist                     PT Assessment/Plan       Row Name 01/15/25 1345          PT Assessment    Functional Limitations Decreased safety during functional activities  -JV     Impairments Balance  -JV     Please refer to paper survey for additional self-reported information Yes  -JV     Rehab Potential Good  -JV     Patient/caregiver participated in establishment of treatment plan and goals Yes  -JV     Patient would benefit from skilled therapy intervention Yes  -JV        PT Plan    PT Frequency Other (comment)  Bi-weekly  -JV     Predicted Duration of Therapy Intervention (PT) 4-6 weeks  -JV     Planned CPT's? PT EVAL LOW COMPLEXITY: 56461;PT THER PROC EA 15 MIN: 87650;PT THER ACT EA 15 MIN: 57121;PT NEUROMUSC  "RE-EDUCATION EA 15 MIN: 88855;PT GAIT TRAINING EA 15 MIN: 82070  -JV     PT Plan Comments Plan to see pt as noted above to help progress to an optimal and safe level of functional independence in her environment.  -JV               User Key  (r) = Recorded By, (t) = Taken By, (c) = Cosigned By      Initials Name Provider Type    Thania Lozoya, FERNIE Physical Therapist                        OP Exercises       Row Name 01/15/25 1100             Subjective    Subjective Comments Pt reports feeling \"unsteady\" when walking, annia on uneven surfaces.  -JV         Subjective Pain    Able to rate subjective pain? yes  -JV      Pre-Treatment Pain Level 0  -JV      Post-Treatment Pain Level 0  -JV         Exercise 1    Exercise Name 1 Pt performed balance progression in corner using normal size Vance including head mov't with eyes open and closed.  -JV                User Key  (r) = Recorded By, (t) = Taken By, (c) = Cosigned By      Initials Name Provider Type    Thania Lozoya, PT Physical Therapist                                Outcome Measure Options: Skelton Balance, Lower Extremity Functional Scale (LEFS)  Skelton Balance Scale  Sitting to Standing: able to stand without using hands and stabilize independently  Standing Unsupported: able to stand safely for 2 minutes  Sitting with Back Unsupported but Feet Supported on Floor or on Stool: able to sit safely and securely for 2 minutes  Standing to Sitting: sits safely with minimal use of hands  Transfers: able to transfer safely with minor use of hands  Standing Unsupported with Eyes Closed: able to stand 10 seconds safely  Standing Unsupported with Feet Together: able to place feet together independently and stand 1 minute safely  Reaching Forward with Outstretched Arm While Standing: can reach forward confidently 25 cm (10 inches)   Object From the Floor From a Standing Position: able to  object safely and easily  Turning to Look Behind Over Left and Right " Shoulders While Standing: looks behind from both sides and weight shifts well  Turn 360 Degrees: able to turn 360 degrees safely in 4 seconds or less  Place Alternate Foot on Step or Stool While Standing Unsupported: able to stand independently and safely and complete 8 steps in 20 seconds  Standing Unsupported with One Foot in Front: able to place foot tandem independently and hold 30 seconds  Standing on One Leg: able to lift leg independently and hold >/equal to 3 seconds  Skelton Total Score: 54  Lower Extremity Functional Index  Any of your usual work, housework or school activities: No difficulty  Your usual hobbies, recreational or sporting activities: No difficulty  Getting into or out of the bath: No difficulty  Walking between rooms: No difficulty  Putting on your shoes or socks: No difficulty  Squatting: A little bit of difficulty  Lifting an object, like a bag of groceries from the floor: No difficulty  Performing light activities around your home: No difficulty  Performing heavy activities around your home: No difficulty  Getting into or out of a car: No difficulty  Walking 2 blocks: A little bit of difficulty  Walking a mile: Moderate difficulty  Going up or down 10 stairs (about 1 flight of stairs): A little bit of difficulty  Standing for 1 hour: Moderate difficulty  Sitting for 1 hour: No difficulty  Running on even ground: Moderate difficulty  Running on uneven ground: Moderate difficulty  Making sharp turns while running fast: Extreme difficulty or unable to perform activity  Hopping: Extreme difficulty or unable to perform activity  Rolling over in bed: No difficulty  Total: 61    Time Calculation:   Start Time: 1100  Stop Time: 1200  Time Calculation (min): 60 min   Therapy Charges for Today       Code Description Service Date Service Provider Modifiers Qty    96029117590 HC PT EVAL LOW COMPLEXITY 4 1/15/2025 Thania Beverly, PT GP 1            PT G-Codes  Outcome Measure Options: Skelton Balance,  Lower Extremity Functional Scale (LEFS)  Skelton Total Score: 54  Total: 61         Thania Beverly, PT  1/15/2025

## 2025-01-15 NOTE — THERAPY EVALUATION
.Outpatient Physical Therapy Neuro Initial Evaluation   [facility]     Patient Name: Germaine Gonzalez  : 1945  MRN: [id number]  Today's Date: 1/15/2025      Visit Date: 01/15/2025    Patient Active Problem List   Diagnosis    Gastroesophageal reflux disease    Hyperlipidemia    Pyelonephritis    Sjogren's syndrome with keratoconjunctivitis sicca    Mcgill's esophagus without dysplasia    Hyperglycemia    WALSH (nonalcoholic steatohepatitis)    Pure hypercholesterolemia    Knee pain    Primary osteoarthritis of left knee    Encounter for screening for malignant neoplasm of colon    Personal history of colonic polyps    Effusion of left shoulder joint    Osteopenia    Prediabetes    Essential hypertension    Primary osteoarthritis of right knee    Knee instability, right    S/P total knee arthroplasty    Esophageal dysphagia    Weight loss        Past Medical History:   Diagnosis Date    Arthritis     knees    Mcgill esophagus     Cancer     cervical    Cervical cancer     Colon polyp     Eczema     Elevated liver enzymes     GERD (gastroesophageal reflux disease)     H/O stress fracture     Health care maintenance     Hypercholesteremia     Hypertension     Kidney stones     sched ureteroscopy, lithotripsy    Knee swelling     Ocular migraine     PONV (postoperative nausea and vomiting)     Postmenopausal     Seasonal allergies     Sjogren's syndrome     autoimmune disorder         Past Surgical History:   Procedure Laterality Date     SECTION      x2    COLONOSCOPY      COLONOSCOPY N/A 2021    Procedure: COLONOSCOPY;  Surgeon: Bc Sanchez MD;  Location: [facility] OR;  Service: Gastroenterology;  Laterality: N/A;  Diverticulosis and polyps    CYSTOSCOPY URETEROSCOPY STONE MANIPULATION/EXTRACTION Left 10/14/2016    Procedure: LEFT URETEROSCOPY bilateral retrograde pyleogram LEFT STENT PLACEMENT.;  Surgeon: Julius Darling MD;  Location: [facility] OR;  Service:     CYSTOSCOPY W/  URETERAL STENT PLACEMENT Right 8/3/2018    Procedure: CYSTOSCOPY URETERAL CATHETER/STENT INSERTION;  Surgeon: Farzad Cordova MD;  Location: [facility] OR;  Service: Urology    ENDOSCOPY N/A 4/10/2019    Procedure: ESOPHAGOGASTRODUODENOSCOPY;  Surgeon: Bc Sanchez MD;  Location: [facility] OR;  Service: Gastroenterology    ENDOSCOPY N/A 4/6/2022    Procedure: ESOPHAGOGASTRODUODENOSCOPY WITH BIOPSY;  Surgeon: Bc Sanchez MD;  Location: [facility] OR;  Service: Gastroenterology;  Laterality: N/A;  gastritis, pulliam's    HYSTERECTOMY      TOTAL KNEE ARTHROPLASTY Right 2/8/2022    Procedure: TOTAL KNEE ARTHROPLASTY AND ALL ASSOCIATED PROCEDURES;  Surgeon: Jovany Villanueva MD;  Location: [facility] OR;  Service: Orthopedics;  Laterality: Right;    UPPER GASTROINTESTINAL ENDOSCOPY      URETEROSCOPY LASER LITHOTRIPSY WITH STENT INSERTION Left 10/26/2016    Procedure: LT URETEROSCOPY LASER LITHOTRIPSY ;  Surgeon: Juluis Darling MD;  Location: [facility];  Service:          Visit Dx:     ICD-10-CM ICD-9-CM   1. Poor balance  R26.89 781.99        Patient History       Row Name 01/15/25 1100             History    Chief Complaint Balance Problems;Difficulty Walking;Falls/history of falls  -JV      Date Current Problem(s) Began 12/23/24  -JV      Brief Description of Current Complaint This pt is referred by PCP for PT eval and tx of poor balance.  -JV      Patient/Caregiver Goals Return to prior level of function;Improve mobility  -JV      Current Tobacco Use no  -JV      Patient's Rating of General Health Good  -JV      Hand Dominance right-handed  -JV      Occupation/sports/leisure activities sewing  -JV      Patient seeing anyone else for problem(s)? Ortho  -JV      Related/Recent Hospitalizations No  -JV      Are you or can you be pregnant No  -JV         Fall Risk Assessment    Any falls in the past year: Yes  -JV      Number of falls reported in the last 12 months 3  -JV      Factors that  "contributed to the fall: Lost balance;Uneven surface  -JV      Does patient have a fear of falling Yes (comment)  -JV         Services    Are you currently receiving Home Health services No  -JV      Do you plan to receive Home Health services in the near future No  -JV         Daily Activities    Primary Language English  -JV      Are you able to read Yes  -JV      Are you able to write Yes  -JV      How does patient learn best? Reading  -JV      Teaching needs identified Home Exercise Program  -JV      Patient is concerned about/has problems with Walking  -JV      Does patient have problems with the following? Anxiety  -JV      Barriers to learning None  -JV      Pt Participated in POC and Goals Yes  -JV         Safety    Are you being hurt, hit, or frightened by anyone at home or in your life? No  -JV      Are you being neglected by a caregiver No  -JV                User Key  (r) = Recorded By, (t) = Taken By, (c) = Cosigned By      Initials Name Provider Type    JV Thania Beverly, PT Physical Therapist                         PT Neuro       Row Name 01/15/25 1100             Subjective    Subjective Comments Pt reports feeling \"unsteady\" when walking, annia on uneven surfaces.  -JV         Precautions and Contraindications    Precautions/Limitations fall precautions  -JV         Subjective Pain    Able to rate subjective pain? yes  -JV      Pre-Treatment Pain Level 0  -JV      Post-Treatment Pain Level 0  -JV         Home Living    Current Living Arrangements home  -JV      Home Accessibility stairs within home  -JV         Vision-Basic Assessment    Current Vision Wears contacts  -JV         Cognition    Overall Cognitive Status WFL  -JV      Orientation Level Oriented X4  -JV         Sensation    Light Touch No apparent deficits  -JV         General ROM    GENERAL ROM COMMENTS Satya LE ROM is WFL's  -JV         MMT (Manual Muscle Testing)    General MMT Comments Satya LE's 4+/5 throughout  -JV         Bed Mobility " "   Comment, (Bed Mobility) NT  -JV         Transfers    Comment, (Transfers) Independent  -JV         Gait/Stairs (Locomotion)    [place] Level (Gait) independent  -JV      Distance in Feet (Gait) 500  -JV      [place] Level (Stairs) not tested  -JV         Balance Skills Training    Balance Comments See MAY  -JV                User Key  (r) = Recorded By, (t) = Taken By, (c) = Cosigned By      Initials Name Provider Type    Thania Lozoya PT Physical Therapist                            Therapy Education  Education Details: balance progression  Given: HEP  Program: New  How Provided: Verbal, Demonstration, Written  Provided to: Patient, Caregiver  Level of Understanding: Teach back education performed, Verbalized, Demonstrated     PT OP Goals       Row Name 01/15/25 1100          PT Short Term Goals    STG Date to Achieve 01/29/25  -JV     STG 1 Pt will be I with HEP  -JV        Long Term Goals    LTG Date to Achieve 02/14/25  -JV     LTG 1 Improve MAY score by 1-2 points  -JV        Time Calculation    PT Goal Re-Cert Due Date 02/14/25  -JV               User Key  (r) = Recorded By, (t) = Taken By, (c) = Cosigned By      Initials Name Provider Type    Thania Lozoya, PT Physical Therapist                     PT Assessment/Plan       Row Name 01/15/25 1351 01/15/25 1345       PT Assessment    Functional Limitations -- Decreased safety during functional activities  -JV    Impairments -- Balance  -JV    Assessment Comments This pt is a 78 y/o F referred by PCP for PT eval and tx of poor balance. The pt reports 3 falls in the last year, and feeling \"unsteady\" when walking annia on uneven surfaces. The pt is physically active and goes to the gym every other day. Pt does not use an AD and is able to amb up/down stairs to her bedroom using a reciprocal pattern. PMH is sig for R TKA and primary OA of the L knee. Pt is followed by Ortho and L knee was injected on 11/19/24 with good results. Satya LE strength " "is 4+/5, MAY balance score is 54/56.  -JV --    Please refer to paper survey for additional self-reported information -- Yes  -JV    Rehab Potential -- Good  -JV    Patient/caregiver participated in establishment of treatment plan and goals -- Yes  -JV    Patient would benefit from skilled therapy intervention -- Yes  -JV       PT Plan    PT Frequency -- Other (comment)  Bi-weekly  -JV    Predicted Duration of Therapy Intervention (PT) -- 4-6 weeks  -JV    Planned CPT's? -- PT EVAL LOW COMPLEXITY: 37227;PT THER PROC EA 15 MIN: 35225;PT THER ACT EA 15 MIN: 16521;PT NEUROMUSC RE-EDUCATION EA 15 MIN: 89986;PT GAIT TRAINING EA 15 MIN: 69865  -JV    PT Plan Comments -- Plan to see pt as noted above to help progress to an optimal and safe level of functional independence in her environment.  -JV              User Key  (r) = Recorded By, (t) = Taken By, (c) = Cosigned By      Initials Name Provider Type    Thania Lozoya PT Physical Therapist                        OP Exercises       Row Name 01/15/25 1100             Subjective    Subjective Comments Pt reports feeling \"unsteady\" when walking, annia on uneven surfaces.  -JV         Subjective Pain    Able to rate subjective pain? yes  -JV      Pre-Treatment Pain Level 0  -JV      Post-Treatment Pain Level 0  -JV         Exercise 1    Exercise Name 1 Pt performed balance progression in corner using normal size Vance including head mov't with eyes open and closed.  -JV                User Key  (r) = Recorded By, (t) = Taken By, (c) = Cosigned By      Initials Name Provider Type    Thania Lozoya, PT Physical Therapist                                Outcome Measure Options: May Balance, Lower Extremity Functional Scale (LEFS)  May Balance Scale  Sitting to Standing: able to stand without using hands and stabilize independently  Standing Unsupported: able to stand safely for 2 minutes  Sitting with Back Unsupported but Feet Supported on Floor or on Stool: able to sit " safely and securely for 2 minutes  Standing to Sitting: sits safely with minimal use of hands  Transfers: able to transfer safely with minor use of hands  Standing Unsupported with Eyes Closed: able to stand 10 seconds safely  Standing Unsupported with Feet Together: able to place feet together independently and stand 1 minute safely  Reaching Forward with Outstretched Arm While Standing: can reach forward confidently 25 cm (10 inches)   Object From the Floor From a Standing Position: able to  object safely and easily  Turning to Look Behind Over Left and Right Shoulders While Standing: looks behind from both sides and weight shifts well  Turn 360 Degrees: able to turn 360 degrees safely in 4 seconds or less  Place Alternate Foot on Step or Stool While Standing Unsupported: able to stand independently and safely and complete 8 steps in 20 seconds  Standing Unsupported with One Foot in Front: able to place foot tandem independently and hold 30 seconds  Standing on One Leg: able to lift leg independently and hold >/equal to 3 seconds  [place] Total Score: 54  Lower Extremity Functional Index  Any of your usual work, housework or school activities: No difficulty  Your usual hobbies, recreational or sporting activities: No difficulty  Getting into or out of the bath: No difficulty  Walking between rooms: No difficulty  Putting on your shoes or socks: No difficulty  Squatting: A little bit of difficulty  Lifting an object, like a bag of groceries from the floor: No difficulty  Performing light activities around your home: No difficulty  Performing heavy activities around your home: No difficulty  Getting into or out of a car: No difficulty  Walking 2 blocks: A little bit of difficulty  Walking a mile: Moderate difficulty  Going up or down 10 stairs (about 1 flight of stairs): A little bit of difficulty  Standing for 1 hour: Moderate difficulty  Sitting for 1 hour: No difficulty  Running on even ground:  Moderate difficulty  Running on uneven ground: Moderate difficulty  Making sharp turns while running fast: Extreme difficulty or unable to perform activity  Hopping: Extreme difficulty or unable to perform activity  Rolling over in bed: No difficulty  Total: 61    Time Calculation:   Start Time: 1100  Stop Time: 1200  Time Calculation (min): 60 min   Therapy Charges for Today       Code Description Service Date Service Provider Modifiers Qty    26096539452 HC PT EVAL LOW COMPLEXITY 4 1/15/2025 Thania Beverly, PT GP 1            PT G-Codes  Outcome Measure Options: Sk[place]ton Balance, Lower Extremity Functional Scale (LEFS)  [place] Total Score: 54  Total: 61         Thania Beverly, PT  1/15/2025

## 2025-01-29 ENCOUNTER — HOSPITAL ENCOUNTER (OUTPATIENT)
Dept: PHYSICAL THERAPY | Facility: HOSPITAL | Age: 80
Setting detail: THERAPIES SERIES
Discharge: HOME | End: 2025-01-29
Payer: MEDICARE

## 2025-01-29 DIAGNOSIS — R26.89 POOR BALANCE: Primary | ICD-10-CM

## 2025-01-29 PROCEDURE — 97112 NEUROMUSCULAR REEDUCATION: CPT

## 2025-01-29 PROCEDURE — 97116 GAIT TRAINING THERAPY: CPT

## 2025-01-29 NOTE — THERAPY TREATMENT NOTE
Outpatient Physical Therapy Neuro Treatment Note   [facility]     Patient Name: Germaine Gonzalez  : 1945  MRN: [id number]  Today's Date: 2025      Visit Date: 2025    Visit Dx:    ICD-10-CM ICD-9-CM   1. Poor balance  R26.89 781.99       Patient Active Problem List   Diagnosis    Gastroesophageal reflux disease    Hyperlipidemia    Pyelonephritis    Sjogren's syndrome with keratoconjunctivitis sicca    Mcgill's esophagus without dysplasia    Hyperglycemia    WALSH (nonalcoholic steatohepatitis)    Pure hypercholesterolemia    Knee pain    Primary osteoarthritis of left knee    Encounter for screening for malignant neoplasm of colon    Personal history of colonic polyps    Effusion of left shoulder joint    Osteopenia    Prediabetes    Essential hypertension    Primary osteoarthritis of right knee    Knee instability, right    S/P total knee arthroplasty    Esophageal dysphagia    Weight loss                        PT Assessment/Plan       Row Name 25 1124          PT Assessment    Functional Limitations Decreased safety during functional activities  -JV     Impairments Balance  -JV     Assessment Comments The pt presents for tx this date and reports compliance with HEP, mastery of balance progression using normal Vance. Pt/spouse instructed to master narrow Vance before attempting activities in semi-tandem stance. Pt worked on balance standing on blue foam pad using normal and narrow Vance including head movt with eyes open and static standing with eyes closed. Pt worked on stepping strategies in all directions with functional responses noted. Pt worked on dynamic gait activities including changes in speed, horizontal head turns, pivot turns, and stepping over obstacles. Admin TUG with times of 10.98 sec and 7.42 sec obtained, age norm is 7.7 +/- 2.3 sec.  -JV     Rehab Potential Good  -JV     Patient/caregiver participated in establishment of treatment plan and goals Yes  -JV     Patient would  benefit from skilled therapy intervention Yes  -JV        PT Plan    PT Plan Comments Cont plan to progress toward goals.  -JV               User Key  (r) = Recorded By, (t) = Taken By, (c) = Cosigned By      Initials Name Provider Type    Thania Lozoya PT Physical Therapist                        OP Exercises       Row Name 01/29/25 1000             Precautions    Existing Precautions/Restrictions fall  -JV         Subjective    Subjective Comments Pt reports compliance with HEP.  -JV         Exercise 1    Exercise Name 1 Verbally reviewed balance progression, pt/spouse instructed to master activities with narrow Vance before attempting semi-tandem stance.  -JV         Exercise 2    Exercise Name 2 Pt worked on balance standing on blue foam pad using normal and narrow Vance including head mov't with eyes open, static standing with eyes closed x 30 sec.  -JV         Exercise 3    Exercise Name 3 Pt worked on stepping strategies forward/rearward/lateral with functional responses noted.  -JV         Exercise 4    Exercise Name 4 Pt worked on dynamic gait challenges with changes in speed, horizontal head turns, pivot turns and stepping over obstacles.  -JV         Exercise 5    Exercise Name 5 admin TUG  -JV                User Key  (r) = Recorded By, (t) = Taken By, (c) = Cosigned By      Initials Name Provider Type    Thania Lozoya PT Physical Therapist                                    Therapy Education  Education Details: Pt instructed to master balance progression with narrow Vance before attempting semi-tandem stance. Work on compensatory stepping strategies in all directions, and dynamic gait challenges as demo today.  Given: HEP  Program: New, Reinforced  How Provided: Verbal, Demonstration, Written  Provided to: Patient, Caregiver  Level of Understanding: Teach back education performed, Verbalized, Demonstrated    Outcome Measure Options: Timed Up and Go (TUG)  Timed Up and Go (TUG)  TUG Test 1: 10.98  seconds  TUG Test 2: 7.42 seconds  Timed Up and Go Comments: 70-79 age norm 7.7 +/-2.3 sec      Time Calculation:   Start Time: 0910  Stop Time: 1010  Time Calculation (min): 60 min   Therapy Charges for Today       Code Description Service Date Service Provider Modifiers Qty    37296225798 HC PT NEUROMUSC RE EDUCATION EA 15 MIN 1/29/2025 Thania Beverly, PT GP 3    87148334856 HC GAIT TRAINING EA 15 MIN 1/29/2025 Thania Bevelry, PT GP 1            PT G-Codes  Outcome Measure Options: Timed Up and Go (TUG)  TUG Test 1: 10.98 seconds  TUG Test 2: 7.42 seconds         Thania Beverly PT  1/29/2025

## 2025-02-12 ENCOUNTER — APPOINTMENT (OUTPATIENT)
Dept: PHYSICAL THERAPY | Facility: HOSPITAL | Age: 80
End: 2025-02-12
Payer: MEDICARE

## 2025-02-13 ENCOUNTER — HOSPITAL ENCOUNTER (OUTPATIENT)
Dept: PHYSICAL THERAPY | Facility: HOSPITAL | Age: 80
Setting detail: THERAPIES SERIES
Discharge: HOME | End: 2025-02-13
Payer: MEDICARE

## 2025-02-13 DIAGNOSIS — R26.89 POOR BALANCE: Primary | ICD-10-CM

## 2025-02-13 PROCEDURE — 97112 NEUROMUSCULAR REEDUCATION: CPT

## 2025-02-13 NOTE — THERAPY TREATMENT NOTE
Outpatient Physical Therapy Neuro Treatment Note   [facility]     Patient Name: Germaine Gonzalez  : 1945  MRN: [id number]  Today's Date: 2025      Visit Date: 2025    Visit Dx:    ICD-10-CM ICD-9-CM   1. Poor balance  R26.89 781.99       Patient Active Problem List   Diagnosis    Gastroesophageal reflux disease    Hyperlipidemia    Pyelonephritis    Sjogren's syndrome with keratoconjunctivitis sicca    Mcgill's esophagus without dysplasia    Hyperglycemia    WALSH (nonalcoholic steatohepatitis)    Pure hypercholesterolemia    Knee pain    Primary osteoarthritis of left knee    Encounter for screening for malignant neoplasm of colon    Personal history of colonic polyps    Effusion of left shoulder joint    Osteopenia    Prediabetes    Essential hypertension    Primary osteoarthritis of right knee    Knee instability, right    S/P total knee arthroplasty    Esophageal dysphagia    Weight loss                        PT Assessment/Plan       Row Name 25 1338          PT Assessment    Functional Limitations Decreased safety during functional activities  -JV     Impairments Balance  -JV     Assessment Comments The pt presents for tx this date and reports ongoing compliance with HEP, mastery of balance progression using narrow Vance. Pt/spouse instructed to begin working in semi-tandem stance alternating leading leg. Pt worked on balance standing on blue foam pad using normal and narrow Vance. Pt worked on balance standing on BAPS board placed over 1/2 foam roll with AP and med/lat instability.  -JV     Rehab Potential Good  -JV     Patient/caregiver participated in establishment of treatment plan and goals Yes  -JV     Patient would benefit from skilled therapy intervention Yes  -JV        PT Plan    PT Frequency Other (comment)  1-2 times per month  -JV     PT Plan Comments Cont plan to progress toward goals.  -JV               User Key  (r) = Recorded By, (t) = Taken By, (c) = Cosigned By      Initials  Name Provider Type    Thania Lozoya PT Physical Therapist                        OP Exercises       Row Name 02/13/25 0900             Precautions    Existing Precautions/Restrictions fall  -JV         Subjective    Subjective Comments Pt reports compliance with HEP, no new falls.  -JV         Exercise 1    Exercise Name 1 Pt performed balance activities standing on blue foam pad using normal and narrow Vance.  -JV         Exercise 2    Exercise Name 2 Pt worked on balance standing on BAPS board placed over 1/2 foam roll with AP and med/lat instability  -JV         Exercise 3    Exercise Name 3 Verbally reviewed balance progression, instructed pt/spouse to work on semi-tandem stance, instructed to alternate leading leg.  -JV                User Key  (r) = Recorded By, (t) = Taken By, (c) = Cosigned By      Initials Name Provider Type    Thania Lozoya PT Physical Therapist                                    Therapy Education  Education Details: Cont HEP, work on balance progression using semi-tandem stance, alternate leading leg.  Program: Reinforced  How Provided: Verbal, Demonstration  Provided to: Patient, Caregiver  Level of Understanding: Teach back education performed, Verbalized, Demonstrated              Time Calculation:   Start Time: 0900  Stop Time: 0945  Time Calculation (min): 45 min   Therapy Charges for Today       Code Description Service Date Service Provider Modifiers Qty    28801130744 HC PT NEUROMUSC RE EDUCATION EA 15 MIN 2/13/2025 Thania Beverly, PT GP 3                      Thania Beverly PT  2/13/2025

## 2025-02-25 ENCOUNTER — TELEPHONE (OUTPATIENT)
Dept: GASTROENTEROLOGY | Facility: CLINIC | Age: 80
End: 2025-02-25
Payer: MEDICARE

## 2025-02-25 NOTE — TELEPHONE ENCOUNTER
[facility] staff attempted to follow warm transfer process and was unsuccessful     Caller: SAVITA REAVES     Relationship to patient: SELF    Best call back number: [phone]     Patient is needing: PT RECEIVED A LETTER TO MAKE AN APPOINTMENT PT ALREADY HAS ONE AND SHE HAS A RECALL PLEASE ADVISE AND CALL PT BACK RECALL DATE 3/1/25

## 2025-02-25 NOTE — TELEPHONE ENCOUNTER
Called pt had recall letter for an EGD last one was 4-6-22 with a 3 year recall, last colon was on 9-8-21 with a 5 year recall she was saying dr agarwal was wanting to combine them so she doesn't have to come in twice so wasn't for sure on how he is wanting to do this she has an office visit apt in June with any as well.

## 2025-03-28 ENCOUNTER — TRANSCRIBE ORDERS (OUTPATIENT)
Dept: ADMINISTRATIVE | Facility: HOSPITAL | Age: 80
End: 2025-03-28
Payer: MEDICARE

## 2025-03-28 DIAGNOSIS — N20.0 CALCULUS, RENAL: Primary | ICD-10-CM

## 2025-03-28 DIAGNOSIS — N30.20 OTHER CHRONIC CYSTITIS WITHOUT HEMATURIA: ICD-10-CM

## 2025-03-30 DIAGNOSIS — K21.00 GASTROESOPHAGEAL REFLUX DISEASE WITH ESOPHAGITIS, UNSPECIFIED WHETHER HEMORRHAGE: ICD-10-CM

## 2025-03-31 RX ORDER — OMEPRAZOLE 40 MG/1
40 CAPSULE, DELAYED RELEASE ORAL 2 TIMES DAILY
Qty: 180 CAPSULE | Refills: 1 | Status: SHIPPED | OUTPATIENT
Start: 2025-03-31

## 2025-03-31 RX ORDER — OMEPRAZOLE 40 MG/1
40 CAPSULE, DELAYED RELEASE ORAL 2 TIMES DAILY
Qty: 180 CAPSULE | Refills: 0 | OUTPATIENT
Start: 2025-03-31

## 2025-03-31 NOTE — TELEPHONE ENCOUNTER
Rx Refill Note  Requested Prescriptions     Pending Prescriptions Disp Refills    omeprazole (priLOSEC) 40 MG capsule [Pharmacy Med Name: Omeprazole 40 MG Oral Capsule Delayed Release] 180 capsule 1     Sig: Take 1 capsule by mouth twice daily      Last office visit with prescribing clinician: 12/23/2024   Last telemedicine visit with prescribing clinician: Visit date not found   Next office visit with prescribing clinician: 6/30/2025                         Would you like a call back once the refill request has been completed: [] Yes [] No    If the office needs to give you a call back, can they leave a voicemail: [] Yes [] No    Andrew Lindsay MA  03/31/25, 09:50 EDT

## 2025-05-15 ENCOUNTER — TELEPHONE (OUTPATIENT)
Dept: INTERNAL MEDICINE | Facility: CLINIC | Age: 80
End: 2025-05-15
Payer: MEDICARE

## 2025-05-15 NOTE — TELEPHONE ENCOUNTER
Name: GonazlezGermaine    Relationship: Self    Best Callback Number: [phone]     [facility] PROVIDED THE RELAY MESSAGE FROM THE OFFICE   PATIENT SCHEDULED AS REQUESTED    ADDITIONAL INFORMATION: RESCHEDULED TO 7/7/25

## 2025-05-15 NOTE — TELEPHONE ENCOUNTER
Ok for [facility] to read     Called pt to reschedule appt (6/30/2025 @ 9:30am) Provider out of office.   No answer. LVM to call back

## 2025-06-04 ENCOUNTER — OFFICE VISIT (OUTPATIENT)
Dept: INTERNAL MEDICINE | Facility: CLINIC | Age: 80
End: 2025-06-04
Payer: MEDICARE

## 2025-06-04 VITALS
BODY MASS INDEX: 25.63 KG/M2 | SYSTOLIC BLOOD PRESSURE: 102 MMHG | OXYGEN SATURATION: 96 % | HEART RATE: 75 BPM | TEMPERATURE: 98.7 F | DIASTOLIC BLOOD PRESSURE: 60 MMHG | WEIGHT: 154 LBS

## 2025-06-04 DIAGNOSIS — R11.0 NAUSEA: ICD-10-CM

## 2025-06-04 DIAGNOSIS — K11.20 PAROTITIS: ICD-10-CM

## 2025-06-04 DIAGNOSIS — M35.00 SJOGREN'S SYNDROME, WITH UNSPECIFIED ORGAN INVOLVEMENT: ICD-10-CM

## 2025-06-04 DIAGNOSIS — R22.1 THROAT SWELLING: Primary | ICD-10-CM

## 2025-06-04 LAB
EXPIRATION DATE: ABNORMAL
INTERNAL CONTROL: ABNORMAL
Lab: ABNORMAL
S PYO AG THROAT QL: POSITIVE

## 2025-06-04 RX ORDER — ERYTHROMYCIN 5 MG/G
OINTMENT OPHTHALMIC
COMMUNITY

## 2025-06-04 RX ORDER — ONDANSETRON 8 MG/1
8 TABLET, ORALLY DISINTEGRATING ORAL EVERY 8 HOURS PRN
Qty: 20 TABLET | Refills: 0 | Status: SHIPPED | OUTPATIENT
Start: 2025-06-04

## 2025-06-04 NOTE — PROGRESS NOTES
Germaine Gonzalez is a 80 y.o. female, who presents with a chief complaint of   Chief Complaint   Patient presents with    Facial Swelling     Jaw swelling started yesterday            Facial Swelling     Pt here bc of left neck swelling.  Last June pt had a severe episode of parotitis and was admitted at [facility] per ENT/Dr. Kc.  Pt's sx are not as bad this time as previous episode.  She also has a hx of Sjogren's syndrome.  No fever.  She is tolerating fluids at this time.  Pt currently hydrated but feels like she should be drinking more.         The following portions of the patient's history were reviewed and updated as appropriate: allergies, current medications, past family history, past medical history, past social history, past surgical history and problem list.    Allergies: Bee venom, Levofloxacin, and Tetracycline    Review of Systems   Constitutional: Negative.    HENT:  Positive for facial swelling.    Eyes: Negative.    Respiratory: Negative.     Cardiovascular: Negative.    Gastrointestinal: Negative.    Endocrine: Negative.    Genitourinary: Negative.    Musculoskeletal: Negative.    Skin: Negative.    Allergic/Immunologic: Negative.    Neurological: Negative.    Hematological: Negative.    Psychiatric/Behavioral: Negative.     All other systems reviewed and are negative.            Wt Readings from Last 3 Encounters:   06/04/25 69.9 kg (154 lb)   12/23/24 71.2 kg (157 lb)   11/19/24 72.6 kg (160 lb)     Temp Readings from Last 3 Encounters:   06/04/25 98.7 °F (37.1 °C) (Infrared)   12/23/24 96.4 °F (35.8 °C) (Infrared)   08/25/24 98.6 °F (37 °C) (Infrared)     BP Readings from Last 3 Encounters:   06/04/25 102/60   12/23/24 124/70   08/25/24 147/77     Pulse Readings from Last 3 Encounters:   06/04/25 75   12/23/24 74   08/25/24 73     Body mass index is 25.63 kg/m².  SpO2 Readings from Last 3 Encounters:   06/04/25 96%   12/23/24 98%   08/25/24 100%          Physical Exam  Vitals and nursing note  reviewed.   Constitutional:       General: She is not in acute distress.     Appearance: She is well-developed.   HENT:      Head: Normocephalic and atraumatic.      Right Ear: External ear normal.      Left Ear: External ear normal.      Nose: Nose normal.   Eyes:      Conjunctiva/sclera: Conjunctivae normal.      Pupils: Pupils are equal, round, and reactive to light.   Neck:      Comments: Left neck with swelling, tenderness, and redness.    Cardiovascular:      Rate and Rhythm: Normal rate and regular rhythm.      Heart sounds: Normal heart sounds.   Pulmonary:      Effort: Pulmonary effort is normal. No respiratory distress.      Breath sounds: Normal breath sounds. No wheezing.   Musculoskeletal:         General: Normal range of motion.      Comments: Normal gait   Skin:     General: Skin is warm and dry.   Neurological:      Mental Status: She is alert and oriented to person, place, and time.   Psychiatric:         Behavior: Behavior normal.         Thought Content: Thought content normal.         Judgment: Judgment normal.         Results for orders placed or performed in visit on 06/04/25   POC Rapid Strep A    Collection Time: 06/04/25 12:20 PM    Specimen: Swab   Result Value Ref Range    Rapid Strep A Screen Positive (A) Negative, VALID, INVALID, Not Performed    Internal Control Passed Passed    Lot Number [id number]     Expiration Date 4/24/26      Result Review :   The following data was reviewed by: Roberta Boswell MD on 06/04/2025:    Data reviewed : Consultant notes ent conult from [facility] June 2024, previous d/c summary, ent f/u note           Assessment and Plan    Diagnoses and all orders for this visit:    1. Throat swelling (Primary)  -     POC Rapid Strep A    2. Parotitis  -     CBC & Differential  -     Comprehensive Metabolic Panel  -     C-reactive protein  -     Amylase  -     amoxicillin-clavulanate (AUGMENTIN) 875-125 MG per tablet; Take 1 tablet by mouth 2 (Two) Times a Day for 10 days.   Dispense: 20 tablet; Refill: 0    3. Sjogren's syndrome, with unspecified organ involvement    4. Nausea  -     ondansetron ODT (ZOFRAN-ODT) 8 MG disintegrating tablet; Take 1 tablet by mouth Every 8 (Eight) Hours As Needed for Nausea or Vomiting.  Dispense: 20 tablet; Refill: 0         Plan for labs, po abx, and pushing fluids.  Pt voiced understanding of trial of outpatient therapy and is in agreement.  If any fever or worsening pt to go to [facility] downtown to be evaluated by ENT service.               Outpatient Medications Prior to Visit   Medication Sig Dispense Refill    Biotin 5000 MCG tablet Take  by mouth Daily.      calcium carbonate (OS-ULI) 600 MG tablet Take 1 tablet by mouth 2 (Two) Times a Day With Meals.      desoximetasone (TOPICORT) 0.05 % cream APPLY CREAM TOPICALLY TO AFFECTED AREA TWICE DAILY FOR ECZEMA FOR UP TO 14 DAYS      EPINEPHrine (EPIPEN) 0.3 MG/0.3ML solution auto-injector injection       erythromycin (ROMYCIN) 5 MG/GM ophthalmic ointment 5 mg/gram (0.5 %)      escitalopram (LEXAPRO) 5 MG tablet TAKE 1 TABLET BY MOUTH ONCE DAILY IN THE MORNING 90 tablet 3    estradiol (ESTRACE) 0.1 MG/GM vaginal cream APPLY ONE INCH RIBBON OF CREAM INTRAVAGINALLY THREE TIMES PER WEEK AT BEDTIME      lisinopril (PRINIVIL,ZESTRIL) 10 MG tablet Take 1 tablet by mouth once daily 90 tablet 1    omeprazole (priLOSEC) 40 MG capsule Take 1 capsule by mouth twice daily 180 capsule 1    rosuvastatin (CRESTOR) 20 MG tablet Take 1 tablet by mouth Daily. 90 tablet 3    vitamin B-12 (CYANOCOBALAMIN) 1000 MCG tablet Take 5 tablets by mouth Daily.      clindamycin 1 % gel APPLY TO THE FACE TWICE DAILY AS NEEDED (Patient not taking: Reported on 6/4/2025)       No facility-administered medications prior to visit.     New Medications Ordered This Visit   Medications    amoxicillin-clavulanate (AUGMENTIN) 875-125 MG per tablet     Sig: Take 1 tablet by mouth 2 (Two) Times a Day for 10 days.     Dispense:  20 tablet     Refill:   0    ondansetron ODT (ZOFRAN-ODT) 8 MG disintegrating tablet     Sig: Take 1 tablet by mouth Every 8 (Eight) Hours As Needed for Nausea or Vomiting.     Dispense:  20 tablet     Refill:  0     [unfilled]  There are no discontinued medications.      Return in about 2 days (around 6/6/2025) for Recheck.    Patient was given instructions and counseling regarding her condition or for health maintenance advice. Please see specific information pulled into the AVS if appropriate.

## 2025-06-05 ENCOUNTER — TELEPHONE (OUTPATIENT)
Dept: INTERNAL MEDICINE | Facility: CLINIC | Age: 80
End: 2025-06-05

## 2025-06-05 ENCOUNTER — TELEPHONE (OUTPATIENT)
Dept: INTERNAL MEDICINE | Facility: CLINIC | Age: 80
End: 2025-06-05
Payer: MEDICARE

## 2025-06-05 DIAGNOSIS — K11.20 PAROTITIS: Primary | ICD-10-CM

## 2025-06-05 LAB
ALBUMIN SERPL-MCNC: 4.9 G/DL (ref 3.5–5.2)
ALBUMIN/GLOB SERPL: 2 G/DL
ALP SERPL-CCNC: 104 U/L (ref 39–117)
ALT SERPL-CCNC: 38 U/L (ref 1–33)
AMYLASE SERPL-CCNC: 1115 U/L (ref 28–100)
AST SERPL-CCNC: 48 U/L (ref 1–32)
BASOPHILS # BLD AUTO: 0.03 10*3/MM3 (ref 0–0.2)
BASOPHILS NFR BLD AUTO: 0.5 % (ref 0–1.5)
BILIRUB SERPL-MCNC: 1.1 MG/DL (ref 0–1.2)
BUN SERPL-MCNC: 13 MG/DL (ref 8–23)
BUN/CREAT SERPL: 14.3 (ref 7–25)
CALCIUM SERPL-MCNC: 10.2 MG/DL (ref 8.6–10.5)
CHLORIDE SERPL-SCNC: 101 MMOL/L (ref 98–107)
CO2 SERPL-SCNC: 23.1 MMOL/L (ref 22–29)
CREAT SERPL-MCNC: 0.91 MG/DL (ref 0.57–1)
CRP SERPL-MCNC: <0.3 MG/DL (ref 0–0.5)
EGFRCR SERPLBLD CKD-EPI 2021: 64.7 ML/MIN/1.73
EOSINOPHIL # BLD AUTO: 0.07 10*3/MM3 (ref 0–0.4)
EOSINOPHIL NFR BLD AUTO: 1.1 % (ref 0.3–6.2)
ERYTHROCYTE [DISTWIDTH] IN BLOOD BY AUTOMATED COUNT: 12.5 % (ref 12.3–15.4)
GLOBULIN SER CALC-MCNC: 2.5 GM/DL
GLUCOSE SERPL-MCNC: 98 MG/DL (ref 65–99)
HCT VFR BLD AUTO: 41.8 % (ref 34–46.6)
HGB BLD-MCNC: 14.2 G/DL (ref 12–15.9)
IMM GRANULOCYTES # BLD AUTO: 0.03 10*3/MM3 (ref 0–0.05)
IMM GRANULOCYTES NFR BLD AUTO: 0.5 % (ref 0–0.5)
LYMPHOCYTES # BLD AUTO: 0.97 10*3/MM3 (ref 0.7–3.1)
LYMPHOCYTES NFR BLD AUTO: 14.9 % (ref 19.6–45.3)
MCH RBC QN AUTO: 33.2 PG (ref 26.6–33)
MCHC RBC AUTO-ENTMCNC: 34 G/DL (ref 31.5–35.7)
MCV RBC AUTO: 97.7 FL (ref 79–97)
MONOCYTES # BLD AUTO: 0.58 10*3/MM3 (ref 0.1–0.9)
MONOCYTES NFR BLD AUTO: 8.9 % (ref 5–12)
NEUTROPHILS # BLD AUTO: 4.81 10*3/MM3 (ref 1.7–7)
NEUTROPHILS NFR BLD AUTO: 74.1 % (ref 42.7–76)
NRBC BLD AUTO-RTO: 0 /100 WBC (ref 0–0.2)
PLATELET # BLD AUTO: 210 10*3/MM3 (ref 140–450)
POTASSIUM SERPL-SCNC: 4.3 MMOL/L (ref 3.5–5.2)
PROT SERPL-MCNC: 7.4 G/DL (ref 6–8.5)
RBC # BLD AUTO: 4.28 10*6/MM3 (ref 3.77–5.28)
SODIUM SERPL-SCNC: 138 MMOL/L (ref 136–145)
WBC # BLD AUTO: 6.49 10*3/MM3 (ref 3.4–10.8)

## 2025-06-05 NOTE — TELEPHONE ENCOUNTER
Hub staff attempted to follow warm transfer process and was unsuccessful     Caller: Germaine Gonzalez    Relationship to patient: Self    Best call back number: [phone]     Patient is needing: PATIENT WAS RETURNING CALL TO OFFICE, NOT SURE WHAT CALL IS ABOUT,. PLEASE CALL TO ADVISE.          Detail Level: Zone Add High Risk Medication Management Associated Diagnosis?: No

## 2025-06-05 NOTE — TELEPHONE ENCOUNTER
Needing to speak with pt to make sure she is doing ok due to some lab results and making sure she it tolerating her po abx

## 2025-06-06 ENCOUNTER — OFFICE VISIT (OUTPATIENT)
Dept: INTERNAL MEDICINE | Facility: CLINIC | Age: 80
End: 2025-06-06
Payer: MEDICARE

## 2025-06-06 VITALS
TEMPERATURE: 96.9 F | SYSTOLIC BLOOD PRESSURE: 112 MMHG | BODY MASS INDEX: 25.99 KG/M2 | WEIGHT: 156.2 LBS | OXYGEN SATURATION: 98 % | DIASTOLIC BLOOD PRESSURE: 70 MMHG | HEART RATE: 86 BPM

## 2025-06-06 DIAGNOSIS — M35.00 SJOGREN'S SYNDROME, WITH UNSPECIFIED ORGAN INVOLVEMENT: ICD-10-CM

## 2025-06-06 DIAGNOSIS — K11.20 PAROTITIS: Primary | ICD-10-CM

## 2025-06-06 NOTE — PROGRESS NOTES
Germaine Gonzalez is a 80 y.o. female, who presents with a chief complaint of   Chief Complaint   Patient presents with    Oral Swelling     2 day follow up            HPI   Pt her for f/u on parotitis.  She was stared on augmenting and aggressive oral hydration 48 hours ago.  She has Improved some since Wednesday.       The following portions of the patient's history were reviewed and updated as appropriate: allergies, current medications, past family history, past medical history, past social history, past surgical history and problem list.    Allergies: Bee venom, Levofloxacin, and Tetracycline    Review of Systems   Constitutional: Negative.    HENT: Negative.     Eyes: Negative.    Respiratory: Negative.     Cardiovascular: Negative.    Gastrointestinal: Negative.    Endocrine: Negative.    Genitourinary: Negative.    Musculoskeletal: Negative.    Skin: Negative.    Allergic/Immunologic: Negative.    Neurological: Negative.    Hematological: Negative.    Psychiatric/Behavioral: Negative.     All other systems reviewed and are negative.            Wt Readings from Last 3 Encounters:   06/06/25 70.9 kg (156 lb 3.2 oz)   06/04/25 69.9 kg (154 lb)   12/23/24 71.2 kg (157 lb)     Temp Readings from Last 3 Encounters:   06/06/25 96.9 °F (36.1 °C) (Infrared)   06/04/25 98.7 °F (37.1 °C) (Infrared)   12/23/24 96.4 °F (35.8 °C) (Infrared)     BP Readings from Last 3 Encounters:   06/06/25 112/70   06/04/25 102/60   12/23/24 124/70     Pulse Readings from Last 3 Encounters:   06/06/25 86   06/04/25 75   12/23/24 74     Body mass index is 25.99 kg/m².  SpO2 Readings from Last 3 Encounters:   06/06/25 98%   06/04/25 96%   12/23/24 98%          Physical Exam  Vitals and nursing note reviewed.   Constitutional:       General: She is not in acute distress.     Appearance: She is well-developed.   HENT:      Head: Normocephalic and atraumatic.      Right Ear: External ear normal.      Left Ear: External ear normal.      Nose:  Nose normal.   Eyes:      Conjunctiva/sclera: Conjunctivae normal.      Pupils: Pupils are equal, round, and reactive to light.   Neck:      Comments: Mild left neck swelling, minimal erythema and minimal increased warmth to touch.   Cardiovascular:      Rate and Rhythm: Normal rate and regular rhythm.      Heart sounds: Normal heart sounds.   Pulmonary:      Effort: Pulmonary effort is normal. No respiratory distress.      Breath sounds: Normal breath sounds. No wheezing.   Musculoskeletal:         General: Normal range of motion.      Cervical back: Normal range of motion and neck supple.      Comments: Normal gait   Skin:     General: Skin is warm and dry.   Neurological:      Mental Status: She is alert and oriented to person, place, and time.   Psychiatric:         Behavior: Behavior normal.         Thought Content: Thought content normal.         Judgment: Judgment normal.         Results for orders placed or performed in visit on 06/04/25   POC Rapid Strep A    Collection Time: 06/04/25 12:20 PM    Specimen: Swab   Result Value Ref Range    Rapid Strep A Screen Positive (A) Negative, VALID, INVALID, Not Performed    Internal Control Passed Passed    Lot Number [id number]     Expiration Date 4/24/26    Comprehensive Metabolic Panel    Collection Time: 06/04/25  1:01 PM    Specimen: Blood   Result Value Ref Range    Glucose 98 65 - 99 mg/dL    BUN 13.0 8.0 - 23.0 mg/dL    Creatinine 0.91 0.57 - 1.00 mg/dL    EGFR Result 64.7 >60.0 mL/min/1.73    BUN/Creatinine Ratio 14.3 7.0 - 25.0    Sodium 138 136 - 145 mmol/L    Potassium 4.3 3.5 - 5.2 mmol/L    Chloride 101 98 - 107 mmol/L    Total CO2 23.1 22.0 - 29.0 mmol/L    Calcium 10.2 8.6 - 10.5 mg/dL    Total Protein 7.4 6.0 - 8.5 g/dL    Albumin 4.9 3.5 - 5.2 g/dL    Globulin 2.5 gm/dL    A/G Ratio 2.0 g/dL    Total Bilirubin 1.1 0.0 - 1.2 mg/dL    Alkaline Phosphatase 104 39 - 117 U/L    AST (SGOT) 48 (H) 1 - 32 U/L    ALT (SGPT) 38 (H) 1 - 33 U/L   C-reactive  protein    Collection Time: 06/04/25  1:01 PM    Specimen: Blood   Result Value Ref Range    C-Reactive Protein <0.30 0.00 - 0.50 mg/dL   Amylase    Collection Time: 06/04/25  1:01 PM    Specimen: Blood   Result Value Ref Range    Amylase 1,115 (H) 28 - 100 U/L   CBC & Differential    Collection Time: 06/04/25  1:01 PM    Specimen: Blood   Result Value Ref Range    WBC 6.49 3.40 - 10.80 10*3/mm3    RBC 4.28 3.77 - 5.28 10*6/mm3    Hemoglobin 14.2 12.0 - 15.9 g/dL    Hematocrit 41.8 34.0 - 46.6 %    MCV 97.7 (H) 79.0 - 97.0 fL    MCH 33.2 (H) 26.6 - 33.0 pg    MCHC 34.0 31.5 - 35.7 g/dL    RDW 12.5 12.3 - 15.4 %    Platelets 210 140 - 450 10*3/mm3    Neutrophil Rel % 74.1 42.7 - 76.0 %    Lymphocyte Rel % 14.9 (L) 19.6 - 45.3 %    Monocyte Rel % 8.9 5.0 - 12.0 %    Eosinophil Rel % 1.1 0.3 - 6.2 %    Basophil Rel % 0.5 0.0 - 1.5 %    Neutrophils Absolute 4.81 1.70 - 7.00 10*3/mm3    Lymphocytes Absolute 0.97 0.70 - 3.10 10*3/mm3    Monocytes Absolute 0.58 0.10 - 0.90 10*3/mm3    Eosinophils Absolute 0.07 0.00 - 0.40 10*3/mm3    Basophils Absolute 0.03 0.00 - 0.20 10*3/mm3    Immature Granulocyte Rel % 0.5 0.0 - 0.5 %    Immature Grans Absolute 0.03 0.00 - 0.05 10*3/mm3    nRBC 0.0 0.0 - 0.2 /100 WBC     Result Review :                  Assessment and Plan    Diagnoses and all orders for this visit:    1. Parotitis (Primary)    2. Sjogren's syndrome, with unspecified organ involvement         Pt clinically improving with po abx and aggressive po hydration.  Labs reviewed.  Cont augmentin x 10 days and f/u with ent.                Outpatient Medications Prior to Visit   Medication Sig Dispense Refill    amoxicillin-clavulanate (AUGMENTIN) 875-125 MG per tablet Take 1 tablet by mouth 2 (Two) Times a Day for 10 days. 20 tablet 0    Biotin 5000 MCG tablet Take  by mouth Daily.      calcium carbonate (OS-ULI) 600 MG tablet Take 1 tablet by mouth 2 (Two) Times a Day With Meals.      clindamycin 1 % gel       desoximetasone  (TOPICORT) 0.05 % cream APPLY CREAM TOPICALLY TO AFFECTED AREA TWICE DAILY FOR ECZEMA FOR UP TO 14 DAYS      EPINEPHrine (EPIPEN) 0.3 MG/0.3ML solution auto-injector injection       erythromycin (ROMYCIN) 5 MG/GM ophthalmic ointment 5 mg/gram (0.5 %)      escitalopram (LEXAPRO) 5 MG tablet TAKE 1 TABLET BY MOUTH ONCE DAILY IN THE MORNING 90 tablet 3    estradiol (ESTRACE) 0.1 MG/GM vaginal cream APPLY ONE INCH RIBBON OF CREAM INTRAVAGINALLY THREE TIMES PER WEEK AT BEDTIME      lisinopril (PRINIVIL,ZESTRIL) 10 MG tablet Take 1 tablet by mouth once daily 90 tablet 1    omeprazole (priLOSEC) 40 MG capsule Take 1 capsule by mouth twice daily 180 capsule 1    ondansetron ODT (ZOFRAN-ODT) 8 MG disintegrating tablet Take 1 tablet by mouth Every 8 (Eight) Hours As Needed for Nausea or Vomiting. 20 tablet 0    rosuvastatin (CRESTOR) 20 MG tablet Take 1 tablet by mouth Daily. 90 tablet 3    vitamin B-12 (CYANOCOBALAMIN) 1000 MCG tablet Take 5 tablets by mouth Daily.       No facility-administered medications prior to visit.     No orders of the defined types were placed in this encounter.    [unfilled]  There are no discontinued medications.      No follow-ups on file.    Patient was given instructions and counseling regarding her condition or for health maintenance advice. Please see specific information pulled into the AVS if appropriate.

## 2025-06-11 ENCOUNTER — OFFICE VISIT (OUTPATIENT)
Dept: ORTHOPEDIC SURGERY | Facility: CLINIC | Age: 80
End: 2025-06-11
Payer: MEDICARE

## 2025-06-11 VITALS — WEIGHT: 156 LBS | HEIGHT: 65 IN | BODY MASS INDEX: 25.99 KG/M2

## 2025-06-11 DIAGNOSIS — I10 ESSENTIAL HYPERTENSION: ICD-10-CM

## 2025-06-11 DIAGNOSIS — M17.12 PRIMARY OSTEOARTHRITIS OF LEFT KNEE: Primary | ICD-10-CM

## 2025-06-11 RX ORDER — LISINOPRIL 10 MG/1
10 TABLET ORAL DAILY
Qty: 90 TABLET | Refills: 1 | Status: SHIPPED | OUTPATIENT
Start: 2025-06-11

## 2025-06-11 NOTE — TELEPHONE ENCOUNTER
Rx Refill Note  Requested Prescriptions     Pending Prescriptions Disp Refills    lisinopril (PRINIVIL,ZESTRIL) 10 MG tablet [Pharmacy Med Name: Lisinopril 10 MG Oral Tablet] 90 tablet 1     Sig: Take 1 tablet by mouth once daily      Last office visit with prescribing clinician: 6/6/2025   Last telemedicine visit with prescribing clinician: Visit date not found   Next office visit with prescribing clinician: 7/7/2025                         Would you like a call back once the refill request has been completed: [] Yes [] No    If the office needs to give you a call back, can they leave a voicemail: [] Yes [] No    Jolene Ellison MA  06/11/25, 07:59 EDT

## 2025-06-11 NOTE — PROGRESS NOTES
Subjective:     Patient ID: Germaine Gonzalez is a 80 y.o. female.    Chief Complaint:   Follow-up DJD left knee  History of Present Illness  History of Present Illness  The patient is a 80-year-old female who presents to the clinic today for a reevaluation of her left knee. She is exhibiting increased swelling and some discomfort in the left knee. She has received injections in the past, with the most recent steroid injection completed in 2024, which provided mild symptom improvement. She returns to the clinic today with acute symptoms.  She has continued walking for exercise otherwise denies any other concerns present         Social History     Occupational History    Not on file   Tobacco Use    Smoking status: Former     Current packs/day: 0.00     Average packs/day: 0.5 packs/day for 19.0 years (9.5 ttl pk-yrs)     Types: Cigarettes     Start date: 3/22/1971     Quit date: 1990     Years since quittin.2     Passive exposure: Never    Smokeless tobacco: Never   Vaping Use    Vaping status: Never Used   Substance and Sexual Activity    Alcohol use: Yes     Alcohol/week: 2.0 standard drinks of alcohol     Types: 2 Glasses of wine per week     Comment: 2 glasses of wine / daily    Drug use: No    Sexual activity: Defer      Past Medical History:   Diagnosis Date    Arthritis     knees    Mcgill esophagus     Cancer     cervical    Cervical cancer     Colon polyp     Eczema     Elevated liver enzymes     Esophageal varices     GERD (gastroesophageal reflux disease)     H/O stress fracture     Health care maintenance     Hypercholesteremia     Hypertension     Kidney stones     sched ureteroscopy, lithotripsy    Knee swelling     Ocular migraine     PONV (postoperative nausea and vomiting)     Postmenopausal     Seasonal allergies     Sjogren's syndrome     autoimmune disorder      Past Surgical History:   Procedure Laterality Date     SECTION      x2    COLONOSCOPY      COLONOSCOPY N/A 2021     Procedure: COLONOSCOPY;  Surgeon: Bc Sanchez MD;  Location: [facility] MAIN OR;  Service: Gastroenterology;  Laterality: N/A;  Diverticulosis and polyps    CYSTOSCOPY URETEROSCOPY STONE MANIPULATION/EXTRACTION Left 10/14/2016    Procedure: LEFT URETEROSCOPY bilateral retrograde pyleogram LEFT STENT PLACEMENT.;  Surgeon: Julius Darling MD;  Location: [facility];  Service:     CYSTOSCOPY W/ URETERAL STENT PLACEMENT Right 8/3/2018    Procedure: CYSTOSCOPY URETERAL CATHETER/STENT INSERTION;  Surgeon: Farzad Cordova MD;  Location: [facility] OR;  Service: Urology    ENDOSCOPY N/A 4/10/2019    Procedure: ESOPHAGOGASTRODUODENOSCOPY;  Surgeon: Bc Sanchez MD;  Location: [facility] OR;  Service: Gastroenterology    ENDOSCOPY N/A 4/6/2022    Procedure: ESOPHAGOGASTRODUODENOSCOPY WITH BIOPSY;  Surgeon: Bc Sanchez MD;  Location: [facility] OR;  Service: Gastroenterology;  Laterality: N/A;  gastritis, pulliam's    HYSTERECTOMY      TOTAL KNEE ARTHROPLASTY Right 2/8/2022    Procedure: TOTAL KNEE ARTHROPLASTY AND ALL ASSOCIATED PROCEDURES;  Surgeon: Jovany Villanueva MD;  Location: [facility];  Service: Orthopedics;  Laterality: Right;    UPPER GASTROINTESTINAL ENDOSCOPY      URETEROSCOPY LASER LITHOTRIPSY WITH STENT INSERTION Left 10/26/2016    Procedure: LT URETEROSCOPY LASER LITHOTRIPSY ;  Surgeon: Julius Darling MD;  Location: [facility] OR;  Service:        Family History   Problem Relation Age of Onset    Anxiety disorder Mother     Bipolar disorder Mother     Stroke Mother 83    Thyroid disease Mother     Depression Mother     Heart attack Father 47    Thyroid disease Brother     Hypertension Brother     Diabetes Brother 68    Glaucoma Brother     Depression Brother     No Known Problems Daughter     No Known Problems Son     Leukemia Maternal Grandmother     Diabetes Maternal Grandfather     Heart attack Paternal Grandmother     Heart attack Paternal Grandfather     Breast  "cancer Neg Hx     Colon cancer Neg Hx     Colon polyps Neg Hx                Objective:  Physical Exam  General: No acute distress.  Eyes: conjunctiva clear; pupils equally round and reactive  ENT: external ears and nose atraumatic; oropharynx clear  CV: no peripheral edema  Resp: normal respiratory effort  Skin: no rashes or wounds; normal turgor  Psych: mood and affect appropriate; recent and remote memory intact    Vitals:    06/11/25 0834   Weight: 70.8 kg (156 lb)   Height: 165.1 cm (65\")         06/11/25  0834   Weight: 70.8 kg (156 lb)     Body mass index is 25.96 kg/m².      Ortho Exam     Physical Exam    Left knee examined  Knee range of motion 0 to 125 degrees  Stable to varus and valgus stress at 0 degrees and 30 degrees  1+ anterior Lachman exam  Negative anterior negative posterior drawer exam  Palpable crepitus or arc of motion  Positive active patellar compression test  Negative medial Geoff's exam, negative lateral Geoff's exam      Assessment:        1. Primary osteoarthritis of left knee         Assessment & Plan      Plan:    - Large Joint Arthrocentesis: L knee on 6/11/2025 9:15 AM  Indications: pain  Details: 21 G needle, superolateral approach  Medications: 88 mg Hyaluronan 88 MG/4ML  Outcome: tolerated well, no immediate complications  Procedure, treatment alternatives, risks and benefits explained, specific risks discussed. Consent was given by the patient. Immediately prior to procedure a time out was called to verify the correct patient, procedure, equipment, support staff and site/side marked as required. Patient was prepped and draped in the usual sterile fashion.         Discussed plan of care with patient.  We did discuss proceeding with submission and viscosupplementation injection at today's visit.  She has been approved we will proceed with the Visco injection.  We did discuss may take up to 6 weeks before she notices any significant symptom resolution I do recommend continue " with active motion she did tolerate the injection well we will plan to see her back in clinic in 6 months and we can repeat gladly see her sooner if needed all questions answered  Orders:  Orders Placed This Encounter   Procedures    - Large Joint Arthrocentesis: L knee    Visco Treatment     No orders of the defined types were placed in this encounter.          Dragon dictation utilized          Patient or patient representative verbalized consent for the use of Ambient Listening during the visit with  JESSICA Whelan for chart documentation. 6/11/2025  18:17 EDT

## 2025-06-12 DIAGNOSIS — I10 ESSENTIAL HYPERTENSION: ICD-10-CM

## 2025-06-12 DIAGNOSIS — R73.03 PREDIABETES: ICD-10-CM

## 2025-06-12 DIAGNOSIS — E78.2 MIXED HYPERLIPIDEMIA: ICD-10-CM

## 2025-06-24 LAB
ALBUMIN SERPL-MCNC: 4.5 G/DL (ref 3.5–5.2)
ALBUMIN/GLOB SERPL: 1.9 G/DL
ALP SERPL-CCNC: 100 U/L (ref 39–117)
ALT SERPL-CCNC: 31 U/L (ref 1–33)
AST SERPL-CCNC: 36 U/L (ref 1–32)
BASOPHILS # BLD AUTO: 0.03 10*3/MM3 (ref 0–0.2)
BASOPHILS NFR BLD AUTO: 0.8 % (ref 0–1.5)
BILIRUB SERPL-MCNC: 0.8 MG/DL (ref 0–1.2)
BUN SERPL-MCNC: 15 MG/DL (ref 8–23)
BUN/CREAT SERPL: 14.9 (ref 7–25)
CALCIUM SERPL-MCNC: 9.7 MG/DL (ref 8.6–10.5)
CHLORIDE SERPL-SCNC: 105 MMOL/L (ref 98–107)
CHOLEST SERPL-MCNC: 276 MG/DL (ref 0–200)
CO2 SERPL-SCNC: 23.3 MMOL/L (ref 22–29)
CREAT SERPL-MCNC: 1.01 MG/DL (ref 0.57–1)
CRP SERPL-MCNC: <0.3 MG/DL (ref 0–0.5)
EGFRCR SERPLBLD CKD-EPI 2021: 57.1 ML/MIN/1.73
EOSINOPHIL # BLD AUTO: 0.08 10*3/MM3 (ref 0–0.4)
EOSINOPHIL NFR BLD AUTO: 2.2 % (ref 0.3–6.2)
ERYTHROCYTE [DISTWIDTH] IN BLOOD BY AUTOMATED COUNT: 12.8 % (ref 12.3–15.4)
GLOBULIN SER CALC-MCNC: 2.4 GM/DL
GLUCOSE SERPL-MCNC: 100 MG/DL (ref 65–99)
HBA1C MFR BLD: 5.6 % (ref 4.8–5.6)
HCT VFR BLD AUTO: 42 % (ref 34–46.6)
HDLC SERPL-MCNC: 58 MG/DL (ref 40–60)
HGB BLD-MCNC: 13.6 G/DL (ref 12–15.9)
IMM GRANULOCYTES # BLD AUTO: 0.02 10*3/MM3 (ref 0–0.05)
IMM GRANULOCYTES NFR BLD AUTO: 0.5 % (ref 0–0.5)
LDLC SERPL CALC-MCNC: 193 MG/DL (ref 0–100)
LDLC/HDLC SERPL: 3.29 {RATIO}
LYMPHOCYTES # BLD AUTO: 1.13 10*3/MM3 (ref 0.7–3.1)
LYMPHOCYTES NFR BLD AUTO: 30.4 % (ref 19.6–45.3)
MCH RBC QN AUTO: 31.9 PG (ref 26.6–33)
MCHC RBC AUTO-ENTMCNC: 32.4 G/DL (ref 31.5–35.7)
MCV RBC AUTO: 98.6 FL (ref 79–97)
MONOCYTES # BLD AUTO: 0.38 10*3/MM3 (ref 0.1–0.9)
MONOCYTES NFR BLD AUTO: 10.2 % (ref 5–12)
NEUTROPHILS # BLD AUTO: 2.08 10*3/MM3 (ref 1.7–7)
NEUTROPHILS NFR BLD AUTO: 55.9 % (ref 42.7–76)
NRBC BLD AUTO-RTO: 0 /100 WBC (ref 0–0.2)
PLATELET # BLD AUTO: 221 10*3/MM3 (ref 140–450)
POTASSIUM SERPL-SCNC: 4.6 MMOL/L (ref 3.5–5.2)
PROT SERPL-MCNC: 6.9 G/DL (ref 6–8.5)
RBC # BLD AUTO: 4.26 10*6/MM3 (ref 3.77–5.28)
SODIUM SERPL-SCNC: 141 MMOL/L (ref 136–145)
T4 FREE SERPL-MCNC: 0.97 NG/DL (ref 0.92–1.68)
TRIGL SERPL-MCNC: 137 MG/DL (ref 0–150)
TSH SERPL DL<=0.005 MIU/L-ACNC: 2.82 UIU/ML (ref 0.27–4.2)
VLDLC SERPL CALC-MCNC: 25 MG/DL (ref 5–40)
WBC # BLD AUTO: 3.72 10*3/MM3 (ref 3.4–10.8)

## 2025-06-26 ENCOUNTER — OFFICE VISIT (OUTPATIENT)
Dept: GASTROENTEROLOGY | Facility: CLINIC | Age: 80
End: 2025-06-26
Payer: MEDICARE

## 2025-06-26 VITALS
SYSTOLIC BLOOD PRESSURE: 128 MMHG | WEIGHT: 156.6 LBS | HEIGHT: 65 IN | BODY MASS INDEX: 26.09 KG/M2 | DIASTOLIC BLOOD PRESSURE: 78 MMHG

## 2025-06-26 DIAGNOSIS — K22.70 BARRETT'S ESOPHAGUS WITHOUT DYSPLASIA: Primary | ICD-10-CM

## 2025-06-26 DIAGNOSIS — K75.81 NASH (NONALCOHOLIC STEATOHEPATITIS): ICD-10-CM

## 2025-06-26 DIAGNOSIS — Z86.0100 HISTORY OF COLONIC POLYPS: ICD-10-CM

## 2025-06-26 PROCEDURE — 3074F SYST BP LT 130 MM HG: CPT | Performed by: INTERNAL MEDICINE

## 2025-06-26 PROCEDURE — 99213 OFFICE O/P EST LOW 20 MIN: CPT | Performed by: INTERNAL MEDICINE

## 2025-06-26 PROCEDURE — 3078F DIAST BP <80 MM HG: CPT | Performed by: INTERNAL MEDICINE

## 2025-06-26 NOTE — PROGRESS NOTES
PATIENT INFORMATION  Germaine Gonzalez       - 1945    CHIEF COMPLAINT  Chief Complaint   Patient presents with    Mcgill's esophagus    Non-alcoholic steatohepatitis       HISTORY OF PRESENT ILLNESS  LOV:  Recall colon 2028 and EGD 2027- would consider doing both in     Annual Follow up and has  had a recent Parotiditis bout and took augmentin at the time of her  labs which had mild LFTs( AST> ALT )and then follow up had normal ALT    Weight is rock stable so her MASH is well managed    Revewed her scopes as above and will stick with the plan        REVIEWED PERTINENT RESULTS/ LABS  Lab Results   Component Value Date    CASEREPORT  2022     Surgical Pathology Report                         Case: [id number]                                  Authorizing Provider:  Bc Sanchez        Collected:           2022 05:12 PM                                 MD Rick                                                                   Ordering Location:     [facility]   Received:            2022 07:31 PM                                 OR                                                                           Pathologist:           Solitario Smith MD                                                          Specimens:   1) - Small Intestine, Duodenum, duodenal biopsy                                                     2) - Gastric, Antrum, gastric biopsy                                                                3) - Esophagus, Distal, distal esophagus                                                   FINALDX  2022     1. Duodenum, Biopsy:  Small bowel mucosa with             A. Normal intact villous surface.   B. No significant inflammation, no granulomas.   C: No viral inclusions or other organisms on routinely stained sections.    2. Stomach, Biopsy:  Oxyntic and antral type gastric mucosa with   A. Mild reactive/chemical gastropathy involving antral type  mucosa.   B. No significant inflammation.   C. No metaplasia, dysplasia nor malignancy.   D. No H pylori-like organisms identified.    3. Esophagus, Distal, Biopsy:  Squamoglandular mucosa and submucosa with   A. Mcgill's esophagus.   B. No dysplasia nor malignancy.   C. No significant eosinophilia.   D. No viral inclusions or fungal organisms identified.    mec/clm         Lab Results   Component Value Date    HGB 13.6 06/23/2025    MCV 98.6 (H) 06/23/2025     06/23/2025    ALT 31 06/23/2025    AST 36 (H) 06/23/2025    HGBA1C 5.60 06/23/2025    INR 0.96 11/29/2022    TRIG 137 06/23/2025      No results found.    REVIEW OF SYSTEMS  Review of Systems   Constitutional:  Negative for activity change, chills, fever and unexpected weight change.   HENT:  Negative for congestion.    Eyes:  Negative for visual disturbance.   Respiratory:  Negative for shortness of breath.    Cardiovascular:  Negative for chest pain and palpitations.   Gastrointestinal:  Positive for nausea and vomiting. Negative for abdominal pain and blood in stool.   Endocrine: Negative for cold intolerance and heat intolerance.   Genitourinary:  Negative for hematuria.   Musculoskeletal:  Negative for gait problem.   Skin:  Negative for color change.   Allergic/Immunologic: Negative for immunocompromised state.   Neurological:  Negative for weakness and light-headedness.   Hematological:  Negative for adenopathy.   Psychiatric/Behavioral:  Negative for sleep disturbance. The patient is not nervous/anxious.          ACTIVE PROBLEMS  Patient Active Problem List    Diagnosis     Esophageal dysphagia [R13.19]     Weight loss [R63.4]     S/P total knee arthroplasty [Z96.659]     Primary osteoarthritis of right knee [M17.11]     Knee instability, right [M25.361]     Osteopenia [M85.80]     Prediabetes [R73.03]     Essential hypertension [I10]     Effusion of left shoulder joint [M25.412]     Encounter for screening for malignant neoplasm of colon  [Z12.11]     Personal history of colonic polyps [Z86.0100]     Primary osteoarthritis of left knee [M17.12]     WALSH (nonalcoholic steatohepatitis) [K75.81]     Sjogren's syndrome with keratoconjunctivitis sicca [M35.01]     Mcgill's esophagus without dysplasia [K22.70]     Hyperglycemia [R73.9]     Pyelonephritis [N12]     Gastroesophageal reflux disease [K21.9]     Hyperlipidemia [E78.5]     Pure hypercholesterolemia [E78.00]     Knee pain [M25.569]          PAST MEDICAL HISTORY  Past Medical History:   Diagnosis Date    Arthritis     knees    Mcgill esophagus     Cancer     cervical    Cervical cancer     Colon polyp     Eczema     Elevated liver enzymes     Esophageal varices     GERD (gastroesophageal reflux disease)     H/O stress fracture     Health care maintenance     Hypercholesteremia     Hypertension     Kidney stones     sched ureteroscopy, lithotripsy    Knee swelling     Ocular migraine     PONV (postoperative nausea and vomiting)     Postmenopausal     Seasonal allergies     Sjogren's syndrome     autoimmune disorder          SURGICAL HISTORY  Past Surgical History:   Procedure Laterality Date     SECTION      x2    COLONOSCOPY      COLONOSCOPY N/A 2021    Procedure: COLONOSCOPY;  Surgeon: Bc Sanchez MD;  Location[facility] OR;  Service: Gastroenterology;  Laterality: N/A;  Diverticulosis and polyps    CYSTOSCOPY URETEROSCOPY STONE MANIPULATION/EXTRACTION Left 10/14/2016    Procedure: LEFT URETEROSCOPY bilateral retrograde pyleogram LEFT STENT PLACEMENT.;  Surgeon: Julius Darling MD;  Location: [facility] OR;  Service:     CYSTOSCOPY W/ URETERAL STENT PLACEMENT Right 8/3/2018    Procedure: CYSTOSCOPY URETERAL CATHETER/STENT INSERTION;  Surgeon: Farzad Cordova MD;  Location: [facility] OR;  Service: Urology    ENDOSCOPY N/A 4/10/2019    Procedure: ESOPHAGOGASTRODUODENOSCOPY;  Surgeon: Bc Sanchez MD;  Location: [facility] OR;  Service: Gastroenterology     ENDOSCOPY N/A 2022    Procedure: ESOPHAGOGASTRODUODENOSCOPY WITH BIOPSY;  Surgeon: Bc Sanchez MD;  Location:  [facility] OR;  Service: Gastroenterology;  Laterality: N/A;  gastritis, pulliam's    HYSTERECTOMY      TOTAL KNEE ARTHROPLASTY Right 2022    Procedure: TOTAL KNEE ARTHROPLASTY AND ALL ASSOCIATED PROCEDURES;  Surgeon: Jovany Villanueva MD;  Location:  [facility] OR;  Service: Orthopedics;  Laterality: Right;    UPPER GASTROINTESTINAL ENDOSCOPY      URETEROSCOPY LASER LITHOTRIPSY WITH STENT INSERTION Left 10/26/2016    Procedure: LT URETEROSCOPY LASER LITHOTRIPSY ;  Surgeon: Julius Darling MD;  Location: [facility] OR;  Service:          FAMILY HISTORY  Family History   Problem Relation Age of Onset    Anxiety disorder Mother     Bipolar disorder Mother     Stroke Mother 83    Thyroid disease Mother     Depression Mother     Heart attack Father 47    Thyroid disease Brother     Hypertension Brother     Diabetes Brother 68    Glaucoma Brother     Depression Brother     No Known Problems Daughter     No Known Problems Son     Leukemia Maternal Grandmother     Diabetes Maternal Grandfather     Heart attack Paternal Grandmother     Heart attack Paternal Grandfather     Breast cancer Neg Hx     Colon cancer Neg Hx     Colon polyps Neg Hx          SOCIAL HISTORY  Social History     Occupational History    Not on file   Tobacco Use    Smoking status: Former     Current packs/day: 0.00     Average packs/day: 0.5 packs/day for 19.0 years (9.5 ttl pk-yrs)     Types: Cigarettes     Start date: 3/22/1971     Quit date: 1990     Years since quittin.2     Passive exposure: Never    Smokeless tobacco: Never   Vaping Use    Vaping status: Never Used   Substance and Sexual Activity    Alcohol use: Yes     Alcohol/week: 2.0 standard drinks of alcohol     Types: 2 Glasses of wine per week     Comment: 2 glasses of wine / daily    Drug use: No    Sexual activity: Defer         CURRENT  "MEDICATIONS    Current Outpatient Medications:     Biotin 5000 MCG tablet, Take  by mouth Daily., Disp: , Rfl:     calcium carbonate (OS-ULI) 600 MG tablet, Take 1 tablet by mouth 2 (Two) Times a Day With Meals., Disp: , Rfl:     clindamycin 1 % gel, , Disp: , Rfl:     desoximetasone (TOPICORT) 0.05 % cream, APPLY CREAM TOPICALLY TO AFFECTED AREA TWICE DAILY FOR ECZEMA FOR UP TO 14 DAYS, Disp: , Rfl:     EPINEPHrine (EPIPEN) 0.3 MG/0.3ML solution auto-injector injection, , Disp: , Rfl:     escitalopram (LEXAPRO) 5 MG tablet, TAKE 1 TABLET BY MOUTH ONCE DAILY IN THE MORNING, Disp: 90 tablet, Rfl: 3    lisinopril (PRINIVIL,ZESTRIL) 10 MG tablet, Take 1 tablet by mouth once daily, Disp: 90 tablet, Rfl: 1    omeprazole (priLOSEC) 40 MG capsule, Take 1 capsule by mouth twice daily, Disp: 180 capsule, Rfl: 1    ondansetron ODT (ZOFRAN-ODT) 8 MG disintegrating tablet, Take 1 tablet by mouth Every 8 (Eight) Hours As Needed for Nausea or Vomiting., Disp: 20 tablet, Rfl: 0    rosuvastatin (CRESTOR) 20 MG tablet, Take 1 tablet by mouth Daily., Disp: 90 tablet, Rfl: 3    ALLERGIES  Bee venom, Levofloxacin, and Tetracycline    VITALS  Vitals:    06/26/25 0920   BP: 128/78   Weight: 71 kg (156 lb 9.6 oz)   Height: 165.1 cm (65\")       PHYSICAL EXAM  Debilities/Disabilities Identified: None  Emotional Behavior: Appropriate  Wt Readings from Last 3 Encounters:   06/26/25 71 kg (156 lb 9.6 oz)   06/11/25 70.8 kg (156 lb)   06/06/25 70.9 kg (156 lb 3.2 oz)     Ht Readings from Last 1 Encounters:   06/26/25 165.1 cm (65\")     Body mass index is 26.06 kg/m².  Physical Exam  Constitutional:       Appearance: She is well-developed. She is not diaphoretic.   HENT:      Head: Normocephalic and atraumatic.   Eyes:      General: No scleral icterus.     Conjunctiva/sclera: Conjunctivae normal.      Pupils: Pupils are equal, round, and reactive to light.   Neck:      Thyroid: No thyromegaly.   Cardiovascular:      Rate and Rhythm: Normal rate " and regular rhythm.      Heart sounds: Normal heart sounds. No murmur heard.     No gallop.   Pulmonary:      Effort: Pulmonary effort is normal.      Breath sounds: Normal breath sounds. No wheezing or rales.   Abdominal:      General: Bowel sounds are normal. There is no distension or abdominal bruit.      Palpations: Abdomen is soft. There is no shifting dullness, fluid wave or mass.      Tenderness: There is no abdominal tenderness. There is no guarding. Negative signs include García's sign.      Hernia: There is no hernia in the ventral area.   Musculoskeletal:         General: Normal range of motion.      Cervical back: Normal range of motion and neck supple.   Lymphadenopathy:      Cervical: No cervical adenopathy.   Skin:     General: Skin is warm and dry.      Findings: No erythema or rash.   Neurological:      Mental Status: She is alert and oriented to person, place, and time.   Psychiatric:         Mood and Affect: Mood normal.         Behavior: Behavior normal.         CLINICAL DATA REVIEWED   reviewed previous lab results and integrated with today's visit, reviewed notes from other physicians and/or last GI encounter, reviewed previous endoscopy results and available photos, reviewed surgical pathology results from previous biopsies    ASSESSMENT  Diagnoses and all orders for this visit:    Mcgill's esophagus without dysplasia    WALSH (nonalcoholic steatohepatitis)    Personal history of colonic polyps          PLAN  Return in about 1 year (around 6/26/2026).    I have discussed the above plan with the patient.  They verbalize understanding and are in agreement with the plan.  They have been advised to contact the office for any questions, concerns, or changes related to their health.

## 2025-07-07 ENCOUNTER — OFFICE VISIT (OUTPATIENT)
Dept: INTERNAL MEDICINE | Facility: CLINIC | Age: 80
End: 2025-07-07
Payer: MEDICARE

## 2025-07-07 VITALS
OXYGEN SATURATION: 98 % | BODY MASS INDEX: 26.09 KG/M2 | HEART RATE: 63 BPM | DIASTOLIC BLOOD PRESSURE: 66 MMHG | WEIGHT: 156.8 LBS | TEMPERATURE: 98 F | SYSTOLIC BLOOD PRESSURE: 144 MMHG

## 2025-07-07 DIAGNOSIS — F41.9 ANXIETY: ICD-10-CM

## 2025-07-07 DIAGNOSIS — E78.2 MIXED HYPERLIPIDEMIA: ICD-10-CM

## 2025-07-07 DIAGNOSIS — I10 ESSENTIAL HYPERTENSION: ICD-10-CM

## 2025-07-07 DIAGNOSIS — R41.3 MEMORY PROBLEM: Primary | ICD-10-CM

## 2025-07-07 DIAGNOSIS — R73.9 HYPERGLYCEMIA: ICD-10-CM

## 2025-07-07 NOTE — PROGRESS NOTES
Germaine Gonzalez is a 80 y.o. female, who presents with a chief complaint of   Chief Complaint   Patient presents with    Hypertension     6 month f/u         Hyperlipidemia           HPI        HTN - currently on Lisinopril 10 mg daily. She does not check her BP at home. No chest pain, dizziness, headaches, or lightheadedness.     HLD - currently on Rosuvastatin 20 mg daily. No issues with muscle cramps or pain.     GERD - no issues with reflux. Currently on Prilosec.     Anxiety - Taking lexapro 5 mg daily. Anxiety symptoms seem to be well controlled on this.     Pt is concerned about her memory.  Her mom and her aunt had dementia.  She will ask questions multiple times about things that have happened.  She has not gotten lost or had serious issues yet. She had brain imaging  with head roberto and neck mra 2023.      The following portions of the patient's history were reviewed and updated as appropriate: allergies, current medications, past family history, past medical history, past social history, past surgical history and problem list.    Allergies: Bee venom, Levofloxacin, and Tetracycline    Review of Systems   Constitutional: Negative.    HENT: Negative.     Eyes: Negative.    Respiratory: Negative.     Cardiovascular: Negative.    Gastrointestinal: Negative.    Endocrine: Negative.    Genitourinary: Negative.    Musculoskeletal: Negative.    Skin: Negative.    Allergic/Immunologic: Negative.    Neurological: Negative.    Hematological: Negative.    Psychiatric/Behavioral: Negative.     All other systems reviewed and are negative.            Wt Readings from Last 3 Encounters:   07/07/25 71.1 kg (156 lb 12.8 oz)   06/26/25 71 kg (156 lb 9.6 oz)   06/11/25 70.8 kg (156 lb)     Temp Readings from Last 3 Encounters:   07/07/25 98 °F (36.7 °C) (Infrared)   06/06/25 96.9 °F (36.1 °C) (Infrared)   06/04/25 98.7 °F (37.1 °C) (Infrared)     BP Readings from Last 3 Encounters:   07/07/25 144/66   06/26/25 128/78    06/06/25 112/70     Pulse Readings from Last 3 Encounters:   07/07/25 63   06/06/25 86   06/04/25 75     Body mass index is 26.09 kg/m².  SpO2 Readings from Last 3 Encounters:   07/07/25 98%   06/06/25 98%   06/04/25 96%          Physical Exam  Vitals and nursing note reviewed.   Constitutional:       General: She is not in acute distress.     Appearance: She is well-developed.   HENT:      Head: Normocephalic and atraumatic.      Right Ear: External ear normal.      Left Ear: External ear normal.      Nose: Nose normal.   Eyes:      Conjunctiva/sclera: Conjunctivae normal.      Pupils: Pupils are equal, round, and reactive to light.   Cardiovascular:      Rate and Rhythm: Normal rate and regular rhythm.      Heart sounds: Normal heart sounds.   Pulmonary:      Effort: Pulmonary effort is normal. No respiratory distress.      Breath sounds: Normal breath sounds. No wheezing.   Musculoskeletal:         General: Normal range of motion.      Cervical back: Normal range of motion and neck supple.      Comments: Normal gait   Skin:     General: Skin is warm and dry.   Neurological:      Mental Status: She is alert and oriented to person, place, and time.   Psychiatric:         Behavior: Behavior normal.         Thought Content: Thought content normal.         Judgment: Judgment normal.         Results for orders placed or performed in visit on 06/12/25   T4, Free    Collection Time: 06/23/25  8:31 AM    Specimen: Blood   Result Value Ref Range    Free T4 0.97 0.92 - 1.68 ng/dL   TSH    Collection Time: 06/23/25  8:31 AM    Specimen: Blood   Result Value Ref Range    TSH 2.820 0.270 - 4.200 uIU/mL   Lipid Panel With LDL / HDL Ratio    Collection Time: 06/23/25  8:31 AM    Specimen: Blood   Result Value Ref Range    Total Cholesterol 276 (H) 0 - 200 mg/dL    Triglycerides 137 0 - 150 mg/dL    HDL Cholesterol 58 40 - 60 mg/dL    VLDL Cholesterol Phil 25 5 - 40 mg/dL    LDL Chol Calc (NIH) 193 (H) 0 - 100 mg/dL    LDL/HDL  RATIO 3.29    Hemoglobin A1c    Collection Time: 06/23/25  8:31 AM    Specimen: Blood   Result Value Ref Range    Hemoglobin A1C 5.60 4.80 - 5.60 %   Comprehensive Metabolic Panel    Collection Time: 06/23/25  8:31 AM    Specimen: Blood   Result Value Ref Range    Glucose 100 (H) 65 - 99 mg/dL    BUN 15.0 8.0 - 23.0 mg/dL    Creatinine 1.01 (H) 0.57 - 1.00 mg/dL    EGFR Result 57.1 (L) >60.0 mL/min/1.73    BUN/Creatinine Ratio 14.9 7.0 - 25.0    Sodium 141 136 - 145 mmol/L    Potassium 4.6 3.5 - 5.2 mmol/L    Chloride 105 98 - 107 mmol/L    Total CO2 23.3 22.0 - 29.0 mmol/L    Calcium 9.7 8.6 - 10.5 mg/dL    Total Protein 6.9 6.0 - 8.5 g/dL    Albumin 4.5 3.5 - 5.2 g/dL    Globulin 2.4 gm/dL    A/G Ratio 1.9 g/dL    Total Bilirubin 0.8 0.0 - 1.2 mg/dL    Alkaline Phosphatase 100 39 - 117 U/L    AST (SGOT) 36 (H) 1 - 32 U/L    ALT (SGPT) 31 1 - 33 U/L   C-reactive Protein    Collection Time: 06/23/25  8:31 AM   Result Value Ref Range    C-Reactive Protein <0.30 0.00 - 0.50 mg/dL   CBC & Differential    Collection Time: 06/23/25  8:31 AM    Specimen: Blood   Result Value Ref Range    WBC 3.72 3.40 - 10.80 10*3/mm3    RBC 4.26 3.77 - 5.28 10*6/mm3    Hemoglobin 13.6 12.0 - 15.9 g/dL    Hematocrit 42.0 34.0 - 46.6 %    MCV 98.6 (H) 79.0 - 97.0 fL    MCH 31.9 26.6 - 33.0 pg    MCHC 32.4 31.5 - 35.7 g/dL    RDW 12.8 12.3 - 15.4 %    Platelets 221 140 - 450 10*3/mm3    Neutrophil Rel % 55.9 42.7 - 76.0 %    Lymphocyte Rel % 30.4 19.6 - 45.3 %    Monocyte Rel % 10.2 5.0 - 12.0 %    Eosinophil Rel % 2.2 0.3 - 6.2 %    Basophil Rel % 0.8 0.0 - 1.5 %    Neutrophils Absolute 2.08 1.70 - 7.00 10*3/mm3    Lymphocytes Absolute 1.13 0.70 - 3.10 10*3/mm3    Monocytes Absolute 0.38 0.10 - 0.90 10*3/mm3    Eosinophils Absolute 0.08 0.00 - 0.40 10*3/mm3    Basophils Absolute 0.03 0.00 - 0.20 10*3/mm3    Immature Granulocyte Rel % 0.5 0.0 - 0.5 %    Immature Grans Absolute 0.02 0.00 - 0.05 10*3/mm3    nRBC 0.0 0.0 - 0.2 /100 WBC      Result Review :   The following data was reviewed by: Roberta Boswell MD on 07/07/2025:    Data reviewed : Radiologic studies mra head and neck 2023           Assessment and Plan    Diagnoses and all orders for this visit:    1. Memory problem (Primary) - pt had mra head 2023.  Will hold off on further imaging unless neuro feels this would be helpful  -     Vitamin B12; Future  -     Folate; Future  -     Ambulatory Referral to Neurology    2. Essential hypertension - cont lisinopril  -     CBC & Differential; Future  -     Comprehensive Metabolic Panel; Future  -     Hemoglobin A1c; Future  -     Lipid Panel With LDL / HDL Ratio; Future  -     TSH; Future  -     T4, Free; Future    3. Mixed hyperlipidemia - cont crestor  -     Comprehensive Metabolic Panel; Future  -     Lipid Panel With LDL / HDL Ratio; Future    4. Anxiety - cont lexapro    5. Hyperglycemia  -     CBC & Differential; Future  -     Comprehensive Metabolic Panel; Future  -     Hemoglobin A1c; Future  -     Lipid Panel With LDL / HDL Ratio; Future  -     TSH; Future  -     T4, Free; Future                       Outpatient Medications Prior to Visit   Medication Sig Dispense Refill    Biotin 5000 MCG tablet Take  by mouth Daily.      calcium carbonate (OS-ULI) 600 MG tablet Take 1 tablet by mouth 2 (Two) Times a Day With Meals.      clindamycin 1 % gel       desoximetasone (TOPICORT) 0.05 % cream APPLY CREAM TOPICALLY TO AFFECTED AREA TWICE DAILY FOR ECZEMA FOR UP TO 14 DAYS      EPINEPHrine (EPIPEN) 0.3 MG/0.3ML solution auto-injector injection       escitalopram (LEXAPRO) 5 MG tablet TAKE 1 TABLET BY MOUTH ONCE DAILY IN THE MORNING 90 tablet 3    lisinopril (PRINIVIL,ZESTRIL) 10 MG tablet Take 1 tablet by mouth once daily 90 tablet 1    omeprazole (priLOSEC) 40 MG capsule Take 1 capsule by mouth twice daily 180 capsule 1    ondansetron ODT (ZOFRAN-ODT) 8 MG disintegrating tablet Take 1 tablet by mouth Every 8 (Eight) Hours As Needed for  Nausea or Vomiting. 20 tablet 0    rosuvastatin (CRESTOR) 20 MG tablet Take 1 tablet by mouth Daily. 90 tablet 3     No facility-administered medications prior to visit.     No orders of the defined types were placed in this encounter.    [unfilled]  There are no discontinued medications.      Return in about 24 weeks (around 12/22/2025) for Medicare wellness.    Patient was given instructions and counseling regarding her condition or for health maintenance advice. Please see specific information pulled into the AVS if appropriate.

## 2025-07-13 DIAGNOSIS — E78.2 MIXED HYPERLIPIDEMIA: ICD-10-CM

## 2025-07-14 RX ORDER — ROSUVASTATIN CALCIUM 20 MG/1
20 TABLET, COATED ORAL DAILY
Qty: 90 TABLET | Refills: 3 | Status: SHIPPED | OUTPATIENT
Start: 2025-07-14

## 2025-07-14 NOTE — TELEPHONE ENCOUNTER
Rx Refill Note  Requested Prescriptions     Signed Prescriptions Disp Refills    rosuvastatin (CRESTOR) 20 MG tablet 90 tablet 3     Sig: Take 1 tablet by mouth once daily     Authorizing Provider: HARRIET VEGA     Ordering User: ZUHAIR KRUSE      Last office visit with prescribing clinician: 7/7/2025   Last telemedicine visit with prescribing clinician: Visit date not found   Next office visit with prescribing clinician: 1/6/2026                         Would you like a call back once the refill request has been completed: [] Yes [] No    If the office needs to give you a call back, can they leave a voicemail: [] Yes [] No    Zuhair Kruse MA  07/14/25, 09:29 EDT

## (undated) DEVICE — CATH URETRL FLXITP POLLACK STD 5F 70CM

## (undated) DEVICE — DRSNG TELFA PAD NONADH STR 1S 3X8IN

## (undated) DEVICE — JACKT LAB F/R KNIT CUFF/COLR XLG BLU

## (undated) DEVICE — PATIENT RETURN ELECTRODE, SINGLE-USE, CONTACT QUALITY MONITORING, ADULT, WITH 9FT CORD, FOR PATIENTS WEIGING OVER 33LBS. (15KG): Brand: MEGADYNE

## (undated) DEVICE — Device: Brand: DEFENDO AIR/WATER/SUCTION AND BIOPSY VALVE

## (undated) DEVICE — SUT VIC 0 CT1 36IN J946H

## (undated) DEVICE — SPNG GZ WOVN 4X4IN 12PLY 10/BX STRL

## (undated) DEVICE — MEDI-VAC YANK SUCT HNDL W/TPRD BULBOUS TIP: Brand: CARDINAL HEALTH

## (undated) DEVICE — MAT FLR ABSORBENT LG 4FT 10 2.5FT

## (undated) DEVICE — Device

## (undated) DEVICE — CONTAINER,SPECIMEN,OR STERILE,4OZ: Brand: MEDLINE

## (undated) DEVICE — FRCP BX RADJAW4 NDL 2.8 240CM LG OG BX40

## (undated) DEVICE — THE BITE BLOCK MAXI, LATEX FREE STRAP IS USED TO PROTECT THE ENDOSCOPE INSERTION TUBE FROM BEING BITTEN BY THE PATIENT.

## (undated) DEVICE — SOL ISO/ALC RUB 70PCT 4OZ

## (undated) DEVICE — PUMP PAIN AUTOFUSER AUTO SELCT NOBOLUS 1TO14ML/HR 550ML DISP

## (undated) DEVICE — KT ORCA ORCAPOD DISP STRL

## (undated) DEVICE — FLEX ADVANTAGE 1500CC: Brand: FLEX ADVANTAGE

## (undated) DEVICE — SYS CLS SKIN PREMIERPRO EXOFINFUSION 22CM

## (undated) DEVICE — AMBU AVIEW, NEW UPDATED VERSION. REUSABLE, PORTABLE MONITOR WITH TOUCH-SCREEN COMPATIBLE WITH ALL ASCOPE4 VERSIONS, ASCOPE3 VERSIONS AND VIVASIGHT-DL AND SL. WITH VIDEO-OUT FUNCTION AND LIVE ZOOM FOR A MORE LARGE AND DETAILED VIEW.: Brand: AVIEW™ MONITOR

## (undated) DEVICE — SYR LL 3CC

## (undated) DEVICE — ENDO. PORT CONNECTOR W/VALVE FOR OLYMPUS® SCOPES: Brand: ERBE

## (undated) DEVICE — HOOD, PEEL-AWAY: Brand: FLYTE

## (undated) DEVICE — BW-412T DISP COMBO CLEANING BRUSH: Brand: SINGLE USE COMBINATION CLEANING BRUSH

## (undated) DEVICE — PREP SOL POVIDONE/IODINE BT 4OZ

## (undated) DEVICE — GLV SURG SENSICARE LT W/ALOE PF LF 7 STRL

## (undated) DEVICE — APPL CHLORAPREP HI/LITE 26ML ORNG

## (undated) DEVICE — INTENDED FOR TISSUE SEPARATION, AND OTHER PROCEDURES THAT REQUIRE A SHARP SURGICAL BLADE TO PUNCTURE OR CUT.: Brand: BARD-PARKER ® STAINLESS STEEL BLADES

## (undated) DEVICE — SUT VIC 2/0 CT1 CR8 18IN J839D

## (undated) DEVICE — TRANSPOSAL ULTRAFLEX DUO/QUAD ULTRA CART MANIFOLD

## (undated) DEVICE — SAFELINER SUCTION CANISTER 1000CC: Brand: DEROYAL

## (undated) DEVICE — CANN NASL CO2 TRULINK W/O2 A/

## (undated) DEVICE — PK KN TOTL 90

## (undated) DEVICE — VIAL FORMLN CAP 10PCT 20ML

## (undated) DEVICE — IMMOB KN 3PNL DLX CANVS 16IN BLU

## (undated) DEVICE — WEBRIL* CAST PADDING: Brand: DEROYAL

## (undated) DEVICE — ADAPT CLN BIOGUARD AIR/H2O DISP

## (undated) DEVICE — SCRW HEX CORT HD 3.5X38MM
Type: IMPLANTABLE DEVICE | Site: KNEE | Status: NON-FUNCTIONAL
Removed: 2022-02-08

## (undated) DEVICE — SYR LUERLOK 20CC

## (undated) DEVICE — WRAP KNEE COLD THERAPY

## (undated) DEVICE — 3M™ STERI-DRAPE™ U-DRAPE 1015: Brand: STERI-DRAPE™

## (undated) DEVICE — FOAM BUMP ROUND LARGE: Brand: MEDLINE INDUSTRIES, INC.

## (undated) DEVICE — CEMENT MIXING SYSTEM WITH FEMORAL BREAKWAY NOZZLE: Brand: REVOLUTION

## (undated) DEVICE — GLV SURG NEOPRENE SENSICARE PF SZ7.5 LF STRL

## (undated) DEVICE — TRAP FLD MINIVAC MEGADYNE 100ML

## (undated) DEVICE — PK CYSTO 90

## (undated) DEVICE — GLV SURG SENSICARE PI MIC PF SZ7.5 LF STRL

## (undated) DEVICE — MASK,FACE,FLUID RESIST,SHLD,EARLOOP: Brand: MEDLINE

## (undated) DEVICE — INTENDED USE FOR SURGICAL MARKING ON INTACT SKIN, ALSO PROVIDES A PERMANENT METHOD OF IDENTIFYING OBJECTS IN THE OPERATING ROOM: Brand: WRITESITE® REGULAR TIP SKIN MARKER

## (undated) DEVICE — SCRW HEX PERSONA FML 2.5X25MM PK/2
Type: IMPLANTABLE DEVICE | Site: KNEE | Status: NON-FUNCTIONAL
Removed: 2022-02-08

## (undated) DEVICE — GLV SURG SENSICARE PI PF LF 7 GRN STRL

## (undated) DEVICE — DISPOSABLE TOURNIQUET CUFF SINGLE BLADDER, SINGLE PORT AND QUICK CONNECT CONNECTOR: Brand: COLOR CUFF

## (undated) DEVICE — VIAL FORMALIN CAP 10P 40ML

## (undated) DEVICE — SINGLE-USE BIOPSY FORCEPS: Brand: RADIAL JAW 4

## (undated) DEVICE — DUAL CUT SAGITTAL BLADE

## (undated) DEVICE — GLV SURG SENSICARE MICRO PF LF 7.5 STRL

## (undated) DEVICE — SUCTION CANISTER, 3000CC,SAFELINER: Brand: DEROYAL

## (undated) DEVICE — NITINOL WIRE WITH HYDROPHILIC TIP: Brand: SENSOR

## (undated) DEVICE — GOWN ISOL W/THUMB UNIV BLU BX/15

## (undated) DEVICE — PENCL E/S ULTRAVAC TELESCP NOSE HOLSTR 10FT

## (undated) DEVICE — GLV SURG SENSICARE PI LF PF 7.0

## (undated) DEVICE — FRAZIER SURGICAL SUCTION INSTRUMENT: Brand: ARGYLE

## (undated) DEVICE — CAP GUIDE PSI/SIGNATURE/I-ASSIST